# Patient Record
Sex: FEMALE | Race: WHITE | Employment: FULL TIME | URBAN - METROPOLITAN AREA
[De-identification: names, ages, dates, MRNs, and addresses within clinical notes are randomized per-mention and may not be internally consistent; named-entity substitution may affect disease eponyms.]

---

## 2020-01-20 ENCOUNTER — HOSPITAL ENCOUNTER (EMERGENCY)
Age: 40
Discharge: HOME | End: 2020-01-20
Attending: EMERGENCY MEDICINE
Payer: COMMERCIAL

## 2020-01-20 VITALS
WEIGHT: 110 LBS | BODY MASS INDEX: 21.6 KG/M2 | OXYGEN SATURATION: 98 % | HEIGHT: 60 IN | DIASTOLIC BLOOD PRESSURE: 81 MMHG | TEMPERATURE: 97.9 F | HEART RATE: 74 BPM | RESPIRATION RATE: 16 BRPM | SYSTOLIC BLOOD PRESSURE: 129 MMHG

## 2020-01-20 LAB
ALBUMIN SERPL-MCNC: 2.8 GM/DL (ref 3.4–5)
ALP BLD-CCNC: 115 IU/L (ref 40–129)
ALT SERPL-CCNC: 16 U/L (ref 10–40)
ANION GAP SERPL CALCULATED.3IONS-SCNC: 11 MMOL/L (ref 4–16)
AST SERPL-CCNC: 33 IU/L (ref 15–37)
BACTERIA: NEGATIVE /HPF
BASE EXCESS MIXED: ABNORMAL (ref 0–2.3)
BASOPHILS ABSOLUTE: 0.1 K/CU MM
BASOPHILS RELATIVE PERCENT: 1 % (ref 0–1)
BETA-HYDROXYBUTYRATE: 0.5 MG/DL (ref 0–3)
BILIRUB SERPL-MCNC: 0.1 MG/DL (ref 0–1)
BILIRUBIN URINE: NEGATIVE MG/DL
BLOOD, URINE: NEGATIVE
BUN BLDV-MCNC: 10 MG/DL (ref 6–23)
CALCIUM SERPL-MCNC: 8.5 MG/DL (ref 8.3–10.6)
CHLORIDE BLD-SCNC: 87 MMOL/L (ref 99–110)
CHP ED QC CHECK: NORMAL
CLARITY: CLEAR
CO2: 26 MMOL/L (ref 21–32)
COLOR: ABNORMAL
COMMENT: ABNORMAL
CREAT SERPL-MCNC: 1.1 MG/DL (ref 0.6–1.1)
DIFFERENTIAL TYPE: ABNORMAL
EOSINOPHILS ABSOLUTE: 0.3 K/CU MM
EOSINOPHILS RELATIVE PERCENT: 3.4 % (ref 0–3)
GFR AFRICAN AMERICAN: >60 ML/MIN/1.73M2
GFR NON-AFRICAN AMERICAN: 55 ML/MIN/1.73M2
GLUCOSE BLD-MCNC: 319 MG/DL
GLUCOSE BLD-MCNC: 319 MG/DL (ref 70–99)
GLUCOSE BLD-MCNC: 636 MG/DL (ref 70–99)
GLUCOSE, URINE: >500 MG/DL
HCO3 VENOUS: 29.7 MMOL/L (ref 19–25)
HCT VFR BLD CALC: 41.8 % (ref 37–47)
HEMOGLOBIN: 13.6 GM/DL (ref 12.5–16)
IMMATURE NEUTROPHIL %: 0.3 % (ref 0–0.43)
KETONES, URINE: NEGATIVE MG/DL
LEUKOCYTE ESTERASE, URINE: NEGATIVE
LYMPHOCYTES ABSOLUTE: 3 K/CU MM
LYMPHOCYTES RELATIVE PERCENT: 37.1 % (ref 24–44)
MCH RBC QN AUTO: 32.4 PG (ref 27–31)
MCHC RBC AUTO-ENTMCNC: 32.5 % (ref 32–36)
MCV RBC AUTO: 99.5 FL (ref 78–100)
MONOCYTES ABSOLUTE: 0.4 K/CU MM
MONOCYTES RELATIVE PERCENT: 5.3 % (ref 0–4)
MUCUS: ABNORMAL HPF
NITRITE URINE, QUANTITATIVE: NEGATIVE
NUCLEATED RBC %: 0 %
O2 SAT, VEN: 85.4 % (ref 50–70)
PCO2, VEN: 49 MMHG (ref 38–52)
PDW BLD-RTO: 11.8 % (ref 11.7–14.9)
PH VENOUS: 7.39 (ref 7.32–7.42)
PH, URINE: 6 (ref 5–8)
PLATELET # BLD: 370 K/CU MM (ref 140–440)
PMV BLD AUTO: 10.2 FL (ref 7.5–11.1)
PO2, VEN: 53 MMHG (ref 28–48)
POTASSIUM SERPL-SCNC: 4.2 MMOL/L (ref 3.5–5.1)
PROTEIN UA: 100 MG/DL
RBC # BLD: 4.2 M/CU MM (ref 4.2–5.4)
RBC URINE: <1 /HPF (ref 0–6)
SEGMENTED NEUTROPHILS ABSOLUTE COUNT: 4.2 K/CU MM
SEGMENTED NEUTROPHILS RELATIVE PERCENT: 52.9 % (ref 36–66)
SODIUM BLD-SCNC: 124 MMOL/L (ref 135–145)
SPECIFIC GRAVITY UA: 1.03 (ref 1–1.03)
SQUAMOUS EPITHELIAL: 2 /HPF
TOTAL IMMATURE NEUTOROPHIL: 0.02 K/CU MM
TOTAL NUCLEATED RBC: 0 K/CU MM
TOTAL PROTEIN: 6 GM/DL (ref 6.4–8.2)
TRICHOMONAS: ABNORMAL /HPF
TSH HIGH SENSITIVITY: 2.09 UIU/ML (ref 0.27–4.2)
UROBILINOGEN, URINE: NORMAL MG/DL (ref 0.2–1)
WBC # BLD: 8 K/CU MM (ref 4–10.5)
WBC UA: 1 /HPF (ref 0–5)

## 2020-01-20 PROCEDURE — 84443 ASSAY THYROID STIM HORMONE: CPT

## 2020-01-20 PROCEDURE — 82962 GLUCOSE BLOOD TEST: CPT

## 2020-01-20 PROCEDURE — 82010 KETONE BODYS QUAN: CPT

## 2020-01-20 PROCEDURE — 2580000003 HC RX 258: Performed by: PHYSICIAN ASSISTANT

## 2020-01-20 PROCEDURE — 96360 HYDRATION IV INFUSION INIT: CPT

## 2020-01-20 PROCEDURE — 82805 BLOOD GASES W/O2 SATURATION: CPT

## 2020-01-20 PROCEDURE — 2580000003 HC RX 258: Performed by: EMERGENCY MEDICINE

## 2020-01-20 PROCEDURE — 85025 COMPLETE CBC W/AUTO DIFF WBC: CPT

## 2020-01-20 PROCEDURE — 81001 URINALYSIS AUTO W/SCOPE: CPT

## 2020-01-20 PROCEDURE — 80053 COMPREHEN METABOLIC PANEL: CPT

## 2020-01-20 PROCEDURE — 36415 COLL VENOUS BLD VENIPUNCTURE: CPT

## 2020-01-20 PROCEDURE — 99284 EMERGENCY DEPT VISIT MOD MDM: CPT

## 2020-01-20 PROCEDURE — 96361 HYDRATE IV INFUSION ADD-ON: CPT

## 2020-01-20 RX ORDER — 0.9 % SODIUM CHLORIDE 0.9 %
1000 INTRAVENOUS SOLUTION INTRAVENOUS ONCE
Status: COMPLETED | OUTPATIENT
Start: 2020-01-20 | End: 2020-01-20

## 2020-01-20 RX ADMIN — SODIUM CHLORIDE 1000 ML: 9 INJECTION, SOLUTION INTRAVENOUS at 18:56

## 2020-01-20 RX ADMIN — SODIUM CHLORIDE 1000 ML: 9 INJECTION, SOLUTION INTRAVENOUS at 17:40

## 2020-01-20 ASSESSMENT — PAIN SCALES - GENERAL: PAINLEVEL_OUTOF10: 5

## 2020-01-20 ASSESSMENT — PAIN DESCRIPTION - PAIN TYPE: TYPE: CHRONIC PAIN

## 2020-01-20 NOTE — ED PROVIDER NOTES
As provider-in-triage, I performed a medical screening history and physical exam on this patient. HISTORY OF PRESENT ILLNESS  Valeriano Ordoñez is a 35 y.o. female who presents under direction of the lab after finding her blood sugar to be over 700 on lab draw this morning. Patient was obtaining lab work per primary care provider's order for further evaluation of gradual weight loss. Patient denies any associated symptoms. She is currently on Tresiba and has been compliant with this medication with no recent change in doses. She does not check BS at home. PHYSICAL EXAM  BP (!) 143/87   Pulse 83   Temp 97.9 °F (36.6 °C) (Oral)   Resp 17   Ht 5' (1.524 m)   Wt 110 lb (49.9 kg)   SpO2 98%   BMI 21.48 kg/m²     On exam, the patient appears well-hydrated, well-nourished, and in no acute distress. Mucous membranes are moist. Speech is clear. Breathing is unlabored. Skin is dry. Mental status is normal. Moves all extremities, and is without facial droop. Diagnostics and treatment initiated. Patient moved to main ED. Comment: Please note this report has been produced using speech recognition software and may contain errors related to that system including errors in grammar, punctuation, and spelling, as well as words and phrases that may be inappropriate. If there are any questions or concerns please feel free to contact the dictating provider for clarification.         YANNI Reddy  01/20/20 8938

## 2020-01-20 NOTE — ED PROVIDER NOTES
eMERGENCY dEPARTMENT eNCOUnter      PCP: Lakeshia Bernal MD    CHIEF COMPLAINT    Chief Complaint   Patient presents with    Hyperglycemia     >700 out pt blood work. HPI    Alex Stevenson is a 35 y.o. female who presents with hyperglycemia. She states that she had been noncompliant with her Robert Asters for the last few months. She states that she was having issues with the medication, having highs and lows, thus did stop taking it consistently. She reports that she finally followed up with her family doctor today and had blood work done. She states she was called this afternoon and told that her blood glucose was running in the 700s and she needed to go to the emergency department for evaluation. She states she has had intermittent polyuria and polydipsia. She reports lack of appetite and weight loss. She states she had been depressed and started taking antidepressants today which were started by her primary care physician. REVIEW OF SYSTEMS    Constitutional:  Denies fever, chills. HENT:  Denies sore throat or ear pain   Cardiovascular:  Denies chest pain, palpitations   Respiratory:  Denies cough or shortness of breath    GI:  Denies abdominal pain, nausea, vomiting, or diarrhea  :  + urinary symptoms, denies flank pain  Musculoskeletal:  Denies back pain, extremity pain  Skin:  Denies rash, color change  Neurologic:  Denies headache, focal weakness or sensory changes   Lymphatic:  Denies swollen glands, edema    All other review of systems are negative  See HPI and nursing notes for additional information     PAST MEDICAL AND SURGICAL HISTORY    Past Medical History:   Diagnosis Date    Anxiety 11/12    Has d/t stress & crowds per pt.     CAD in native artery 11/20/2012    NSTEMI    CAD S/P percutaneous coronary angioplasty 11/20/2012    PTCA with 2.75 x 22mm Resolute Integrity ILSA in mid RCA- Dr Interiano Friends tunnel syndrome, bilateral     Mild to moderate on right and mild on left.    Chronic low back pain     Cocaine abuse ([facility] [address]     per ERLIDNA Fernando Chart    Depression     DM type 1 (diabetes mellitus, type 1) ([facility] [address] [facility]     H/O cardiac catheterization     H/O echocardiogram 2012-Normal chamber size. Mild regional wall motion abnormalities with normal LV systolic, but abnormal diastolic function. LVEF 50-55%    H/O tubal ligation     History of cholecystectomy     History of complete ECG 2012    Marijuana use     Per ERLINDA Lopez chart    Neuropathy     Panic attacks     H/O after she had MI .  Retinopathy     Seasonal allergies      Past Surgical History:   Procedure Laterality Date     SECTION      CHOLECYSTECTOMY      CORONARY ANGIOPLASTY WITH STENT PLACEMENT  2012    PTCA with 2.75 x 22mm Resollute Integrity ILSA to mid RCA    DIAGNOSTIC CARDIAC CATH LAB PROCEDURE  2012    TUBAL LIGATION         CURRENT MEDICATIONS    Current Outpatient Rx   Medication Sig Dispense Refill    famotidine (PEPCID) 20 MG tablet Take 1 tablet by mouth 2 times daily. 60 tablet 0    sertraline (ZOLOFT) 50 MG tablet Take 50 mg by mouth daily.  hydrOXYzine (VISTARIL) 50 MG capsule Take 50 mg by mouth 3 times daily as needed for Itching.  carvedilol (COREG) 3.125 MG tablet TAKE 1 TABLET TWICE A DAY WITH MEALS 60 tablet 6    aspirin 81 MG tablet Take 81 mg by mouth daily.  insulin glargine (LANTUS) 100 UNIT/ML injection Inject 16 Units into the skin nightly.  insulin aspart (NOVOLOG) 100 UNIT/ML injection Inject  into the skin 3 times daily (before meals). **As per SS.**      nitroGLYCERIN (NITROSTAT) 0.4 MG SL tablet Place 1 tablet under the tongue every 5 minutes as needed for Chest pain. 25 tablet 3       ALLERGIES    Allergies   Allergen Reactions    Tramadol Hives    Vicodin [Hydrocodone-Acetaminophen] Nausea And Vomiting     Pt reports tolerates now.        SOCIAL AND FAMILY HISTORY    Social History Socioeconomic History    Marital status:      Spouse name: None    Number of children: None    Years of education: None    Highest education level: None   Occupational History    None   Social Needs    Financial resource strain: None    Food insecurity:     Worry: None     Inability: None    Transportation needs:     Medical: None     Non-medical: None   Tobacco Use    Smoking status: Current Some Day Smoker     Packs/day: 0.50     Years: 16.00     Pack years: 8.00     Types: Cigarettes     Start date: 12/1/2013    Smokeless tobacco: Never Used    Tobacco comment: . Substance and Sexual Activity    Alcohol use: No     Comment: 2/month    Drug use: Yes     Types: Cocaine, Marijuana     Comment[facility]    Sexual activity: Not Currently     Partners: Male     Comment:    Lifestyle    Physical activity:     Days per week: None     Minutes per session: None    Stress: None   Relationships    Social connections:     Talks on phone: None     Gets together: None     Attends Episcopal service: None     Active member of club or organization: None     Attends meetings of clubs or organizations: None     Relationship status: None    Intimate partner violence:     Fear of current or ex partner: None     Emotionally abused: None     Physically abused: None     Forced sexual activity: None   Other Topics Concern    None   Social History Narrative    None     Family History   Problem Relation Age of Onset    Heart Disease Mother     Diabetes Father          PHYSICAL EXAM    VITAL SIGNS: BP (!) 143/111   Pulse 73   Temp 97.9 °F (36.6 °C) (Oral)   Resp 18   Ht 5' (1.524 m)   Wt 110 lb (49.9 kg)   LMP 04/18/2015   SpO2 99%   BMI 21.48 kg/m²    Constitutional:  Well developed, No acute distress. HENT:  Normocephalic, Atraumatic, PERRL. EOMI. Sclera clear. Conjunctiva normal.  Neck: supple without ridigity  Cardiovascular:  Regular rate and rhythm, No normal.  She was given 2 L of IV fluids, glucose is responding. She is well-appearing, nontoxic. Plan for discharge home with outpatient follow-up with her primary care physician for discussion of her diabetic medications. She is instructed return with new or worsening signs or symptoms. She voiced understanding of the discharge directions and the return precautions. Due to the immediate potential for life-threatening deterioration due to hyperglycemia, I spent 40 minutes providing critical care. This time is excluding time spent performing procedures.     Clinical  IMPRESSION    Hyperglycemia           (Please note the MDM and HPI sections of this note were completed with a voice recognition program.  Efforts were made to edit the dictations but occasionally words are mis-transcribed.)      Kimmy Dillard, DO  01/20/20 2044

## 2021-05-14 PROBLEM — R80.9 MICROALBUMINURIA DUE TO TYPE 2 DIABETES MELLITUS (HCC): Status: ACTIVE | Noted: 2021-05-14

## 2021-05-14 PROBLEM — R80.9 MICROALBUMINURIA: Status: ACTIVE | Noted: 2021-05-14

## 2021-05-14 PROBLEM — E11.29 MICROALBUMINURIA DUE TO TYPE 2 DIABETES MELLITUS (HCC): Status: ACTIVE | Noted: 2021-05-14

## 2021-05-14 PROBLEM — I63.81 LACUNAR CEREBROVASCULAR ACCIDENT (CVA) OF SUBTHALAMIC REGION (HCC): Status: ACTIVE | Noted: 2021-05-14

## 2021-05-14 PROBLEM — N18.30 CHRONIC KIDNEY DISEASE, STAGE III (MODERATE) (HCC): Status: ACTIVE | Noted: 2021-05-14

## 2021-07-12 ENCOUNTER — HOSPITAL ENCOUNTER (OUTPATIENT)
Dept: ULTRASOUND IMAGING | Age: 41
Discharge: HOME | End: 2021-07-12
Payer: COMMERCIAL

## 2021-07-12 DIAGNOSIS — N18.31 STAGE 3A CHRONIC KIDNEY DISEASE (HCC): ICD-10-CM

## 2021-07-12 DIAGNOSIS — E11.29 MICROALBUMINURIA DUE TO TYPE 2 DIABETES MELLITUS (HCC): ICD-10-CM

## 2021-07-12 DIAGNOSIS — R80.9 MICROALBUMINURIA DUE TO TYPE 2 DIABETES MELLITUS (HCC): ICD-10-CM

## 2021-07-12 PROCEDURE — 76775 US EXAM ABDO BACK WALL LIM: CPT

## 2021-08-16 ENCOUNTER — APPOINTMENT (OUTPATIENT)
Dept: GENERAL RADIOLOGY | Age: 41
End: 2021-08-16
Payer: COMMERCIAL

## 2021-08-16 ENCOUNTER — HOSPITAL ENCOUNTER (EMERGENCY)
Age: 41
Discharge: HOME | End: 2021-08-16
Attending: EMERGENCY MEDICINE
Payer: COMMERCIAL

## 2021-08-16 VITALS
RESPIRATION RATE: 19 BRPM | TEMPERATURE: 98.2 F | HEART RATE: 62 BPM | WEIGHT: 130 LBS | DIASTOLIC BLOOD PRESSURE: 100 MMHG | BODY MASS INDEX: 25.52 KG/M2 | SYSTOLIC BLOOD PRESSURE: 124 MMHG | HEIGHT: 60 IN | OXYGEN SATURATION: 98 %

## 2021-08-16 DIAGNOSIS — R53.83 FATIGUE, UNSPECIFIED TYPE: Primary | ICD-10-CM

## 2021-08-16 DIAGNOSIS — E87.5 HYPERKALEMIA: ICD-10-CM

## 2021-08-16 DIAGNOSIS — N17.9 AKI (ACUTE KIDNEY INJURY) (HCC): ICD-10-CM

## 2021-08-16 LAB
ALBUMIN SERPL-MCNC: 3.6 GM/DL (ref 3.4–5)
ALP BLD-CCNC: 97 IU/L (ref 40–129)
ALT SERPL-CCNC: 15 U/L (ref 10–40)
ANION GAP SERPL CALCULATED.3IONS-SCNC: 9 MMOL/L (ref 4–16)
AST SERPL-CCNC: 32 IU/L (ref 15–37)
BACTERIA: ABNORMAL /HPF
BASOPHILS ABSOLUTE: 0.1 K/CU MM
BASOPHILS RELATIVE PERCENT: 1.2 % (ref 0–1)
BILIRUB SERPL-MCNC: 0.3 MG/DL (ref 0–1)
BILIRUBIN URINE: NEGATIVE MG/DL
BLOOD, URINE: NEGATIVE
BUN BLDV-MCNC: 23 MG/DL (ref 6–23)
CALCIUM SERPL-MCNC: 9.1 MG/DL (ref 8.3–10.6)
CHLORIDE BLD-SCNC: 103 MMOL/L (ref 99–110)
CHP ED QC CHECK: YES
CLARITY: ABNORMAL
CO2: 24 MMOL/L (ref 21–32)
COLOR: YELLOW
CREAT SERPL-MCNC: 1.8 MG/DL (ref 0.6–1.1)
DIFFERENTIAL TYPE: ABNORMAL
EKG ATRIAL RATE: 92 BPM
EKG DIAGNOSIS: NORMAL
EKG P AXIS: 75 DEGREES
EKG P-R INTERVAL: 132 MS
EKG Q-T INTERVAL: 354 MS
EKG QRS DURATION: 68 MS
EKG QTC CALCULATION (BAZETT): 437 MS
EKG R AXIS: -16 DEGREES
EKG T AXIS: 97 DEGREES
EKG VENTRICULAR RATE: 92 BPM
EOSINOPHILS ABSOLUTE: 0.5 K/CU MM
EOSINOPHILS RELATIVE PERCENT: 6.8 % (ref 0–3)
GFR AFRICAN AMERICAN: 37 ML/MIN/1.73M2
GFR NON-AFRICAN AMERICAN: 31 ML/MIN/1.73M2
GLUCOSE BLD-MCNC: 163 MG/DL
GLUCOSE BLD-MCNC: 163 MG/DL (ref 70–99)
GLUCOSE, URINE: 50 MG/DL
HCT VFR BLD CALC: 39.6 % (ref 37–47)
HEMOGLOBIN: 12.7 GM/DL (ref 12.5–16)
IMMATURE NEUTROPHIL %: 0.3 % (ref 0–0.43)
KETONES, URINE: NEGATIVE MG/DL
LEUKOCYTE ESTERASE, URINE: NEGATIVE
LYMPHOCYTES ABSOLUTE: 2.1 K/CU MM
LYMPHOCYTES RELATIVE PERCENT: 27.8 % (ref 24–44)
MCH RBC QN AUTO: 31.9 PG (ref 27–31)
MCHC RBC AUTO-ENTMCNC: 32.1 % (ref 32–36)
MCV RBC AUTO: 99.5 FL (ref 78–100)
MONOCYTES ABSOLUTE: 0.5 K/CU MM
MONOCYTES RELATIVE PERCENT: 6 % (ref 0–4)
MUCUS: ABNORMAL HPF
NITRITE URINE, QUANTITATIVE: NEGATIVE
NUCLEATED RBC %: 0 %
PDW BLD-RTO: 12.2 % (ref 11.7–14.9)
PH, URINE: 5 (ref 5–8)
PLATELET # BLD: 413 K/CU MM (ref 140–440)
PMV BLD AUTO: 9.4 FL (ref 7.5–11.1)
POTASSIUM SERPL-SCNC: 5.6 MMOL/L (ref 3.5–5.1)
PROTEIN UA: >500 MG/DL
RBC # BLD: 3.98 M/CU MM (ref 4.2–5.4)
RBC URINE: 4 /HPF (ref 0–6)
SARS-COV-2, NAAT: NOT DETECTED
SEGMENTED NEUTROPHILS ABSOLUTE COUNT: 4.5 K/CU MM
SEGMENTED NEUTROPHILS RELATIVE PERCENT: 57.9 % (ref 36–66)
SODIUM BLD-SCNC: 136 MMOL/L (ref 135–145)
SPECIFIC GRAVITY UA: 1.01 (ref 1–1.03)
SQUAMOUS EPITHELIAL: 31 /HPF
TOTAL IMMATURE NEUTOROPHIL: 0.02 K/CU MM
TOTAL NUCLEATED RBC: 0 K/CU MM
TOTAL PROTEIN: 7.1 GM/DL (ref 6.4–8.2)
TRICHOMONAS: ABNORMAL /HPF
TROPONIN T: <0.01 NG/ML
UROBILINOGEN, URINE: NEGATIVE MG/DL (ref 0.2–1)
WBC # BLD: 7.7 K/CU MM (ref 4–10.5)
WBC UA: 4 /HPF (ref 0–5)

## 2021-08-16 PROCEDURE — 85025 COMPLETE CBC W/AUTO DIFF WBC: CPT

## 2021-08-16 PROCEDURE — 96360 HYDRATION IV INFUSION INIT: CPT

## 2021-08-16 PROCEDURE — 87635 SARS-COV-2 COVID-19 AMP PRB: CPT

## 2021-08-16 PROCEDURE — 99284 EMERGENCY DEPT VISIT MOD MDM: CPT

## 2021-08-16 PROCEDURE — 81001 URINALYSIS AUTO W/SCOPE: CPT

## 2021-08-16 PROCEDURE — 80053 COMPREHEN METABOLIC PANEL: CPT

## 2021-08-16 PROCEDURE — 93010 ELECTROCARDIOGRAM REPORT: CPT | Performed by: INTERNAL MEDICINE

## 2021-08-16 PROCEDURE — 6370000000 HC RX 637 (ALT 250 FOR IP): Performed by: EMERGENCY MEDICINE

## 2021-08-16 PROCEDURE — 84484 ASSAY OF TROPONIN QUANT: CPT

## 2021-08-16 PROCEDURE — 71045 X-RAY EXAM CHEST 1 VIEW: CPT

## 2021-08-16 PROCEDURE — 93005 ELECTROCARDIOGRAM TRACING: CPT | Performed by: PHYSICIAN ASSISTANT

## 2021-08-16 PROCEDURE — 2580000003 HC RX 258: Performed by: EMERGENCY MEDICINE

## 2021-08-16 RX ORDER — 0.9 % SODIUM CHLORIDE 0.9 %
1000 INTRAVENOUS SOLUTION INTRAVENOUS ONCE
Status: COMPLETED | OUTPATIENT
Start: 2021-08-16 | End: 2021-08-16

## 2021-08-16 RX ORDER — ONDANSETRON 2 MG/ML
4 INJECTION INTRAMUSCULAR; INTRAVENOUS EVERY 30 MIN PRN
Status: DISCONTINUED | OUTPATIENT
Start: 2021-08-16 | End: 2021-08-16 | Stop reason: HOSPADM

## 2021-08-16 RX ORDER — HYDROCODONE BITARTRATE AND ACETAMINOPHEN 5; 325 MG/1; MG/1
1 TABLET ORAL ONCE
Status: COMPLETED | OUTPATIENT
Start: 2021-08-16 | End: 2021-08-16

## 2021-08-16 RX ORDER — ASPIRIN 81 MG/1
324 TABLET, CHEWABLE ORAL ONCE
Status: COMPLETED | OUTPATIENT
Start: 2021-08-16 | End: 2021-08-16

## 2021-08-16 RX ORDER — SODIUM POLYSTYRENE SULFONATE 15 G/60ML
15 SUSPENSION ORAL; RECTAL ONCE
Status: COMPLETED | OUTPATIENT
Start: 2021-08-16 | End: 2021-08-16

## 2021-08-16 RX ADMIN — HYDROCODONE BITARTRATE AND ACETAMINOPHEN 1 TABLET: 5; 325 TABLET ORAL at 07:40

## 2021-08-16 RX ADMIN — ASPIRIN 324 MG: 81 TABLET, CHEWABLE ORAL at 07:40

## 2021-08-16 RX ADMIN — SODIUM CHLORIDE 1000 ML: 9 INJECTION, SOLUTION INTRAVENOUS at 08:14

## 2021-08-16 RX ADMIN — SODIUM POLYSTYRENE SULFONATE 15 G: 15 SUSPENSION ORAL; RECTAL at 08:14

## 2021-08-16 ASSESSMENT — PAIN SCALES - GENERAL
PAINLEVEL_OUTOF10: 8
PAINLEVEL_OUTOF10: 8

## 2021-08-16 NOTE — ED PROVIDER NOTES
Emergency Department Encounter    Patient: Blanca Del Angel  MRN: [id number]  : 1980  Date of Evaluation: 2021  ED Provider:  Uday Terrazas MD    Triage Chief Complaint:   Chest Pain and Back Pain    [place]:  Blanca Del Angel is a 42 y.o. female that presents with concern for a week of fatigue, nausea, generally feeling unwell, not wanting to eat. Has some chest tightness, cough. No shortness of breath. She has some lower back pain, bilateral, not midline. It is 8 out of 10. No weakness or numbness in her extremities. No incontinence. She has had no trauma or injury. No known fevers. No diarrhea. She does not know if she could've been exposed to Covid. She is not vaccinated. She has a history of type 1 diabetes. She also has a history of CAD status post stent in . She denies previous back problems, though chronic low back pain is documented in her chart. Reports a stroke in November when she was in [place], affected her left side, will still occasionally have some tingling in the left leg but otherwise completely back to normal.  She is not have any radiation of the pain from her back into her legs. Denies rashes and joint swelling. She is a type I diabetic, blood sugars have been running into the 80s and 90s, been normal for her. She is a diabetic. ROS - see HPI, below listed is current ROS at time of my eval:  10 systems reviewed and negative except as above    Past Medical History:   Diagnosis Date    Anxiety     Has d/t stress & crowds per pt.  CAD in native artery 2012    NSTEMI    CAD S/P percutaneous coronary angioplasty 2012    PTCA with 2.75 x 22mm Resolute Integrity ILSA in mid RCA- Dr Harrell Mood tunnel syndrome, bilateral     Mild to moderate on right and mild on left.  Chronic low back pain     Cocaine abuse ([facility] [address]     per T.  Manuel Parsons Chart    Depression     DM type 1 (diabetes mellitus, type 1) ([facility] [address]     H/O cardiac catheterization     H/O echocardiogram 2012-Normal chamber size. Mild regional wall motion abnormalities with normal LV systolic, but abnormal diastolic function. LVEF 50-55%    H/O tubal ligation     History of cholecystectomy     History of complete ECG 2012    Marijuana use     Per ERLINDA harris    Neuropathy     Panic attacks     H/O after she had MI .  Retinopathy     Seasonal allergies      Past Surgical History:   Procedure Laterality Date     SECTION      CHOLECYSTECTOMY      CORONARY ANGIOPLASTY WITH STENT PLACEMENT  2012    PTCA with 2.75 x 22mm Resollute Integrity ILSA to mid RCA    DIAGNOSTIC CARDIAC CATH LAB PROCEDURE  2012    TUBAL LIGATION       Family History   Problem Relation Age of Onset    Heart Disease Mother     Diabetes Father      Social History     Socioeconomic History    Marital status:      Spouse name: Not on file    Number of children: Not on file    Years of education: Not on file    Highest education level: Not on file   Occupational History    Not on file   Tobacco Use    Smoking status: Current Some Day Smoker     Packs/day: 0.50     Years: 16.00     Pack years: 8.00     Types: Cigarettes     Start date: 2013    Smokeless tobacco: Never Used    Tobacco comment: . Substance and Sexual Activity    Alcohol use: No     Comment: 2/month    Drug use: Yes     Types: Cocaine, Marijuana     Comment: per [facility] chart    Sexual activity: Not Currently     Partners: Male     Comment:    Other Topics Concern    Not on file   Social History Narrative    Not on file     Social Determinants of Health     Financial Resource Strain:     Difficulty of Paying Living Expenses:    Food Insecurity:     Worried About Running Out of Food in the Last Year:     [address] in the Last Year:    Transportation Needs:     Lack of Transportation (Medical):      Lack of Transportation (Non-Medical):    Physical Activity:     Days of Exercise per Week:     Minutes of Exercise per Session:    Stress:     Feeling of Stress :    Social Connections:     Frequency of Communication with Friends and Family:     Frequency of Social Gatherings with Friends and Family:     Attends Judaism Services:     Active Member of Clubs or Organizations:     Attends Club or Organization Meetings:     Marital Status:    Intimate Partner Violence:     Fear of Current or Ex-Partner:     Emotionally Abused:     Physically Abused:     Sexually Abused:      Current Facility-Administered Medications   Medication Dose Route Frequency Provider Last Rate Last Admin    ondansetron (ZOFRAN) injection 4 mg  4 mg Intravenous Q30 Min PRN De Aquino MD         Current Outpatient Medications   Medication Sig Dispense Refill    VITAMIN D PO Take 1 capsule by mouth daily      citalopram (CELEXA) 40 MG tablet Take 40 mg by mouth daily      Insulin Degludec (TRESIBA SC) Inject 15 Units into the skin      rosuvastatin (CRESTOR) 10 MG tablet Take 10 mg by mouth daily      aspirin 81 MG tablet Take 81 mg by mouth daily. Allergies   Allergen Reactions    Tramadol Hives       Nursing Notes Reviewed    Physical Exam:  Triage VS:    ED Triage Vitals [08/16/21 0700]   Enc Vitals Group      BP (!) 142/87      Pulse 93      Resp 18      Temp 98.2 °F (36.8 °C)      Temp Source Oral      SpO2 98 %      Weight 130 lb (59 kg)      Height 5' (1.524 m)      Head Circumference       Peak Flow       Pain Score       Pain Loc       Pain Edu? Excl. in [address]? My pulse ox interpretation is - normal    General appearance:  No acute distress. Skin:  Warm. Dry. Eye:  Extraocular movements intact. Ears, nose, mouth and throat:  Oral mucosa moist   Neck:  Trachea midline. Extremity:  No swelling. Normal ROM     Heart:  Regular rate and rhythm, normal S1 & S2, no extra heart sounds.     Perfusion: intact  Respiratory:  Lungs clear to auscultation bilaterally. Respirations nonlabored. Abdominal:   Soft. Nontender. Non distended. Back: No midline T or L-spine tenderness, no step-offs or deformities. Tender to palpation over bilateral lumbar paraspinal muscles, with reproduction of her pain. Neurological:  Alert and oriented, normal gait, strength 5 and 5 in bilateral lower extremities, sensation intact light touch in bilateral lower extremities, no saddle anesthesia. Deep tendon reflexes intact at bilateral patella. Negative straight leg raise.           Psychiatric:  Appropriate    I have reviewed and interpreted all of the currently available lab results from this visit (if applicable):  Results for orders placed or performed during the hospital encounter of 08/16/21   COVID-19, Rapid    Specimen: Nasopharyngeal   Result Value Ref Range    SARS-CoV-2, NAAT NOT DETECTED NOT DETECTED   CBC Auto Differential   Result Value Ref Range    WBC 7.7 4.0 - 10.5 K/CU MM    RBC 3.98 (L) 4.2 - 5.4 M/CU MM    Hemoglobin 12.7 12.5 - 16.0 GM/DL    Hematocrit 39.6 37 - 47 %    MCV 99.5 78 - 100 FL    MCH 31.9 (H) 27 - 31 PG    MCHC 32.1 32.0 - 36.0 %    RDW 12.2 11.7 - 14.9 %    Platelets 689 181 - 513 K/CU MM    MPV 9.4 7.5 - 11.1 FL    Differential Type AUTOMATED DIFFERENTIAL     Segs Relative 57.9 36 - 66 %    Lymphocytes % 27.8 24 - 44 %    Monocytes % 6.0 (H) 0 - 4 %    Eosinophils % 6.8 (H) 0 - 3 %    Basophils % 1.2 (H) 0 - 1 %    Segs Absolute 4.5 K/CU MM    Lymphocytes Absolute 2.1 K/CU MM    Monocytes Absolute 0.5 K/CU MM    Eosinophils Absolute 0.5 K/CU MM    Basophils Absolute 0.1 K/CU MM    Nucleated RBC % 0.0 %    Total Nucleated RBC 0.0 K/CU MM    Total Immature Neutrophil 0.02 K/CU MM    Immature Neutrophil % 0.3 0 - 0.43 %   Comprehensive Metabolic Panel   Result Value Ref Range    Sodium 136 135 - 145 MMOL/L    Potassium 5.6 (HH) 3.5 - 5.1 MMOL/L    Chloride 103 99 - 110 mMol/L    CO2 24 21 - 32 No ST elevation. No previous compare. MDM:  59-year-old female with history as above presents with multiple complaints, has been feeling ill for a week, fatigue, cough, nausea, no appetite, chest tightness, low back pain. Neurologic exam is unremarkable, no trauma, no fevers. Her symptoms are suggestive of likely some sort of viral illness, she does have cardiac history as above, EKG obtained as above. Troponin added to her labs. I did discuss with her plan to test for COVID-19 and she is agreeable to this. We will also do labs, chest x-ray and reevaluate. I have ordered a dose of pain and nausea medication in addition to a full dose aspirin. Her back pain I do not believe warrants emergent imaging, she has no red flags on history or exam to suggest cauda equina, or traumatic injury, or other emergent cause of her symptoms, appears to be more musculoskeletal.    Found to have Cr slightly elevated with K 5.6, given fluids and kayexalate, other labs reassuring- no acidosis, covid negative. Urinalysis with >500 protein. Discussed with her nephrologist- Dr. Rick Ivey. Plan to hold her lisinopril and f/ui with him next week for BMP, f/u with her PCP as well. Patient agreeable to plan, given strict return precautions, discharged in stable condition. Clinical Impression:  1. Fatigue, unspecified type    2. CRISTIANA (acute kidney injury) ([facility] [address]    3.  Hyperkalemia      Disposition referral (if applicable):  Dangelo Cardenas MD  [address] [facility] [id number]  Adult Hypertension and  [address] [place] [address]  [phone]      for BMP/labs next week    Saurav Savage MD  [address]  [phone]    Schedule an appointment as soon as possible for a visit       [facility] Emergency Department  [address] [facility] [phone]    If symptoms worsen    Disposition medications (if applicable):  Discharge Medication List as of 8/16/2021 10:33 AM        ED Provider Disposition Time  DISPOSITION Decision To Discharge 08/16/2021 10:31:31 AM      Comment: Please note this report has been produced using speech recognition software and may contain errors related to that system including errors in grammar, punctuation, and spelling, as well as words and phrases that may be inappropriate. Efforts were made to edit the dictations.         Alicia Bush MD  08/16/21 2342

## 2021-08-16 NOTE — ED NOTES
Discharge instructions reviewed with patient. follow up was discussed.  Patient denies further questions and verbalizes understanding     Varsha RN  08/16/21 5472

## 2021-08-16 NOTE — PROGRESS NOTES
Medication History  [facility]    Patient Name: Alfredo Nugent 1980     Medication history has been completed by: Lizandro Marquez CPhT    Source(s) of information: patient and insurance claims     Primary Care Physician: Lonnie Soto MD     Pharmacy: [facility]    Allergies as of 08/16/2021 - Fully Reviewed 08/16/2021   Allergen Reaction Noted    Tramadol Hives 07/22/2014        Prior to Admission medications    Medication Sig Start Date End Date Taking? Authorizing Provider   VITAMIN D PO Take 1 capsule by mouth daily   Yes Historical Provider, MD   citalopram (CELEXA) 40 MG tablet Take 40 mg by mouth daily   Yes Historical Provider, MD   Insulin Degludec (TRESIBA SC) Inject 15 Units into the skin   Yes Historical Provider, MD   lisinopril (PRINIVIL;ZESTRIL) 2.5 MG tablet Take 2.5 mg by mouth daily   Yes Historical Provider, MD   rosuvastatin (CRESTOR) 10 MG tablet Take 10 mg by mouth daily   Yes Historical Provider, MD   aspirin 81 MG tablet Take 81 mg by mouth daily. Yes Historical Provider, MD     Medications added or changed (ex. new medication, dosage change, interval change, formulation change):  Vitamin d (added)    Comments:  Medication list reviewed with patient and insurance claims verified. Insulin dosing updated as patient uses [place] 15 units q am.  Patient reports she has taken medications for today.     To my knowledge the above medication history is accurate as of 8/16/2021 9:25 AM.   Lizandro Marquez CPhT   8/16/2021 9:25 AM

## 2021-08-18 PROBLEM — N17.9 AKI (ACUTE KIDNEY INJURY) (HCC): Status: ACTIVE | Noted: 2021-08-18

## 2021-08-18 PROBLEM — E87.5 HYPERPOTASSEMIA: Status: ACTIVE | Noted: 2021-08-18

## 2021-10-25 ENCOUNTER — INITIAL CONSULT (OUTPATIENT)
Dept: CARDIOLOGY CLINIC | Age: 41
End: 2021-10-25
Payer: COMMERCIAL

## 2021-10-25 VITALS
SYSTOLIC BLOOD PRESSURE: 150 MMHG | DIASTOLIC BLOOD PRESSURE: 86 MMHG | HEART RATE: 80 BPM | HEIGHT: 60 IN | WEIGHT: 135.6 LBS | BODY MASS INDEX: 26.62 KG/M2

## 2021-10-25 DIAGNOSIS — I25.10 CAD S/P PERCUTANEOUS CORONARY ANGIOPLASTY: Primary | ICD-10-CM

## 2021-10-25 DIAGNOSIS — I21.4 NSTEMI (NON-ST ELEVATED MYOCARDIAL INFARCTION) (HCC): ICD-10-CM

## 2021-10-25 DIAGNOSIS — Z98.61 CAD S/P PERCUTANEOUS CORONARY ANGIOPLASTY: Primary | ICD-10-CM

## 2021-10-25 DIAGNOSIS — R07.9 CHEST PAIN, UNSPECIFIED TYPE: ICD-10-CM

## 2021-10-25 DIAGNOSIS — E10.59 TYPE 1 DIABETES MELLITUS WITH OTHER CIRCULATORY COMPLICATION (HCC): ICD-10-CM

## 2021-10-25 DIAGNOSIS — I25.10 CAD IN NATIVE ARTERY: ICD-10-CM

## 2021-10-25 PROCEDURE — 93000 ELECTROCARDIOGRAM COMPLETE: CPT | Performed by: INTERNAL MEDICINE

## 2021-10-25 PROCEDURE — 99204 OFFICE O/P NEW MOD 45 MIN: CPT | Performed by: INTERNAL MEDICINE

## 2021-10-25 RX ORDER — METOPROLOL TARTRATE 50 MG/1
50 TABLET, FILM COATED ORAL 2 TIMES DAILY
Qty: 60 TABLET | Refills: 5 | Status: SHIPPED | OUTPATIENT
Start: 2021-10-25 | End: 2022-05-26

## 2021-10-25 NOTE — LETTER
10/25/2021 @[facility]  Patient Name: Tyron Hidalgo  : 1980  MRN# [id number]    REASON FOR VISIT:consult/chest pain  Cardiology Problems  NSTEMI (non-ST elevated myocardial infarction) ([facility] [address]  CAD in native artery  CAD S/P percutaneous coronary angioplasty  Chest pain     Other  Nausea & vomiting  DM type 1 (diabetes mellitus, type 1) ([facility])  Chronic kidney disease, stage III (moderate) ([facility])  Microalbuminuria due to type 2 diabetes mellitus ([facility])  Microalbuminuria  Lacunar cerebrovascular accident (CVA) of subthalamic region ([facility])  Hyperpotassemia    Current Outpatient Medications   Medication Sig Dispense Refill    chlorthalidone (HYGROTON) 25 MG tablet Take 1 tablet by mouth daily 30 tablet 3    amLODIPine (NORVASC) 5 MG tablet Take 1 tablet by mouth daily 30 tablet 2    VITAMIN D PO Take 1 capsule by mouth daily      citalopram (CELEXA) 40 MG tablet Take 40 mg by mouth daily      Insulin Degludec (TRESIBA SC) Inject 15 Units into the skin      rosuvastatin (CRESTOR) 10 MG tablet Take 10 mg by mouth daily      aspirin 81 MG tablet Take 81 mg by mouth daily. No current facility-administered medications for this visit. Smoke: What:                           How much:    Alcohol: How Much:     Caffeine: Pop:         Tea:            Coffee:                Chocolate:    Exercise:    Labs: Lipids:             CBC:       BMP/CMP:          TSH:              A1C:    Last Visit:  Complaints:  Changes:    Last EKG:      STRESS TEST:    This is a Lexiscan nuclear stress test using technetium-based sestamibi. The  patient wa s injected with 10.1 mCi of sestamibi for resting imaging and  myocardial perfusion SPECT was obtained. Subsequently patient was brought to  stress lab and injected with 0.4 mg Lexiscan bolus over 30 seconds, and then  32 mCi of sestamibi for stress imaging.   Baseline EKG showed sinus rhythm  without any EKG changes of ischemia, and during peak exercise there is no  evidence of ischemia. Five-minutes post-Lexiscan blood pressure was 131/77,  pulse 93. Patient complained of chest pain during the study and was given  aminophylline. Myocardial perfusion SPECT is normal in all distributions. No ischemia noted, and EF of 69%. ECHO: 2012  1. Normal chamber sizes. 2.  Mild regional wall motion abnormalities with normal LV systolic, but  abnormal diastolic function    CAROTID: NONE    MUGA: NONE    LAST PACER CHECK: NONE    CARDIAC CATH: 2012    Amio Protocol:    CHADS: [id number] Score for Atrial Fibrillation Stroke Risk   Risk   Factors  Component Value   C CHF No 0   H HTN No 0   A2 Age >= 76 No,  (42 y.o.) 0   D DM Yes 1   S2 Prior Stroke/TIA No 0   V Vascular Disease Yes 1   A Age 74-69 No,  (42 y.o.) 0   Sc Sex female 1    [id number]  Score  3   Score last updated 10/25/21 4:27 AM EDT    Click here for a link to the UpToDate guideline \"Atrial Fibrillation: Anticoagulation therapy to prevent embolization    Disclaimer: Risk Score calculation is dependent on accuracy of patient problem list and past encounter diagnosis.

## 2021-10-25 NOTE — PATIENT INSTRUCTIONS
1. CAD: Known CAD WITH MULTIPLE RISK FACTORS. Had  PTCA with 2.75 x 22mm Resolute Integrity ILSA in mid RCA- now with recurrence of symptoms & abnormal EKG. Will add Metoprolol to the regimen, check Cardiolite perfusion imaging due to baseline abnormal EKG & recommend Cath if significant ischemic burden. 2. Dyslipidemia: No recent Lipid profile, will check  3. Hypertension: Not well controlled. Adjust meds as above  4. DM: Says it is well controlled, last A1c being 6.9  5. CRF: Sees     Office Visit for test results. Please hold on to these instructions the  will call you within 1-9 business days when we receive authorization from your insurance. Echocardiogram    WHAT TO EXPECT:   ? This test will take approximately 45 minutes. ? It is an ultrasound of the heart. ? It can look at the valves and chambers inside the heart   ? There is no special instructions for this test.     If you are unable to keep this appointment, please notify us 24 hours prior to test at [phone]. Please hold on to these instructions the  will call you within 1-9 business days when we receive authorization from your insurance. Nuclear Stress Test    WHAT TO EXPECT:   ? You will need to confirm the test or it could be cancelled. ? This test will take approximately 2 hours: 1 hour in the AM &    1 hour in the PM. You will be given a time by the Technologist after the first part is completed to come back. ? You will be given a medication, through an IV in the hand, this will safely simulate exercise. This IV is also needed to inject the radioactive isotope unless you are able toe walk the treadmill. ? You will receive an injection in the AM & PM before the pictures. ?  Using a special camera, you will have one set of pictures of your heart taken in the AM and a set of pictures in the PM.     PREPARATION FOR TEST:  ? Eat a light breakfast such as water or juice and toast.  ? If you are DIABETIC: Eat a normal breakfast with NO CAFFEINE and take your insulin as normal.   ? AVOID ALL FOODS & DRINKS containing CAFFEINE 12 HOURS PRIOR TO THE TEST: Including coffee, Tea, Cathi and other soft drinks even those labeled  caffeine free or decaffeinated.  ? HOLD THESE MEDICATIONS Persantine & Theophylline (Theodur)  24 hours prior & bring your inhaler with you.

## 2021-10-25 NOTE — LETTER
[address]  [address] [id number]  Phone: [phone]  Fax: [phone]    Ze Malagon MD    October 25, 2021     Myles Clayton MD  [facility] [address]    Patient: Anna Grossman   MR Number: [id number]   YOB: 1980   Date of Visit: 10/25/2021       Dear Myles Clayton: Thank you for referring Rosy Tan to me for evaluation/treatment. Below are the relevant portions of my assessment and plan of care. If you have questions, please do not hesitate to call me. I look forward to following Dayana Caruso along with you.     Sincerely,      Ze Malagon MD

## 2021-10-25 NOTE — PROGRESS NOTES
CARDIAC CONSULT NOTE       Matthias Jara  42 y.o.  female    Chief Complaint   Patient presents with    Coronary Artery Disease    Shortness of Breath       Referring physician:  Peyton Flower MD     Primary care physician:  Peyton Flower MD    History of Present Illness:     Matthias Jara is a 42 y.o. female referred for evaluation and management of CAD. Patient has known H/O CAD, had Stent placed in RCA in . She was lost to F/U now returns with C/O fatigue, weakness & tired. Says these were the symptoms she had before her Heart attack. In addition she C/O Low back pain. Prior to her PCI she had presented with back pain and vomitings X 3 days & had NSTEMI. Her symptoms have been going on for couple of weeks. Her BP has been running high for couple of months. has a past medical history of Anxiety, CAD in native artery, CAD S/P percutaneous coronary angioplasty, Carpal tunnel syndrome, bilateral, Chronic low back pain, Cocaine abuse ([facility] [address] Depression, DM type 1 (diabetes mellitus, type 1) ([facility] [address] H/O cardiac catheterization, H/O echocardiogram, H/O tubal ligation, History of cholecystectomy, History of complete ECG, Marijuana use, Neuropathy, Panic attacks, Retinopathy, and Seasonal allergies. has a past surgical history that includes  section; Tubal ligation; Cholecystectomy; Diagnostic Cardiac Cath Lab Procedure (2012); and Coronary angioplasty with stent (2012). reports that she has quit smoking. Her smoking use included cigarettes. She started smoking about 7 years ago. She has a 8.00 pack-year smoking history. She has never used smokeless tobacco. She reports current drug use. Drugs: Cocaine and Marijuana. She reports that she does not drink alcohol.    family history includes Diabetes in her father; Heart Disease in her mother. Review of Systems:   1.  Cardiovascular: No chest pain, dyspnea on exertion, palpitations or loss of consciousness  2. Respiratory: No cough or wheezing    3. Musculoskeletal:  No gait disturbance, weakness, muscle cramps, aches & pains or joint complaints  4. Neurological: No TIA or stroke symptoms  5. Psychiatric: No anxiety or depression  6. Hematologic/Lymphatic: No bleeding problems, blood clots or swollen lymph nodes    Physical Examination:    BP (!) 150/86 (Site: Left Upper Arm, Position: Sitting, Cuff Size: Medium Adult)   Pulse 80   Ht 5' (1.524 m)   Wt 135 lb 9.6 oz (61.5 kg)   LMP 04/18/2015   BMI 26.48 kg/m²    Wt Readings from Last 3 Encounters:   10/25/21 135 lb 9.6 oz (61.5 kg)   08/18/21 134 lb (60.8 kg)   08/16/21 130 lb (59 kg)     Body mass index is 26.48 kg/m². General Appearance:  Non-obese/Well Nourished  1. Skin: It is warm & dry. No rashes noted. 2. Eyes: No conjunctival Pallor seen. No jaundice noted. 3. Neck: is supple there is no elevation of JVD. No thyromegaly  4. Respiratory:  · Resp Assessment: No abnormal findings. · Resp Auscultation: Vesicular breath sounds without rales or wheezing. 5. Cardiovascular:  · Auscultation: Normal S1 & S2, No prominent murmurs  · Carotid Arteries: No bruits present  · Abdominal Aorta: Non-palpable  · Pedal Pulses: 2+ and equal   6. Abdomen:  · No masses or tenderness  · Liver/Spleen: No Abnormalities Noted, no organomegaly. 7. Musculoskeletal: No joint deformities. No muscle wasting  8. Extremities:  ·  No Cyanosis or Clubbing. No significant edema   9. Rectal / genital:  ·  Deferred  10.  Neurological/Psychiatric:  · Oriented to time, place, and person  · Non-anxious    Lab Results   Component Value Date    TROPONINT <0.010 08/16/2021    TROPONINT <0.010 02/27/2015     BNP:  No results found for: BNP, PROBNP  PT/INR:  No results found for: [employer]  Lab Results   Component Value Date    LABA1C 8.5 (H) 11/20/2012     Lab Results   Component Value Date    CHOL 114 11/20/2012    TRIG 74 11/20/2012    HDL 44 (L) 11/20/2012    LDLDIRECT 58 11/20/2012     Lab Results   Component Value Date    ALT 13 09/17/2021    ALT 15 08/16/2021    AST 29 09/17/2021    AST 32 08/16/2021     BMP:    Lab Results   Component Value Date     09/17/2021     09/17/2021    K 4.9 09/17/2021    K 5.0 09/17/2021     09/17/2021     09/17/2021    CO2 26 09/17/2021    CO2 25 09/17/2021    BUN 17 09/17/2021    BUN 17 09/17/2021    CREATININE 1.6 09/17/2021    CREATININE 1.5 09/17/2021     CMP:    Lab Results   Component Value Date     09/17/2021     09/17/2021    K 4.9 09/17/2021    K 5.0 09/17/2021     09/17/2021     09/17/2021    CO2 26 09/17/2021    CO2 25 09/17/2021    BUN 17 09/17/2021    BUN 17 09/17/2021    CREATININE 1.6 09/17/2021    CREATININE 1.5 09/17/2021    PROT 5.8 09/17/2021    PROT 7.1 08/16/2021    PROT 6.0 11/19/2012    PROT 6.6 08/14/2011     TSH:    Lab Results   Component Value Date    TSHHS 2.090 01/20/2020    TSHHS 0.861 11/20/2012       Echo: none for > 5 years  Stress Cardiolyte: none for > 5 years  EKG: normal sinus rhythm, Lateral ST-T changes C/W ischemia. QUALITY MEASURES REVIEWED:  1.CAD:Patient is taking anti platelet agent:Yes  Patient does not have Hx of documented CAD  2. DYSLIPIDEMIA: Patient is on cholesterol lowering medication:Yes   Patient does not tolerate, secondary to side-effects. 3.Beta-Blocker therapy for CAD, if prior Myocardial Infarction:No   Due to side-effect(s) / Bradycardia  4. Counselled regarding smoking cessation. Yes   Recently quit. 5.Anticoagulation therapy (for A.Fib) No   Does Not have A.Fib.  6.Discussed weight management strategies. Assessment, Recommendations & Tests:     1. CAD: Known CAD WITH MULTIPLE RISK FACTORS. Had  PTCA with 2.75 x 22mm Resolute Integrity ILSA in mid RCA- now with recurrence of symptoms & abnormal EKG.   Will add Metoprolol to the regimen, check Cardiolite perfusion imaging due to baseline abnormal EKG & recommend Cath if significant ischemic burden. 2. Dyslipidemia: No recent Lipid profile, will check  3. Hypertension: Not well controlled. Adjust meds as above  4. DM: Says it is well controlled, last A1c being 6.9  5. CRF: Sees     Office Visit for test results.      Khadra Phillip MD, 10/25/2021 3:46 PM

## 2021-11-04 ENCOUNTER — PROCEDURE VISIT (OUTPATIENT)
Dept: CARDIOLOGY CLINIC | Age: 41
End: 2021-11-04
Payer: COMMERCIAL

## 2021-11-04 DIAGNOSIS — I21.4 NSTEMI (NON-ST ELEVATED MYOCARDIAL INFARCTION) (HCC): ICD-10-CM

## 2021-11-04 DIAGNOSIS — Z98.61 CAD S/P PERCUTANEOUS CORONARY ANGIOPLASTY: ICD-10-CM

## 2021-11-04 DIAGNOSIS — I25.10 CAD S/P PERCUTANEOUS CORONARY ANGIOPLASTY: ICD-10-CM

## 2021-11-04 DIAGNOSIS — I25.10 CAD IN NATIVE ARTERY: ICD-10-CM

## 2021-11-04 DIAGNOSIS — E10.59 TYPE 1 DIABETES MELLITUS WITH OTHER CIRCULATORY COMPLICATION (HCC): ICD-10-CM

## 2021-11-04 DIAGNOSIS — R07.9 CHEST PAIN, UNSPECIFIED TYPE: ICD-10-CM

## 2021-11-04 LAB
LV EF: 50 %
LVEF MODALITY: NORMAL

## 2021-11-04 PROCEDURE — 78452 HT MUSCLE IMAGE SPECT MULT: CPT | Performed by: INTERNAL MEDICINE

## 2021-11-04 PROCEDURE — 93017 CV STRESS TEST TRACING ONLY: CPT | Performed by: INTERNAL MEDICINE

## 2021-11-04 PROCEDURE — 93018 CV STRESS TEST I&R ONLY: CPT | Performed by: INTERNAL MEDICINE

## 2021-11-04 PROCEDURE — A9500 TC99M SESTAMIBI: HCPCS | Performed by: INTERNAL MEDICINE

## 2021-11-04 PROCEDURE — 93016 CV STRESS TEST SUPVJ ONLY: CPT | Performed by: INTERNAL MEDICINE

## 2021-11-05 ENCOUNTER — TELEPHONE (OUTPATIENT)
Dept: CARDIOLOGY CLINIC | Age: 41
End: 2021-11-05

## 2021-11-05 NOTE — TELEPHONE ENCOUNTER
nm:    Abnormal Study.    Medium sized area of mild anterior wall Ischemia.    Normal LV function. LVEF is 50 %     Patient understood.

## 2021-11-08 ENCOUNTER — OFFICE VISIT (OUTPATIENT)
Dept: CARDIOLOGY CLINIC | Age: 41
End: 2021-11-08
Payer: COMMERCIAL

## 2021-11-08 VITALS
HEART RATE: 60 BPM | HEIGHT: 60 IN | DIASTOLIC BLOOD PRESSURE: 82 MMHG | WEIGHT: 136.6 LBS | SYSTOLIC BLOOD PRESSURE: 128 MMHG | BODY MASS INDEX: 26.82 KG/M2

## 2021-11-08 DIAGNOSIS — E10.59 TYPE 1 DIABETES MELLITUS WITH OTHER CIRCULATORY COMPLICATION (HCC): ICD-10-CM

## 2021-11-08 DIAGNOSIS — I21.4 NSTEMI (NON-ST ELEVATED MYOCARDIAL INFARCTION) (HCC): ICD-10-CM

## 2021-11-08 DIAGNOSIS — I25.10 CAD S/P PERCUTANEOUS CORONARY ANGIOPLASTY: Primary | ICD-10-CM

## 2021-11-08 DIAGNOSIS — I25.10 CAD IN NATIVE ARTERY: ICD-10-CM

## 2021-11-08 DIAGNOSIS — Z98.61 CAD S/P PERCUTANEOUS CORONARY ANGIOPLASTY: Primary | ICD-10-CM

## 2021-11-08 LAB
CHOLESTEROL, TOTAL: 145 MG/DL
CHOLESTEROL/HDL RATIO: ABNORMAL
HDLC SERPL-MCNC: 79 MG/DL (ref 35–70)
LDL CHOLESTEROL CALCULATED: 48 MG/DL (ref 0–160)
NONHDLC SERPL-MCNC: ABNORMAL MG/DL
TRIGL SERPL-MCNC: 90 MG/DL
VLDLC SERPL CALC-MCNC: 18 MG/DL

## 2021-11-08 PROCEDURE — 99213 OFFICE O/P EST LOW 20 MIN: CPT | Performed by: INTERNAL MEDICINE

## 2021-11-08 RX ORDER — NITROGLYCERIN 0.4 MG/1
0.4 TABLET SUBLINGUAL EVERY 5 MIN PRN
Qty: 25 TABLET | Refills: 3 | Status: SHIPPED | OUTPATIENT
Start: 2021-11-08

## 2021-11-08 NOTE — PROGRESS NOTES
Sweta Shoemaker is a 42 y.o. female who has    CHIEF COMPLAINT AS FOLLOWS:  CHEST PAIN: Patient denies any C/O chest pains at this time. Still has the back pain, her angina equivalent   SOB:Has SOB with exertion but no change over previous noted. Class 2. LEG EDEMA: No leg edema   PALPITATIONS: Denies any C/O Palpitations   DIZZINESS:  C/O Dizziness once in awhile SYNCOPE: None   OTHER:                                     HPI: Patient is here for F/U on her CAD problems. CAD: Patient has known CAD. Had angioplasty in the past.                Current Outpatient Medications   Medication Sig Dispense Refill    metoprolol tartrate (LOPRESSOR) 50 MG tablet Take 1 tablet by mouth 2 times daily 60 tablet 5    chlorthalidone (HYGROTON) 25 MG tablet Take 1 tablet by mouth daily 30 tablet 3    amLODIPine (NORVASC) 5 MG tablet Take 1 tablet by mouth daily 30 tablet 2    VITAMIN D PO Take 1 capsule by mouth daily      citalopram (CELEXA) 40 MG tablet Take 40 mg by mouth daily      Insulin Degludec (TRESIBA SC) Inject 15 Units into the skin      rosuvastatin (CRESTOR) 10 MG tablet Take 10 mg by mouth daily      aspirin 81 MG tablet Take 81 mg by mouth daily. No current facility-administered medications for this visit.      Allergies: Tramadol  Review of Systems:    Constitutional: Negative for diaphoresis and fatigue  Respiratory: Negative for shortness of breath  Cardiovascular: Negative for chest pain, dyspnea on exertion, claudication, edema, irregular heartbeat, murmur, palpitations or shortness of breath  Musculoskeletal: Negative for muscle pain, muscular weakness, negative for pain in arm and leg or swelling in foot and leg    Objective:  /82   Pulse 60   Ht 5' (1.524 m)   Wt 136 lb 9.6 oz (62 kg)   LMP 04/18/2015   BMI 26.68 kg/m²   Wt Readings from Last 3 Encounters:   11/08/21 136 lb 9.6 oz (62 kg)   10/25/21 135 lb 9.6 oz (61.5 kg)   08/18/21 134 lb (60.8 kg)     Body mass index is 26.68 kg/m². GENERAL - Alert, oriented, pleasant, in no apparent distress. EYES: No jaundice, no conjunctival pallor. Neck - Supple. No jugular venous distention noted. No carotid bruits. Cardiovascular  Normal S1 and S2 without obvious murmur or gallop. Extremities - No cyanosis, clubbing, or significant edema. Pulmonary  No respiratory distress. No wheezes or rales.       Lab Review   Lab Results   Component Value Date    TROPONINT <0.010 08/16/2021    TROPONINT <0.010 02/27/2015     No results found for: BNP, PROBNP  No results found for: INR  Lab Results   Component Value Date    LABA1C 8.5 (H) 11/20/2012     Lab Results   Component Value Date    WBC 7.7 08/16/2021    WBC 8.0 01/20/2020    HCT 39.6 08/16/2021    HCT 41.8 01/20/2020    MCV 99.5 08/16/2021    MCV 99.5 01/20/2020     08/16/2021     01/20/2020     Lab Results   Component Value Date    CHOL 114 11/20/2012    TRIG 74 11/20/2012    HDL 44 (L) 11/20/2012    LDLDIRECT 58 11/20/2012     Lab Results   Component Value Date    ALT 13 09/17/2021    ALT 15 08/16/2021    AST 29 09/17/2021    AST 32 08/16/2021     BMP:    Lab Results   Component Value Date     09/17/2021     09/17/2021    K 4.9 09/17/2021    K 5.0 09/17/2021     09/17/2021     09/17/2021    CO2 26 09/17/2021    CO2 25 09/17/2021    BUN 17 09/17/2021    BUN 17 09/17/2021    CREATININE 1.6 09/17/2021    CREATININE 1.5 09/17/2021     CMP:   Lab Results   Component Value Date     09/17/2021     09/17/2021    K 4.9 09/17/2021    K 5.0 09/17/2021     09/17/2021     09/17/2021    CO2 26 09/17/2021    CO2 25 09/17/2021    BUN 17 09/17/2021    BUN 17 09/17/2021    CREATININE 1.6 09/17/2021    CREATININE 1.5 09/17/2021    PROT 5.8 09/17/2021    PROT 7.1 08/16/2021    PROT 6.0 11/19/2012    PROT 6.6 08/14/2011     Lab Results   Component Value Date    [facility] 2.090 01/20/2020 [facility] 0.861 11/20/2012     CARDIOLITE 11/2021    Abnormal Study.    Medium sized area of mild anterior wall Ischemia.    Normal LV function. LVEF is 50 %     QUALITY MEASURES REVIEWED:  1.CAD:Patient is taking anti platelet agent:Yes  2. DYSLIPIDEMIA: Patient is on cholesterol lowering medication:Yes   3. Beta-Blocker therapy for CAD, if prior Myocardial Infarction:Yes   4. Counselled regarding smoking cessation. No   Patient does not Smoke. 5.Anticoagulation therapy (for A.Fib) No   Does Not have A.Fib.  6.Discussed weight management strategies. Assessment & Plan:  Primary / Secondary prevention is the goal by aggressive risk modification, healthy and therapeutic life style changes for cardiovascular risk reduction. CORONARY ARTERY DISEASE:Yes  clinically stable. Patient is on optimal medical regimen ( see medication list above )  Patient is currently  symptomatic from CAD. -changes in  treatment:   no  -Testing ordered:  yes, if Nephrology agrees Cath  [facility] classification: 3  [place] RISK SCORE:  [place] RISK SCORE:  HYPERTENSION:Yes  well controlled on current medical regimen, see list above. - changes in  treatment:   no   DYSLIPIDEMIA: yes,   Patient's profile is at / near Goal.yes,   HDL is low   Tolerating current medical regimen wellyes,   See most recent Lab values in Labs section above. OTHER RELEVANT DIAGNOSIS:as noted in patient's active problem list:    TESTS ORDERED: Left heart Cath after Nephrology input. PREVIOUSLY ORDERED TESTS REVIEWED & DISCUSSED WITH THE PATIENT:     I personally reviewed & interpreted, all previously ordered tests as copied above. Latest Labs are pulled in to the note with dates. Labs, specially in Reference to Lipid profile, Cardiac testing in the form of Echo, stress tests & other relevant cardiac testing reviewed with patient & recommendations made based on assessment of the results.      MEDICATIONS: List of medications patient is currently taking is reviewed in detail with the patient & family member present. Discussed any side effects or problems taking the medication. Recommend Continue present management & medications as listed. + add SL NTG for PRN use. AFFIRMATION: I spent at least 30 minutes of time reviewing patient's history, previous & current medical problems & all Labs + testing. This includes chart prep even prior to the vosit. Various goals are discussed and multiple questions answered. Relevant concelling performed. Office follow up after Cath.

## 2021-11-08 NOTE — LETTER
2021 @10:30  Patient Name: Mukund Biswas  : 1980  MRN# [id number]    REASON FOR VISIT:   Cardiology Problems  NSTEMI (non-ST elevated myocardial infarction) ([facility] [address]    CAD in native artery    CAD S/P percutaneous coronary angioplasty    Chest pain       Other  Nausea & vomiting    DM type 1 (diabetes mellitus, type 1) ([facility])      Current Outpatient Medications   Medication Sig Dispense Refill    metoprolol tartrate (LOPRESSOR) 50 MG tablet Take 1 tablet by mouth 2 times daily 60 tablet 5    chlorthalidone (HYGROTON) 25 MG tablet Take 1 tablet by mouth daily 30 tablet 3    amLODIPine (NORVASC) 5 MG tablet Take 1 tablet by mouth daily 30 tablet 2    VITAMIN D PO Take 1 capsule by mouth daily      citalopram (CELEXA) 40 MG tablet Take 40 mg by mouth daily      Insulin Degludec (TRESIBA SC) Inject 15 Units into the skin      rosuvastatin (CRESTOR) 10 MG tablet Take 10 mg by mouth daily      aspirin 81 MG tablet Take 81 mg by mouth daily. No current facility-administered medications for this visit. Smoke: What:                           How much:    Alcohol: How Much:     Caffeine: Pop:         Tea:            Coffee:                Chocolate:    Exercise:    Labs: Lipids:             CBC:       BMP/CMP:          TSH:              A1C:    Last Visit:  Complaints:  Changes:    Last EKG:      STRESS TEST:  2021  Abnormal Study.    Medium sized area of mild anterior wall Ischemia.    Normal LV function.  LVEF is 50 %     Patient wanted to come in before echo to talk about this abnormal     ECHO: having done on 2021    CAROTID: NONE    MUGA: NONE    LAST PACER CHECK: NONE    CARDIAC CATH: 2012    Amio Protocol:    CHADS: [id number] Score for Atrial Fibrillation Stroke Risk   Risk   Factors  Component Value   C CHF No 0   H HTN No 0   A2 Age >= 75 No,  (42 y.o.) 0   D DM Yes 1   S2 Prior Stroke/TIA No 0   V Vascular Disease Yes 1   A Age 74-69 No,  (42 y.o.) 0   Sc Sex female 1    [id number]  Score  3   Score last updated 11/8/21 7:38 AM EST    Click here for a link to the UpToDate guideline \"Atrial Fibrillation: Anticoagulation therapy to prevent embolization    Disclaimer: Risk Score calculation is dependent on accuracy of patient problem list and past encounter diagnosis.

## 2021-11-09 ENCOUNTER — TELEPHONE (OUTPATIENT)
Dept: CARDIOLOGY CLINIC | Age: 41
End: 2021-11-09

## 2021-11-09 DIAGNOSIS — Z01.810 PRE-OPERATIVE CARDIOVASCULAR EXAMINATION: Primary | ICD-10-CM

## 2021-11-09 NOTE — TELEPHONE ENCOUNTER
I talk to [facility] and he show me the test between him and Dr. Atul Sigala. I called the patient to make sure everything is correct. The patient said yes and that once we have a date and time she will call Dr. Atul Sigala office to let them know. I told her one of the coworker will be working on the paper work.

## 2021-11-09 NOTE — TELEPHONE ENCOUNTER
Patient called stating that she just heard from Dr. Jackelin Bullard and she is ready to schedule her cath, please call her back at ph# [phone].

## 2021-11-15 ENCOUNTER — NURSE ONLY (OUTPATIENT)
Dept: CARDIOLOGY CLINIC | Age: 41
End: 2021-11-15
Payer: COMMERCIAL

## 2021-11-15 ENCOUNTER — HOSPITAL ENCOUNTER (OUTPATIENT)
Age: 41
Discharge: HOME | End: 2021-11-15
Payer: COMMERCIAL

## 2021-11-15 ENCOUNTER — HOSPITAL ENCOUNTER (OUTPATIENT)
Age: 41
Setting detail: SPECIMEN
Discharge: HOME | End: 2021-11-15
Payer: COMMERCIAL

## 2021-11-15 VITALS — DIASTOLIC BLOOD PRESSURE: 80 MMHG | TEMPERATURE: 97.2 F | SYSTOLIC BLOOD PRESSURE: 116 MMHG

## 2021-11-15 DIAGNOSIS — Z01.810 PRE-OPERATIVE CARDIOVASCULAR EXAMINATION: ICD-10-CM

## 2021-11-15 LAB
ABO/RH: NORMAL
ANTIBODY SCREEN: NEGATIVE
COMMENT: NORMAL

## 2021-11-15 PROCEDURE — 86850 RBC ANTIBODY SCREEN: CPT

## 2021-11-15 PROCEDURE — 99211 OFF/OP EST MAY X REQ PHY/QHP: CPT | Performed by: INTERNAL MEDICINE

## 2021-11-15 PROCEDURE — 86901 BLOOD TYPING SEROLOGIC RH(D): CPT

## 2021-11-15 PROCEDURE — U0005 INFEC AGEN DETEC AMPLI PROBE: HCPCS

## 2021-11-15 PROCEDURE — U0003 INFECTIOUS AGENT DETECTION BY NUCLEIC ACID (DNA OR RNA); SEVERE ACUTE RESPIRATORY SYNDROME CORONAVIRUS 2 (SARS-COV-2) (CORONAVIRUS DISEASE [COVID-19]), AMPLIFIED PROBE TECHNIQUE, MAKING USE OF HIGH THROUGHPUT TECHNOLOGIES AS DESCRIBED BY CMS-2020-01-R: HCPCS

## 2021-11-15 PROCEDURE — 36415 COLL VENOUS BLD VENIPUNCTURE: CPT

## 2021-11-15 PROCEDURE — 86900 BLOOD TYPING SEROLOGIC ABO: CPT

## 2021-11-15 NOTE — PROGRESS NOTES
Patient informed of instructions and guidance for performing test.  Throat swab performed. Swab placed into labeled collection tube. Collection tube and lab order placed in plastic bag. Bag placed in biohazard bag and placed in fridge.  called for . Patient here in office & educated on [facility] for Dx: ABN NM, scheduled for 11/18/21 @ 8, w/arrival @ 6 AM, @ [address] [facility]. Risks explained; & consents signed. Pre-admission orders given to pt for labs & CXR, which are due TODAY @ [facility]. Instructions given to pt to: aby [place] after midnight the night before procedure. Patient to call hospital @ [phone] to pre-register. May take morning meds the morning of procedure. Patient was notified that procedure could be delayed due to an emergency. Patient voiced understanding. Copies of consent, pre-testing orders, & info. sheet scanned into media.

## 2021-11-17 ENCOUNTER — TELEPHONE (OUTPATIENT)
Dept: CARDIOLOGY CLINIC | Age: 41
End: 2021-11-17

## 2021-11-17 ENCOUNTER — PROCEDURE VISIT (OUTPATIENT)
Dept: CARDIOLOGY CLINIC | Age: 41
End: 2021-11-17
Payer: COMMERCIAL

## 2021-11-17 DIAGNOSIS — E10.59 TYPE 1 DIABETES MELLITUS WITH OTHER CIRCULATORY COMPLICATION (HCC): ICD-10-CM

## 2021-11-17 DIAGNOSIS — Z01.818 PRE-OP EXAMINATION: Primary | ICD-10-CM

## 2021-11-17 DIAGNOSIS — I25.10 CAD S/P PERCUTANEOUS CORONARY ANGIOPLASTY: ICD-10-CM

## 2021-11-17 DIAGNOSIS — Z98.61 CAD S/P PERCUTANEOUS CORONARY ANGIOPLASTY: ICD-10-CM

## 2021-11-17 DIAGNOSIS — R07.9 CHEST PAIN, UNSPECIFIED TYPE: Primary | ICD-10-CM

## 2021-11-17 DIAGNOSIS — I21.4 NSTEMI (NON-ST ELEVATED MYOCARDIAL INFARCTION) (HCC): ICD-10-CM

## 2021-11-17 DIAGNOSIS — I25.10 CAD IN NATIVE ARTERY: ICD-10-CM

## 2021-11-17 LAB
LV EF: 58 %
LVEF MODALITY: NORMAL
SARS-COV-2: NOT DETECTED
SOURCE: NORMAL

## 2021-11-17 PROCEDURE — 93306 TTE W/DOPPLER COMPLETE: CPT | Performed by: INTERNAL MEDICINE

## 2021-11-17 NOTE — TELEPHONE ENCOUNTER
Patient was to follow up with Dr. Hira Webb about labs prior to procedure. Called and left her a message to give the office back to discuss before 5 pm if she gets the message.

## 2021-11-17 NOTE — TELEPHONE ENCOUNTER
Called cath lab to inform them that Dr. Malou Harris had cleared patient for heart cath and he was going to check her labs prior to the cath tomorrow. If this is done we do not have the results. Spoke to Ryan Kim and informed her that patient may need labs in the am. She verbalized understanding of all information given.

## 2021-11-17 NOTE — TELEPHONE ENCOUNTER
Shantell Tim stated the girl toshia in Dr. [facility] office did not call with order for labs. Called cath lab spoke to Ryan Kim they are going to be drawn stat on arrival to the cath lab.

## 2021-11-17 NOTE — H&P
HISTORY & PHYSICAL        CHIEF COMPLAINT: Angina equivalent symptoms. HISTORY OF PRESENT ILLNESS:  Demetra Ortega is a 42 y.o. female with known H/O CAD with PCI in the fast.  Her Cardiolite perfusion imaging is also abnormal.  A Nephrology opinion was obtained before arranging a Cath    Brian Jenkins has the following history recorded in [facility]:  Patient Active Problem List    Diagnosis Date Noted    Hyperpotassemia 08/18/2021    CRISTIANA (acute kidney injury) ([facility] 08/18/2021    Chronic kidney disease, stage III (moderate) ([facility] 05/14/2021    Microalbuminuria due to type 2 diabetes mellitus ([facility] 05/14/2021    Microalbuminuria 05/14/2021    Lacunar cerebrovascular accident (CVA) of subthalamic region ([facility] 05/14/2021    Chest pain     CAD in native artery 11/20/2012    CAD S/P percutaneous coronary angioplasty 11/20/2012    [facility], [facility]     NSTEMI (non-ST elevated myocardial infarction) ([facility] 11/19/2012    Nausea & vomiting 11/19/2012     Current Outpatient Medications   Medication Sig Dispense Refill    nitroGLYCERIN (NITROSTAT) 0.4 MG SL tablet Place 1 tablet under the tongue every 5 minutes as needed for Chest pain up to max of 3 total doses. If no relief after 1 dose, call 911. 25 tablet 3    metoprolol tartrate (LOPRESSOR) 50 MG tablet Take 1 tablet by mouth 2 times daily 60 tablet 5    chlorthalidone (HYGROTON) 25 MG tablet Take 1 tablet by mouth daily 30 tablet 3    amLODIPine (NORVASC) 5 MG tablet Take 1 tablet by mouth daily 30 tablet 2    VITAMIN D PO Take 1 capsule by mouth daily      citalopram (CELEXA) 40 MG tablet Take 40 mg by mouth daily      Insulin Degludec (TRESIBA SC) Inject 15 Units into the skin      rosuvastatin (CRESTOR) 10 MG tablet Take 10 mg by mouth daily      aspirin 81 MG tablet Take 81 mg by mouth daily.        No current facility-administered Documented  Constitutional: Neg    Objective:    /82   Pulse 60   Ht 5' (1.524 m)   Wt 136 lb 9.6 oz (62 kg)   LMP 04/18/2015   BMI 26.68 kg/m²    LMP 04/18/2015   Wt Readings from Last 3 Encounters:   11/08/21 136 lb 9.6 oz (62 kg)   10/25/21 135 lb 9.6 oz (61.5 kg)   08/18/21 134 lb (60.8 kg)     GENERAL - Alert, oriented, pleasant, in no apparent distress. HEENT - Unremarkable. Neck - Supple. No jugular venous distention noted. No carotid bruits. Cardiovascular - Normal S1 and S2 without obvious murmur or gallop. Extremities - No cyanosis, clubbing, or significant edema. Pulmonary - No respiratory distress. No wheezes or rales. Abdomen - No masses, tenderness, or organomegaly. Musculoskeletal - No significant edema. Neurologic - Cranial nerves II through XII are grossly intact. There were no gross focal neurologic abnormalities.     Lab Review   Lab Results   Component Value Date    TROPONINI 8.269 11/20/2012     BNP:  No results found for: BNP  PT/INR:  No results found for: PTINR  Lab Results   Component Value Date    LABA1C 8.5 (H) 11/20/2012     Lab Results   Component Value Date    CHOL 145 11/04/2021    TRIG 90 11/04/2021    HDL 79 (A) 11/04/2021    LDLCALC 48 11/04/2021    LDLDIRECT 58 11/20/2012     Lab Results   Component Value Date    ALT 13 09/17/2021    AST 29 09/17/2021     BMP:    Lab Results   Component Value Date     09/17/2021     09/17/2021    K 4.9 09/17/2021    K 5.0 09/17/2021     09/17/2021     09/17/2021    CO2 26 09/17/2021    CO2 25 09/17/2021    BUN 17 09/17/2021    BUN 17 09/17/2021     CMP:   Lab Results   Component Value Date     09/17/2021     09/17/2021    K 4.9 09/17/2021    K 5.0 09/17/2021     09/17/2021     09/17/2021    CO2 26 09/17/2021    CO2 25 09/17/2021    BUN 17 09/17/2021    BUN 17 09/17/2021    PROT 5.8 09/17/2021    PROT 6.0 11/19/2012     TSH:  No results found for: TSH      Impression: Patient Active Problem List   Diagnosis    NSTEMI (non-ST elevated myocardial infarction) [facility]    Nausea & vomiting    DM type 1 (diabetes mellitus, type 1) ([facility]    CAD in native artery    CAD S/P percutaneous coronary angioplasty    Chest pain    [facility], [facility]    Microalbuminuria due to type 2 diabetes mellitus ([facility]    Microalbuminuria    Lacunar cerebrovascular accident (CVA) of subthalamic region ([facility]    Hyperpotassemia    [facility]       Plan:  Left Heart Cath possible PCI. Informed consent obtained. ASA & Mallampati 2:2  Further recommendations to be based on Cath findings.

## 2021-11-18 ENCOUNTER — HOSPITAL ENCOUNTER (OUTPATIENT)
Dept: CARDIAC CATH/INVASIVE PROCEDURES | Age: 41
Discharge: HOME | End: 2021-11-18
Attending: INTERNAL MEDICINE | Admitting: INTERNAL MEDICINE
Payer: COMMERCIAL

## 2021-11-18 VITALS
DIASTOLIC BLOOD PRESSURE: 93 MMHG | TEMPERATURE: 97 F | SYSTOLIC BLOOD PRESSURE: 133 MMHG | BODY MASS INDEX: 26.5 KG/M2 | WEIGHT: 135 LBS | RESPIRATION RATE: 20 BRPM | OXYGEN SATURATION: 98 % | HEIGHT: 60 IN | HEART RATE: 72 BPM

## 2021-11-18 PROBLEM — I10 PRIMARY HYPERTENSION: Status: ACTIVE | Noted: 2021-11-18

## 2021-11-18 PROBLEM — E78.5 DYSLIPIDEMIA: Status: ACTIVE | Noted: 2021-11-18

## 2021-11-18 LAB
ACTIVATED CLOTTING TIME, LOW RANGE: 157 SEC
ACTIVATED CLOTTING TIME, LOW RANGE: 188 SEC
ANION GAP SERPL CALCULATED.3IONS-SCNC: 8 MMOL/L (ref 4–16)
APTT: 25.4 SECONDS (ref 25.1–37.1)
BUN BLDV-MCNC: 30 MG/DL (ref 6–23)
CALCIUM SERPL-MCNC: 8.7 MG/DL (ref 8.3–10.6)
CHLORIDE BLD-SCNC: 101 MMOL/L (ref 99–110)
CO2: 23 MMOL/L (ref 21–32)
CREAT SERPL-MCNC: 2 MG/DL (ref 0.6–1.1)
GFR AFRICAN AMERICAN: 33 ML/MIN/1.73M2
GFR NON-AFRICAN AMERICAN: 27 ML/MIN/1.73M2
GLUCOSE BLD-MCNC: 145 MG/DL (ref 70–99)
HCT VFR BLD CALC: 33.8 % (ref 37–47)
HEMOGLOBIN: 11.1 GM/DL (ref 12.5–16)
INR BLD: 0.75 INDEX
MCH RBC QN AUTO: 32.3 PG (ref 27–31)
MCHC RBC AUTO-ENTMCNC: 32.8 % (ref 32–36)
MCV RBC AUTO: 98.3 FL (ref 78–100)
PDW BLD-RTO: 11.6 % (ref 11.7–14.9)
PLATELET # BLD: 343 K/CU MM (ref 140–440)
PMV BLD AUTO: 10.1 FL (ref 7.5–11.1)
POTASSIUM SERPL-SCNC: 5.5 MMOL/L (ref 3.5–5.1)
PROTHROMBIN TIME: 9.7 SECONDS (ref 11.7–14.5)
RBC # BLD: 3.44 M/CU MM (ref 4.2–5.4)
SODIUM BLD-SCNC: 132 MMOL/L (ref 135–145)
WBC # BLD: 11.9 K/CU MM (ref 4–10.5)

## 2021-11-18 PROCEDURE — 2709999900 HC NON-CHARGEABLE SUPPLY

## 2021-11-18 PROCEDURE — 85730 THROMBOPLASTIN TIME PARTIAL: CPT

## 2021-11-18 PROCEDURE — 93458 L HRT ARTERY/VENTRICLE ANGIO: CPT

## 2021-11-18 PROCEDURE — C1760 CLOSURE DEV, VASC: HCPCS

## 2021-11-18 PROCEDURE — 2500000003 HC RX 250 WO HCPCS

## 2021-11-18 PROCEDURE — 2580000003 HC RX 258: Performed by: INTERNAL MEDICINE

## 2021-11-18 PROCEDURE — 93572 IV DOP VEL&/PRESS C FLO EA: CPT

## 2021-11-18 PROCEDURE — 85027 COMPLETE CBC AUTOMATED: CPT

## 2021-11-18 PROCEDURE — 93571 IV DOP VEL&/PRESS C FLO 1ST: CPT | Performed by: INTERNAL MEDICINE

## 2021-11-18 PROCEDURE — 85347 COAGULATION TIME ACTIVATED: CPT

## 2021-11-18 PROCEDURE — C1894 INTRO/SHEATH, NON-LASER: HCPCS

## 2021-11-18 PROCEDURE — 80048 BASIC METABOLIC PNL TOTAL CA: CPT

## 2021-11-18 PROCEDURE — 93572 IV DOP VEL&/PRESS C FLO EA: CPT | Performed by: INTERNAL MEDICINE

## 2021-11-18 PROCEDURE — 93571 IV DOP VEL&/PRESS C FLO 1ST: CPT

## 2021-11-18 PROCEDURE — C1887 CATHETER, GUIDING: HCPCS

## 2021-11-18 PROCEDURE — 85610 PROTHROMBIN TIME: CPT

## 2021-11-18 PROCEDURE — 6360000002 HC RX W HCPCS

## 2021-11-18 PROCEDURE — 2720000010 HC SURG SUPPLY STERILE

## 2021-11-18 PROCEDURE — 6360000004 HC RX CONTRAST MEDICATION

## 2021-11-18 PROCEDURE — 6370000000 HC RX 637 (ALT 250 FOR IP): Performed by: INTERNAL MEDICINE

## 2021-11-18 PROCEDURE — 93458 L HRT ARTERY/VENTRICLE ANGIO: CPT | Performed by: INTERNAL MEDICINE

## 2021-11-18 RX ORDER — SODIUM CHLORIDE 0.9 % (FLUSH) 0.9 %
5-40 SYRINGE (ML) INJECTION EVERY 12 HOURS SCHEDULED
Status: DISCONTINUED | OUTPATIENT
Start: 2021-11-18 | End: 2021-11-18 | Stop reason: HOSPADM

## 2021-11-18 RX ORDER — ACETAMINOPHEN 325 MG/1
650 TABLET ORAL EVERY 4 HOURS PRN
Status: DISCONTINUED | OUTPATIENT
Start: 2021-11-18 | End: 2021-11-18 | Stop reason: HOSPADM

## 2021-11-18 RX ORDER — SODIUM CHLORIDE 0.9 % (FLUSH) 0.9 %
5-40 SYRINGE (ML) INJECTION PRN
Status: DISCONTINUED | OUTPATIENT
Start: 2021-11-18 | End: 2021-11-18 | Stop reason: HOSPADM

## 2021-11-18 RX ORDER — DIPHENHYDRAMINE HCL 25 MG
25 TABLET ORAL ONCE
Status: COMPLETED | OUTPATIENT
Start: 2021-11-18 | End: 2021-11-18

## 2021-11-18 RX ORDER — SODIUM CHLORIDE 9 MG/ML
INJECTION, SOLUTION INTRAVENOUS CONTINUOUS
Status: DISCONTINUED | OUTPATIENT
Start: 2021-11-18 | End: 2021-11-18 | Stop reason: HOSPADM

## 2021-11-18 RX ORDER — DIAZEPAM 5 MG/1
5 TABLET ORAL ONCE
Status: COMPLETED | OUTPATIENT
Start: 2021-11-18 | End: 2021-11-18

## 2021-11-18 RX ORDER — SODIUM CHLORIDE 9 MG/ML
25 INJECTION, SOLUTION INTRAVENOUS PRN
Status: DISCONTINUED | OUTPATIENT
Start: 2021-11-18 | End: 2021-11-18 | Stop reason: HOSPADM

## 2021-11-18 RX ADMIN — DIAZEPAM 5 MG: 5 TABLET ORAL at 06:50

## 2021-11-18 RX ADMIN — SODIUM CHLORIDE: 9 INJECTION, SOLUTION INTRAVENOUS at 06:49

## 2021-11-18 RX ADMIN — DIPHENHYDRAMINE HCL 25 MG: 25 TABLET ORAL at 06:50

## 2021-11-18 NOTE — FLOWSHEET NOTE
Pt to pt  in wheelchair per volunteer for d/c home in private vehicle with family.  Right groin free of bleeding/hematoma at time of d/c

## 2021-11-19 RX ORDER — METOPROLOL TARTRATE 50 MG/1
TABLET, FILM COATED ORAL
Qty: 60 TABLET | Refills: 5 | OUTPATIENT
Start: 2021-11-19

## 2021-12-01 ENCOUNTER — OFFICE VISIT (OUTPATIENT)
Dept: CARDIOLOGY CLINIC | Age: 41
End: 2021-12-01
Payer: COMMERCIAL

## 2021-12-01 VITALS
HEART RATE: 64 BPM | HEIGHT: 60 IN | DIASTOLIC BLOOD PRESSURE: 78 MMHG | WEIGHT: 142 LBS | BODY MASS INDEX: 27.88 KG/M2 | SYSTOLIC BLOOD PRESSURE: 136 MMHG

## 2021-12-01 DIAGNOSIS — E10.59 TYPE 1 DIABETES MELLITUS WITH OTHER CIRCULATORY COMPLICATION (HCC): ICD-10-CM

## 2021-12-01 DIAGNOSIS — I10 PRIMARY HYPERTENSION: ICD-10-CM

## 2021-12-01 DIAGNOSIS — I25.10 CAD S/P PERCUTANEOUS CORONARY ANGIOPLASTY: ICD-10-CM

## 2021-12-01 DIAGNOSIS — I21.4 NSTEMI (NON-ST ELEVATED MYOCARDIAL INFARCTION) (HCC): ICD-10-CM

## 2021-12-01 DIAGNOSIS — I25.10 CAD IN NATIVE ARTERY: Primary | ICD-10-CM

## 2021-12-01 DIAGNOSIS — Z98.61 CAD S/P PERCUTANEOUS CORONARY ANGIOPLASTY: ICD-10-CM

## 2021-12-01 DIAGNOSIS — E78.5 DYSLIPIDEMIA: ICD-10-CM

## 2021-12-01 PROCEDURE — 99213 OFFICE O/P EST LOW 20 MIN: CPT | Performed by: INTERNAL MEDICINE

## 2021-12-01 NOTE — LETTER
[address]  [address] [id number]  Phone: [phone]  Fax: [phone]    Maryann Ramirez MD    December 1, 2021     Rene Lomeli MD  [facility] [id number]    Patient: Tessie Barillas   MR Number: [id number]   YOB: 1980   Date of Visit: 12/1/2021       Dear Rene Lomeli: Thank you for referring Satish Carvalho to me for evaluation/treatment. Below are the relevant portions of my assessment and plan of care. If you have questions, please do not hesitate to call me. I look forward to following Nyla Harrington along with you.     Sincerely,      Maryann Ramirez MD

## 2021-12-01 NOTE — PATIENT INSTRUCTIONS
CORONARY ARTERY DISEASE:Yes, mild to moderate. clinically stable. Patient is on optimal medical regimen ( see medication list above )  Patient is currently  not significantly symptomatic from CAD. -changes in  treatment:   no, on  ASA & Metoprolol  -Testing ordered:  yes, if Nephrology agrees Cath  [facility] classification: 3  [place] RISK SCORE:  PILLO RISK SCORE:  HYPERTENSION:Yes  well controlled on current medical regimen, see list above. - changes in  treatment:   no, takes Amlodipine & Hygroton  DYSLIPIDEMIA: yes,   Patient's profile is at / near Goal.yes,   HDL is high  Tolerating current medical regimen wellyes, on Crestor  See most recent Lab values in Labs section above. TESTS ORDERED: None this visit     PREVIOUSLY ORDERED TESTS REVIEWED & DISCUSSED WITH THE PATIENT:     I personally reviewed & interpreted, all previously ordered tests as copied above. Latest Labs are pulled in to the note with dates. Labs, specially in Reference to Lipid profile, Cardiac testing in the form of Echo, stress tests & other relevant cardiac testing reviewed with patient & recommendations made based on assessment of the results. MEDICATIONS: List of medications patient is currently taking is reviewed in detail with the patient . Discussed any side effects or problems taking the medication. Recommend Continue present management & medications as listed. AFFIRMATION: I spent at least 20 minutes of time reviewing patient's history, previous & current medical problems & all Labs + testing. This includes chart prep even prior to the vosit. Various goals are discussed and multiple questions answered. Relevant concelling performed. Office follow up in six months.

## 2021-12-01 NOTE — PROGRESS NOTES
Mehdi Gooden is a 42 y.o. female who has    CHIEF COMPLAINT AS FOLLOWS:  CHEST PAIN: Patient denies any C/O chest pains at this time. SOB: Has SOB with exertion but no change over previous noted. LEG EDEMA: No leg edema   PALPITATIONS: Denies any C/O Palpitations   DIZZINESS: No C/O Dizziness   SYNCOPE: None   OTHER:                                     HPI: Patient is here for F/U on his CAD, HTN & Dyslipidemia problems. CAD: Patient has known CAD. Had angioplasty in the past.                Current Outpatient Medications   Medication Sig Dispense Refill    nitroGLYCERIN (NITROSTAT) 0.4 MG SL tablet Place 1 tablet under the tongue every 5 minutes as needed for Chest pain up to max of 3 total doses. If no relief after 1 dose, call 911. 25 tablet 3    metoprolol tartrate (LOPRESSOR) 50 MG tablet Take 1 tablet by mouth 2 times daily 60 tablet 5    chlorthalidone (HYGROTON) 25 MG tablet Take 1 tablet by mouth daily 30 tablet 3    amLODIPine (NORVASC) 5 MG tablet Take 1 tablet by mouth daily 30 tablet 2    VITAMIN D PO Take 1 capsule by mouth daily      citalopram (CELEXA) 40 MG tablet Take 40 mg by mouth daily      Insulin Degludec (TRESIBA SC) Inject 15 Units into the skin      rosuvastatin (CRESTOR) 10 MG tablet Take 10 mg by mouth daily      aspirin 81 MG tablet Take 81 mg by mouth daily. No current facility-administered medications for this visit.      Allergies: Tramadol  Review of Systems:    Constitutional: Negative for diaphoresis and fatigue  Respiratory: Negative for shortness of breath  Cardiovascular: Negative for chest pain, dyspnea on exertion, claudication, edema, irregular heartbeat, murmur, palpitations or shortness of breath  Musculoskeletal: Negative for muscle pain, muscular weakness, negative for pain in arm and leg or swelling in foot and leg    Objective:  /78   Pulse 64   Ht 5' (1.524 m)   Wt 142 lb (64.4 kg)   LMP 04/18/2015 BMI 27.73 kg/m²   Wt Readings from Last 3 Encounters:   12/01/21 142 lb (64.4 kg)   11/18/21 135 lb (61.2 kg)   11/08/21 136 lb 9.6 oz (62 kg)     Body mass index is 27.73 kg/m². GENERAL - Alert, oriented, pleasant, in no apparent distress. EYES: No jaundice, no conjunctival pallor. Neck - Supple. No jugular venous distention noted. No carotid bruits. Cardiovascular  Normal S1 and S2 without obvious murmur or gallop. Extremities - No cyanosis, clubbing, or significant edema. Pulmonary  No respiratory distress. No wheezes or rales.       MEDICAL DECISION MAKING & DATA REVIEW:    Lab Review   Lab Results   Component Value Date    TROPONINT <0.010 08/16/2021    TROPONINT <0.010 02/27/2015     No results found for: BNP, PROBNP  Lab Results   Component Value Date    INR 0.75 11/18/2021     Lab Results   Component Value Date    LABA1C 8.5 (H) 11/20/2012     Lab Results   Component Value Date    WBC 11.9 (H) 11/18/2021    WBC 7.7 08/16/2021    HCT 33.8 (L) 11/18/2021    HCT 39.6 08/16/2021    MCV 98.3 11/18/2021    MCV 99.5 08/16/2021     11/18/2021     08/16/2021     Lab Results   Component Value Date    CHOL 145 11/04/2021    CHOL 114 11/20/2012    TRIG 90 11/04/2021    TRIG 74 11/20/2012    HDL 79 (A) 11/04/2021    HDL 44 (L) 11/20/2012    LDLCALC 48 11/04/2021    LDLDIRECT 58 11/20/2012     Lab Results   Component Value Date    ALT 13 09/17/2021    ALT 15 08/16/2021    AST 29 09/17/2021    AST 32 08/16/2021     BMP:    Lab Results   Component Value Date     11/18/2021     09/17/2021     09/17/2021    K 5.5 11/18/2021    K 4.9 09/17/2021    K 5.0 09/17/2021     11/18/2021     09/17/2021     09/17/2021    CO2 23 11/18/2021    CO2 26 09/17/2021    CO2 25 09/17/2021    BUN 30 11/18/2021    BUN 17 09/17/2021    BUN 17 09/17/2021    CREATININE 2.0 11/18/2021    CREATININE 1.6 09/17/2021    CREATININE 1.5 09/17/2021     CMP:   Lab Results   Component Value Date  11/18/2021     09/17/2021     09/17/2021    K 5.5 11/18/2021    K 4.9 09/17/2021    K 5.0 09/17/2021     11/18/2021     09/17/2021     09/17/2021    CO2 23 11/18/2021    CO2 26 09/17/2021    CO2 25 09/17/2021    BUN 30 11/18/2021    BUN 17 09/17/2021    BUN 17 09/17/2021    CREATININE 2.0 11/18/2021    CREATININE 1.6 09/17/2021    CREATININE 1.5 09/17/2021    PROT 5.8 09/17/2021    PROT 7.1 08/16/2021    PROT 6.0 11/19/2012    PROT 6.6 08/14/2011     Lab Results   Component Value Date    TSHHS 2.090 01/20/2020    TSHHS 0.861 11/20/2012     CATH 11/2021   Left Main: Mild distal tapering. LAD: Moderate segmental Luminal irregularities + 50 to 60 % very   proximal (hazy) & mid vessel stenosis. LEFT CIRCUMFLEX: Moderate segmental Luminal irregularities + Sub   branch of OM1 which is very small 1.5 mm vessel has ostial &   proximal 95 % stenosis. RCA: Moderate segmental Luminal irregularities + 40 to 50 %   proximal & 50 to 60 & mid vessel in-stent re-stenosis. Normal LVEDP. IFR:   Proximal RCA: 95   Mid RCA: 95   Proximal LAD: 92    QUALITY MEASURES REVIEWED:  1.CAD:Patient is taking anti platelet agent:Yes  2. DYSLIPIDEMIA: Patient is on cholesterol lowering medication:Yes   3. Beta-Blocker therapy for CAD, if prior Myocardial Infarction:Yes   4. Counselled regarding smoking cessation. No   Patient does not Smoke. 5.Anticoagulation therapy (for A.Fib) No   Does Not have A.Fib.  6.Discussed weight management Nostrategies. Assessment & Plan:  Primary / Secondary prevention is the goal by aggressive risk modification, healthy and therapeutic life style changes for cardiovascular risk reduction. CORONARY ARTERY DISEASE:Yes, mild to moderate. clinically stable. Patient is on optimal medical regimen ( see medication list above )  Patient is currently  not significantly symptomatic from CAD.    -changes in  treatment:   no, on  ASA & Metoprolol  -Testing ordered: yes, if Nephrology agrees Cath  [place] classification: 3  [place] RISK SCORE:  PILLO RISK SCORE:  HYPERTENSION:Yes  well controlled on current medical regimen, see list above. - changes in  treatment:   no, takes Amlodipine & Hygroton  DYSLIPIDEMIA: yes,   Patient's profile is at / near Goal.yes,   HDL is high  Tolerating current medical regimen wellyes, on Crestor  See most recent Lab values in Labs section above. TESTS ORDERED: None this visit     PREVIOUSLY ORDERED TESTS REVIEWED & DISCUSSED WITH THE PATIENT:     I personally reviewed & interpreted, all previously ordered tests as copied above. Latest Labs are pulled in to the note with dates. Labs, specially in Reference to Lipid profile, Cardiac testing in the form of Echo, stress tests & other relevant cardiac testing reviewed with patient & recommendations made based on assessment of the results. MEDICATIONS: List of medications patient is currently taking is reviewed in detail with the patient . Discussed any side effects or problems taking the medication. Recommend Continue present management & medications as listed. AFFIRMATION: I spent at least 20 minutes of time reviewing patient's history, previous & current medical problems & all Labs + testing. This includes chart prep even prior to the vosit. Various goals are discussed and multiple questions answered. Relevant concelling performed. Office follow up in six months.

## 2022-01-07 ENCOUNTER — HOSPITAL ENCOUNTER (EMERGENCY)
Age: 42
Discharge: HOME | End: 2022-01-07
Payer: COMMERCIAL

## 2022-01-07 VITALS
TEMPERATURE: 97.9 F | OXYGEN SATURATION: 99 % | RESPIRATION RATE: 17 BRPM | HEART RATE: 98 BPM | DIASTOLIC BLOOD PRESSURE: 89 MMHG | SYSTOLIC BLOOD PRESSURE: 124 MMHG

## 2022-01-07 DIAGNOSIS — Z20.822 SUSPECTED COVID-19 VIRUS INFECTION: Primary | ICD-10-CM

## 2022-01-07 LAB
GLUCOSE BLD-MCNC: 220 MG/DL (ref 70–99)
SARS-COV-2, NAAT: NOT DETECTED
SOURCE: NORMAL

## 2022-01-07 PROCEDURE — 82962 GLUCOSE BLOOD TEST: CPT

## 2022-01-07 PROCEDURE — 99285 EMERGENCY DEPT VISIT HI MDM: CPT

## 2022-01-07 PROCEDURE — 87635 SARS-COV-2 COVID-19 AMP PRB: CPT

## 2022-01-07 PROCEDURE — 6370000000 HC RX 637 (ALT 250 FOR IP): Performed by: PHYSICIAN ASSISTANT

## 2022-01-07 RX ORDER — ONDANSETRON 4 MG/1
4 TABLET, ORALLY DISINTEGRATING ORAL EVERY 8 HOURS PRN
Qty: 15 TABLET | Refills: 0 | Status: SHIPPED | OUTPATIENT
Start: 2022-01-07

## 2022-01-07 RX ORDER — ONDANSETRON 4 MG/1
4 TABLET, ORALLY DISINTEGRATING ORAL ONCE
Status: COMPLETED | OUTPATIENT
Start: 2022-01-07 | End: 2022-01-07

## 2022-01-07 RX ORDER — GUAIFENESIN/DEXTROMETHORPHAN 100-10MG/5
5 SYRUP ORAL ONCE
Status: COMPLETED | OUTPATIENT
Start: 2022-01-07 | End: 2022-01-07

## 2022-01-07 RX ADMIN — GUAIFENESIN AND DEXTROMETHORPHAN 5 ML: 100; 10 SYRUP ORAL at 06:46

## 2022-01-07 RX ADMIN — ONDANSETRON 4 MG: 4 TABLET, ORALLY DISINTEGRATING ORAL at 06:47

## 2022-01-07 ASSESSMENT — PAIN SCALES - GENERAL: PAINLEVEL_OUTOF10: 8

## 2022-01-07 ASSESSMENT — PAIN DESCRIPTION - LOCATION: LOCATION: GENERALIZED

## 2022-01-07 ASSESSMENT — PAIN DESCRIPTION - PAIN TYPE: TYPE: ACUTE PAIN

## 2022-01-07 NOTE — ED TRIAGE NOTES
"ALERT team  note:  60 year old male BIB PD today, legal hold, inability to care for self; pt with noted h/o of dementia; Medicare insurance plan; pt evaluated by [facility] psychiatrist, Dr. Price, \"dx with dementia without behavioral disturbance. Referred to neurology\" and \"does NOT have a psychiatric problem, therefore legal hold is invalid\"; pt with calm, cooperarive behaviors during his all day visit at [facility], with no aggression noted; writer RN spoke with pt's son, Michele Doty, [phone], re: pt's medically stable and requesting family assistance with DC planning; son states family, he, his wife, or sister, who reside in [facility], where pt lives with his wife, are unable to transport pt home; states  Pt can be put in a taxi home; son states family does not have community medical or  support for pt, that they \"tried\" to make phone calls approx 1 year ago to request assistance with mgmt of pt's dementia but did not receive any callbacks (son does not state who family LVM's for); writer RN reviewed [facility] services with pt's son with phone numbers given for case mgmt, extended living; later, writer RN called pt's son's phone and pt's wife, Marina, answered and, again, states family is not available to transport pt home; she does verify pt is living with his wife independently in pt's residence and has daily assistance provided pt's daughter; states pt's daughter is on vacation for a week but has hired a temporary daily caregiver to attend to pt's wife (states pt does not have in home community care or services); states pt with h/o agitation and aggression, noted today towards pt's daugther, who then called PD; writer RN reviewed pt's status and DC plan with Marina; [employer] transportation is not available for pt transport home today; [facility] SW's assisted pt to contact [facility] ambulance for transport home; pt unable to sign ambulance transport payment waiver with cost estimate of $1763.00; " May stop cellcept- labs from today stable; no elevated creatinine- will treat with pg monotherapy Pt presents to ED with c/o body aches, nausea, and loss of appetite x2 days. Pt states her mother just tested positive for covid and she was exposed. Pt states that she is worried that she has been unable to eat because she is diabetic. Last blood sugar was 150 a few days ago per pt. "writer RN reviewed this cost with Marina who states pt will pay \"he has plenty of money\"; writer RN also reviewed community adult and elderly supports for pt and reviewed private or public guardianship with Marina who states family will not apply for guardianship, will not assist pt in referral to a supervised living residence  Pt to DC to his residence tonight with transport by ambulance [facility] arranged by [facility] SW  Writer RN reviewed above with [facility] ERP Dr. Jalloh  "

## 2022-01-07 NOTE — ED PROVIDER NOTES
Juan Antonio      PCP: Capri Sepulveda MD    [address] [place] [address]    Chief Complaint   Patient presents with    Nausea    Generalized Body Aches    Anorexia     pt states she is diabetic so she was worried she isn't able to eat       This patient was not evaluated by the attending physician. I have independently evaluated this patient. HPI    Destin Britton is a 42 y.o. female who presents with cough, body aches, nausea. Onset 2 days ago. Patient states her mother tested positive for COVID. Patient is not vaccinated. Patient has had a sense of hot and cold chills. Patient has decreased appetite. Patient denies abdominal pain. Patient does have a history of diabetes. Patient denies chest pain, shortness of breath. REVIEW OF SYSTEMS    Constitutional: See HPI  HEENT:  See above  Cardiovascular:  Denies chest pain  Respiratory:  + cough Denies shortness of breath   GI:  + nausea Denies abdominal pain, vomiting, or diarrhea   :  Denies any urinary symptoms   Neurologic:  Denies syncope       All other review of systems are negative  See HPI and nursing notes for additional information       PAST MEDICAL AND SURGICAL HISTORY    Past Medical History:   Diagnosis Date    Anxiety 11/12    Has d/t stress & crowds per pt.  CAD in native artery 11/20/2012    NSTEMI    CAD S/P percutaneous coronary angioplasty 11/20/2012    PTCA with 2.75 x 22mm Resolute Integrity ILSA in mid RCA- Dr Hayes Netters tunnel syndrome, bilateral     Mild to moderate on right and mild on left.  Chronic low back pain     Cocaine abuse ([facility]     per ERLINDA Siddiqui Chart    Depression     DM type 1 (diabetes mellitus, type 1) ([facility]     H/O cardiac catheterization 11/20/20012    H/O echocardiogram 11/20/2012 11/20/2012-Normal chamber size. Mild regional wall motion abnormalities with normal LV systolic, but abnormal diastolic function.  LVEF 50-55%    H/O tubal ligation     History of cholecystectomy  History of complete ECG 2012    Marijuana use     Per ERLINDA Lopez chart    Neuropathy     Panic attacks     H/O after she had MI .  Retinopathy     Seasonal allergies      Past Surgical History:   Procedure Laterality Date     SECTION      CHOLECYSTECTOMY      CORONARY ANGIOPLASTY WITH STENT PLACEMENT  2012    PTCA with 2.75 x 22mm Resollute Integrity ILSA to mid RCA    DIAGNOSTIC CARDIAC CATH LAB PROCEDURE  2012    TUBAL LIGATION         CURRENT MEDICATIONS    Current Outpatient Rx   Medication Sig Dispense Refill    nitroGLYCERIN (NITROSTAT) 0.4 MG SL tablet Place 1 tablet under the tongue every 5 minutes as needed for Chest pain up to max of 3 total doses. If no relief after 1 dose, call 911. 25 tablet 3    metoprolol tartrate (LOPRESSOR) 50 MG tablet Take 1 tablet by mouth 2 times daily 60 tablet 5    chlorthalidone (HYGROTON) 25 MG tablet Take 1 tablet by mouth daily 30 tablet 3    amLODIPine (NORVASC) 5 MG tablet Take 1 tablet by mouth daily 30 tablet 2    VITAMIN D PO Take 1 capsule by mouth daily      citalopram (CELEXA) 40 MG tablet Take 40 mg by mouth daily      Insulin Degludec (TRESIBA SC) Inject 15 Units into the skin      rosuvastatin (CRESTOR) 10 MG tablet Take 10 mg by mouth daily      aspirin 81 MG tablet Take 81 mg by mouth daily.          ALLERGIES    Allergies   Allergen Reactions    Tramadol Hives       FAMILY AND SOCIAL HISTORY    Family History   Problem Relation Age of Onset    Heart Disease Mother     Diabetes Father      Social History     Socioeconomic History    Marital status:      Spouse name: None    Number of children: None    Years of education: None    Highest education level: None   Occupational History    None   Tobacco Use    Smoking status: Former Smoker     Packs/day: 0.50     Years: 16.00     Pack years: 8.00     Types: Cigarettes     Start date: 2013     Quit date: 10/8/2021     Years since quittin.2    Smokeless tobacco: Never Used    Tobacco comment: quit in past month   Vaping Use    Vaping Use: Never used   Substance and Sexual Activity    Alcohol use: No     Comment: 2/month/caffeine 1-2 cups of coffee and a1 pop a day    Drug use: Yes     Types: Cocaine, Marijuana (Weed)     Comment: per [facility] chart    Sexual activity: Not Currently     Partners: Male     Comment:    Other Topics Concern    None   Social History Narrative    None     Social Determinants of Health     Financial Resource Strain:     Difficulty of Paying Living Expenses: Not on file   Food Insecurity:     Worried About Running Out of Food in the Last Year: Not on file    Melvin of Food in the Last Year: Not on file   Transportation Needs:     Lack of Transportation (Medical): Not on file    Lack of Transportation (Non-Medical):  Not on file   Physical Activity:     Days of Exercise per Week: Not on file    Minutes of Exercise per Session: Not on file   Stress:     Feeling of Stress : Not on file   Social Connections:     Frequency of Communication with Friends and Family: Not on file    Frequency of Social Gatherings with Friends and Family: Not on file    Attends Orthodoxy Services: Not on file    Active Member of [address] [place] [address] [employer] or Organizations: Not on file    Attends Club or Organization Meetings: Not on file    Marital Status: Not on file   Intimate Partner Violence:     Fear of Current or Ex-Partner: Not on file    Emotionally Abused: Not on file    Physically Abused: Not on file    Sexually Abused: Not on file   Housing Stability:     Unable to Pay for Housing in the Last Year: Not on file    Number of [place] in the Last Year: Not on file    Unstable Housing in the Last Year: Not on file       PHYSICAL EXAM    VITAL SIGNS: /89   Pulse 98   Temp 97.9 °F (36.6 °C) (Oral)   Resp 17   LMP 2015   SpO2 99%   Constitutional:  Well developed, well nourished, no acute

## 2022-03-01 LAB
ALBUMIN/GLOBULIN RATIO: 1.4 RATIO (ref 0.8–2.6)
ALBUMIN: 3.4 G/DL (ref 3.5–5.2)
ALP BLD-CCNC: 109 U/L (ref 23–144)
ALT SERPL-CCNC: 10 U/L (ref 0–60)
AST SERPL-CCNC: 22 U/L (ref 0–55)
BILIRUB SERPL-MCNC: 0.1 MG/DL (ref 0–1.2)
BUN BLDV-MCNC: 44 MG/DL (ref 3–29)
BUN/CREAT BLD: 16 (ref 7–25)
CALCIUM SERPL-MCNC: 8.5 MG/DL (ref 8.5–10.5)
CHLORIDE BLD-SCNC: 98 MEQ/L (ref 96–110)
CO2: 22 MEQ/L (ref 19–32)
CREAT SERPL-MCNC: 2.8 MG/DL (ref 0.5–1.2)
GLOBULIN: 2.5 G/DL (ref 1.9–3.6)
GLOMERULAR FILTRATION RATE: 21 MLS/MIN/1.73M2
GLUCOSE BLD-MCNC: 224 MG/DL (ref 70–99)
POTASSIUM SERPL-SCNC: 4.4 MEQ/L (ref 3.4–5.3)
SODIUM BLD-SCNC: 133 MEQ/L (ref 135–148)
STATUS: ABNORMAL
TOTAL PROTEIN: 5.9 G/DL (ref 6–8.3)

## 2022-03-03 PROBLEM — R30.0 DYSURIA: Status: ACTIVE | Noted: 2022-03-03

## 2022-03-03 PROBLEM — N18.30 CHRONIC KIDNEY DISEASE, STAGE III (MODERATE) (HCC): Status: RESOLVED | Noted: 2021-05-14 | Resolved: 2022-03-03

## 2022-03-03 PROBLEM — N18.4 CHRONIC KIDNEY DISEASE, STAGE IV (SEVERE) (HCC): Status: ACTIVE | Noted: 2022-03-03

## 2022-05-26 RX ORDER — METOPROLOL TARTRATE 50 MG/1
50 TABLET, FILM COATED ORAL 2 TIMES DAILY
Qty: 180 TABLET | Refills: 3 | Status: SHIPPED | OUTPATIENT
Start: 2022-05-26

## 2023-02-21 NOTE — PROGRESS NOTES
IR Procedure at [facility]:  Spoke with patient and she will arrive at 0900 at [facility] on 2/24/2023 for her procedure at 1030. Also went over below instructions. NPO at [address]     2. Follow your directions as prescribed by the doctor for your procedure and medications. 3.   Consult your provider as to when to stop blood thinner  4. Do not take any pain medication within 6 hours of your procedure  5. Do not drink any alcoholic beverages or use any street drugs 24 hours before procedure. 6.   Please wear simple, loose fitting clothing to the hospital.  Do not bring valuables (money,             credit cards, checkbooks, etc.)     7. If you  have a Living Will and Durable Power of  for Healthcare, please bring in a copy. 8.   Please bring picture ID,  insurance card, paperwork from the doctors office            (H & P, Consent,  & card for implantable devices). 9.   Report to the information desk on the ground floor. 10. Take a shower the night before or morning of your procedure, do not apply any lotion, oil or powder. 11. If you are going to be sedated for the procedure, you will need a responsible adult to drive you home.

## 2023-02-24 ENCOUNTER — HOSPITAL ENCOUNTER (OUTPATIENT)
Dept: INTERVENTIONAL RADIOLOGY/VASCULAR | Age: 43
Discharge: HOME | End: 2023-02-24
Payer: COMMERCIAL

## 2023-02-24 VITALS
OXYGEN SATURATION: 98 % | SYSTOLIC BLOOD PRESSURE: 135 MMHG | HEART RATE: 65 BPM | DIASTOLIC BLOOD PRESSURE: 58 MMHG | RESPIRATION RATE: 20 BRPM

## 2023-02-24 DIAGNOSIS — R33.9 ACUTE ON CHRONIC RETENTION OF URINE: ICD-10-CM

## 2023-02-24 LAB
APTT: 23.1 SECONDS (ref 25.1–37.1)
GLUCOSE BLD-MCNC: 104 MG/DL (ref 70–99)
GLUCOSE BLD-MCNC: 55 MG/DL (ref 70–99)
HCT VFR BLD CALC: 31.6 % (ref 37–47)
HEMOGLOBIN: 10.1 GM/DL (ref 12.5–16)
INR BLD: 0.87 INDEX
MCH RBC QN AUTO: 31.1 PG (ref 27–31)
MCHC RBC AUTO-ENTMCNC: 32 % (ref 32–36)
MCV RBC AUTO: 97.2 FL (ref 78–100)
PDW BLD-RTO: 12.1 % (ref 11.7–14.9)
PLATELET # BLD: 311 K/CU MM (ref 140–440)
PMV BLD AUTO: 10.2 FL (ref 7.5–11.1)
PROTHROMBIN TIME: 11.2 SECONDS (ref 11.7–14.5)
RBC # BLD: 3.25 M/CU MM (ref 4.2–5.4)
WBC # BLD: 9.9 K/CU MM (ref 4–10.5)

## 2023-02-24 PROCEDURE — 77002 NEEDLE LOCALIZATION BY XRAY: CPT

## 2023-02-24 PROCEDURE — 6360000004 HC RX CONTRAST MEDICATION

## 2023-02-24 PROCEDURE — 85730 THROMBOPLASTIN TIME PARTIAL: CPT

## 2023-02-24 PROCEDURE — 7100000011 HC PHASE II RECOVERY - ADDTL 15 MIN

## 2023-02-24 PROCEDURE — 51102 DRAIN BL W/CATH INSERTION: CPT

## 2023-02-24 PROCEDURE — 6360000004 HC RX CONTRAST MEDICATION: Performed by: SURGERY

## 2023-02-24 PROCEDURE — 85027 COMPLETE CBC AUTOMATED: CPT

## 2023-02-24 PROCEDURE — 2500000003 HC RX 250 WO HCPCS

## 2023-02-24 PROCEDURE — 6360000002 HC RX W HCPCS

## 2023-02-24 PROCEDURE — 2580000003 HC RX 258: Performed by: RADIOLOGY

## 2023-02-24 PROCEDURE — 85610 PROTHROMBIN TIME: CPT

## 2023-02-24 PROCEDURE — 6360000002 HC RX W HCPCS: Performed by: RADIOLOGY

## 2023-02-24 PROCEDURE — 82962 GLUCOSE BLOOD TEST: CPT

## 2023-02-24 PROCEDURE — 2709999900 IR DRAINAGE HEMATOMA/SEROMA/FLUID COLLECTION

## 2023-02-24 PROCEDURE — 7100000010 HC PHASE II RECOVERY - FIRST 15 MIN

## 2023-02-24 RX ORDER — FENTANYL CITRATE 50 UG/ML
INJECTION, SOLUTION INTRAMUSCULAR; INTRAVENOUS
Status: COMPLETED | OUTPATIENT
Start: 2023-02-24 | End: 2023-02-24

## 2023-02-24 RX ORDER — SODIUM CHLORIDE 0.9 % (FLUSH) 0.9 %
10 SYRINGE (ML) INJECTION PRN
Status: DISCONTINUED | OUTPATIENT
Start: 2023-02-24 | End: 2023-02-25 | Stop reason: HOSPADM

## 2023-02-24 RX ADMIN — FENTANYL CITRATE 50 MCG: 50 INJECTION, SOLUTION INTRAMUSCULAR; INTRAVENOUS at 10:09

## 2023-02-24 RX ADMIN — DEXTROSE MONOHYDRATE 250 ML: 50 INJECTION, SOLUTION INTRAVENOUS at 08:42

## 2023-02-24 RX ADMIN — IOPAMIDOL 5 ML: 755 INJECTION, SOLUTION INTRAVENOUS at 11:49

## 2023-02-24 ASSESSMENT — PAIN SCALES - GENERAL
PAINLEVEL_OUTOF10: 0
PAINLEVEL_OUTOF10: 0

## 2023-02-24 NOTE — PROGRESS NOTES
0484 31 29 02:  Pt discharged to home via wheelchair alert and oriented. SP puncture site covered with gauze/opsite, no bleeding noted. SP cath draining clear yellow urine. Pt tolerating PO, VSS.

## 2023-02-24 NOTE — PROGRESS NOTES
0800 Pt. Originally came up from ED to [facility] from scheduled outpatient. Pt. States I went to ED first because I felt like my BS was low. Pt. States they gave me a life saver and I feel a little better but still shaky. Education provided on use of call light, call light in reach. 0806 Pt. BS checked, result of 55. This nurse notified IR, new orders given. Pt. Provided with apple juice. 7773 Dextrose 5% started and BS rechecked at 0915 and BS came up to 104. IR notified. Pt. Family at bedside and call light in reach.

## 2023-02-24 NOTE — OR NURSING
PROCEDURE PERFORMED: Suprapubic tube placement     PRIMARY INDICATION FOR PROCEDURE: urinary retention post stroke     INFORMED CONSENT:  Obtained prior to procedure. Consent placed in chart. RICCO SCORE PRE PROCEDURE:    10    PT TRANSPORTED FROM:      [facility]                              TO THE IR ROOM:    Large                     ARRIVED TO ROOM: 0100    ASSESSMENT:A&Ox 4 verbalized undrerstanding of procedure. VSS on R/A. Applied 2L Nc pre sedation . Right side weakness post stroke 12/18/22. Candelario in place ,patent and flowing to gravity. Candelario clamped and bladder inflated with 450cc of sterile water per [address]:               Pt transferred to the IR table and positioned supine for comfort. Warm blankets given. Pt placed on Cardiac Monitor. Pt prepped and draped in a sterile fashion with chlorhexadine.     TIME OUT:  1008        PAIN/LOCAL ANESTHESIA/SEDATION MANAGEMENT:           Local: Lidocaine 1% given by Dr. Selma Smith @ 1013          Sedation:              Fentanyl:50mcg IV @ 1009             Versed:     INTRAOPERATIVE:           ACCESS TIME: one step @1014          US/FLUORO: x2 US images  FT 0.1  K 4          WIRE USED: Amplatz super stiff 0.035 x75 cm          SHEATH USED:           CATHETER USED: M drain single step 16 fr x25 cm locking pigtail connected to Uresil drainage bag           FINAL IMAGE TAKEN TO CONFIRM PLACEMENT OF: SPT           CONTRAST/CC: ISOVUE 370 x5 cc    STERILE DRESSINGS:sutured, bio patch ,tegaderm     SPECIMENS: 450 cc of clear /pink tinged urine/ sterile water from uresil bag disposed of properly     EBL:      >3cc    FOLLOW- UP X-RAY: done in IR         COMPLICATIONS/ OUTCOME:pt tolerated procedure without issue     RCICO SCORE POST PROCEDURE:   10       LEFT THE ROOM: 1027    REPORT CALLED TO: Jade at bedside

## 2023-02-24 NOTE — PRE SEDATION
Sedation Pre-Procedure Note    Patient Name: Gus Garza   YOB: 1980  Room/Bed: Room/bed info not found  Medical Record Number: [id number]  Date: 2023   Time: 9:51 AM       Indication:  suprapubic catheter placement - recent stroke    Consent: I have discussed with the patient and/or the patient representative the indication, alternatives, and the possible risks and/or complications of the planned procedure and the anesthesia methods. The patient and/or patient representative appear to understand and agree to proceed.    Vital Signs:   Vitals:    23 0812   BP: (!) 144/63   Pulse: 66   Resp: 16   SpO2: 99%       Past Medical History:   has a past medical history of Anxiety, CAD in native artery, CAD S/P percutaneous coronary angioplasty, Carpal tunnel syndrome, bilateral, Chronic low back pain, Cocaine abuse ([facility]), Depression, DM type 1 (diabetes mellitus, type 1) ([facility]), H/O cardiac catheterization, H/O echocardiogram, H/O tubal ligation, History of cholecystectomy, History of complete ECG, Marijuana use, Neuropathy, Panic attacks, Retinopathy, and Seasonal allergies.    Past Surgical History:   has a past surgical history that includes  section; Tubal ligation; Cholecystectomy; Diagnostic Cardiac Cath Lab Procedure (2012); and Coronary angioplasty with stent (2012).    Medications:   Scheduled Meds:    dextrose 5%  250 mL IntraVENous Once     Continuous Infusions:   PRN Meds: sodium chloride flush  Home Meds:   Prior to Admission medications    Medication Sig Start Date End Date Taking? Authorizing Provider   metoprolol tartrate (LOPRESSOR) 50 MG tablet Take 1 tablet by mouth 2 times daily 22   John Lieberman MD   ondansetron (ZOFRAN ODT) 4 MG disintegrating tablet Take 1 tablet by mouth every 8 hours as needed for Nausea or Vomiting 22   Evens Rojo PA-C   guaiFENesin (ROBITUSSIN) 100 MG/5ML liquid 1 teaspoon by mouth every 4-6 hours when  necessary cough 1/7/22   Temitope Rojo PA-C   nitroGLYCERIN (NITROSTAT) 0.4 MG SL tablet Place 1 tablet under the tongue every 5 minutes as needed for Chest pain up to max of 3 total doses. If no relief after 1 dose, call 911. 11/8/21   Bianca Quintanilla MD   chlorthalidone (HYGROTON) 25 MG tablet Take 1 tablet by mouth daily  Patient not taking: Reported on 2/24/2023 10/11/21   Pedro Bansal MD   VITAMIN D PO Take 1 capsule by mouth daily    Historical Provider, MD   citalopram (CELEXA) 40 MG tablet Take 40 mg by mouth daily    Historical Provider, MD   Insulin Degludec (TRESIBA SC) Inject 15 Units into the skin    Historical Provider, MD   rosuvastatin (CRESTOR) 10 MG tablet Take 10 mg by mouth daily    Historical Provider, MD   aspirin 81 MG tablet Take 81 mg by mouth daily.     Historical Provider, MD         Pre-Sedation Documentation and Exam:   Middle age female in nad resting on bed  AAOx3 Intact grossly    Mallampati Airway Assessment:  normal    Prior History of Anesthesia Complications:   none    ASA Classification:  Class 3 - A patient with severe systemic disease that limits activity but is not incapacitating    Sedation/ Anesthesia Plan:   intravenous sedation    Medications Planned:   midazolam (Versed) intravenously and fentanyl intravenously    Patient is an appropriate candidate for plan of sedation: yes    Electronically signed by Leticia Covarrubias MD on 2/24/2023 at 9:51 AM

## 2023-03-13 NOTE — PROGRESS NOTES
IR Procedure at [facility]:  Left message for patient to arrive at 0800 at [facility] on 3/20/2023 for his procedure at 0930. Also went over below instructions. NPO at [address] [place] [address] Avenue     2. Follow your directions as prescribed by the doctor for your procedure and medications. 3.   Consult your provider as to when to stop blood thinner  4. Do not take any pain medication within 6 hours of your procedure  5. Do not drink any alcoholic beverages or use any street drugs 24 hours before procedure. 6.   Please wear simple, loose fitting clothing to the hospital.  Do not bring valuables (money,             credit cards, checkbooks, etc.)     7. If you  have a Living Will and Durable Power of  for Healthcare, please bring in a copy. 8.   Please bring picture ID,  insurance card, paperwork from the doctors office            (H & P, Consent,  & card for implantable devices). 9.   Report to the information desk on the ground floor. 10. Take a shower the night before or morning of your procedure, do not apply any lotion, oil or powder. 11. If you are going to be sedated for the procedure, you will need a responsible adult to drive you home.

## 2023-03-20 ENCOUNTER — HOSPITAL ENCOUNTER (OUTPATIENT)
Dept: INTERVENTIONAL RADIOLOGY/VASCULAR | Age: 43
Discharge: HOME | End: 2023-03-20
Payer: COMMERCIAL

## 2023-03-20 VITALS
DIASTOLIC BLOOD PRESSURE: 74 MMHG | BODY MASS INDEX: 31.41 KG/M2 | TEMPERATURE: 97.4 F | OXYGEN SATURATION: 95 % | HEIGHT: 60 IN | SYSTOLIC BLOOD PRESSURE: 165 MMHG | RESPIRATION RATE: 18 BRPM | WEIGHT: 160 LBS | HEART RATE: 62 BPM

## 2023-03-20 DIAGNOSIS — R33.9 BLADDER RETENTION OF URINE: ICD-10-CM

## 2023-03-20 PROCEDURE — 75984 XRAY CONTROL CATHETER CHANGE: CPT

## 2023-03-20 PROCEDURE — 6360000004 HC RX CONTRAST MEDICATION

## 2023-03-20 PROCEDURE — 2500000003 HC RX 250 WO HCPCS

## 2023-03-20 PROCEDURE — 51600 INJECTION FOR BLADDER X-RAY: CPT

## 2023-03-20 PROCEDURE — 49423 EXCHANGE DRAINAGE CATHETER: CPT

## 2023-03-20 PROCEDURE — 7100000010 HC PHASE II RECOVERY - FIRST 15 MIN

## 2023-03-20 PROCEDURE — 2709999900 IR CHANGE DRAINAGE CATH

## 2023-03-20 ASSESSMENT — PAIN - FUNCTIONAL ASSESSMENT: PAIN_FUNCTIONAL_ASSESSMENT: 0-10

## 2023-03-20 ASSESSMENT — PAIN SCALES - GENERAL: PAINLEVEL_OUTOF10: 0

## 2023-03-20 NOTE — PROCEDURES
Called  to clarify order for upsizing suprapubic tube. Dr. Joy Akins stated that patient needs SPT exchanged d/t difficulty exchanging the m-drain (J-loop) tube in office. Pt denied leaking around current SPT site and endorses good urine output without any concerns. The plan is to exchange the m-drain with a otto catheter.

## 2023-03-20 NOTE — PROGRESS NOTES
0910- Pt returned to SDS rm 136, respirations even and unlabored, VSS, report at bedside w/ Mindy Amin.  Family at [facility]   0912- Pt dressing w/ assistance from pt mother   5- Discharge instructions reviewed w/ pt and pt mother at bedside, verbalized understanding, no questions at this time   26 [id number]- Pt discharged via car w/ pt mother driving

## 2023-03-20 NOTE — OR NURSING
PROCEDURE PERFORMED: suprapubic tube exchange     PRIMARY INDICATION FOR PROCEDURE: Denise Day stated that he has difficulty exchanging the m-drain for a otto catheter in the office and asked that we please place a otto instead of m-drain     INFORMED CONSENT:  Obtained prior to procedure. Consent placed in chart. PT TRANSPORTED FROM:    [facility]                                TO THE IR ROOM:    Large room                     ARRIVED TO ROOM: 0820    ASSESSMENT:pt is alert and oriented x4 with VSS on room air. Pt has right sided weakness d/t previous stroke. Verbal consent obtained per pt request. Pt verbalized understanding of risks and benefits of procedure. BARRIER PRECAUTIONS & STERILE TECHNIQUE:               Pt transferred to the IR table and positioned supine for comfort. Warm blankets given. Pt placed on vital sign Monitor. Pt prepped and draped in a sterile fashion with chlorhexadine.       PAIN/LOCAL ANESTHESIA/SEDATION MANAGEMENT:           Local: Lidocaine 1% given by  @9342             INTRAOPERATIVE:           ACCESS TIME: @5306 with current access           US/FLUORO:   FT 1.1  K 50          WIRE USED: stiff glide 180                 CATHETER USED: 16 F silicone otto catheter connected to fluid management ,stop cock, and otto drainage bag           FINAL IMAGE TAKEN TO CONFIRM PLACEMENT OF: otto catheter           CONTRAST/CC: 10 cc of Iso Figueroa 370     STERILE DRESSINGS: bio patch , tegaderm   SPECIMENS: none     EBL:      >1cc    FOLLOW- UP X-RAY: IR guided         COMPLICATIONS/ OUTCOME:pt tolerated without issue            LEFT THE ROOM: 0906    REPORT CALLED TO: bedside report given to [facility] RN SDS

## 2023-03-20 NOTE — DISCHARGE INSTRUCTIONS
appropriate. When working with your local health department check their available hours. If you have a medical emergency and need to call 911, notify the dispatch personnel that you have, or are being evaluated for COVID-19. If possible, put on a facemask before emergency medical services arrive. Discontinuing home isolation  Patients with confirmed COVID-19 should remain under home isolation precautions until the risk of secondary transmission to others is thought to be low. The decision to discontinue home isolation precautions should be made on a case-by-case basis, in consultation with healthcare providers and state and local health departments.

## 2023-03-23 ENCOUNTER — HOSPITAL ENCOUNTER (OUTPATIENT)
Age: 43
Discharge: HOME | End: 2023-03-23
Payer: COMMERCIAL

## 2023-03-23 DIAGNOSIS — N18.4 CHRONIC KIDNEY DISEASE, STAGE IV (SEVERE) (HCC): ICD-10-CM

## 2023-03-23 DIAGNOSIS — R80.9 MICROALBUMINURIA DUE TO TYPE 2 DIABETES MELLITUS (HCC): ICD-10-CM

## 2023-03-23 DIAGNOSIS — N17.9 AKI (ACUTE KIDNEY INJURY) (HCC): ICD-10-CM

## 2023-03-23 DIAGNOSIS — E87.5 HYPERPOTASSEMIA: ICD-10-CM

## 2023-03-23 DIAGNOSIS — I10 PRIMARY HYPERTENSION: ICD-10-CM

## 2023-03-23 DIAGNOSIS — E11.29 MICROALBUMINURIA DUE TO TYPE 2 DIABETES MELLITUS (HCC): ICD-10-CM

## 2023-03-23 LAB
ANION GAP SERPL CALCULATED.3IONS-SCNC: 9 MMOL/L (ref 4–16)
BUN SERPL-MCNC: 58 MG/DL (ref 6–23)
CALCIUM SERPL-MCNC: 8.2 MG/DL (ref 8.3–10.6)
CHLORIDE BLD-SCNC: 103 MMOL/L (ref 99–110)
CO2: 23 MMOL/L (ref 21–32)
CREAT SERPL-MCNC: 3 MG/DL (ref 0.6–1.1)
GFR SERPL CREATININE-BSD FRML MDRD: 19 ML/MIN/1.73M2
GLUCOSE SERPL-MCNC: 161 MG/DL (ref 70–99)
POTASSIUM SERPL-SCNC: 5.6 MMOL/L (ref 3.5–5.1)
SODIUM BLD-SCNC: 135 MMOL/L (ref 135–145)

## 2023-03-23 PROCEDURE — 36415 COLL VENOUS BLD VENIPUNCTURE: CPT

## 2023-03-23 PROCEDURE — 80048 BASIC METABOLIC PNL TOTAL CA: CPT

## 2023-04-06 ENCOUNTER — HOSPITAL ENCOUNTER (OUTPATIENT)
Age: 43
Discharge: HOME | End: 2023-04-06
Payer: COMMERCIAL

## 2023-04-06 DIAGNOSIS — N18.4 CHRONIC KIDNEY DISEASE, STAGE IV (SEVERE) (HCC): ICD-10-CM

## 2023-04-06 DIAGNOSIS — N17.9 AKI (ACUTE KIDNEY INJURY) (HCC): ICD-10-CM

## 2023-04-06 LAB
ALBUMIN SERPL-MCNC: 3.6 GM/DL (ref 3.4–5)
ALBUMIN SERPL-MCNC: 3.6 GM/DL (ref 3.4–5)
ALP BLD-CCNC: 97 IU/L (ref 40–128)
ALT SERPL-CCNC: 13 U/L (ref 10–40)
ANION GAP SERPL CALCULATED.3IONS-SCNC: 8 MMOL/L (ref 4–16)
ANION GAP SERPL CALCULATED.3IONS-SCNC: 9 MMOL/L (ref 4–16)
AST SERPL-CCNC: 25 IU/L (ref 15–37)
BASOPHILS ABSOLUTE: 0.1 K/CU MM
BASOPHILS RELATIVE PERCENT: 0.7 % (ref 0–1)
BILIRUB SERPL-MCNC: 0.2 MG/DL (ref 0–1)
BUN SERPL-MCNC: 43 MG/DL (ref 6–23)
BUN SERPL-MCNC: 45 MG/DL (ref 6–23)
CALCIUM SERPL-MCNC: 8.7 MG/DL (ref 8.3–10.6)
CALCIUM SERPL-MCNC: 8.7 MG/DL (ref 8.3–10.6)
CHLORIDE BLD-SCNC: 106 MMOL/L (ref 99–110)
CHLORIDE BLD-SCNC: 107 MMOL/L (ref 99–110)
CHOLEST SERPL-MCNC: 99 MG/DL
CO2: 23 MMOL/L (ref 21–32)
CO2: 23 MMOL/L (ref 21–32)
CREAT SERPL-MCNC: 2.8 MG/DL (ref 0.6–1.1)
CREAT SERPL-MCNC: 2.9 MG/DL (ref 0.6–1.1)
DIFFERENTIAL TYPE: ABNORMAL
EOSINOPHILS ABSOLUTE: 0.3 K/CU MM
EOSINOPHILS RELATIVE PERCENT: 2.7 % (ref 0–3)
ESTIMATED AVERAGE GLUCOSE: 123 MG/DL
FERRITIN: 84 NG/ML (ref 15–150)
GFR SERPL CREATININE-BSD FRML MDRD: 20 ML/MIN/1.73M2
GFR SERPL CREATININE-BSD FRML MDRD: 21 ML/MIN/1.73M2
GLUCOSE SERPL-MCNC: 81 MG/DL (ref 70–99)
GLUCOSE SERPL-MCNC: 82 MG/DL (ref 70–99)
HBA1C MFR BLD: 5.9 % (ref 4.2–6.3)
HCT VFR BLD CALC: 26.7 % (ref 37–47)
HDLC SERPL-MCNC: 59 MG/DL
HEMOGLOBIN: 8.3 GM/DL (ref 12.5–16)
IMMATURE NEUTROPHIL %: 0.1 % (ref 0–0.43)
IRON: 38 UG/DL (ref 37–145)
LDLC SERPL CALC-MCNC: 28 MG/DL
LYMPHOCYTES ABSOLUTE: 1.8 K/CU MM
LYMPHOCYTES RELATIVE PERCENT: 19.6 % (ref 24–44)
MCH RBC QN AUTO: 30.2 PG (ref 27–31)
MCHC RBC AUTO-ENTMCNC: 31.1 % (ref 32–36)
MCV RBC AUTO: 97.1 FL (ref 78–100)
MONOCYTES ABSOLUTE: 0.6 K/CU MM
MONOCYTES RELATIVE PERCENT: 6.8 % (ref 0–4)
NUCLEATED RBC %: 0 %
PCT TRANSFERRIN: 15 % (ref 10–44)
PDW BLD-RTO: 12.6 % (ref 11.7–14.9)
PHOSPHORUS: 4.2 MG/DL (ref 2.5–4.9)
PLATELET # BLD: 330 K/CU MM (ref 140–440)
PMV BLD AUTO: 10.5 FL (ref 7.5–11.1)
POTASSIUM SERPL-SCNC: 4.9 MMOL/L (ref 3.5–5.1)
POTASSIUM SERPL-SCNC: 5 MMOL/L (ref 3.5–5.1)
RBC # BLD: 2.75 M/CU MM (ref 4.2–5.4)
SEGMENTED NEUTROPHILS ABSOLUTE COUNT: 6.4 K/CU MM
SEGMENTED NEUTROPHILS RELATIVE PERCENT: 70.1 % (ref 36–66)
SODIUM BLD-SCNC: 138 MMOL/L (ref 135–145)
SODIUM BLD-SCNC: 138 MMOL/L (ref 135–145)
TOTAL IMMATURE NEUTOROPHIL: 0.01 K/CU MM
TOTAL IRON BINDING CAPACITY: 253 UG/DL (ref 250–450)
TOTAL NUCLEATED RBC: 0 K/CU MM
TOTAL PROTEIN: 6.1 GM/DL (ref 6.4–8.2)
TRIGL SERPL-MCNC: 61 MG/DL
UNSATURATED IRON BINDING CAPACITY: 215 UG/DL (ref 110–370)
VITAMIN B-12: 491 PG/ML (ref 211–911)
WBC # BLD: 9.1 K/CU MM (ref 4–10.5)

## 2023-04-06 PROCEDURE — 83540 ASSAY OF IRON: CPT

## 2023-04-06 PROCEDURE — 83550 IRON BINDING TEST: CPT

## 2023-04-06 PROCEDURE — 80061 LIPID PANEL: CPT

## 2023-04-06 PROCEDURE — 82607 VITAMIN B-12: CPT

## 2023-04-06 PROCEDURE — 85025 COMPLETE CBC W/AUTO DIFF WBC: CPT

## 2023-04-06 PROCEDURE — 80069 RENAL FUNCTION PANEL: CPT

## 2023-04-06 PROCEDURE — 36415 COLL VENOUS BLD VENIPUNCTURE: CPT

## 2023-04-06 PROCEDURE — 82728 ASSAY OF FERRITIN: CPT

## 2023-04-06 PROCEDURE — 80053 COMPREHEN METABOLIC PANEL: CPT

## 2023-04-06 PROCEDURE — 83036 HEMOGLOBIN GLYCOSYLATED A1C: CPT

## 2023-04-21 ENCOUNTER — HOSPITAL ENCOUNTER (OUTPATIENT)
Age: 43
Discharge: HOME | End: 2023-04-21
Payer: COMMERCIAL

## 2023-04-21 LAB
ALBUMIN SERPL-MCNC: 3.8 GM/DL (ref 3.4–5)
ALP BLD-CCNC: 95 IU/L (ref 40–129)
ALT SERPL-CCNC: 16 U/L (ref 10–40)
ANION GAP SERPL CALCULATED.3IONS-SCNC: 10 MMOL/L (ref 4–16)
AST SERPL-CCNC: 30 IU/L (ref 15–37)
BASOPHILS ABSOLUTE: 0.1 K/CU MM
BASOPHILS RELATIVE PERCENT: 0.7 % (ref 0–1)
BILIRUB SERPL-MCNC: 0.2 MG/DL (ref 0–1)
BUN SERPL-MCNC: 51 MG/DL (ref 6–23)
CALCIUM SERPL-MCNC: 8.7 MG/DL (ref 8.3–10.6)
CHLORIDE BLD-SCNC: 102 MMOL/L (ref 99–110)
CHOLEST SERPL-MCNC: 102 MG/DL
CO2: 22 MMOL/L (ref 21–32)
CREAT SERPL-MCNC: 2.9 MG/DL (ref 0.6–1.1)
DIFFERENTIAL TYPE: ABNORMAL
EOSINOPHILS ABSOLUTE: 0.3 K/CU MM
EOSINOPHILS RELATIVE PERCENT: 3 % (ref 0–3)
ESTIMATED AVERAGE GLUCOSE: 123 MG/DL
GFR SERPL CREATININE-BSD FRML MDRD: 20 ML/MIN/1.73M2
GLUCOSE SERPL-MCNC: 124 MG/DL (ref 70–99)
HBA1C MFR BLD: 5.9 % (ref 4.2–6.3)
HCT VFR BLD CALC: 26 % (ref 37–47)
HDLC SERPL-MCNC: 61 MG/DL
HEMOGLOBIN: 8.1 GM/DL (ref 12.5–16)
IMMATURE NEUTROPHIL %: 0.2 % (ref 0–0.43)
IRON: 55 UG/DL (ref 37–145)
LDLC SERPL CALC-MCNC: 27 MG/DL
LYMPHOCYTES ABSOLUTE: 1.6 K/CU MM
LYMPHOCYTES RELATIVE PERCENT: 17.3 % (ref 24–44)
MCH RBC QN AUTO: 30.5 PG (ref 27–31)
MCHC RBC AUTO-ENTMCNC: 31.2 % (ref 32–36)
MCV RBC AUTO: 97.7 FL (ref 78–100)
MONOCYTES ABSOLUTE: 0.6 K/CU MM
MONOCYTES RELATIVE PERCENT: 6.2 % (ref 0–4)
NUCLEATED RBC %: 0 %
PCT TRANSFERRIN: 21 % (ref 10–44)
PDW BLD-RTO: 12.5 % (ref 11.7–14.9)
PLATELET # BLD: 310 K/CU MM (ref 140–440)
PMV BLD AUTO: 10.5 FL (ref 7.5–11.1)
POTASSIUM SERPL-SCNC: 5.2 MMOL/L (ref 3.5–5.1)
RBC # BLD: 2.66 M/CU MM (ref 4.2–5.4)
SEGMENTED NEUTROPHILS ABSOLUTE COUNT: 6.6 K/CU MM
SEGMENTED NEUTROPHILS RELATIVE PERCENT: 72.6 % (ref 36–66)
SODIUM BLD-SCNC: 134 MMOL/L (ref 135–145)
TOTAL IMMATURE NEUTOROPHIL: 0.02 K/CU MM
TOTAL IRON BINDING CAPACITY: 256 UG/DL (ref 250–450)
TOTAL NUCLEATED RBC: 0 K/CU MM
TOTAL PROTEIN: 6.4 GM/DL (ref 6.4–8.2)
TRIGL SERPL-MCNC: 71 MG/DL
UNSATURATED IRON BINDING CAPACITY: 201 UG/DL (ref 110–370)
WBC # BLD: 9.1 K/CU MM (ref 4–10.5)

## 2023-04-21 PROCEDURE — 82728 ASSAY OF FERRITIN: CPT

## 2023-04-21 PROCEDURE — 85025 COMPLETE CBC W/AUTO DIFF WBC: CPT

## 2023-04-21 PROCEDURE — 83540 ASSAY OF IRON: CPT

## 2023-04-21 PROCEDURE — 82607 VITAMIN B-12: CPT

## 2023-04-21 PROCEDURE — 80053 COMPREHEN METABOLIC PANEL: CPT

## 2023-04-21 PROCEDURE — 36415 COLL VENOUS BLD VENIPUNCTURE: CPT

## 2023-04-21 PROCEDURE — 83550 IRON BINDING TEST: CPT

## 2023-04-21 PROCEDURE — 80061 LIPID PANEL: CPT

## 2023-04-21 PROCEDURE — 83036 HEMOGLOBIN GLYCOSYLATED A1C: CPT

## 2023-04-22 LAB
FERRITIN: 71 NG/ML (ref 15–150)
VITAMIN B-12: 540.1 PG/ML (ref 211–911)

## 2023-05-04 ENCOUNTER — TELEPHONE (OUTPATIENT)
Dept: NEUROLOGY | Age: 43
End: 2023-05-04

## 2023-05-04 ENCOUNTER — HOSPITAL ENCOUNTER (OUTPATIENT)
Age: 43
Discharge: HOME | End: 2023-05-04
Payer: COMMERCIAL

## 2023-05-04 DIAGNOSIS — N17.9 AKI (ACUTE KIDNEY INJURY) (HCC): ICD-10-CM

## 2023-05-04 DIAGNOSIS — N18.4 CHRONIC KIDNEY DISEASE, STAGE IV (SEVERE) (HCC): ICD-10-CM

## 2023-05-04 LAB
ALBUMIN SERPL-MCNC: 3.9 GM/DL (ref 3.4–5)
ANION GAP SERPL CALCULATED.3IONS-SCNC: 12 MMOL/L (ref 4–16)
BUN SERPL-MCNC: 60 MG/DL (ref 6–23)
CALCIUM SERPL-MCNC: 8.7 MG/DL (ref 8.3–10.6)
CHLORIDE BLD-SCNC: 103 MMOL/L (ref 99–110)
CO2: 22 MMOL/L (ref 21–32)
CREAT SERPL-MCNC: 3.7 MG/DL (ref 0.6–1.1)
GFR SERPL CREATININE-BSD FRML MDRD: 15 ML/MIN/1.73M2
GLUCOSE SERPL-MCNC: 79 MG/DL (ref 70–99)
PHOSPHORUS: 5.4 MG/DL (ref 2.5–4.9)
POTASSIUM SERPL-SCNC: 4.8 MMOL/L (ref 3.5–5.1)
SODIUM BLD-SCNC: 137 MMOL/L (ref 135–145)

## 2023-05-04 PROCEDURE — 80069 RENAL FUNCTION PANEL: CPT

## 2023-05-04 PROCEDURE — 36415 COLL VENOUS BLD VENIPUNCTURE: CPT

## 2023-05-04 NOTE — TELEPHONE ENCOUNTER
LM for pt to call back to be schedule with Asaf Matthew. Pt was seen at [facility] previously for a Lacunar Infarction.

## 2023-05-09 PROBLEM — I15.1 SECONDARY HYPERTENSION DUE TO RENAL DISEASE: Status: ACTIVE | Noted: 2023-05-09

## 2023-05-09 PROBLEM — N18.4 ANEMIA IN STAGE 4 CHRONIC KIDNEY DISEASE (HCC): Status: ACTIVE | Noted: 2023-05-09

## 2023-05-09 PROBLEM — D63.1 ANEMIA IN STAGE 4 CHRONIC KIDNEY DISEASE (HCC): Status: ACTIVE | Noted: 2023-05-09

## 2023-05-10 ENCOUNTER — APPOINTMENT (OUTPATIENT)
Dept: GENERAL RADIOLOGY | Age: 43
End: 2023-05-10
Payer: COMMERCIAL

## 2023-05-10 ENCOUNTER — HOSPITAL ENCOUNTER (EMERGENCY)
Age: 43
Discharge: HOME | End: 2023-05-10
Attending: EMERGENCY MEDICINE
Payer: COMMERCIAL

## 2023-05-10 VITALS
TEMPERATURE: 97.7 F | RESPIRATION RATE: 15 BRPM | HEART RATE: 71 BPM | BODY MASS INDEX: 30.04 KG/M2 | WEIGHT: 153 LBS | SYSTOLIC BLOOD PRESSURE: 169 MMHG | HEIGHT: 60 IN | OXYGEN SATURATION: 98 % | DIASTOLIC BLOOD PRESSURE: 69 MMHG

## 2023-05-10 DIAGNOSIS — D64.9 ACUTE ON CHRONIC ANEMIA: ICD-10-CM

## 2023-05-10 DIAGNOSIS — I10 ESSENTIAL HYPERTENSION: ICD-10-CM

## 2023-05-10 DIAGNOSIS — N39.0 ACUTE LOWER URINARY TRACT INFECTION: Primary | ICD-10-CM

## 2023-05-10 LAB
ALBUMIN SERPL-MCNC: 3.9 GM/DL (ref 3.4–5)
ALP BLD-CCNC: 64 IU/L (ref 40–129)
ALT SERPL-CCNC: 24 U/L (ref 10–40)
ANION GAP SERPL CALCULATED.3IONS-SCNC: 8 MMOL/L (ref 4–16)
AST SERPL-CCNC: 16 IU/L (ref 15–37)
BASOPHILS ABSOLUTE: 0.1 K/CU MM
BASOPHILS RELATIVE PERCENT: 1 % (ref 0–1)
BILIRUB SERPL-MCNC: 0.4 MG/DL (ref 0–1)
BUN SERPL-MCNC: 9 MG/DL (ref 6–23)
CALCIUM SERPL-MCNC: 7.8 MG/DL (ref 8.3–10.6)
CHLORIDE BLD-SCNC: 106 MMOL/L (ref 99–110)
CO2: 27 MMOL/L (ref 21–32)
CREAT SERPL-MCNC: 0.8 MG/DL (ref 0.6–1.1)
DIFFERENTIAL TYPE: ABNORMAL
EKG ATRIAL RATE: 68 BPM
EKG DIAGNOSIS: NORMAL
EKG P AXIS: 48 DEGREES
EKG P-R INTERVAL: 154 MS
EKG Q-T INTERVAL: 460 MS
EKG QRS DURATION: 84 MS
EKG QTC CALCULATION (BAZETT): 489 MS
EKG R AXIS: -41 DEGREES
EKG T AXIS: 82 DEGREES
EKG VENTRICULAR RATE: 68 BPM
EOSINOPHILS ABSOLUTE: 0.4 K/CU MM
EOSINOPHILS RELATIVE PERCENT: 4 % (ref 0–3)
GFR SERPL CREATININE-BSD FRML MDRD: >60 ML/MIN/1.73M2
GLUCOSE SERPL-MCNC: 97 MG/DL (ref 70–99)
HCT VFR BLD CALC: 27.2 % (ref 37–47)
HEMOGLOBIN: 8.6 GM/DL (ref 12.5–16)
IMMATURE NEUTROPHIL %: 0.2 % (ref 0–0.43)
LYMPHOCYTES ABSOLUTE: 2.2 K/CU MM
LYMPHOCYTES RELATIVE PERCENT: 23 % (ref 24–44)
MAGNESIUM: 1.7 MG/DL (ref 1.8–2.4)
MCH RBC QN AUTO: 30.2 PG (ref 27–31)
MCHC RBC AUTO-ENTMCNC: 31.6 % (ref 32–36)
MCV RBC AUTO: 95.4 FL (ref 78–100)
MONOCYTES ABSOLUTE: 0.5 K/CU MM
MONOCYTES RELATIVE PERCENT: 5.5 % (ref 0–4)
NUCLEATED RBC %: 0 %
PDW BLD-RTO: 12.2 % (ref 11.7–14.9)
PLATELET # BLD: 340 K/CU MM (ref 140–440)
PMV BLD AUTO: 10.5 FL (ref 7.5–11.1)
POTASSIUM SERPL-SCNC: 3.8 MMOL/L (ref 3.5–5.1)
PRO-BNP: 61.99 PG/ML
RBC # BLD: 2.85 M/CU MM (ref 4.2–5.4)
SEGMENTED NEUTROPHILS ABSOLUTE COUNT: 6.3 K/CU MM
SEGMENTED NEUTROPHILS RELATIVE PERCENT: 66.3 % (ref 36–66)
SODIUM BLD-SCNC: 141 MMOL/L (ref 135–145)
TOTAL IMMATURE NEUTOROPHIL: 0.02 K/CU MM
TOTAL NUCLEATED RBC: 0 K/CU MM
TOTAL PROTEIN: 6.2 GM/DL (ref 6.4–8.2)
TROPONIN T: <0.01 NG/ML
WBC # BLD: 9.6 K/CU MM (ref 4–10.5)

## 2023-05-10 PROCEDURE — 85025 COMPLETE CBC W/AUTO DIFF WBC: CPT

## 2023-05-10 PROCEDURE — 96376 TX/PRO/DX INJ SAME DRUG ADON: CPT

## 2023-05-10 PROCEDURE — 84484 ASSAY OF TROPONIN QUANT: CPT

## 2023-05-10 PROCEDURE — 71045 X-RAY EXAM CHEST 1 VIEW: CPT

## 2023-05-10 PROCEDURE — 6370000000 HC RX 637 (ALT 250 FOR IP): Performed by: EMERGENCY MEDICINE

## 2023-05-10 PROCEDURE — 80053 COMPREHEN METABOLIC PANEL: CPT

## 2023-05-10 PROCEDURE — 93005 ELECTROCARDIOGRAM TRACING: CPT | Performed by: EMERGENCY MEDICINE

## 2023-05-10 PROCEDURE — 99285 EMERGENCY DEPT VISIT HI MDM: CPT

## 2023-05-10 PROCEDURE — 93010 ELECTROCARDIOGRAM REPORT: CPT | Performed by: INTERNAL MEDICINE

## 2023-05-10 PROCEDURE — 6360000002 HC RX W HCPCS: Performed by: EMERGENCY MEDICINE

## 2023-05-10 PROCEDURE — 83735 ASSAY OF MAGNESIUM: CPT

## 2023-05-10 PROCEDURE — 83880 ASSAY OF NATRIURETIC PEPTIDE: CPT

## 2023-05-10 PROCEDURE — 96375 TX/PRO/DX INJ NEW DRUG ADDON: CPT

## 2023-05-10 PROCEDURE — 96365 THER/PROPH/DIAG IV INF INIT: CPT

## 2023-05-10 PROCEDURE — 2580000003 HC RX 258: Performed by: EMERGENCY MEDICINE

## 2023-05-10 RX ORDER — HYDRALAZINE HYDROCHLORIDE 20 MG/ML
10 INJECTION INTRAMUSCULAR; INTRAVENOUS ONCE
Status: COMPLETED | OUTPATIENT
Start: 2023-05-10 | End: 2023-05-10

## 2023-05-10 RX ORDER — CEPHALEXIN 500 MG/1
500 CAPSULE ORAL 4 TIMES DAILY
Qty: 40 CAPSULE | Refills: 0 | Status: SHIPPED | OUTPATIENT
Start: 2023-05-10 | End: 2023-05-20

## 2023-05-10 RX ORDER — AMLODIPINE BESYLATE 5 MG/1
5 TABLET ORAL ONCE
Status: COMPLETED | OUTPATIENT
Start: 2023-05-10 | End: 2023-05-10

## 2023-05-10 RX ADMIN — HYDRALAZINE HYDROCHLORIDE 10 MG: 20 INJECTION INTRAMUSCULAR; INTRAVENOUS at 18:02

## 2023-05-10 RX ADMIN — HYDRALAZINE HYDROCHLORIDE 10 MG: 20 INJECTION INTRAMUSCULAR; INTRAVENOUS at 16:13

## 2023-05-10 RX ADMIN — AMLODIPINE BESYLATE 5 MG: 5 TABLET ORAL at 16:33

## 2023-05-10 RX ADMIN — CEFTRIAXONE SODIUM 1000 MG: 1 INJECTION, POWDER, FOR SOLUTION INTRAMUSCULAR; INTRAVENOUS at 16:34

## 2023-05-10 ASSESSMENT — LIFESTYLE VARIABLES
HOW MANY STANDARD DRINKS CONTAINING ALCOHOL DO YOU HAVE ON A TYPICAL DAY: PATIENT DOES NOT DRINK
HOW OFTEN DO YOU HAVE A DRINK CONTAINING ALCOHOL: NEVER

## 2023-05-10 NOTE — DISCHARGE INSTRUCTIONS
Follow up with PCP for evaluation of hypertension and anemia. Call for an appointment. Follow up with your nephrologist Dr. Steve Brito in 1-2 days for re-evaluation. Call for an appointment  Take blood pressure medications as prescribed and directed.   Return to ED immediately if any shortness of breath, chest pain, nausea, vomiting or any worsening symptoms

## 2023-05-10 NOTE — ED PROVIDER NOTES
answered. Clinical Impression:  1. Acute lower urinary tract infection    2. Essential hypertension    3. Acute on chronic anemia      Disposition referral (if applicable):  Kevin Vargas MD  [address]  [address]  [phone]    Schedule an appointment as soon as possible for a visit in 1 day  If symptoms worsen    Jaylene Richards MD  [address]  Adult Hypertension and  [address] [address]  [phone]    Schedule an appointment as soon as possible for a visit in 1 day  If symptoms worsen    [facility] Emergency Department  [address] [facility] [phone]  [phone]  Go in 1 day  If symptoms worsen    Disposition medications (if applicable):  New Prescriptions    CEPHALEXIN (KEFLEX) 500 MG CAPSULE    Take 1 capsule by mouth 4 times daily for 10 days     ED Provider Disposition Time  DISPOSITION  6:09 PM          Comment: Please note this report has been produced using speech recognition software and may contain errors related to that system including errors in grammar, punctuation, and spelling, as well as words and phrases that may be inappropriate. Efforts were made to edit the dictations. Bhavik Mcpherson DO  05/10/23 5760

## 2023-05-15 ENCOUNTER — OFFICE VISIT (OUTPATIENT)
Dept: NEUROLOGY | Age: 43
End: 2023-05-15
Payer: COMMERCIAL

## 2023-05-15 VITALS
DIASTOLIC BLOOD PRESSURE: 72 MMHG | HEIGHT: 60 IN | WEIGHT: 153 LBS | OXYGEN SATURATION: 98 % | BODY MASS INDEX: 30.04 KG/M2 | HEART RATE: 61 BPM | SYSTOLIC BLOOD PRESSURE: 142 MMHG

## 2023-05-15 DIAGNOSIS — Z86.73 HISTORY OF LACUNAR CEREBROVASCULAR ACCIDENT: ICD-10-CM

## 2023-05-15 DIAGNOSIS — I61.3 PONTINE HEMORRHAGE (HCC): Primary | ICD-10-CM

## 2023-05-15 DIAGNOSIS — F14.10 COCAINE ABUSE (HCC): ICD-10-CM

## 2023-05-15 PROCEDURE — 3078F DIAST BP <80 MM HG: CPT | Performed by: PSYCHIATRY & NEUROLOGY

## 2023-05-15 PROCEDURE — 99204 OFFICE O/P NEW MOD 45 MIN: CPT | Performed by: PSYCHIATRY & NEUROLOGY

## 2023-05-15 PROCEDURE — 3077F SYST BP >= 140 MM HG: CPT | Performed by: PSYCHIATRY & NEUROLOGY

## 2023-05-15 NOTE — PROGRESS NOTES
5/15/23    Alfred Hunter Kayla  1980    Chief Complaint   Patient presents with    New Patient     Intercranial hemorrhage         History of Present Illness  Susanne Reyna is a 39 y.o. female presenting today for evaluation of:  Pontine hemorrhage    She presented to [facility] Emergency room for possible overdose. She was found to have an acute hemorrhagic infarct in the sathish with extension to the left cerebral peduncle. She had pinpoint pupils and was given intranasal Narcan. Cocaine was positive on presentation. She has had a left facial droop and right sided weakness. She was transferred to [facility] for further treatment and she Beegan today for extraction. She states that she was hospitalized for about a month. She has severe dysphagia and had a feeding tube and provide 6 weeks. She still currently has a suprapubic catheter in due to urinary retention. She had blood pressure adjustments due to significant hypertension presentation. She states she was in inpatient rehab for about 3 more weeks after getting the hospital.  She did have home health physical therapy and is currently in outpatient rehab in hospital.  She states she still has some pins-and-needles on the right side. She is now walking with the assistance of a walker. She states her right forehead \"feels like there is tape on it\". Her speech was originally affected by the stroke but she states that her speech is actually pretty good and so is her cognition. She states her swallowing is back to normal.  She had a feeding tube for about 6 weeks. She is on a normal diet. She states at the time of the stroke she had deviation of the right eye outwards. This has improved. She denies any issues with her vision currently. She states in 2021 she had a possible stroke at that time. This affected her right side last about 24 hours. Documentation states that this was to be \"splenic region\".   She was on aspirin 81 mg and

## 2023-05-22 ENCOUNTER — HOSPITAL ENCOUNTER (OUTPATIENT)
Age: 43
Discharge: HOME | End: 2023-05-22
Payer: COMMERCIAL

## 2023-05-22 LAB
ALBUMIN SERPL-MCNC: 4 GM/DL (ref 3.4–5)
ANION GAP SERPL CALCULATED.3IONS-SCNC: 10 MMOL/L (ref 4–16)
BUN SERPL-MCNC: 52 MG/DL (ref 6–23)
CALCIUM SERPL-MCNC: 8.5 MG/DL (ref 8.3–10.6)
CHLORIDE BLD-SCNC: 104 MMOL/L (ref 99–110)
CO2: 22 MMOL/L (ref 21–32)
CREAT SERPL-MCNC: 3.4 MG/DL (ref 0.6–1.1)
FERRITIN: 86 NG/ML (ref 15–150)
FOLATE SERPL-MCNC: 10.2 NG/ML (ref 3.1–17.5)
GFR SERPL CREATININE-BSD FRML MDRD: 16 ML/MIN/1.73M2
GLUCOSE SERPL-MCNC: 100 MG/DL (ref 70–99)
HCT VFR BLD CALC: 25.2 % (ref 37–47)
HEMOGLOBIN: 7.7 GM/DL (ref 12.5–16)
IRON: 44 UG/DL (ref 37–145)
MCH RBC QN AUTO: 29.7 PG (ref 27–31)
MCHC RBC AUTO-ENTMCNC: 30.6 % (ref 32–36)
MCV RBC AUTO: 97.3 FL (ref 78–100)
PCT TRANSFERRIN: 17 % (ref 10–44)
PDW BLD-RTO: 12.8 % (ref 11.7–14.9)
PHOSPHORUS: 5.2 MG/DL (ref 2.5–4.9)
PLATELET # BLD: 325 K/CU MM (ref 140–440)
PMV BLD AUTO: 10.8 FL (ref 7.5–11.1)
POTASSIUM SERPL-SCNC: 5.2 MMOL/L (ref 3.5–5.1)
RBC # BLD: 2.59 M/CU MM (ref 4.2–5.4)
RETICULOCYTE COUNT PCT: 1.5 % (ref 0.2–2.2)
SODIUM BLD-SCNC: 136 MMOL/L (ref 135–145)
TOTAL IRON BINDING CAPACITY: 266 UG/DL (ref 250–450)
UNSATURATED IRON BINDING CAPACITY: 222 UG/DL (ref 110–370)
VITAMIN B-12: 502.6 PG/ML (ref 211–911)
WBC # BLD: 7.5 K/CU MM (ref 4–10.5)

## 2023-05-22 PROCEDURE — 36415 COLL VENOUS BLD VENIPUNCTURE: CPT

## 2023-05-22 PROCEDURE — 83540 ASSAY OF IRON: CPT

## 2023-05-22 PROCEDURE — 83550 IRON BINDING TEST: CPT

## 2023-05-22 PROCEDURE — 85027 COMPLETE CBC AUTOMATED: CPT

## 2023-05-22 PROCEDURE — 82607 VITAMIN B-12: CPT

## 2023-05-22 PROCEDURE — 82746 ASSAY OF FOLIC ACID SERUM: CPT

## 2023-05-22 PROCEDURE — 83970 ASSAY OF PARATHORMONE: CPT

## 2023-05-22 PROCEDURE — 85045 AUTOMATED RETICULOCYTE COUNT: CPT

## 2023-05-22 PROCEDURE — 82728 ASSAY OF FERRITIN: CPT

## 2023-05-22 PROCEDURE — 80069 RENAL FUNCTION PANEL: CPT

## 2023-05-24 LAB — PTH-INTACT SERPL-MCNC: 104 PG/ML (ref 15–65)

## 2023-06-22 ENCOUNTER — INITIAL CONSULT (OUTPATIENT)
Dept: ONCOLOGY | Age: 43
End: 2023-06-22
Payer: COMMERCIAL

## 2023-06-22 ENCOUNTER — HOSPITAL ENCOUNTER (OUTPATIENT)
Dept: INFUSION THERAPY | Age: 43
Discharge: HOME | End: 2023-06-22
Payer: COMMERCIAL

## 2023-06-22 VITALS
HEIGHT: 60 IN | RESPIRATION RATE: 18 BRPM | WEIGHT: 157.4 LBS | OXYGEN SATURATION: 95 % | DIASTOLIC BLOOD PRESSURE: 73 MMHG | HEART RATE: 67 BPM | BODY MASS INDEX: 30.9 KG/M2 | SYSTOLIC BLOOD PRESSURE: 169 MMHG | TEMPERATURE: 98 F

## 2023-06-22 DIAGNOSIS — D63.1 ANEMIA IN STAGE 4 CHRONIC KIDNEY DISEASE (HCC): Primary | ICD-10-CM

## 2023-06-22 DIAGNOSIS — E10.59 TYPE 1 DIABETES MELLITUS WITH OTHER CIRCULATORY COMPLICATION (HCC): ICD-10-CM

## 2023-06-22 DIAGNOSIS — N18.4 ANEMIA IN STAGE 4 CHRONIC KIDNEY DISEASE (HCC): ICD-10-CM

## 2023-06-22 DIAGNOSIS — D63.1 ANEMIA IN STAGE 4 CHRONIC KIDNEY DISEASE (HCC): ICD-10-CM

## 2023-06-22 DIAGNOSIS — N18.4 ANEMIA IN STAGE 4 CHRONIC KIDNEY DISEASE (HCC): Primary | ICD-10-CM

## 2023-06-22 LAB
ALBUMIN SERPL-MCNC: 3.9 GM/DL (ref 3.4–5)
ALP BLD-CCNC: 184 IU/L (ref 40–129)
ALT SERPL-CCNC: 56 U/L (ref 10–40)
ANION GAP SERPL CALCULATED.3IONS-SCNC: 12 MMOL/L (ref 4–16)
AST SERPL-CCNC: 77 IU/L (ref 15–37)
BASOPHILS ABSOLUTE: 0.1 K/CU MM
BASOPHILS RELATIVE PERCENT: 1 % (ref 0–1)
BILIRUB SERPL-MCNC: 0.2 MG/DL (ref 0–1)
BUN SERPL-MCNC: 46 MG/DL (ref 6–23)
CALCIUM SERPL-MCNC: 8.3 MG/DL (ref 8.3–10.6)
CHLORIDE BLD-SCNC: 102 MMOL/L (ref 99–110)
CO2: 21 MMOL/L (ref 21–32)
CREAT SERPL-MCNC: 3 MG/DL (ref 0.6–1.1)
DIFFERENTIAL TYPE: ABNORMAL
EOSINOPHILS ABSOLUTE: 0.3 K/CU MM
EOSINOPHILS RELATIVE PERCENT: 3.4 % (ref 0–3)
ERYTHROCYTE SEDIMENTATION RATE: 41 MM/HR (ref 0–20)
FERRITIN: 93 NG/ML (ref 15–150)
FOLATE SERPL-MCNC: 13.6 NG/ML (ref 3.1–17.5)
GFR SERPL CREATININE-BSD FRML MDRD: 19 ML/MIN/1.73M2
GLUCOSE SERPL-MCNC: 131 MG/DL (ref 70–99)
HCT VFR BLD CALC: 22.3 % (ref 37–47)
HEMOGLOBIN: 7.1 GM/DL (ref 12.5–16)
IRON: 21 UG/DL (ref 37–145)
LACTATE DEHYDROGENASE: 193 IU/L (ref 120–246)
LYMPHOCYTES ABSOLUTE: 2 K/CU MM
LYMPHOCYTES RELATIVE PERCENT: 25 % (ref 24–44)
MCH RBC QN AUTO: 30.5 PG (ref 27–31)
MCHC RBC AUTO-ENTMCNC: 31.8 % (ref 32–36)
MCV RBC AUTO: 95.7 FL (ref 78–100)
MONOCYTES ABSOLUTE: 0.6 K/CU MM
MONOCYTES RELATIVE PERCENT: 7.1 % (ref 0–4)
PCT TRANSFERRIN: 9 % (ref 10–44)
PDW BLD-RTO: 12.2 % (ref 11.7–14.9)
PLATELET # BLD: 289 K/CU MM (ref 140–440)
PMV BLD AUTO: 9.7 FL (ref 7.5–11.1)
POTASSIUM SERPL-SCNC: 3.6 MMOL/L (ref 3.5–5.1)
RBC # BLD: 2.33 M/CU MM (ref 4.2–5.4)
RETICULOCYTE COUNT PCT: 1.6 % (ref 0.2–2.2)
SEGMENTED NEUTROPHILS ABSOLUTE COUNT: 5 K/CU MM
SEGMENTED NEUTROPHILS RELATIVE PERCENT: 63.5 % (ref 36–66)
SODIUM BLD-SCNC: 135 MMOL/L (ref 135–145)
TOTAL IRON BINDING CAPACITY: 247 UG/DL (ref 250–450)
TOTAL PROTEIN: 6.6 GM/DL (ref 6.4–8.2)
TSH SERPL DL<=0.005 MIU/L-ACNC: 1.5 UIU/ML (ref 0.27–4.2)
UNSATURATED IRON BINDING CAPACITY: 226 UG/DL (ref 110–370)
VITAMIN B-12: 709.2 PG/ML (ref 211–911)
WBC # BLD: 7.9 K/CU MM (ref 4–10.5)

## 2023-06-22 PROCEDURE — 3078F DIAST BP <80 MM HG: CPT | Performed by: INTERNAL MEDICINE

## 2023-06-22 PROCEDURE — 3077F SYST BP >= 140 MM HG: CPT | Performed by: INTERNAL MEDICINE

## 2023-06-22 PROCEDURE — 84165 PROTEIN E-PHORESIS SERUM: CPT

## 2023-06-22 PROCEDURE — 82607 VITAMIN B-12: CPT

## 2023-06-22 PROCEDURE — 85045 AUTOMATED RETICULOCYTE COUNT: CPT

## 2023-06-22 PROCEDURE — 86038 ANTINUCLEAR ANTIBODIES: CPT

## 2023-06-22 PROCEDURE — 82746 ASSAY OF FOLIC ACID SERUM: CPT

## 2023-06-22 PROCEDURE — 85652 RBC SED RATE AUTOMATED: CPT

## 2023-06-22 PROCEDURE — 86430 RHEUMATOID FACTOR TEST QUAL: CPT

## 2023-06-22 PROCEDURE — 82668 ASSAY OF ERYTHROPOIETIN: CPT

## 2023-06-22 PROCEDURE — 3044F HG A1C LEVEL LT 7.0%: CPT | Performed by: INTERNAL MEDICINE

## 2023-06-22 PROCEDURE — 83550 IRON BINDING TEST: CPT

## 2023-06-22 PROCEDURE — 99211 OFF/OP EST MAY X REQ PHY/QHP: CPT

## 2023-06-22 PROCEDURE — 86039 ANTINUCLEAR ANTIBODIES (ANA): CPT

## 2023-06-22 PROCEDURE — 85025 COMPLETE CBC W/AUTO DIFF WBC: CPT

## 2023-06-22 PROCEDURE — 36415 COLL VENOUS BLD VENIPUNCTURE: CPT

## 2023-06-22 PROCEDURE — 82728 ASSAY OF FERRITIN: CPT

## 2023-06-22 PROCEDURE — 84155 ASSAY OF PROTEIN SERUM: CPT

## 2023-06-22 PROCEDURE — 84443 ASSAY THYROID STIM HORMONE: CPT

## 2023-06-22 PROCEDURE — 83615 LACTATE (LD) (LDH) ENZYME: CPT

## 2023-06-22 PROCEDURE — 80053 COMPREHEN METABOLIC PANEL: CPT

## 2023-06-22 PROCEDURE — 99204 OFFICE O/P NEW MOD 45 MIN: CPT | Performed by: INTERNAL MEDICINE

## 2023-06-22 PROCEDURE — 84436 ASSAY OF TOTAL THYROXINE: CPT

## 2023-06-22 PROCEDURE — 83540 ASSAY OF IRON: CPT

## 2023-06-22 PROCEDURE — 83010 ASSAY OF HAPTOGLOBIN QUANT: CPT

## 2023-06-22 ASSESSMENT — PATIENT HEALTH QUESTIONNAIRE - PHQ9
2. FEELING DOWN, DEPRESSED OR HOPELESS: 1
SUM OF ALL RESPONSES TO PHQ9 QUESTIONS 1 & 2: 3
SUM OF ALL RESPONSES TO PHQ QUESTIONS 1-9: 3
1. LITTLE INTEREST OR PLEASURE IN DOING THINGS: 2
SUM OF ALL RESPONSES TO PHQ QUESTIONS 1-9: 3

## 2023-06-22 NOTE — PROGRESS NOTES
MA Rooming Questions  Patient: Savi Lyman  MRN: [id number]    Date: 6/22/2023        New patient    5. Did the patient have a depression screening completed today?  Yes    No data recorded     PHQ-9 Given to (if applicable):               PHQ-9 Score (if applicable):                     [] Positive     []  Negative              Does question #9 need addressed (if applicable)                     [] Yes    []  No               Malissa Chandra MA
corrected by editing.     [address]

## 2023-06-23 ENCOUNTER — HOSPITAL ENCOUNTER (OUTPATIENT)
Age: 43
Setting detail: SPECIMEN
Discharge: HOME | End: 2023-06-23
Payer: COMMERCIAL

## 2023-06-23 LAB
AMORPHOUS: ABNORMAL /HPF
BACTERIA: ABNORMAL /HPF
BILIRUBIN URINE: NEGATIVE MG/DL
BLOOD, URINE: NEGATIVE
CLARITY: ABNORMAL
COLOR: YELLOW
GLUCOSE, URINE: NEGATIVE MG/DL
HEMOCCULT SP1 STL QL: NEGATIVE
KETONES, URINE: NEGATIVE MG/DL
LEUKOCYTE ESTERASE, URINE: ABNORMAL
NITRITE URINE, QUANTITATIVE: NEGATIVE
PH, URINE: 7.5 (ref 5–8)
PROTEIN UA: 100 MG/DL
RBC URINE: 1 /HPF (ref 0–6)
RENAL EPITHELIAL, UA: 1 /HPF
SPECIFIC GRAVITY UA: 1.01 (ref 1–1.03)
TRANSITIONAL EPITHELIAL: <1 /HPF
TRICHOMONAS: ABNORMAL /HPF
UROBILINOGEN, URINE: 0.2 MG/DL (ref 0.2–1)
WBC CLUMP: ABNORMAL /HPF
WBC UA: 20 /HPF (ref 0–5)

## 2023-06-23 PROCEDURE — 82270 OCCULT BLOOD FECES: CPT

## 2023-06-23 PROCEDURE — 81001 URINALYSIS AUTO W/SCOPE: CPT

## 2023-06-25 LAB
ALBUMIN SERPL ELPH-MCNC: 3.4 GM/DL (ref 3.2–5.6)
ALPHA-1-GLOBULIN: 0.4 GM/DL (ref 0.1–0.4)
ALPHA-2-GLOBULIN: 0.8 GM/DL (ref 0.4–1.2)
BETA GLOBULIN: 0.8 GM/DL (ref 0.5–1.3)
EPO SERPL-ACNC: 15 MU/ML (ref 4–27)
GAMMA GLOBULIN: 1.2 GM/DL (ref 0.5–1.6)
HAPTOGLOB SERPL-MCNC: 193 MG/DL (ref 30–200)
NUCLEAR IGG SER QL IA: DETECTED
RHEUMATOID FACTOR: <10 IU/ML (ref 0–14)
T4 SERPL-MCNC: 7.66 UG/DL (ref 4.5–11.7)
TOTAL PROTEIN: 6.6 GM/DL (ref 6.4–8.2)

## 2023-06-26 LAB
ALBUMIN SERPL ELPH-MCNC: 3.4 GM/DL (ref 3.2–5.6)
ALPHA-1-GLOBULIN: 0.4 GM/DL (ref 0.1–0.4)
ALPHA-2-GLOBULIN: 0.8 GM/DL (ref 0.4–1.2)
ANA PATTERN: ABNORMAL
ANA TITER: ABNORMAL
ANTINUCLEAR ANTIBODY, HEP-2, IGG: DETECTED
BETA GLOBULIN: 0.8 GM/DL (ref 0.5–1.3)
GAMMA GLOBULIN: 1.2 GM/DL (ref 0.5–1.6)
INTERPRETATION: ABNORMAL
SPEP INTERPRETATION: NORMAL
TOTAL PROTEIN: 6.6 GM/DL (ref 6.4–8.2)

## 2023-06-27 ENCOUNTER — CLINICAL DOCUMENTATION (OUTPATIENT)
Dept: ONCOLOGY | Age: 43
End: 2023-06-27

## 2023-06-27 DIAGNOSIS — N18.4 ANEMIA IN STAGE 4 CHRONIC KIDNEY DISEASE (HCC): ICD-10-CM

## 2023-06-27 DIAGNOSIS — D63.1 ANEMIA IN STAGE 4 CHRONIC KIDNEY DISEASE (HCC): ICD-10-CM

## 2023-06-27 DIAGNOSIS — R76.8 ANA POSITIVE: Primary | ICD-10-CM

## 2023-07-05 ENCOUNTER — CLINICAL DOCUMENTATION (OUTPATIENT)
Dept: INFUSION THERAPY | Age: 43
End: 2023-07-05

## 2023-07-13 ENCOUNTER — OFFICE VISIT (OUTPATIENT)
Dept: ONCOLOGY | Age: 43
End: 2023-07-13
Payer: COMMERCIAL

## 2023-07-13 ENCOUNTER — HOSPITAL ENCOUNTER (OUTPATIENT)
Dept: INFUSION THERAPY | Age: 43
Discharge: HOME | End: 2023-07-13
Payer: COMMERCIAL

## 2023-07-13 VITALS
BODY MASS INDEX: 29.29 KG/M2 | DIASTOLIC BLOOD PRESSURE: 67 MMHG | TEMPERATURE: 97.7 F | HEIGHT: 60 IN | SYSTOLIC BLOOD PRESSURE: 150 MMHG | OXYGEN SATURATION: 94 % | RESPIRATION RATE: 14 BRPM | HEART RATE: 65 BPM | WEIGHT: 149.2 LBS

## 2023-07-13 DIAGNOSIS — D63.1 ANEMIA IN STAGE 4 CHRONIC KIDNEY DISEASE (HCC): ICD-10-CM

## 2023-07-13 DIAGNOSIS — N18.4 ANEMIA IN STAGE 4 CHRONIC KIDNEY DISEASE (HCC): ICD-10-CM

## 2023-07-13 DIAGNOSIS — D63.1 ANEMIA DUE TO CHRONIC KIDNEY DISEASE, UNSPECIFIED CKD STAGE: ICD-10-CM

## 2023-07-13 DIAGNOSIS — R76.8 ANA POSITIVE: Primary | ICD-10-CM

## 2023-07-13 DIAGNOSIS — E10.59 TYPE 1 DIABETES MELLITUS WITH OTHER CIRCULATORY COMPLICATION (HCC): ICD-10-CM

## 2023-07-13 DIAGNOSIS — R76.8 ANA POSITIVE: ICD-10-CM

## 2023-07-13 DIAGNOSIS — N18.9 ANEMIA DUE TO CHRONIC KIDNEY DISEASE, UNSPECIFIED CKD STAGE: ICD-10-CM

## 2023-07-13 LAB
ALBUMIN SERPL-MCNC: 3.9 GM/DL (ref 3.4–5)
ALP BLD-CCNC: 182 IU/L (ref 40–128)
ALT SERPL-CCNC: 26 U/L (ref 10–40)
ANION GAP SERPL CALCULATED.3IONS-SCNC: 12 MMOL/L (ref 4–16)
AST SERPL-CCNC: 42 IU/L (ref 15–37)
BASOPHILS ABSOLUTE: 0.1 K/CU MM
BASOPHILS RELATIVE PERCENT: 1 % (ref 0–1)
BILIRUB SERPL-MCNC: 0.2 MG/DL (ref 0–1)
BUN SERPL-MCNC: 52 MG/DL (ref 6–23)
CALCIUM SERPL-MCNC: 9 MG/DL (ref 8.3–10.6)
CHLORIDE BLD-SCNC: 102 MMOL/L (ref 99–110)
CO2: 20 MMOL/L (ref 21–32)
CREAT SERPL-MCNC: 3.4 MG/DL (ref 0.6–1.1)
DIFFERENTIAL TYPE: ABNORMAL
EOSINOPHILS ABSOLUTE: 0.4 K/CU MM
EOSINOPHILS RELATIVE PERCENT: 6.8 % (ref 0–3)
GFR SERPL CREATININE-BSD FRML MDRD: 16 ML/MIN/1.73M2
GLUCOSE SERPL-MCNC: 87 MG/DL (ref 70–99)
HCT VFR BLD CALC: 25 % (ref 37–47)
HEMOGLOBIN: 7.7 GM/DL (ref 12.5–16)
LYMPHOCYTES ABSOLUTE: 1.3 K/CU MM
LYMPHOCYTES RELATIVE PERCENT: 21.7 % (ref 24–44)
MCH RBC QN AUTO: 29.4 PG (ref 27–31)
MCHC RBC AUTO-ENTMCNC: 30.8 % (ref 32–36)
MCV RBC AUTO: 95.4 FL (ref 78–100)
MONOCYTES ABSOLUTE: 0.5 K/CU MM
MONOCYTES RELATIVE PERCENT: 8.4 % (ref 0–4)
PDW BLD-RTO: 12.4 % (ref 11.7–14.9)
PLATELET # BLD: 288 K/CU MM (ref 140–440)
PMV BLD AUTO: 9.3 FL (ref 7.5–11.1)
POTASSIUM SERPL-SCNC: 5.3 MMOL/L (ref 3.5–5.1)
RBC # BLD: 2.62 M/CU MM (ref 4.2–5.4)
SEGMENTED NEUTROPHILS ABSOLUTE COUNT: 3.8 K/CU MM
SEGMENTED NEUTROPHILS RELATIVE PERCENT: 62.1 % (ref 36–66)
SODIUM BLD-SCNC: 134 MMOL/L (ref 135–145)
TOTAL PROTEIN: 6.8 GM/DL (ref 6.4–8.2)
WBC # BLD: 6.1 K/CU MM (ref 4–10.5)

## 2023-07-13 PROCEDURE — 99211 OFF/OP EST MAY X REQ PHY/QHP: CPT

## 2023-07-13 PROCEDURE — 3044F HG A1C LEVEL LT 7.0%: CPT | Performed by: INTERNAL MEDICINE

## 2023-07-13 PROCEDURE — 85025 COMPLETE CBC W/AUTO DIFF WBC: CPT

## 2023-07-13 PROCEDURE — 99214 OFFICE O/P EST MOD 30 MIN: CPT | Performed by: INTERNAL MEDICINE

## 2023-07-13 PROCEDURE — 36415 COLL VENOUS BLD VENIPUNCTURE: CPT

## 2023-07-13 PROCEDURE — 3077F SYST BP >= 140 MM HG: CPT | Performed by: INTERNAL MEDICINE

## 2023-07-13 PROCEDURE — 3078F DIAST BP <80 MM HG: CPT | Performed by: INTERNAL MEDICINE

## 2023-07-13 PROCEDURE — 80053 COMPREHEN METABOLIC PANEL: CPT

## 2023-07-13 RX ORDER — ACETAMINOPHEN 325 MG/1
650 TABLET ORAL
OUTPATIENT
Start: 2023-07-20

## 2023-07-13 RX ORDER — ALBUTEROL SULFATE 90 UG/1
4 AEROSOL, METERED RESPIRATORY (INHALATION) PRN
OUTPATIENT
Start: 2023-07-20

## 2023-07-13 RX ORDER — EPINEPHRINE 1 MG/ML
0.3 INJECTION, SOLUTION, CONCENTRATE INTRAVENOUS PRN
OUTPATIENT
Start: 2023-07-20

## 2023-07-13 RX ORDER — FAMOTIDINE 10 MG/ML
20 INJECTION, SOLUTION INTRAVENOUS
OUTPATIENT
Start: 2023-07-20

## 2023-07-13 RX ORDER — LANOLIN ALCOHOL/MO/W.PET/CERES
1000 CREAM (GRAM) TOPICAL DAILY
Qty: 30 TABLET | Refills: 3 | Status: SHIPPED | OUTPATIENT
Start: 2023-07-13 | End: 2023-11-10

## 2023-07-13 RX ORDER — SODIUM CHLORIDE 9 MG/ML
INJECTION, SOLUTION INTRAVENOUS CONTINUOUS
OUTPATIENT
Start: 2023-07-20

## 2023-07-13 RX ORDER — ONDANSETRON 2 MG/ML
8 INJECTION INTRAMUSCULAR; INTRAVENOUS
OUTPATIENT
Start: 2023-07-20

## 2023-07-13 RX ORDER — BUSPIRONE HYDROCHLORIDE 5 MG/1
5 TABLET ORAL 2 TIMES DAILY
COMMUNITY

## 2023-07-13 RX ORDER — DIPHENHYDRAMINE HYDROCHLORIDE 50 MG/ML
50 INJECTION INTRAMUSCULAR; INTRAVENOUS
OUTPATIENT
Start: 2023-07-20

## 2023-07-13 RX ORDER — FERROUS GLUCONATE 324(37.5)
324 TABLET ORAL EVERY OTHER DAY
Qty: 15 TABLET | Refills: 5 | Status: SHIPPED | OUTPATIENT
Start: 2023-07-13 | End: 2024-01-09

## 2023-07-13 NOTE — PROGRESS NOTES
Patient Name:  Kenji Garibay  Patient :  1980  Patient MRN:  [id number]     Primary Oncologist: Aida Moses MD  Referring Provider: Mariaelena Day MD     Date of Service: 2023      Reason for Consult:  Anemia in stage 4 CKD      Chief Complaint:    Chief Complaint   Patient presents with    Follow-up       Encounter Diagnoses   Name Primary? SHELLY positive Yes    Anemia in stage 4 chronic kidney disease (HCC)     Type 1 diabetes mellitus with other circulatory complication (HCC)         HPI: 23: She arrived with her mother to the clinic today. She reported that she was diagnosed with type 1 diabetes at the age of 15 and has not been taking care of herself controlling blood glucose levels  Reported that she had hemorrhagic CVA in 2022 at which time the CT scan of the head was done with contrast and hence worsen the renal function. She reported a right-sided upper and lower extremity weakness. Is undergoing physical therapy. Denied any bleeding. Denied any weight loss. Denied chest pain. Reported increased shortness of breath on exertion. Denied any abdominal pain, nausea, vomiting, diarrhea, constipation. 23 CT Head  IMPRESSION:     1. Resolution of the pontine and intraventricular hemorrhage. No new hemorrhage or developing hydrocephalus. 2.  Stable small area of hypoattenuation in the lateral left cerebellum that may reflect sequelae of a remote infarct.      2023 vitamin B12 540.1  CBC WBC 9.1 hemoglobin 8.1 hematocrit 26.0 MCV 97.7 platelets 618 ANC 5647  CMP BUN 51 creatinine 2.9 GFR 20  Ferritin 71  Iron 55 TIBC 256 sat 21    5/10/2023 CMP WNL  Magnesium 1.7  CBC WBC 9.6 hemoglobin 8.6 hematocrit 27.2 MCV 95.4 platelets 130 ANC 0306  BNP 61.99    2023 vitamin B12 502.6, folate 10.2  CBC WBC 7.5 hemoglobin 7.7 hematocrit 25.2 MCV 97.3 platelets 116  Ferritin 86    Renal function BUN 52 creatinine 3.4 GFR 16 phosphorus 5.2  Reticulocytes

## 2023-07-13 NOTE — PROGRESS NOTES
MA Rooming Questions  Patient: Sushila Kelly  MRN: [id number]    Date: 7/13/2023        1. Do you have any new issues?   no         2. Do you need any refills on medications?    no    3. Have you had any imaging done since your last visit?   no    4. Have you been hospitalized or seen in the emergency room since your last visit here?   no    5. Did the patient have a depression screening completed today?  No    No data recorded     PHQ-9 Given to (if applicable):               PHQ-9 Score (if applicable):                     [] Positive     []  Negative              Does question #9 need addressed (if applicable)                     [] Yes    []  No               Nano Heaton MA

## 2023-08-08 RX ORDER — LANOLIN ALCOHOL/MO/W.PET/CERES
CREAM (GRAM) TOPICAL
Qty: 30 TABLET | Refills: 3 | OUTPATIENT
Start: 2023-08-08

## 2023-08-08 RX ORDER — FERROUS GLUCONATE 324(37.5)
324 TABLET ORAL EVERY OTHER DAY
Qty: 15 TABLET | Refills: 5 | OUTPATIENT
Start: 2023-08-08 | End: 2024-02-04

## 2023-08-16 ENCOUNTER — HOSPITAL ENCOUNTER (OUTPATIENT)
Dept: INFUSION THERAPY | Age: 43
Discharge: HOME | End: 2023-08-16
Payer: COMMERCIAL

## 2023-08-16 VITALS — DIASTOLIC BLOOD PRESSURE: 73 MMHG | SYSTOLIC BLOOD PRESSURE: 165 MMHG

## 2023-08-16 DIAGNOSIS — N18.4 ANEMIA DUE TO STAGE 4 CHRONIC KIDNEY DISEASE (HCC): ICD-10-CM

## 2023-08-16 DIAGNOSIS — R76.8 ANA POSITIVE: Primary | ICD-10-CM

## 2023-08-16 DIAGNOSIS — D63.1 ANEMIA DUE TO STAGE 4 CHRONIC KIDNEY DISEASE (HCC): ICD-10-CM

## 2023-08-16 LAB
BASOPHILS ABSOLUTE: 0.1 K/CU MM
BASOPHILS RELATIVE PERCENT: 0.8 % (ref 0–1)
DIFFERENTIAL TYPE: ABNORMAL
EOSINOPHILS ABSOLUTE: 0.7 K/CU MM
EOSINOPHILS RELATIVE PERCENT: 7 % (ref 0–3)
HCT VFR BLD CALC: 23.9 % (ref 37–47)
HEMOGLOBIN: 7.6 GM/DL (ref 12.5–16)
LYMPHOCYTES ABSOLUTE: 2 K/CU MM
LYMPHOCYTES RELATIVE PERCENT: 21.5 % (ref 24–44)
MCH RBC QN AUTO: 29.7 PG (ref 27–31)
MCHC RBC AUTO-ENTMCNC: 31.8 % (ref 32–36)
MCV RBC AUTO: 93.4 FL (ref 78–100)
MONOCYTES ABSOLUTE: 0.6 K/CU MM
MONOCYTES RELATIVE PERCENT: 6.8 % (ref 0–4)
PDW BLD-RTO: 12.7 % (ref 11.7–14.9)
PLATELET # BLD: 294 K/CU MM (ref 140–440)
PMV BLD AUTO: 9.8 FL (ref 7.5–11.1)
RBC # BLD: 2.56 M/CU MM (ref 4.2–5.4)
SEGMENTED NEUTROPHILS ABSOLUTE COUNT: 5.9 K/CU MM
SEGMENTED NEUTROPHILS RELATIVE PERCENT: 63.9 % (ref 36–66)
WBC # BLD: 9.3 K/CU MM (ref 4–10.5)

## 2023-08-16 PROCEDURE — 85025 COMPLETE CBC W/AUTO DIFF WBC: CPT

## 2023-08-16 PROCEDURE — 96372 THER/PROPH/DIAG INJ SC/IM: CPT

## 2023-08-16 PROCEDURE — 36415 COLL VENOUS BLD VENIPUNCTURE: CPT

## 2023-08-16 PROCEDURE — 6360000002 HC RX W HCPCS: Performed by: INTERNAL MEDICINE

## 2023-08-16 RX ORDER — ALBUTEROL SULFATE 90 UG/1
4 AEROSOL, METERED RESPIRATORY (INHALATION) PRN
OUTPATIENT
Start: 2023-08-30

## 2023-08-16 RX ORDER — ACETAMINOPHEN 325 MG/1
650 TABLET ORAL
OUTPATIENT
Start: 2023-08-30

## 2023-08-16 RX ORDER — FAMOTIDINE 10 MG/ML
20 INJECTION, SOLUTION INTRAVENOUS
OUTPATIENT
Start: 2023-08-30

## 2023-08-16 RX ORDER — ONDANSETRON 2 MG/ML
8 INJECTION INTRAMUSCULAR; INTRAVENOUS
OUTPATIENT
Start: 2023-08-30

## 2023-08-16 RX ORDER — DIPHENHYDRAMINE HYDROCHLORIDE 50 MG/ML
50 INJECTION INTRAMUSCULAR; INTRAVENOUS
OUTPATIENT
Start: 2023-08-30

## 2023-08-16 RX ORDER — EPINEPHRINE 1 MG/ML
0.3 INJECTION, SOLUTION, CONCENTRATE INTRAVENOUS PRN
OUTPATIENT
Start: 2023-08-30

## 2023-08-16 RX ORDER — SODIUM CHLORIDE 9 MG/ML
INJECTION, SOLUTION INTRAVENOUS CONTINUOUS
OUTPATIENT
Start: 2023-08-30

## 2023-08-16 RX ADMIN — DARBEPOETIN ALFA 40 MCG: 40 INJECTION, SOLUTION INTRAVENOUS; SUBCUTANEOUS at 15:37

## 2023-08-16 NOTE — PROGRESS NOTES
Pt here for new Aransep injection. CBC reviewed from 8/16 and Hgb of 7.6. pt has no concerns at this time for physician. Pt educated on Aranesp injections. All questions answered. Injection given SQ to left upper arm.  Pt tolerated without incident left ambulatory discharge instructions given

## 2023-08-30 ENCOUNTER — APPOINTMENT (OUTPATIENT)
Dept: INFUSION THERAPY | Age: 43
End: 2023-08-30
Payer: COMMERCIAL

## 2023-08-30 ENCOUNTER — HOSPITAL ENCOUNTER (OUTPATIENT)
Dept: INFUSION THERAPY | Age: 43
Discharge: HOME | End: 2023-08-30
Payer: COMMERCIAL

## 2023-08-30 VITALS — SYSTOLIC BLOOD PRESSURE: 176 MMHG | DIASTOLIC BLOOD PRESSURE: 77 MMHG

## 2023-08-30 DIAGNOSIS — D63.1 ANEMIA DUE TO STAGE 4 CHRONIC KIDNEY DISEASE (HCC): Primary | ICD-10-CM

## 2023-08-30 DIAGNOSIS — N18.4 ANEMIA DUE TO STAGE 4 CHRONIC KIDNEY DISEASE (HCC): Primary | ICD-10-CM

## 2023-08-30 LAB
BASOPHILS ABSOLUTE: 0.1 K/CU MM
BASOPHILS RELATIVE PERCENT: 0.8 % (ref 0–1)
DIFFERENTIAL TYPE: ABNORMAL
EOSINOPHILS ABSOLUTE: 0.5 K/CU MM
EOSINOPHILS RELATIVE PERCENT: 6.5 % (ref 0–3)
HCT VFR BLD CALC: 26.2 % (ref 37–47)
HEMOGLOBIN: 8.2 GM/DL (ref 12.5–16)
LYMPHOCYTES ABSOLUTE: 1.8 K/CU MM
LYMPHOCYTES RELATIVE PERCENT: 24.1 % (ref 24–44)
MCH RBC QN AUTO: 29.4 PG (ref 27–31)
MCHC RBC AUTO-ENTMCNC: 31.3 % (ref 32–36)
MCV RBC AUTO: 93.9 FL (ref 78–100)
MONOCYTES ABSOLUTE: 0.7 K/CU MM
MONOCYTES RELATIVE PERCENT: 9.6 % (ref 0–4)
PDW BLD-RTO: 12.8 % (ref 11.7–14.9)
PLATELET # BLD: 307 K/CU MM (ref 140–440)
PMV BLD AUTO: 8.7 FL (ref 7.5–11.1)
RBC # BLD: 2.79 M/CU MM (ref 4.2–5.4)
SEGMENTED NEUTROPHILS ABSOLUTE COUNT: 4.5 K/CU MM
SEGMENTED NEUTROPHILS RELATIVE PERCENT: 59 % (ref 36–66)
WBC # BLD: 7.6 K/CU MM (ref 4–10.5)

## 2023-08-30 PROCEDURE — 85025 COMPLETE CBC W/AUTO DIFF WBC: CPT

## 2023-08-30 PROCEDURE — 96372 THER/PROPH/DIAG INJ SC/IM: CPT

## 2023-08-30 PROCEDURE — 36415 COLL VENOUS BLD VENIPUNCTURE: CPT

## 2023-08-30 PROCEDURE — 6360000002 HC RX W HCPCS: Performed by: INTERNAL MEDICINE

## 2023-08-30 RX ORDER — ALBUTEROL SULFATE 90 UG/1
4 AEROSOL, METERED RESPIRATORY (INHALATION) PRN
OUTPATIENT
Start: 2023-09-13

## 2023-08-30 RX ORDER — SODIUM CHLORIDE 9 MG/ML
INJECTION, SOLUTION INTRAVENOUS CONTINUOUS
OUTPATIENT
Start: 2023-09-13

## 2023-08-30 RX ORDER — DIPHENHYDRAMINE HYDROCHLORIDE 50 MG/ML
50 INJECTION INTRAMUSCULAR; INTRAVENOUS
OUTPATIENT
Start: 2023-09-13

## 2023-08-30 RX ORDER — FAMOTIDINE 10 MG/ML
20 INJECTION, SOLUTION INTRAVENOUS
OUTPATIENT
Start: 2023-09-13

## 2023-08-30 RX ORDER — ONDANSETRON 2 MG/ML
8 INJECTION INTRAMUSCULAR; INTRAVENOUS
OUTPATIENT
Start: 2023-09-13

## 2023-08-30 RX ORDER — ACETAMINOPHEN 325 MG/1
650 TABLET ORAL
OUTPATIENT
Start: 2023-09-13

## 2023-08-30 RX ORDER — EPINEPHRINE 1 MG/ML
0.3 INJECTION, SOLUTION, CONCENTRATE INTRAVENOUS PRN
OUTPATIENT
Start: 2023-09-13

## 2023-08-30 RX ADMIN — DARBEPOETIN ALFA 40 MCG: 40 INJECTION, SOLUTION INTRAVENOUS; SUBCUTANEOUS at 14:58

## 2023-08-30 NOTE — PROGRESS NOTES
Patient arrived to treatment [facility] lab for Aranesp injection. No questions or concerns for the doctor at this time. /77, but patient states feeling like she has a UTI and that her BP was elevated the last time she felt that way. Hgb 8.2, injection indicated. Treatment approved and given subcutaneously in right arm, band-aid applied. Patient tolerated well. Left treatment suite ambulatory. Discharge instructions provided.

## 2023-08-31 ENCOUNTER — HOSPITAL ENCOUNTER (OUTPATIENT)
Age: 43
Discharge: HOME | End: 2023-08-31
Payer: COMMERCIAL

## 2023-08-31 DIAGNOSIS — N18.4 CHRONIC KIDNEY DISEASE, STAGE IV (SEVERE) (HCC): ICD-10-CM

## 2023-08-31 DIAGNOSIS — R80.9 MICROALBUMINURIA DUE TO TYPE 2 DIABETES MELLITUS (HCC): ICD-10-CM

## 2023-08-31 DIAGNOSIS — N18.4 ANEMIA IN STAGE 4 CHRONIC KIDNEY DISEASE (HCC): ICD-10-CM

## 2023-08-31 DIAGNOSIS — E11.29 MICROALBUMINURIA DUE TO TYPE 2 DIABETES MELLITUS (HCC): ICD-10-CM

## 2023-08-31 DIAGNOSIS — D63.1 ANEMIA IN STAGE 4 CHRONIC KIDNEY DISEASE (HCC): ICD-10-CM

## 2023-08-31 DIAGNOSIS — N17.9 AKI (ACUTE KIDNEY INJURY) (HCC): ICD-10-CM

## 2023-08-31 DIAGNOSIS — I15.1 SECONDARY HYPERTENSION DUE TO RENAL DISEASE: ICD-10-CM

## 2023-08-31 LAB
ANION GAP SERPL CALCULATED.3IONS-SCNC: 13 MMOL/L (ref 4–16)
BUN SERPL-MCNC: 45 MG/DL (ref 6–23)
CALCIUM SERPL-MCNC: 8.6 MG/DL (ref 8.3–10.6)
CHLORIDE BLD-SCNC: 105 MMOL/L (ref 99–110)
CO2: 20 MMOL/L (ref 21–32)
CREAT SERPL-MCNC: 3.3 MG/DL (ref 0.6–1.1)
GFR SERPL CREATININE-BSD FRML MDRD: 17 ML/MIN/1.73M2
GLUCOSE SERPL-MCNC: 72 MG/DL (ref 70–99)
POTASSIUM SERPL-SCNC: 5.1 MMOL/L (ref 3.5–5.1)
SODIUM BLD-SCNC: 138 MMOL/L (ref 135–145)

## 2023-08-31 PROCEDURE — 80048 BASIC METABOLIC PNL TOTAL CA: CPT

## 2023-08-31 PROCEDURE — 36415 COLL VENOUS BLD VENIPUNCTURE: CPT

## 2023-09-13 ENCOUNTER — HOSPITAL ENCOUNTER (OUTPATIENT)
Dept: INFUSION THERAPY | Age: 43
Discharge: HOME | End: 2023-09-13
Payer: COMMERCIAL

## 2023-09-13 VITALS — DIASTOLIC BLOOD PRESSURE: 70 MMHG | SYSTOLIC BLOOD PRESSURE: 156 MMHG

## 2023-09-13 DIAGNOSIS — N18.4 ANEMIA IN STAGE 4 CHRONIC KIDNEY DISEASE (HCC): Primary | ICD-10-CM

## 2023-09-13 DIAGNOSIS — D63.1 ANEMIA IN STAGE 4 CHRONIC KIDNEY DISEASE (HCC): Primary | ICD-10-CM

## 2023-09-13 DIAGNOSIS — N18.4 ANEMIA DUE TO STAGE 4 CHRONIC KIDNEY DISEASE (HCC): ICD-10-CM

## 2023-09-13 DIAGNOSIS — D63.1 ANEMIA DUE TO STAGE 4 CHRONIC KIDNEY DISEASE (HCC): ICD-10-CM

## 2023-09-13 LAB
BASOPHILS ABSOLUTE: 0 K/CU MM
BASOPHILS RELATIVE PERCENT: 0.4 % (ref 0–1)
DIFFERENTIAL TYPE: ABNORMAL
EOSINOPHILS ABSOLUTE: 0.4 K/CU MM
EOSINOPHILS RELATIVE PERCENT: 4.4 % (ref 0–3)
HCT VFR BLD CALC: 27.9 % (ref 37–47)
HEMOGLOBIN: 8.6 GM/DL (ref 12.5–16)
LYMPHOCYTES ABSOLUTE: 1.8 K/CU MM
LYMPHOCYTES RELATIVE PERCENT: 19.3 % (ref 24–44)
MCH RBC QN AUTO: 29 PG (ref 27–31)
MCHC RBC AUTO-ENTMCNC: 30.8 % (ref 32–36)
MCV RBC AUTO: 93.9 FL (ref 78–100)
MONOCYTES ABSOLUTE: 0.8 K/CU MM
MONOCYTES RELATIVE PERCENT: 8.9 % (ref 0–4)
PDW BLD-RTO: 12.9 % (ref 11.7–14.9)
PLATELET # BLD: 338 K/CU MM (ref 140–440)
PMV BLD AUTO: 8.8 FL (ref 7.5–11.1)
RBC # BLD: 2.97 M/CU MM (ref 4.2–5.4)
SEGMENTED NEUTROPHILS ABSOLUTE COUNT: 6.3 K/CU MM
SEGMENTED NEUTROPHILS RELATIVE PERCENT: 67 % (ref 36–66)
WBC # BLD: 9.4 K/CU MM (ref 4–10.5)

## 2023-09-13 PROCEDURE — 96372 THER/PROPH/DIAG INJ SC/IM: CPT

## 2023-09-13 PROCEDURE — 85025 COMPLETE CBC W/AUTO DIFF WBC: CPT

## 2023-09-13 PROCEDURE — 6360000002 HC RX W HCPCS: Performed by: INTERNAL MEDICINE

## 2023-09-13 RX ORDER — EPINEPHRINE 1 MG/ML
0.3 INJECTION, SOLUTION, CONCENTRATE INTRAVENOUS PRN
OUTPATIENT
Start: 2023-09-27

## 2023-09-13 RX ORDER — DIPHENHYDRAMINE HYDROCHLORIDE 50 MG/ML
50 INJECTION INTRAMUSCULAR; INTRAVENOUS
OUTPATIENT
Start: 2023-09-27

## 2023-09-13 RX ORDER — ACETAMINOPHEN 325 MG/1
650 TABLET ORAL
OUTPATIENT
Start: 2023-09-27

## 2023-09-13 RX ORDER — ONDANSETRON 2 MG/ML
8 INJECTION INTRAMUSCULAR; INTRAVENOUS
OUTPATIENT
Start: 2023-09-27

## 2023-09-13 RX ORDER — SODIUM CHLORIDE 9 MG/ML
INJECTION, SOLUTION INTRAVENOUS CONTINUOUS
OUTPATIENT
Start: 2023-09-27

## 2023-09-13 RX ORDER — ALBUTEROL SULFATE 90 UG/1
4 AEROSOL, METERED RESPIRATORY (INHALATION) PRN
OUTPATIENT
Start: 2023-09-27

## 2023-09-13 RX ORDER — FAMOTIDINE 10 MG/ML
20 INJECTION, SOLUTION INTRAVENOUS
OUTPATIENT
Start: 2023-09-27

## 2023-09-13 RX ADMIN — DARBEPOETIN ALFA 40 MCG: 40 INJECTION, SOLUTION INTRAVENOUS; SUBCUTANEOUS at 15:21

## 2023-09-27 ENCOUNTER — HOSPITAL ENCOUNTER (OUTPATIENT)
Dept: INFUSION THERAPY | Age: 43
Discharge: HOME | End: 2023-09-27
Payer: COMMERCIAL

## 2023-09-27 VITALS — SYSTOLIC BLOOD PRESSURE: 142 MMHG | HEART RATE: 58 BPM | DIASTOLIC BLOOD PRESSURE: 68 MMHG

## 2023-09-27 DIAGNOSIS — D63.1 ANEMIA DUE TO STAGE 4 CHRONIC KIDNEY DISEASE (HCC): Primary | ICD-10-CM

## 2023-09-27 DIAGNOSIS — N18.4 ANEMIA DUE TO STAGE 4 CHRONIC KIDNEY DISEASE (HCC): Primary | ICD-10-CM

## 2023-09-27 LAB
BASOPHILS ABSOLUTE: 0.1 K/CU MM
BASOPHILS RELATIVE PERCENT: 1 % (ref 0–1)
DIFFERENTIAL TYPE: ABNORMAL
EOSINOPHILS ABSOLUTE: 0.4 K/CU MM
EOSINOPHILS RELATIVE PERCENT: 5.2 % (ref 0–3)
HCT VFR BLD CALC: 29.6 % (ref 37–47)
HEMOGLOBIN: 9.3 GM/DL (ref 12.5–16)
LYMPHOCYTES ABSOLUTE: 1.8 K/CU MM
LYMPHOCYTES RELATIVE PERCENT: 22.4 % (ref 24–44)
MCH RBC QN AUTO: 29.3 PG (ref 27–31)
MCHC RBC AUTO-ENTMCNC: 31.4 % (ref 32–36)
MCV RBC AUTO: 93.4 FL (ref 78–100)
MONOCYTES ABSOLUTE: 0.7 K/CU MM
MONOCYTES RELATIVE PERCENT: 8.3 % (ref 0–4)
PDW BLD-RTO: 12.9 % (ref 11.7–14.9)
PLATELET # BLD: 324 K/CU MM (ref 140–440)
PMV BLD AUTO: 8.9 FL (ref 7.5–11.1)
RBC # BLD: 3.17 M/CU MM (ref 4.2–5.4)
SEGMENTED NEUTROPHILS ABSOLUTE COUNT: 5.1 K/CU MM
SEGMENTED NEUTROPHILS RELATIVE PERCENT: 63.1 % (ref 36–66)
WBC # BLD: 8.1 K/CU MM (ref 4–10.5)

## 2023-09-27 PROCEDURE — 36415 COLL VENOUS BLD VENIPUNCTURE: CPT

## 2023-09-27 PROCEDURE — 96372 THER/PROPH/DIAG INJ SC/IM: CPT

## 2023-09-27 PROCEDURE — 6360000002 HC RX W HCPCS: Performed by: INTERNAL MEDICINE

## 2023-09-27 PROCEDURE — 85025 COMPLETE CBC W/AUTO DIFF WBC: CPT

## 2023-09-27 RX ORDER — ONDANSETRON 2 MG/ML
8 INJECTION INTRAMUSCULAR; INTRAVENOUS
OUTPATIENT
Start: 2023-10-11

## 2023-09-27 RX ORDER — DIPHENHYDRAMINE HYDROCHLORIDE 50 MG/ML
50 INJECTION INTRAMUSCULAR; INTRAVENOUS
OUTPATIENT
Start: 2023-10-11

## 2023-09-27 RX ORDER — SODIUM CHLORIDE 9 MG/ML
INJECTION, SOLUTION INTRAVENOUS CONTINUOUS
OUTPATIENT
Start: 2023-10-11

## 2023-09-27 RX ORDER — EPINEPHRINE 1 MG/ML
0.3 INJECTION, SOLUTION, CONCENTRATE INTRAVENOUS PRN
OUTPATIENT
Start: 2023-10-11

## 2023-09-27 RX ORDER — ACETAMINOPHEN 325 MG/1
650 TABLET ORAL
OUTPATIENT
Start: 2023-10-11

## 2023-09-27 RX ORDER — ALBUTEROL SULFATE 90 UG/1
4 AEROSOL, METERED RESPIRATORY (INHALATION) PRN
OUTPATIENT
Start: 2023-10-11

## 2023-09-27 RX ORDER — FAMOTIDINE 10 MG/ML
20 INJECTION, SOLUTION INTRAVENOUS
OUTPATIENT
Start: 2023-10-11

## 2023-09-27 RX ADMIN — DARBEPOETIN ALFA 40 MCG: 40 INJECTION, SOLUTION INTRAVENOUS; SUBCUTANEOUS at 14:52

## 2023-09-27 NOTE — PROGRESS NOTES
Patient ambulated to infusion area, here today for an Aranesp injection. No concerns at this time. Treatment approved and given in right arm SQ. Patient tolerated well. Patient provided discharge instructions. RTC 10/11 for next injection.

## 2023-10-03 ENCOUNTER — HOSPITAL ENCOUNTER (OUTPATIENT)
Age: 43
Discharge: HOME | End: 2023-10-03
Payer: COMMERCIAL

## 2023-10-03 LAB
ALBUMIN SERPL-MCNC: 4 GM/DL (ref 3.4–5)
ANION GAP SERPL CALCULATED.3IONS-SCNC: 13 MMOL/L (ref 4–16)
BUN SERPL-MCNC: 52 MG/DL (ref 6–23)
CALCIUM SERPL-MCNC: 8.5 MG/DL (ref 8.3–10.6)
CHLORIDE BLD-SCNC: 102 MMOL/L (ref 99–110)
CO2: 19 MMOL/L (ref 21–32)
CREAT SERPL-MCNC: 4 MG/DL (ref 0.6–1.1)
GFR SERPL CREATININE-BSD FRML MDRD: 13 ML/MIN/1.73M2
GLUCOSE SERPL-MCNC: 153 MG/DL (ref 70–99)
PHOSPHORUS: 5.6 MG/DL (ref 2.5–4.9)
POTASSIUM SERPL-SCNC: 5.7 MMOL/L (ref 3.5–5.1)
SODIUM BLD-SCNC: 134 MMOL/L (ref 135–145)

## 2023-10-03 PROCEDURE — 36415 COLL VENOUS BLD VENIPUNCTURE: CPT

## 2023-10-03 PROCEDURE — 80069 RENAL FUNCTION PANEL: CPT

## 2023-10-04 DIAGNOSIS — N18.4 ANEMIA IN STAGE 4 CHRONIC KIDNEY DISEASE (HCC): Primary | ICD-10-CM

## 2023-10-04 DIAGNOSIS — D63.1 ANEMIA IN STAGE 4 CHRONIC KIDNEY DISEASE (HCC): Primary | ICD-10-CM

## 2023-10-09 ENCOUNTER — HOSPITAL ENCOUNTER (EMERGENCY)
Age: 43
Discharge: LEFT BEFORE DISCHARGE | End: 2023-10-09

## 2023-10-09 VITALS
TEMPERATURE: 98.5 F | OXYGEN SATURATION: 95 % | SYSTOLIC BLOOD PRESSURE: 177 MMHG | DIASTOLIC BLOOD PRESSURE: 82 MMHG | RESPIRATION RATE: 18 BRPM | HEART RATE: 70 BPM

## 2023-10-09 NOTE — ACP (ADVANCE CARE PLANNING)
Patient does not have any ACP documents/Medical Power of . LSW notes hospital will follow [place] Next of Kin hierarchy in the following descending order for priority:    Guardian  Spouse  Majority of adult Children  Parents  Majority of adult Siblings  Nearest Relative not described above    Per [place] Next of Kin hierarchy: Patients' adult child will be [address]

## 2023-10-10 ENCOUNTER — APPOINTMENT (OUTPATIENT)
Dept: CT IMAGING | Age: 43
DRG: 698 | End: 2023-10-10
Payer: COMMERCIAL

## 2023-10-10 ENCOUNTER — HOSPITAL ENCOUNTER (INPATIENT)
Age: 43
LOS: 2 days | Discharge: HOME | DRG: 698 | End: 2023-10-12
Attending: EMERGENCY MEDICINE | Admitting: INTERNAL MEDICINE
Payer: COMMERCIAL

## 2023-10-10 DIAGNOSIS — N18.4 STAGE 4 CHRONIC KIDNEY DISEASE (HCC): ICD-10-CM

## 2023-10-10 DIAGNOSIS — N39.0 URINARY TRACT INFECTION ASSOCIATED WITH INDWELLING URETHRAL CATHETER, INITIAL ENCOUNTER (HCC): Primary | ICD-10-CM

## 2023-10-10 DIAGNOSIS — T83.511A URINARY TRACT INFECTION ASSOCIATED WITH INDWELLING URETHRAL CATHETER, INITIAL ENCOUNTER (HCC): Primary | ICD-10-CM

## 2023-10-10 DIAGNOSIS — R91.1 RIGHT UPPER LOBE PULMONARY NODULE: ICD-10-CM

## 2023-10-10 LAB
ALBUMIN SERPL-MCNC: 3.9 GM/DL (ref 3.4–5)
ALP BLD-CCNC: 118 IU/L (ref 40–128)
ALT SERPL-CCNC: 12 U/L (ref 10–40)
ANION GAP SERPL CALCULATED.3IONS-SCNC: 15 MMOL/L (ref 4–16)
AST SERPL-CCNC: 25 IU/L (ref 15–37)
BACTERIA: ABNORMAL /HPF
BASOPHILS ABSOLUTE: 0.1 K/CU MM
BASOPHILS RELATIVE PERCENT: 1 % (ref 0–1)
BILIRUB SERPL-MCNC: 0.3 MG/DL (ref 0–1)
BILIRUBIN URINE: NEGATIVE MG/DL
BLOOD, URINE: ABNORMAL
BUN SERPL-MCNC: 50 MG/DL (ref 6–23)
CALCIUM SERPL-MCNC: 9.5 MG/DL (ref 8.3–10.6)
CHLORIDE BLD-SCNC: 101 MMOL/L (ref 99–110)
CLARITY: ABNORMAL
CO2: 20 MMOL/L (ref 21–32)
COLOR: YELLOW
CREAT SERPL-MCNC: 3.2 MG/DL (ref 0.6–1.1)
DIFFERENTIAL TYPE: ABNORMAL
EKG ATRIAL RATE: 63 BPM
EKG DIAGNOSIS: NORMAL
EKG P AXIS: 43 DEGREES
EKG P-R INTERVAL: 146 MS
EKG Q-T INTERVAL: 524 MS
EKG QRS DURATION: 86 MS
EKG QTC CALCULATION (BAZETT): 536 MS
EKG R AXIS: -46 DEGREES
EKG T AXIS: 64 DEGREES
EKG VENTRICULAR RATE: 63 BPM
EOSINOPHILS ABSOLUTE: 0.4 K/CU MM
EOSINOPHILS RELATIVE PERCENT: 4.5 % (ref 0–3)
ESTIMATED AVERAGE GLUCOSE: 114 MG/DL
GFR SERPL CREATININE-BSD FRML MDRD: 18 ML/MIN/1.73M2
GLUCOSE BLD-MCNC: 293 MG/DL (ref 70–99)
GLUCOSE BLD-MCNC: 444 MG/DL (ref 70–99)
GLUCOSE SERPL-MCNC: 90 MG/DL (ref 70–99)
GLUCOSE, URINE: NEGATIVE MG/DL
HBA1C MFR BLD: 5.6 % (ref 4.2–6.3)
HCT VFR BLD CALC: 32.5 % (ref 37–47)
HEMOGLOBIN: 10.3 GM/DL (ref 12.5–16)
IMMATURE NEUTROPHIL %: 0.2 % (ref 0–0.43)
KETONES, URINE: NEGATIVE MG/DL
LACTIC ACID, SEPSIS: 0.6 MMOL/L (ref 0.4–2)
LACTIC ACID, SEPSIS: 1.8 MMOL/L (ref 0.4–2)
LEUKOCYTE ESTERASE, URINE: ABNORMAL
LYMPHOCYTES ABSOLUTE: 1.2 K/CU MM
LYMPHOCYTES RELATIVE PERCENT: 14.6 % (ref 24–44)
MAGNESIUM: 2.5 MG/DL (ref 1.8–2.4)
MCH RBC QN AUTO: 28.9 PG (ref 27–31)
MCHC RBC AUTO-ENTMCNC: 31.7 % (ref 32–36)
MCV RBC AUTO: 91 FL (ref 78–100)
MONOCYTES ABSOLUTE: 0.6 K/CU MM
MONOCYTES RELATIVE PERCENT: 6.7 % (ref 0–4)
MUCUS: ABNORMAL HPF
NITRITE URINE, QUANTITATIVE: POSITIVE
NUCLEATED RBC %: 0 %
PDW BLD-RTO: 12.7 % (ref 11.7–14.9)
PH, URINE: 8.5 (ref 5–8)
PLATELET # BLD: 300 K/CU MM (ref 140–440)
PMV BLD AUTO: 9.2 FL (ref 7.5–11.1)
POTASSIUM SERPL-SCNC: 3.9 MMOL/L (ref 3.5–5.1)
PRO-BNP: 8128 PG/ML
PROCALCITONIN SERPL-MCNC: 0.11 NG/ML
PROTEIN UA: >300 MG/DL
RBC # BLD: 3.57 M/CU MM (ref 4.2–5.4)
RBC URINE: 30 /HPF (ref 0–6)
SARS-COV-2 RDRP RESP QL NAA+PROBE: NOT DETECTED
SEGMENTED NEUTROPHILS ABSOLUTE COUNT: 6.1 K/CU MM
SEGMENTED NEUTROPHILS RELATIVE PERCENT: 73 % (ref 36–66)
SODIUM BLD-SCNC: 136 MMOL/L (ref 135–145)
SOURCE: NORMAL
SPECIFIC GRAVITY UA: 1.01 (ref 1–1.03)
SQUAMOUS EPITHELIAL: <1 /HPF
TOTAL IMMATURE NEUTOROPHIL: 0.02 K/CU MM
TOTAL NUCLEATED RBC: 0 K/CU MM
TOTAL PROTEIN: 7.5 GM/DL (ref 6.4–8.2)
TRICHOMONAS: ABNORMAL /HPF
TRIPLE PHOSPHATE CRYSTALS: ABNORMAL /HPF
TROPONIN, HIGH SENSITIVITY: 12 NG/L (ref 0–14)
TROPONIN, HIGH SENSITIVITY: 14 NG/L (ref 0–14)
UROBILINOGEN, URINE: 0.2 MG/DL (ref 0.2–1)
WBC # BLD: 8.3 K/CU MM (ref 4–10.5)
WBC UA: 107 /HPF (ref 0–5)

## 2023-10-10 PROCEDURE — 1200000000 HC SEMI PRIVATE

## 2023-10-10 PROCEDURE — 87086 URINE CULTURE/COLONY COUNT: CPT

## 2023-10-10 PROCEDURE — 80053 COMPREHEN METABOLIC PANEL: CPT

## 2023-10-10 PROCEDURE — 87635 SARS-COV-2 COVID-19 AMP PRB: CPT

## 2023-10-10 PROCEDURE — 6370000000 HC RX 637 (ALT 250 FOR IP): Performed by: NURSE PRACTITIONER

## 2023-10-10 PROCEDURE — 85025 COMPLETE CBC W/AUTO DIFF WBC: CPT

## 2023-10-10 PROCEDURE — 2580000003 HC RX 258: Performed by: NURSE PRACTITIONER

## 2023-10-10 PROCEDURE — 83880 ASSAY OF NATRIURETIC PEPTIDE: CPT

## 2023-10-10 PROCEDURE — 87040 BLOOD CULTURE FOR BACTERIA: CPT

## 2023-10-10 PROCEDURE — 83036 HEMOGLOBIN GLYCOSYLATED A1C: CPT

## 2023-10-10 PROCEDURE — 81001 URINALYSIS AUTO W/SCOPE: CPT

## 2023-10-10 PROCEDURE — 94640 AIRWAY INHALATION TREATMENT: CPT

## 2023-10-10 PROCEDURE — 96375 TX/PRO/DX INJ NEW DRUG ADDON: CPT

## 2023-10-10 PROCEDURE — 6370000000 HC RX 637 (ALT 250 FOR IP)

## 2023-10-10 PROCEDURE — 6360000002 HC RX W HCPCS: Performed by: EMERGENCY MEDICINE

## 2023-10-10 PROCEDURE — 2580000003 HC RX 258

## 2023-10-10 PROCEDURE — 84484 ASSAY OF TROPONIN QUANT: CPT

## 2023-10-10 PROCEDURE — 84145 PROCALCITONIN (PCT): CPT

## 2023-10-10 PROCEDURE — 83605 ASSAY OF LACTIC ACID: CPT

## 2023-10-10 PROCEDURE — 93005 ELECTROCARDIOGRAM TRACING: CPT

## 2023-10-10 PROCEDURE — 93010 ELECTROCARDIOGRAM REPORT: CPT | Performed by: INTERNAL MEDICINE

## 2023-10-10 PROCEDURE — 71250 CT THORAX DX C-: CPT

## 2023-10-10 PROCEDURE — 83735 ASSAY OF MAGNESIUM: CPT

## 2023-10-10 PROCEDURE — 96374 THER/PROPH/DIAG INJ IV PUSH: CPT

## 2023-10-10 PROCEDURE — 82962 GLUCOSE BLOOD TEST: CPT

## 2023-10-10 PROCEDURE — 36415 COLL VENOUS BLD VENIPUNCTURE: CPT

## 2023-10-10 PROCEDURE — 94761 N-INVAS EAR/PLS OXIMETRY MLT: CPT

## 2023-10-10 PROCEDURE — 6360000002 HC RX W HCPCS

## 2023-10-10 PROCEDURE — 2580000003 HC RX 258: Performed by: EMERGENCY MEDICINE

## 2023-10-10 PROCEDURE — 99285 EMERGENCY DEPT VISIT HI MDM: CPT

## 2023-10-10 RX ORDER — ACETAMINOPHEN 650 MG/1
650 SUPPOSITORY RECTAL EVERY 6 HOURS PRN
Status: DISCONTINUED | OUTPATIENT
Start: 2023-10-10 | End: 2023-10-12 | Stop reason: HOSPADM

## 2023-10-10 RX ORDER — ONDANSETRON 2 MG/ML
4 INJECTION INTRAMUSCULAR; INTRAVENOUS EVERY 6 HOURS PRN
Status: DISCONTINUED | OUTPATIENT
Start: 2023-10-10 | End: 2023-10-10

## 2023-10-10 RX ORDER — INSULIN LISPRO 100 [IU]/ML
0-8 INJECTION, SOLUTION INTRAVENOUS; SUBCUTANEOUS
Status: DISCONTINUED | OUTPATIENT
Start: 2023-10-10 | End: 2023-10-12 | Stop reason: HOSPADM

## 2023-10-10 RX ORDER — ROSUVASTATIN CALCIUM 5 MG/1
10 TABLET, COATED ORAL NIGHTLY
Status: DISCONTINUED | OUTPATIENT
Start: 2023-10-10 | End: 2023-10-12 | Stop reason: HOSPADM

## 2023-10-10 RX ORDER — OXYCODONE HYDROCHLORIDE AND ACETAMINOPHEN 5; 325 MG/1; MG/1
1 TABLET ORAL EVERY 4 HOURS PRN
Status: DISCONTINUED | OUTPATIENT
Start: 2023-10-10 | End: 2023-10-12 | Stop reason: HOSPADM

## 2023-10-10 RX ORDER — POLYETHYLENE GLYCOL 3350 17 G/17G
17 POWDER, FOR SOLUTION ORAL DAILY PRN
Status: DISCONTINUED | OUTPATIENT
Start: 2023-10-10 | End: 2023-10-12 | Stop reason: HOSPADM

## 2023-10-10 RX ORDER — FENTANYL CITRATE 50 UG/ML
25 INJECTION, SOLUTION INTRAMUSCULAR; INTRAVENOUS ONCE
Status: COMPLETED | OUTPATIENT
Start: 2023-10-10 | End: 2023-10-10

## 2023-10-10 RX ORDER — PROCHLORPERAZINE EDISYLATE 5 MG/ML
10 INJECTION INTRAMUSCULAR; INTRAVENOUS EVERY 6 HOURS PRN
Status: DISCONTINUED | OUTPATIENT
Start: 2023-10-10 | End: 2023-10-12 | Stop reason: HOSPADM

## 2023-10-10 RX ORDER — ONDANSETRON 4 MG/1
4 TABLET, ORALLY DISINTEGRATING ORAL EVERY 8 HOURS PRN
Status: DISCONTINUED | OUTPATIENT
Start: 2023-10-10 | End: 2023-10-10

## 2023-10-10 RX ORDER — SODIUM CHLORIDE 0.9 % (FLUSH) 0.9 %
10 SYRINGE (ML) INJECTION PRN
Status: DISCONTINUED | OUTPATIENT
Start: 2023-10-10 | End: 2023-10-12 | Stop reason: HOSPADM

## 2023-10-10 RX ORDER — PREDNISONE 20 MG/1
60 TABLET ORAL ONCE
Status: COMPLETED | OUTPATIENT
Start: 2023-10-10 | End: 2023-10-10

## 2023-10-10 RX ORDER — ACETAMINOPHEN 325 MG/1
650 TABLET ORAL EVERY 6 HOURS PRN
Status: DISCONTINUED | OUTPATIENT
Start: 2023-10-10 | End: 2023-10-12 | Stop reason: HOSPADM

## 2023-10-10 RX ORDER — SEVELAMER CARBONATE 800 MG/1
800 TABLET, FILM COATED ORAL
Status: DISCONTINUED | OUTPATIENT
Start: 2023-10-10 | End: 2023-10-12 | Stop reason: HOSPADM

## 2023-10-10 RX ORDER — ASPIRIN 81 MG/1
81 TABLET, CHEWABLE ORAL DAILY
Status: DISCONTINUED | OUTPATIENT
Start: 2023-10-11 | End: 2023-10-12 | Stop reason: HOSPADM

## 2023-10-10 RX ORDER — GLUCAGON 1 MG/ML
1 KIT INJECTION PRN
Status: DISCONTINUED | OUTPATIENT
Start: 2023-10-10 | End: 2023-10-12 | Stop reason: HOSPADM

## 2023-10-10 RX ORDER — FERROUS GLUCONATE 324(37.5)
324 TABLET ORAL
Status: DISCONTINUED | OUTPATIENT
Start: 2023-10-13 | End: 2023-10-12 | Stop reason: HOSPADM

## 2023-10-10 RX ORDER — METOPROLOL TARTRATE 50 MG/1
25 TABLET, FILM COATED ORAL 2 TIMES DAILY
Status: DISCONTINUED | OUTPATIENT
Start: 2023-10-10 | End: 2023-10-12 | Stop reason: HOSPADM

## 2023-10-10 RX ORDER — IPRATROPIUM BROMIDE AND ALBUTEROL SULFATE 2.5; .5 MG/3ML; MG/3ML
1 SOLUTION RESPIRATORY (INHALATION) ONCE
Status: COMPLETED | OUTPATIENT
Start: 2023-10-10 | End: 2023-10-10

## 2023-10-10 RX ORDER — AMLODIPINE BESYLATE 5 MG/1
5 TABLET ORAL DAILY
Status: DISCONTINUED | OUTPATIENT
Start: 2023-10-10 | End: 2023-10-12 | Stop reason: HOSPADM

## 2023-10-10 RX ORDER — 0.9 % SODIUM CHLORIDE 0.9 %
1000 INTRAVENOUS SOLUTION INTRAVENOUS ONCE
Status: COMPLETED | OUTPATIENT
Start: 2023-10-10 | End: 2023-10-10

## 2023-10-10 RX ORDER — POLYETHYLENE GLYCOL 3350 17 G
2 POWDER IN PACKET (EA) ORAL
Status: DISCONTINUED | OUTPATIENT
Start: 2023-10-10 | End: 2023-10-12 | Stop reason: HOSPADM

## 2023-10-10 RX ORDER — ONDANSETRON 2 MG/ML
4 INJECTION INTRAMUSCULAR; INTRAVENOUS EVERY 30 MIN PRN
Status: DISCONTINUED | OUTPATIENT
Start: 2023-10-10 | End: 2023-10-10 | Stop reason: SDUPTHER

## 2023-10-10 RX ORDER — DEXTROSE MONOHYDRATE 100 MG/ML
INJECTION, SOLUTION INTRAVENOUS CONTINUOUS PRN
Status: DISCONTINUED | OUTPATIENT
Start: 2023-10-10 | End: 2023-10-12 | Stop reason: HOSPADM

## 2023-10-10 RX ORDER — CITALOPRAM 40 MG/1
40 TABLET ORAL DAILY
Status: DISCONTINUED | OUTPATIENT
Start: 2023-10-11 | End: 2023-10-12 | Stop reason: HOSPADM

## 2023-10-10 RX ORDER — SODIUM CHLORIDE 0.9 % (FLUSH) 0.9 %
5-40 SYRINGE (ML) INJECTION EVERY 12 HOURS SCHEDULED
Status: DISCONTINUED | OUTPATIENT
Start: 2023-10-10 | End: 2023-10-12 | Stop reason: HOSPADM

## 2023-10-10 RX ORDER — BUSPIRONE HYDROCHLORIDE 5 MG/1
5 TABLET ORAL 2 TIMES DAILY
Status: DISCONTINUED | OUTPATIENT
Start: 2023-10-10 | End: 2023-10-12 | Stop reason: HOSPADM

## 2023-10-10 RX ORDER — INSULIN LISPRO 100 [IU]/ML
0-4 INJECTION, SOLUTION INTRAVENOUS; SUBCUTANEOUS NIGHTLY
Status: DISCONTINUED | OUTPATIENT
Start: 2023-10-10 | End: 2023-10-12 | Stop reason: HOSPADM

## 2023-10-10 RX ORDER — FERROUS GLUCONATE 324(37.5)
324 TABLET ORAL EVERY OTHER DAY
Status: DISCONTINUED | OUTPATIENT
Start: 2023-10-10 | End: 2023-10-10

## 2023-10-10 RX ORDER — SODIUM CHLORIDE 9 MG/ML
INJECTION, SOLUTION INTRAVENOUS PRN
Status: DISCONTINUED | OUTPATIENT
Start: 2023-10-10 | End: 2023-10-12 | Stop reason: HOSPADM

## 2023-10-10 RX ORDER — PROCHLORPERAZINE MALEATE 5 MG/1
5 TABLET ORAL EVERY 6 HOURS PRN
Status: DISCONTINUED | OUTPATIENT
Start: 2023-10-10 | End: 2023-10-12 | Stop reason: HOSPADM

## 2023-10-10 RX ORDER — SODIUM CHLORIDE 9 MG/ML
INJECTION, SOLUTION INTRAVENOUS CONTINUOUS
Status: DISCONTINUED | OUTPATIENT
Start: 2023-10-10 | End: 2023-10-11

## 2023-10-10 RX ORDER — HYDRALAZINE HYDROCHLORIDE 25 MG/1
100 TABLET, FILM COATED ORAL 3 TIMES DAILY
Status: DISCONTINUED | OUTPATIENT
Start: 2023-10-10 | End: 2023-10-12 | Stop reason: HOSPADM

## 2023-10-10 RX ORDER — CITALOPRAM 20 MG/1
20 TABLET ORAL DAILY
Status: DISCONTINUED | OUTPATIENT
Start: 2023-10-10 | End: 2023-10-10

## 2023-10-10 RX ORDER — ENOXAPARIN SODIUM 100 MG/ML
30 INJECTION SUBCUTANEOUS DAILY
Status: DISCONTINUED | OUTPATIENT
Start: 2023-10-10 | End: 2023-10-12 | Stop reason: HOSPADM

## 2023-10-10 RX ORDER — INSULIN LISPRO 100 [IU]/ML
4 INJECTION, SOLUTION INTRAVENOUS; SUBCUTANEOUS ONCE
Status: COMPLETED | OUTPATIENT
Start: 2023-10-10 | End: 2023-10-10

## 2023-10-10 RX ADMIN — IPRATROPIUM BROMIDE AND ALBUTEROL SULFATE 1 DOSE: 2.5; .5 SOLUTION RESPIRATORY (INHALATION) at 07:44

## 2023-10-10 RX ADMIN — PREDNISONE 60 MG: 20 TABLET ORAL at 08:16

## 2023-10-10 RX ADMIN — OXYCODONE AND ACETAMINOPHEN 1 TABLET: 5; 325 TABLET ORAL at 18:49

## 2023-10-10 RX ADMIN — ONDANSETRON 4 MG: 2 INJECTION INTRAMUSCULAR; INTRAVENOUS at 08:24

## 2023-10-10 RX ADMIN — AMLODIPINE BESYLATE 5 MG: 5 TABLET ORAL at 17:45

## 2023-10-10 RX ADMIN — ROSUVASTATIN CALCIUM 10 MG: 5 TABLET, FILM COATED ORAL at 20:41

## 2023-10-10 RX ADMIN — BUSPIRONE HYDROCHLORIDE 5 MG: 5 TABLET ORAL at 20:40

## 2023-10-10 RX ADMIN — INSULIN LISPRO 4 UNITS: 100 INJECTION, SOLUTION INTRAVENOUS; SUBCUTANEOUS at 17:45

## 2023-10-10 RX ADMIN — HYDRALAZINE HYDROCHLORIDE 100 MG: 25 TABLET, FILM COATED ORAL at 17:45

## 2023-10-10 RX ADMIN — CEFTRIAXONE SODIUM 1000 MG: 1 INJECTION, POWDER, FOR SOLUTION INTRAMUSCULAR; INTRAVENOUS at 09:49

## 2023-10-10 RX ADMIN — SODIUM CHLORIDE 1000 ML: 9 INJECTION, SOLUTION INTRAVENOUS at 08:13

## 2023-10-10 RX ADMIN — INSULIN LISPRO 4 UNITS: 100 INJECTION, SOLUTION INTRAVENOUS; SUBCUTANEOUS at 22:50

## 2023-10-10 RX ADMIN — METOPROLOL TARTRATE 25 MG: 50 TABLET, FILM COATED ORAL at 20:40

## 2023-10-10 RX ADMIN — SODIUM CHLORIDE, PRESERVATIVE FREE 10 ML: 5 INJECTION INTRAVENOUS at 22:47

## 2023-10-10 RX ADMIN — INSULIN LISPRO 4 UNITS: 100 INJECTION, SOLUTION INTRAVENOUS; SUBCUTANEOUS at 22:51

## 2023-10-10 RX ADMIN — FENTANYL CITRATE 25 MCG: 50 INJECTION INTRAMUSCULAR; INTRAVENOUS at 08:24

## 2023-10-10 RX ADMIN — SEVELAMER CARBONATE 800 MG: 800 TABLET, FILM COATED ORAL at 17:45

## 2023-10-10 RX ADMIN — HYDRALAZINE HYDROCHLORIDE 100 MG: 25 TABLET, FILM COATED ORAL at 20:40

## 2023-10-10 RX ADMIN — SODIUM CHLORIDE: 9 INJECTION, SOLUTION INTRAVENOUS at 17:00

## 2023-10-10 ASSESSMENT — PAIN DESCRIPTION - ORIENTATION
ORIENTATION: LEFT;RIGHT
ORIENTATION: RIGHT;LEFT

## 2023-10-10 ASSESSMENT — PAIN DESCRIPTION - PAIN TYPE: TYPE: ACUTE PAIN

## 2023-10-10 ASSESSMENT — PAIN SCALES - GENERAL
PAINLEVEL_OUTOF10: 7
PAINLEVEL_OUTOF10: 5
PAINLEVEL_OUTOF10: 0
PAINLEVEL_OUTOF10: 8
PAINLEVEL_OUTOF10: 7

## 2023-10-10 ASSESSMENT — PAIN DESCRIPTION - FREQUENCY: FREQUENCY: CONTINUOUS

## 2023-10-10 ASSESSMENT — PAIN - FUNCTIONAL ASSESSMENT: PAIN_FUNCTIONAL_ASSESSMENT: PREVENTS OR INTERFERES SOME ACTIVE ACTIVITIES AND ADLS

## 2023-10-10 ASSESSMENT — PAIN DESCRIPTION - LOCATION
LOCATION: FLANK
LOCATION: FLANK

## 2023-10-10 ASSESSMENT — PAIN DESCRIPTION - DESCRIPTORS
DESCRIPTORS: SHOOTING
DESCRIPTORS: DISCOMFORT;SHARP;SHOOTING

## 2023-10-10 ASSESSMENT — PAIN DESCRIPTION - ONSET: ONSET: ON-GOING

## 2023-10-10 NOTE — CONSULTS
non-tender; bowel sounds normal; no masses,   Extremities: extremities normal, atraumatic, no cyanosis or edema  Neurologic: Mental status: alert, oriented, interactive, following commands  Psychiatric: mood and affect appropriate     CBC:   Recent Labs     10/10/23  0746   WBC 8.3   HGB 10.3*                 Electronically signed by Kerry Dubin, DO on 10/10/2023 at 115 MD mAy Ramos DO  [address]  PHONE: [phone]  FAX: [phone]

## 2023-10-10 NOTE — ED PROVIDER NOTES
I independently examined and evaluated Ketan. In brief, 43yof with complaint of cough and congestion, rhinorrhea. Generally not feeling well for several days. Had had some cough and nausea as well. Was having back pain/flank pain. No vomiting. No diarrhea. Has indwelling otto. Sees nephrology and urology. No headaches. No fevers. Saw PCP yesterday and had abnormal CXR and CT a/p without contrast.  She does complain of some back pain, and nausea currently, not single-sided, generally just not feeling well and having some body aches. Focused exam revealed patient in no acute distress, regular rate and rhythm, nonlabored respirations, abdomen soft and nondistended. No peripheral edema. She is alert and oriented, Otto catheter with sediment in it. No blood noted. Leatha Montes CC/HPI Summary, DDx, ED Course, and Reassessment: Reviewed patient's chart as below, appears that she had been seen by nephrology with worsening CRISTIANA on CKD on 5 October, had some medication adjustments and was given Lokelma samples. I am going to give her small amount of fluids now as appears she had had that CRISTIANA and is now in the setting of questionable infection versus viral disease versus worsening kidney disease versus electrolyte abnormality. Plan for labs, and reassess. ED Course as of 10/10/23 0913   Tue Oct 10, 2023   0739 No tachycardia, no fevers. Has CKD stage 4, will start with 1L bolus as had had recent CRISTIANA, but no definitive bacterial infection at this time, symptoms suggestive of viral illness. Holding on antibiotics until labs/imaging result as vitals only show HTN [KA]   9644 Spoke with Dr. Nestor Liao, she will evaluate patient, symptoms sound more viral, may be retaining some fluid, so far WBC and lactic are normal.  [KA]   0840 Urinalysis + nitrites, does have indwelling otto, will give dose of ceftriaxone. WBC normal. Lactic acid normal. I am not concerned for sepsis at this time.  BNP elevated, in setting of CKD we will see how she does with initial fluids and reassess. [KA]   0840 UA results moderate blood positive nitrates large number leukocytes. Ceftriaxone IV ordered. [NS]      ED Course User Index  [KA] Ирина Dunaway MD  [NS] MAMIE Ibarra - CNP       History from : Patient    Limitations to history : None    Patient was given the following medications:  Medications - No data to display    EKG (if obtained): (All EKG's are interpreted by myself in the absence of a cardiologist) normal sinus rhythm with rate of 63 bpm, there is some baseline artifact, no ST elevation. Normal intervals. 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 Rosalind Stack as above    Social Determinants : insurance will not pay for Carlos Goltz, nephrology working to get her pt assistance. Records Reviewed : Outpatient Notes reviewed nephrology note from 10/5/2023. She follows with Dr. Lauren Guy has a history of chronic kidney disease stage IV severe and secondary hypertension due to renal disease as well as anemia secondary to chronic kidney disease, and hyperpotassemia. They also noted that she had had CRISTIANA recently. Appears that her blood pressures been running in the 130s in the morning, 1 40-1 50 at night, she has an indwelling catheter in place. She is on Lopressor 25 mg twice daily and hydralazine 100 mg 3 times daily. Her insurance will not cover Carlos Goltz and so had been on Veltassa because of that but her potassium was going up and they provided her with samples of Carlos Goltz. Reviewed care everywhere office note from Dr. Lord Leahy from yesterday. Patient's blood tests are yesterday was 160/70. They were concerned that she was having cough and shortness of breath, had wheezing and crackles and they did a stat chest x-ray.   She had apparently also complained of flank pain, and

## 2023-10-10 NOTE — H&P
V2.0  History and Physical      Name:  Nia Spears /Age/Sex: 1980  (39 y.o. female)   MRN & CSN:  [id number] & [id number] Encounter Date/Time: 10/10/2023 10:45 AM EDT   Location:  ED21/ED-21 PCP: Stephen Ramos MD       Hospital Day: 1    Assessment and Plan:   Nia Spears is a 39 y.o. female Nia Spears is a 39 y.o. female with a pmh of hypertension, type 2 diabetes, stroke, MI, urine retention with suprapubic catheter who presents with Complicated UTI (urinary tract infection)    Hospital Problems             Last Modified POA    * (Principal) Complicated UTI (urinary tract infection)  Yes     Complicated UTI  Chronic urinary retention  -Suprapubic catheter is onsite, it was changed 3 weeks ago  -Cloudy urine in bag  -UA showed positive nitrite with large amount of leukocyte and blood  -Urine culture, blood culture pending  -Admit patient in medical surgical floor  -IV antibiotics with Rocephin  -Urology consult to change suprapubic catheter    CRISTIANA on CKD stage IV  -Creatinine level 3.2  -Baseline with from 2.5-3.0  -Received 1 bag of fluid normal saline in the ED  -Gentle hydration with normal saline at 100 mL/h for 10 hours  -Nephrology consult    Lung nodule  -Incidental finding in right upper lung with 10 mm nodule.  -No cough, no shortness of breath  -Recommended follow-up chest CT in 3 months, PET CT or tissue sampling can be considered    Hypertension  -Continue amlodipine, metoprolol, hydralazine    Type 2 diabetes  -Update hemoglobin A1c  -Insulin sliding scale    History of stroke  -Continue statin, aspirin    Anxiety and depression  -Continue BuSpar and citalopram        Disposition:   Current Living situation: Home  Expected Disposition: Home  Estimated D/C: 2 to 3 days    Diet No diet orders on file   DVT Prophylaxis [x] Lovenox, []  Heparin, [] SCDs, [] Ambulation,  [] Eliquis, [] Xarelto   Code Status Prior   Surrogate Decision Maker/ POA      History from:

## 2023-10-10 NOTE — ED PROVIDER NOTES
[address] [facility] ENCOUNTER        Pt Name: Angle Montejo  MRN: [id number]  [address] [facility] 1980  Date of evaluation: 10/10/2023  Provider: MAMIE Jackson - CNP  PCP: Lucina Garrison MD  Note Started: 7:22 AM EDT 10/10/23       I have seen and evaluated this patient with my supervising physician Sana Toledo MD.      [address]       Chief Complaint   Patient presents with    Abnormal Lab     Pt told to come by her doctor for abnormal chest xray and CT       HISTORY OF PRESENT ILLNESS: 1 or more Elements     History From: Patient and mother at bedside    Limitations to history : None    Social Determinants Significantly Affecting Health : None    Chief Complaint: Flank pain  congestion    Angle Montejo is a 39 y.o. female who presents via private vehicle was reported to come in by her primary care provider for a \"abnormal labs abnormal CT of kidney and abnormal chest x-ray\". Patient stated for 2 weeks she i stated her urine becomes darker in color with flank pain bilaterally. She also states she has been, feeling more fatigued and has been started to have nasal congestion as well as a nonproductive cough. Stated she feels slightly short of breath with center sternal chest pain when she takes a deep breath. Reports she is in stage IV kidney disease and currently has a suprapubic catheter in place from history of retention due to a stroke. Her last catheter care was done in September. Patient denies fever chills diarrhea constipation. Reports she has had some nausea without vomiting. States that she always has suprapubic pressure due to her catheter but the flank pain on both sides is new. Patient denies unilateral leg swelling denies pain in her lower extremities. Denies abdominal pain. History of stage IV kidney disease diabetes, CVA, chronic low back pain.      Nursing Notes were all reviewed and agreed hospitalist for admission for continuation of lab trends as well as treatment of UTI. Disposition Considerations (tests considered but not done, Admit vs D/C, Shared Decision Making, Pt Expectation of Test or Tx.): Shared decision making with patient for inpatient stay to continue to trend labs and treatment of UTI due to chronic kidney disease history as well as indwelling catheter. Patient will be admitted to inpatient hospitalization. I am the Primary Clinician of Record. FINAL IMPRESSION      1. Urinary tract infection associated with indwelling urethral catheter, initial encounter ([address])    2. Stage 4 chronic kidney disease ([address])    3. Right upper lobe pulmonary nodule          DISPOSITION/PLAN     DISPOSITION Admitted 10/10/2023 10:47:37 AM      PATIENT REFERRED TO:  No follow-up provider specified.     DISCHARGE MEDICATIONS:  New Prescriptions    No medications on file       DISCONTINUED MEDICATIONS:  Discontinued Medications    No medications on file              (Please note that portions of this note were completed with a voice recognition program.  Efforts were made to edit the dictations but occasionally words are mis-transcribed.)    MAMIE Johnston CNP (electronically signed)      MAMIE Johnston CNP  10/10/23 1049

## 2023-10-10 NOTE — PROGRESS NOTES
4 Eyes Skin Assessment     NAME:  Harika Gramajo  YOB: 1980  MEDICAL RECORD NUMBER:  [id number]    The patient is being assessed for  Admission    I agree that at least one RN has performed a thorough Head to Toe Skin Assessment on the patient. ALL assessment sites listed below have been assessed. Areas assessed by both nurses:    Head, Face, Ears, Shoulders, Back, Chest, Arms, Elbows, Hands, Sacrum. Buttock, Coccyx, Ischium, and Legs. Feet and Heels        Does the Patient have a Wound?  No noted wound(s)       Saleem Prevention initiated by RN: No  Wound Care Orders initiated by RN: No    Pressure Injury (Stage 3,4, Unstageable, DTI, NWPT, and Complex wounds) if present, place Wound referral order by RN under : No    New Ostomies, if present place, Ostomy referral order under : No     Nurse 1 eSignature: Electronically signed by Dianne Nelson LPN on 79/65/89 at 5:41 PM EDT    **SHARE this note so that the co-signing nurse can place an eSignature**    Nurse 2 eSignature: Electronically signed by Carissa Gagnon RN on 10/10/23 at 6:04 PM EDT

## 2023-10-10 NOTE — ED TRIAGE NOTES
Pt to ED after she was told by her doctor to come in for having abnormal results on her chest xray and CT. Pt states she has stage 4 kidney disease, and has just not been feeling well so she went to her primary care doctor yesterday. Pt states she was sent here for further testing.

## 2023-10-10 NOTE — ED NOTES
Medication History  [facility]    Patient Name: Kenji Garibay 1980     Medication history has been completed by: Kitty Flowers CPhT    Source(s) of information: patient and insurance  claims     Primary Care Physician: Mariaelena Day MD     Pharmacy: [facility]    Allergies as of 10/10/2023 - Fully Reviewed 10/10/2023   Allergen Reaction Noted    Tramadol Hives 07/22/2014    Hydrocodone-acetaminophen Anxiety 02/24/2023        Prior to Admission medications    Medication Sig Start Date End Date Taking?  Authorizing Provider   sodium zirconium cyclosilicate (LOKELMA) 10 g PACK oral suspension Take 1 packet by mouth daily 10/5/23 2/2/24  Josiah Lester MD   sevelamer (RENVELA) 800 MG tablet Take 1 tablet by mouth 3 times daily (with meals) 10/5/23   Josiah Lester MD   busPIRone (BUSPAR) 5 MG tablet Take 1 tablet by mouth 2 times daily    Corie Erwin MD   ferrous gluconate 324 (37.5 Fe) MG TABS Take 1 tablet by mouth Twice a Week   10/10/23 Patient states she takes this on Mon/Fri 7/13/23 1/9/24  Aida Moses MD   vitamin B-12 (CYANOCOBALAMIN) 1000 MCG tablet  Take 1 tablet by mouth three times a week 10/10/23 Patient reports takes this 3 times weekly Mon/Wed/Fri 7/13/23 11/10/23  Aida Moses MD   Insulin Degludec 100 UNIT/ML SOLN Inject 6 Units into the skin nightly    ProviderCorie MD   Melatonin 1 MG CAPS Take by mouth    Corie Erwin MD   amLODIPine (NORVASC) 5 MG tablet  Take 1 tablet by mouth nightly 5/9/23   Josiah Lester MD   hydrALAZINE (APRESOLINE) 100 MG tablet Take 1 tablet by mouth 3 times daily    Corie Erwin MD   metoprolol tartrate (LOPRESSOR) 50 MG tablet Take 0.5 tablets by mouth 2 times daily 5/26/22   Monse Ravi MD   VITAMIN D PO Take 1 capsule by mouth daily    Corie Erwin MD   citalopram (CELEXA) 40 MG tablet Take 1 tablet by mouth daily    Corie Erwin MD   rosuvastatin (CRESTOR) 10 MG tablet Take 1

## 2023-10-11 LAB
ANION GAP SERPL CALCULATED.3IONS-SCNC: 11 MMOL/L (ref 4–16)
BUN SERPL-MCNC: 56 MG/DL (ref 6–23)
CALCIUM SERPL-MCNC: 8.6 MG/DL (ref 8.3–10.6)
CHLORIDE BLD-SCNC: 101 MMOL/L (ref 99–110)
CO2: 22 MMOL/L (ref 21–32)
CREAT SERPL-MCNC: 3.6 MG/DL (ref 0.6–1.1)
CULTURE: NORMAL
GFR SERPL CREATININE-BSD FRML MDRD: 15 ML/MIN/1.73M2
GLUCOSE BLD-MCNC: 161 MG/DL (ref 70–99)
GLUCOSE BLD-MCNC: 194 MG/DL (ref 70–99)
GLUCOSE BLD-MCNC: 225 MG/DL (ref 70–99)
GLUCOSE BLD-MCNC: 277 MG/DL (ref 70–99)
GLUCOSE BLD-MCNC: 288 MG/DL (ref 70–99)
GLUCOSE SERPL-MCNC: 192 MG/DL (ref 70–99)
Lab: NORMAL
POTASSIUM SERPL-SCNC: 4.1 MMOL/L (ref 3.5–5.1)
SODIUM BLD-SCNC: 134 MMOL/L (ref 135–145)
SPECIMEN: NORMAL

## 2023-10-11 PROCEDURE — 6370000000 HC RX 637 (ALT 250 FOR IP): Performed by: NURSE PRACTITIONER

## 2023-10-11 PROCEDURE — 6360000002 HC RX W HCPCS: Performed by: NURSE PRACTITIONER

## 2023-10-11 PROCEDURE — 80048 BASIC METABOLIC PNL TOTAL CA: CPT

## 2023-10-11 PROCEDURE — 94761 N-INVAS EAR/PLS OXIMETRY MLT: CPT

## 2023-10-11 PROCEDURE — 1200000000 HC SEMI PRIVATE

## 2023-10-11 PROCEDURE — 2580000003 HC RX 258: Performed by: INTERNAL MEDICINE

## 2023-10-11 PROCEDURE — 36415 COLL VENOUS BLD VENIPUNCTURE: CPT

## 2023-10-11 PROCEDURE — 82962 GLUCOSE BLOOD TEST: CPT

## 2023-10-11 PROCEDURE — 6370000000 HC RX 637 (ALT 250 FOR IP)

## 2023-10-11 PROCEDURE — 2580000003 HC RX 258: Performed by: NURSE PRACTITIONER

## 2023-10-11 RX ORDER — SODIUM CHLORIDE 9 MG/ML
INJECTION, SOLUTION INTRAVENOUS CONTINUOUS
Status: DISCONTINUED | OUTPATIENT
Start: 2023-10-11 | End: 2023-10-12

## 2023-10-11 RX ADMIN — METOPROLOL TARTRATE 25 MG: 50 TABLET, FILM COATED ORAL at 20:44

## 2023-10-11 RX ADMIN — CITALOPRAM 40 MG: 40 TABLET, FILM COATED ORAL at 08:22

## 2023-10-11 RX ADMIN — SODIUM CHLORIDE: 9 INJECTION, SOLUTION INTRAVENOUS at 11:50

## 2023-10-11 RX ADMIN — OXYCODONE AND ACETAMINOPHEN 1 TABLET: 5; 325 TABLET ORAL at 08:27

## 2023-10-11 RX ADMIN — ASPIRIN 81 MG CHEWABLE TABLET 81 MG: 81 TABLET CHEWABLE at 08:22

## 2023-10-11 RX ADMIN — ROSUVASTATIN CALCIUM 10 MG: 5 TABLET, FILM COATED ORAL at 20:51

## 2023-10-11 RX ADMIN — SEVELAMER CARBONATE 800 MG: 800 TABLET, FILM COATED ORAL at 16:33

## 2023-10-11 RX ADMIN — AMLODIPINE BESYLATE 5 MG: 5 TABLET ORAL at 08:22

## 2023-10-11 RX ADMIN — SEVELAMER CARBONATE 800 MG: 800 TABLET, FILM COATED ORAL at 08:23

## 2023-10-11 RX ADMIN — INSULIN LISPRO 2 UNITS: 100 INJECTION, SOLUTION INTRAVENOUS; SUBCUTANEOUS at 11:51

## 2023-10-11 RX ADMIN — METOPROLOL TARTRATE 25 MG: 50 TABLET, FILM COATED ORAL at 08:22

## 2023-10-11 RX ADMIN — SODIUM CHLORIDE, PRESERVATIVE FREE 10 ML: 5 INJECTION INTRAVENOUS at 08:24

## 2023-10-11 RX ADMIN — HYDRALAZINE HYDROCHLORIDE 100 MG: 25 TABLET, FILM COATED ORAL at 13:54

## 2023-10-11 RX ADMIN — HYDRALAZINE HYDROCHLORIDE 100 MG: 25 TABLET, FILM COATED ORAL at 20:51

## 2023-10-11 RX ADMIN — BUSPIRONE HYDROCHLORIDE 5 MG: 5 TABLET ORAL at 08:23

## 2023-10-11 RX ADMIN — HYDRALAZINE HYDROCHLORIDE 100 MG: 25 TABLET, FILM COATED ORAL at 08:22

## 2023-10-11 RX ADMIN — SODIUM CHLORIDE, PRESERVATIVE FREE 10 ML: 5 INJECTION INTRAVENOUS at 20:44

## 2023-10-11 RX ADMIN — SEVELAMER CARBONATE 800 MG: 800 TABLET, FILM COATED ORAL at 11:51

## 2023-10-11 RX ADMIN — BUSPIRONE HYDROCHLORIDE 5 MG: 5 TABLET ORAL at 20:51

## 2023-10-11 RX ADMIN — CEFTRIAXONE SODIUM 1000 MG: 1 INJECTION, POWDER, FOR SOLUTION INTRAMUSCULAR; INTRAVENOUS at 10:26

## 2023-10-11 ASSESSMENT — PAIN SCALES - GENERAL: PAINLEVEL_OUTOF10: 0

## 2023-10-11 NOTE — CARE COORDINATION
Pt is from home with elderly mother, 70. She had a stroke in December of last year. Mother cares for her. Pt is ind, with walker at home. On disabilty, can live on mail level of two story home. Denies needs/        10/11/23 1335   Service Assessment   Patient Orientation Alert and Oriented   Cognition Alert   History Provided By Patient   Primary Caregiver Self   Support Systems Parent  (mother)   [address] is: Legal Next of Kin   Prior Functional Level Independent in ADLs/IADLs   Current Functional Level Independent in ADLs/IADLs   Can patient return to prior living arrangement Yes   Ability to make needs known: Fair   Family able to assist with home care needs: Yes   Would you like for me to discuss the discharge plan with any other family members/significant others, and if so, who? No   Social/Functional History   Lives With Parent   Type of [address] [place] [address] Road to Live on Main level with bedroom/bathroom   Home Access Stairs to enter with rails   [address] chair   Home Equipment Walker, [address] Responsibilities Yes   Ambulation Assistance Independent   Transfer Assistance Independent   Active  No   Patient's  Info mother drive her   Mode of Transportation Other   Occupation On disability   Discharge Planning   Living Arrangements Parent   Current Services Prior To Admission None   Potential Assistance Needed N/A   DME Ordered? No   Potential Assistance Purchasing Medications No   Type of Home Care Services None   Patient expects to be discharged to: House   Condition of Participation: Discharge Planning   The Patient and/or Patient Representative was provided with a Choice of Provider? Patient   The Patient and/Or Patient Representative agree with the Discharge Plan?  Yes   Freedom of Choice list was provided with basic dialogue that supports the

## 2023-10-11 NOTE — PROGRESS NOTES
V2.0  [facility] Hospitalist Progress Note      Name:  Judie Brunner /Age/Sex: 1980  (39 y.o. female)   MRN & CSN:  [id number] & [id number] Encounter Date/Time: 10/11/2023 10:39 AM EDT    Location:  Critical access hospital[facility] hospital[facility] PCP: Luisito Allan MD       Hospital Day: 2    Assessment and Plan:   Judie Brunner is a 39 y.o. female with pmh of hypertension, type 2 diabetes, stroke, MI, urinary retention with a suprapubic catheter who presents with Complicated UTI (urinary tract infection). Continue IV antibiotics: Pending urine culture result. Possible discharge in 1 to 2 days. Plan:  Complicated UTI  Chronic urinary retention  -Suprapubic catheter is onsite, it was changed 3 weeks ago  -Cloudy urine in bag  -UA showed positive nitrite with large amount of leukocyte and blood  -Urine culture, blood culture pending  -IV antibiotics with Rocephin  -Appreciate urology input: Changed suprapubic catheter today, follow-up outpatient later this year for possible voiding trial     CRISTIANA on CKD stage IV  -Creatinine level 3.2, increased to 3.6  -Baseline with from 2.5-3.0  -Received 1 bag of fluid normal saline in the ED  -Gentle hydration with normal saline at 100 mL/h for 10 hours  -Appreciate nephrology input, avoid nephrotoxin, keep monitor     Lung nodule  -Incidental finding in right upper lung with 10 mm nodule.  -No cough, no shortness of breath  -Recommended follow-up chest CT in 3 months, PET CT or tissue sampling can be considered     Hypertension  -Continue amlodipine, metoprolol, hydralazine     Type 2 diabetes  -Update hemoglobin A1c  -Insulin sliding scale     History of stroke  -Continue statin, aspirin     Anxiety and depression  -Continue BuSpar and citalopram    Diet ADULT DIET;  Regular   DVT Prophylaxis [] Lovenox, []  Heparin, [] SCDs, [] Ambulation,  [] Eliquis, [] Xarelto  [] Coumadin   Code Status Full Code   Disposition From: Home  Expected Disposition: Home  Estimated Date of Discharge: 1 to 2 contrast is seen in the colon and the rectum. No evidence of bowel obstruction is seen. The appendix is nondilated. No evidence of bowel obstruction seen. Pelvis: The urinary bladder is collapsed due to suprapubic catheter. There is apparent urinary bladder wall thickening, which may be due to under distension. Correlate clinically with signs of cystitis. The uterus appears absent or atrophic. Peritoneum/Retroperitoneum: No evidence of intra-abdominal free air is seen. No evidence of ascites is seen. Heavy vascular calcification changes of the abdominal aorta and its branches seen. Bones/Soft Tissues: No evidence of acute osseous abnormality seen. Limited noncontrast examination. The urinary bladder is collapsed due to Candelario catheter. Apparent urinary bladder wall thickening may be due to under distension; however, correlate clinically with signs of cystitis. Nonspecific moderate bilateral perinephric stranding. Punctate foci of mineralization about the kidneys may be vascular in origin. No evidence of obstructive uropathy seen. 10 mm nodule in the right upper lobe. Follow-up chest CT in 3 months, PET-CT or tissue sampling can be considered for further evaluation. Mild scarring/atelectatic changes seen at the left lung base.        Electronically signed by MAMIE Harvey CNP on 10/11/2023 at 10:39 AM

## 2023-10-12 ENCOUNTER — APPOINTMENT (OUTPATIENT)
Dept: GENERAL RADIOLOGY | Age: 43
DRG: 698 | End: 2023-10-12
Payer: COMMERCIAL

## 2023-10-12 VITALS
HEART RATE: 61 BPM | HEIGHT: 60 IN | BODY MASS INDEX: 30.3 KG/M2 | WEIGHT: 154.32 LBS | OXYGEN SATURATION: 96 % | SYSTOLIC BLOOD PRESSURE: 153 MMHG | DIASTOLIC BLOOD PRESSURE: 78 MMHG | TEMPERATURE: 98.1 F | RESPIRATION RATE: 18 BRPM

## 2023-10-12 LAB
ANION GAP SERPL CALCULATED.3IONS-SCNC: 15 MMOL/L (ref 4–16)
BUN SERPL-MCNC: 63 MG/DL (ref 6–23)
CALCIUM SERPL-MCNC: 8.2 MG/DL (ref 8.3–10.6)
CHLORIDE BLD-SCNC: 103 MMOL/L (ref 99–110)
CO2: 16 MMOL/L (ref 21–32)
CREAT SERPL-MCNC: 3.6 MG/DL (ref 0.6–1.1)
GFR SERPL CREATININE-BSD FRML MDRD: 15 ML/MIN/1.73M2
GLUCOSE BLD-MCNC: 130 MG/DL (ref 70–99)
GLUCOSE BLD-MCNC: 239 MG/DL (ref 70–99)
GLUCOSE SERPL-MCNC: 238 MG/DL (ref 70–99)
POTASSIUM SERPL-SCNC: 4.6 MMOL/L (ref 3.5–5.1)
SODIUM BLD-SCNC: 134 MMOL/L (ref 135–145)

## 2023-10-12 PROCEDURE — 2580000003 HC RX 258: Performed by: NURSE PRACTITIONER

## 2023-10-12 PROCEDURE — 80048 BASIC METABOLIC PNL TOTAL CA: CPT

## 2023-10-12 PROCEDURE — 82962 GLUCOSE BLOOD TEST: CPT

## 2023-10-12 PROCEDURE — 6370000000 HC RX 637 (ALT 250 FOR IP): Performed by: INTERNAL MEDICINE

## 2023-10-12 PROCEDURE — 2700000000 HC OXYGEN THERAPY PER DAY

## 2023-10-12 PROCEDURE — 94664 DEMO&/EVAL PT USE INHALER: CPT

## 2023-10-12 PROCEDURE — 6370000000 HC RX 637 (ALT 250 FOR IP): Performed by: NURSE PRACTITIONER

## 2023-10-12 PROCEDURE — 71045 X-RAY EXAM CHEST 1 VIEW: CPT

## 2023-10-12 PROCEDURE — 6360000002 HC RX W HCPCS

## 2023-10-12 PROCEDURE — 36415 COLL VENOUS BLD VENIPUNCTURE: CPT

## 2023-10-12 PROCEDURE — 94150 VITAL CAPACITY TEST: CPT

## 2023-10-12 PROCEDURE — 94761 N-INVAS EAR/PLS OXIMETRY MLT: CPT

## 2023-10-12 PROCEDURE — 6370000000 HC RX 637 (ALT 250 FOR IP)

## 2023-10-12 PROCEDURE — 6360000002 HC RX W HCPCS: Performed by: NURSE PRACTITIONER

## 2023-10-12 PROCEDURE — 2580000003 HC RX 258: Performed by: INTERNAL MEDICINE

## 2023-10-12 RX ORDER — FUROSEMIDE 10 MG/ML
40 INJECTION INTRAMUSCULAR; INTRAVENOUS ONCE
Status: COMPLETED | OUTPATIENT
Start: 2023-10-12 | End: 2023-10-12

## 2023-10-12 RX ORDER — CEPHALEXIN 500 MG/1
500 CAPSULE ORAL 3 TIMES DAILY
Qty: 15 CAPSULE | Refills: 0 | Status: SHIPPED | OUTPATIENT
Start: 2023-10-12 | End: 2023-10-17

## 2023-10-12 RX ORDER — SODIUM BICARBONATE 650 MG/1
1300 TABLET ORAL 3 TIMES DAILY
Qty: 8 TABLET | Refills: 0 | Status: SHIPPED | OUTPATIENT
Start: 2023-10-12 | End: 2023-10-14

## 2023-10-12 RX ORDER — SODIUM BICARBONATE 650 MG/1
1300 TABLET ORAL 3 TIMES DAILY
Status: DISCONTINUED | OUTPATIENT
Start: 2023-10-12 | End: 2023-10-12 | Stop reason: HOSPADM

## 2023-10-12 RX ADMIN — SODIUM CHLORIDE: 9 INJECTION, SOLUTION INTRAVENOUS at 00:13

## 2023-10-12 RX ADMIN — METOPROLOL TARTRATE 25 MG: 50 TABLET, FILM COATED ORAL at 09:07

## 2023-10-12 RX ADMIN — FUROSEMIDE 40 MG: 10 INJECTION, SOLUTION INTRAMUSCULAR; INTRAVENOUS at 10:28

## 2023-10-12 RX ADMIN — ASPIRIN 81 MG CHEWABLE TABLET 81 MG: 81 TABLET CHEWABLE at 09:06

## 2023-10-12 RX ADMIN — SEVELAMER CARBONATE 800 MG: 800 TABLET, FILM COATED ORAL at 09:14

## 2023-10-12 RX ADMIN — SODIUM CHLORIDE, PRESERVATIVE FREE 10 ML: 5 INJECTION INTRAVENOUS at 10:25

## 2023-10-12 RX ADMIN — CEFTRIAXONE SODIUM 1000 MG: 1 INJECTION, POWDER, FOR SOLUTION INTRAMUSCULAR; INTRAVENOUS at 10:25

## 2023-10-12 RX ADMIN — HYDRALAZINE HYDROCHLORIDE 100 MG: 25 TABLET, FILM COATED ORAL at 09:06

## 2023-10-12 RX ADMIN — PROCHLORPERAZINE EDISYLATE 10 MG: 5 INJECTION INTRAMUSCULAR; INTRAVENOUS at 08:37

## 2023-10-12 RX ADMIN — CITALOPRAM 40 MG: 40 TABLET, FILM COATED ORAL at 09:05

## 2023-10-12 RX ADMIN — SODIUM BICARBONATE 1300 MG: 650 TABLET ORAL at 14:49

## 2023-10-12 RX ADMIN — AMLODIPINE BESYLATE 5 MG: 5 TABLET ORAL at 09:14

## 2023-10-12 RX ADMIN — HYDRALAZINE HYDROCHLORIDE 100 MG: 25 TABLET, FILM COATED ORAL at 14:49

## 2023-10-12 RX ADMIN — INSULIN LISPRO 2 UNITS: 100 INJECTION, SOLUTION INTRAVENOUS; SUBCUTANEOUS at 11:59

## 2023-10-12 RX ADMIN — SODIUM BICARBONATE 1300 MG: 650 TABLET ORAL at 09:05

## 2023-10-12 RX ADMIN — SEVELAMER CARBONATE 800 MG: 800 TABLET, FILM COATED ORAL at 11:59

## 2023-10-12 RX ADMIN — OXYCODONE AND ACETAMINOPHEN 1 TABLET: 5; 325 TABLET ORAL at 08:35

## 2023-10-12 RX ADMIN — BUSPIRONE HYDROCHLORIDE 5 MG: 5 TABLET ORAL at 09:07

## 2023-10-12 ASSESSMENT — PAIN SCALES - GENERAL
PAINLEVEL_OUTOF10: 4
PAINLEVEL_OUTOF10: 5
PAINLEVEL_OUTOF10: 2
PAINLEVEL_OUTOF10: 2

## 2023-10-12 ASSESSMENT — PAIN DESCRIPTION - DESCRIPTORS: DESCRIPTORS: ACHING

## 2023-10-12 ASSESSMENT — PAIN DESCRIPTION - ORIENTATION: ORIENTATION: RIGHT

## 2023-10-12 ASSESSMENT — PAIN DESCRIPTION - LOCATION: LOCATION: ABDOMEN

## 2023-10-12 NOTE — PROGRESS NOTES
Patient instructed and educated on "[employer]. Patient able to do 500 ml. Vital capacity. Patient's goal is 1750ml.  Electronically signed by Shameka Marinelli RCP on 10/12/2023 at 1:00 PM

## 2023-10-12 NOTE — DISCHARGE INSTR - COC
Continuity of Care Form    Patient Name: Gordon Huang   :  1980  MRN:  [id number]    Admit date:  10/10/2023  Discharge date:  ***    Code Status Order: Full Code   Advance Directives:     Admitting Physician:  Manuel Reyna MD  PCP: Chelsea Phillip MD    Discharging Nurse: [facility] Care Unit/Room#: 0320/8264-U  Discharging Unit Phone Number: ***    Emergency Contact:   Extended Emergency Contact Information  Primary Emergency Contact: [facility] ([facility]) Phone: [phone]  Mobile Phone: [phone]  Relation: Child  Secondary Emergency Contact: Charlene Marks  Address: [facility], [address] [place] [address] of [address] Phone: [phone]  Relation: Parent    Past Surgical History:  Past Surgical History:   Procedure Laterality Date     SECTION      CHOLECYSTECTOMY      CORONARY ANGIOPLASTY WITH STENT PLACEMENT  2012    PTCA with 2.75 x 22mm Resollute Integrity ILSA to mid RCA    DIAGNOSTIC CARDIAC CATH LAB PROCEDURE  2012    DILATION AND CURETTAGE OF UTERUS      TUBAL LIGATION         Immunization History:   Immunization History   Administered Date(s) Administered    Influenza Virus Vaccine 2012, 2016, 2020    Influenza, AFLURIA (age 1 yrs+), FLUZONE, (age 10 mo+), MDV, 0.5mL 2016    Pneumococcal, PPSV23, PNEUMOVAX 21, (age 2y+), SC/IM, 0.5mL 2012    TDaP, ADACEL (age 6y-58y), BOOSTRIX (age 10y+), IM, 0.5mL 2021       Active Problems:  Patient Active Problem List   Diagnosis Code    NSTEMI (non-ST elevated myocardial infarction) ([address]) I21.4    Nausea & vomiting R11.2    DM type 1 (diabetes mellitus, type 1) ([address]) E10.9    CAD in native artery I25.10    CAD S/P percutaneous coronary angioplasty I25.10, Z98.61    Chest pain R07.9    Microalbuminuria due to type 2 diabetes mellitus (HCC) E11.29, R80.9    Microalbuminuria R80.9    Lacunar cerebrovascular accident (CVA) of subthalamic region [facility]) Concerns:032659582}    Impairments/Disabilities:      [address] [place] [address] Impairments/Disabilities:863236354}    Nutrition Therapy:  Current Nutrition Therapy:   [address] [place] [address] Diet List:996494277}    Routes of Feeding: {CHP DME Other Feedings:798892939}  Liquids: {Slp liquid thickness:26944}  Daily Fluid Restriction: {CHP DME Yes amt example:471989990}  Last Modified Barium Swallow with Video (Video Swallowing Test): {Done Not Done WSNW:160615753}    Treatments at the Time of Hospital Discharge:   Respiratory Treatments: ***  Oxygen Therapy:  {Therapy; copd oxygen:59854}  Ventilator:    { CC Vent KONO:194887168}    Rehab Therapies: {THERAPEUTIC INTERVENTION:1981594295}  Weight Bearing Status/Restrictions: [address] [place] [address] Weight Bearin}  Other Medical Equipment (for information only, NOT a DME order):  {EQUIPMENT:195926704}  Other Treatments: ***    Patient's personal belongings (please select all that are sent with patient):  {CHP DME Belongings:754064484}    RN SIGNATURE:  {Esignature:819867263}    CASE MANAGEMENT/SOCIAL WORK SECTION    Inpatient Status Date: ***    Readmission Risk Assessment Score:  Readmission Risk              Risk of Unplanned Readmission:  26           Discharging to Facility/ Agency   Name:   Address:  Phone:  Fax:    Dialysis Facility (if applicable)   Name:  Address:  Dialysis Schedule:  Phone:  Fax:    / signature: {Esignature:277137422}    PHYSICIAN SECTION    Prognosis: {Prognosis:3528048526}    Condition at Discharge: [address] [place] [address] Patient Condition:246646218}    Rehab Potential (if transferring to Rehab): {Prognosis:2409299311}    Recommended Labs or Other Treatments After Discharge: ***    Physician Certification: I certify the above information and transfer of Judie Brunner  is necessary for the continuing treatment of the diagnosis listed and that she requires {Admit to Appropriate Level of Care:70256} for {GREATER/LESS:108599183} 30 days.      Update Admission H&P: {CHP DME

## 2023-10-12 NOTE — DISCHARGE SUMMARY
40 mg IV  -resolved shortness of breath, stable on room air with O2 sat 93%     Lung nodule  -Incidental finding in right upper lung with 10 mm nodule.  -No cough, no shortness of breath  -Recommended follow-up chest CT in 3 months, PET CT or tissue sampling can be considered     Hypertension  -Continue amlodipine, metoprolol, hydralazine     Type 2 diabetes  -Update hemoglobin A1c  -Insulin sliding scale     History of stroke  -Continue statin, aspirin     Anxiety and depression  -Continue BuSpar and citalopram      The patient expressed appropriate understanding of, and agreement with the discharge recommendations, medications, and plan.      Consults this admission:  IP CONSULT TO NEPHROLOGY  IP CONSULT TO UROLOGY    Discharge Diagnosis:   Complicated UTI (urinary tract infection)  Chronic urinary retention  CRISTIANA on CKD stage IV  Acute respiratory failure  Lung nodule  Hypertension  Type 2 diabetes  History of stroke  Anxiety and depression    Discharge Instruction:   Follow up appointments: nephology  Primary care physician: Dayami Busch MD within 2 weeks  Diet: regular diet   Activity: activity as tolerated  Disposition: Discharged to:   [x]Home, []C, []SNF, []Acute Rehab, []Other [address] [place] [address], []Hospice   Condition on discharge: Stable  Labs and Tests to be Followed up as an outpatient by PCP or Specialist:     Discharge Medications:        Medication List        START taking these medications      cephALEXin 500 MG capsule  Commonly known as: KEFLEX  Take 1 capsule by mouth 3 times daily for 5 days     sodium bicarbonate 650 MG tablet  Take 2 tablets by mouth 3 times daily for 4 doses            CHANGE how you take these medications      amLODIPine 5 MG tablet  Commonly known as: NORVASC  Take 1 tablet by mouth daily  What changed: when to take this            CONTINUE taking these medications      aspirin 81 MG tablet     busPIRone 5 MG tablet  Commonly known as: BUSPAR     citalopram 40 MG BLOODU MODERATE NUMBER OR AMOUNT OBSERVED 10/10/2023 08:12 AM    GLUCOSEU 500 03/24/2022 07:09 AM    KETUA NEGATIVE 10/10/2023 08:12 AM    AMORPHOUS OCCASIONAL 06/23/2023 01:00 PM     Urine Cultures:   Lab Results   Component Value Date/Time    LABURIN SEE RESULTS BELOW 03/03/2022 12:12 PM     Blood Cultures: No results found for: \"BC\"  No results found for: \"BLOODCULT2\"  Organism: No results found for: \"ORG\"    Time Spent Discharging patient 35 minutes    Electronically signed by MAMIE Valentin CNP on 10/12/2023 at 4:00 PM

## 2023-10-12 NOTE — DISCHARGE INSTRUCTIONS
Please follow up with nephology in 2-3 days to repeat kidney function, follow up with urology for catheter replacement every month, and have void trial at the end of this year, complete antibiotics as instruction, return to ER for any other concerns.

## 2023-10-15 LAB
CULTURE: NORMAL
CULTURE: NORMAL
Lab: NORMAL
Lab: NORMAL
SPECIMEN: NORMAL
SPECIMEN: NORMAL

## 2023-10-18 ENCOUNTER — HOSPITAL ENCOUNTER (OUTPATIENT)
Dept: INFUSION THERAPY | Age: 43
Discharge: HOME | End: 2023-10-18
Payer: COMMERCIAL

## 2023-10-18 ENCOUNTER — HOSPITAL ENCOUNTER (OUTPATIENT)
Age: 43
Discharge: HOME | End: 2023-10-18
Payer: COMMERCIAL

## 2023-10-18 DIAGNOSIS — D63.1 ANEMIA IN STAGE 4 CHRONIC KIDNEY DISEASE (HCC): Primary | ICD-10-CM

## 2023-10-18 DIAGNOSIS — N17.9 AKI (ACUTE KIDNEY INJURY) (HCC): ICD-10-CM

## 2023-10-18 DIAGNOSIS — N18.4 ANEMIA IN STAGE 4 CHRONIC KIDNEY DISEASE (HCC): Primary | ICD-10-CM

## 2023-10-18 DIAGNOSIS — N18.4 CHRONIC KIDNEY DISEASE, STAGE IV (SEVERE) (HCC): ICD-10-CM

## 2023-10-18 DIAGNOSIS — D63.1 ANEMIA DUE TO STAGE 4 CHRONIC KIDNEY DISEASE (HCC): ICD-10-CM

## 2023-10-18 DIAGNOSIS — N18.4 ANEMIA DUE TO STAGE 4 CHRONIC KIDNEY DISEASE (HCC): ICD-10-CM

## 2023-10-18 LAB
ALBUMIN SERPL-MCNC: 3.7 GM/DL (ref 3.4–5)
ALP BLD-CCNC: 103 IU/L (ref 40–129)
ALT SERPL-CCNC: 10 U/L (ref 10–40)
ANION GAP SERPL CALCULATED.3IONS-SCNC: 10 MMOL/L (ref 4–16)
ANION GAP SERPL CALCULATED.3IONS-SCNC: 12 MMOL/L (ref 4–16)
AST SERPL-CCNC: 26 IU/L (ref 15–37)
BASOPHILS ABSOLUTE: 0.1 K/CU MM
BASOPHILS RELATIVE PERCENT: 1 % (ref 0–1)
BILIRUB SERPL-MCNC: 0.2 MG/DL (ref 0–1)
BUN SERPL-MCNC: 54 MG/DL (ref 6–23)
BUN SERPL-MCNC: 54 MG/DL (ref 6–23)
CALCIUM SERPL-MCNC: 9.1 MG/DL (ref 8.3–10.6)
CALCIUM SERPL-MCNC: 9.2 MG/DL (ref 8.3–10.6)
CHLORIDE BLD-SCNC: 97 MMOL/L (ref 99–110)
CHLORIDE BLD-SCNC: 97 MMOL/L (ref 99–110)
CO2: 21 MMOL/L (ref 21–32)
CO2: 23 MMOL/L (ref 21–32)
CREAT SERPL-MCNC: 3.7 MG/DL (ref 0.6–1.1)
CREAT SERPL-MCNC: 3.8 MG/DL (ref 0.6–1.1)
DIFFERENTIAL TYPE: ABNORMAL
EOSINOPHILS ABSOLUTE: 0.4 K/CU MM
EOSINOPHILS RELATIVE PERCENT: 4.5 % (ref 0–3)
FOLATE SERPL-MCNC: 10.2 NG/ML (ref 3.1–17.5)
GFR SERPL CREATININE-BSD FRML MDRD: 14 ML/MIN/1.73M2
GFR SERPL CREATININE-BSD FRML MDRD: 15 ML/MIN/1.73M2
GLUCOSE SERPL-MCNC: 123 MG/DL (ref 70–99)
GLUCOSE SERPL-MCNC: 153 MG/DL (ref 70–99)
HCT VFR BLD CALC: 29.8 % (ref 37–47)
HEMOGLOBIN: 9.6 GM/DL (ref 12.5–16)
IRON: 32 UG/DL (ref 37–145)
LYMPHOCYTES ABSOLUTE: 1.7 K/CU MM
LYMPHOCYTES RELATIVE PERCENT: 20.9 % (ref 24–44)
MCH RBC QN AUTO: 29 PG (ref 27–31)
MCHC RBC AUTO-ENTMCNC: 32.2 % (ref 32–36)
MCV RBC AUTO: 90 FL (ref 78–100)
MONOCYTES ABSOLUTE: 0.7 K/CU MM
MONOCYTES RELATIVE PERCENT: 9.3 % (ref 0–4)
PCT TRANSFERRIN: 12 % (ref 10–44)
PDW BLD-RTO: 12.2 % (ref 11.7–14.9)
PLATELET # BLD: 377 K/CU MM (ref 140–440)
PMV BLD AUTO: 8.9 FL (ref 7.5–11.1)
POTASSIUM SERPL-SCNC: 3.9 MMOL/L (ref 3.5–5.1)
POTASSIUM SERPL-SCNC: 3.9 MMOL/L (ref 3.5–5.1)
RBC # BLD: 3.31 M/CU MM (ref 4.2–5.4)
SEGMENTED NEUTROPHILS ABSOLUTE COUNT: 5.1 K/CU MM
SEGMENTED NEUTROPHILS RELATIVE PERCENT: 64.3 % (ref 36–66)
SODIUM BLD-SCNC: 130 MMOL/L (ref 135–145)
SODIUM BLD-SCNC: 130 MMOL/L (ref 135–145)
TOTAL IRON BINDING CAPACITY: 266 UG/DL (ref 250–450)
TOTAL PROTEIN: 6.9 GM/DL (ref 6.4–8.2)
UNSATURATED IRON BINDING CAPACITY: 234 UG/DL (ref 110–370)
VITAMIN B-12: 1082 PG/ML (ref 211–911)
WBC # BLD: 8 K/CU MM (ref 4–10.5)

## 2023-10-18 PROCEDURE — 83540 ASSAY OF IRON: CPT

## 2023-10-18 PROCEDURE — 36415 COLL VENOUS BLD VENIPUNCTURE: CPT

## 2023-10-18 PROCEDURE — 80048 BASIC METABOLIC PNL TOTAL CA: CPT

## 2023-10-18 PROCEDURE — 96372 THER/PROPH/DIAG INJ SC/IM: CPT

## 2023-10-18 PROCEDURE — 80053 COMPREHEN METABOLIC PANEL: CPT

## 2023-10-18 PROCEDURE — 85025 COMPLETE CBC W/AUTO DIFF WBC: CPT

## 2023-10-18 PROCEDURE — 82746 ASSAY OF FOLIC ACID SERUM: CPT

## 2023-10-18 PROCEDURE — 83550 IRON BINDING TEST: CPT

## 2023-10-18 PROCEDURE — 6360000002 HC RX W HCPCS: Performed by: INTERNAL MEDICINE

## 2023-10-18 PROCEDURE — 82607 VITAMIN B-12: CPT

## 2023-10-18 RX ORDER — ACETAMINOPHEN 325 MG/1
650 TABLET ORAL
OUTPATIENT
Start: 2023-10-25

## 2023-10-18 RX ORDER — EPINEPHRINE 1 MG/ML
0.3 INJECTION, SOLUTION, CONCENTRATE INTRAVENOUS PRN
OUTPATIENT
Start: 2023-10-25

## 2023-10-18 RX ORDER — FAMOTIDINE 10 MG/ML
20 INJECTION, SOLUTION INTRAVENOUS
OUTPATIENT
Start: 2023-10-25

## 2023-10-18 RX ORDER — DIPHENHYDRAMINE HYDROCHLORIDE 50 MG/ML
50 INJECTION INTRAMUSCULAR; INTRAVENOUS
OUTPATIENT
Start: 2023-10-25

## 2023-10-18 RX ORDER — SODIUM CHLORIDE 9 MG/ML
INJECTION, SOLUTION INTRAVENOUS CONTINUOUS
OUTPATIENT
Start: 2023-10-25

## 2023-10-18 RX ORDER — ONDANSETRON 2 MG/ML
8 INJECTION INTRAMUSCULAR; INTRAVENOUS
OUTPATIENT
Start: 2023-10-25

## 2023-10-18 RX ORDER — ALBUTEROL SULFATE 90 UG/1
4 AEROSOL, METERED RESPIRATORY (INHALATION) PRN
OUTPATIENT
Start: 2023-10-25

## 2023-10-18 RX ADMIN — DARBEPOETIN ALFA 40 MCG: 40 INJECTION, SOLUTION INTRAVENOUS; SUBCUTANEOUS at 15:33

## 2023-10-18 NOTE — PROGRESS NOTES
Patient ambulated to infusion area, here today for an Aransep injection. No concerns at this time. Treatment approved and given in right arm. Patient tolerated well. Patient provided discharge instructions.

## 2023-10-19 NOTE — PROGRESS NOTES
Physician Progress Note      Netta Vincent  [facility] #:                  [id number]  :                       1980  ADMIT DATE:       10/10/2023 6:43 AM  DISCH DATE:        10/12/2023 4:30 PM  RESPONDING  PROVIDER #:        Vicenta Barriga          QUERY TEXT:    Pt admitted with UTI. Pt noted to have suprapubic catheter. If possible,   please document in the progress notes and discharge summary if you are   evaluating and/or treating any of the following: The medical record reflects the following:  Risk Factors: Suprapubic catheter, urinary retention  Clinical Indicators: Discharge summary \"Complicated UTI Chronic urinary   retention -Suprapubic catheter is onsite, it was changed 3 weeks ago -Cloudy   urine in bag -UA showed positive nitrite with large amount of leukocyte and   blood. \",  Treatment: IV antibiotic, urology consult, UA. Thank you,  Donald THURSTONN, RN, [place] [phone]  Options provided:  -- UTI due to suprapubic catheter  -- UTI not due to suprapubic catheter  -- Other - I will add my own diagnosis  -- Disagree - Not applicable / Not valid  -- Disagree - Clinically unable to determine / Unknown  -- Refer to Clinical Documentation Reviewer    PROVIDER RESPONSE TEXT:    UTI is due to suprapubic catheter.     Query created by: Donald Martinez on 10/19/2023 1:15 PM      Electronically signed by:  Vicenta Barriga 10/19/2023 7:41 PM

## 2023-10-20 ENCOUNTER — OFFICE VISIT (OUTPATIENT)
Dept: ONCOLOGY | Age: 43
End: 2023-10-20
Payer: COMMERCIAL

## 2023-10-20 ENCOUNTER — HOSPITAL ENCOUNTER (OUTPATIENT)
Dept: INFUSION THERAPY | Age: 43
Discharge: HOME | End: 2023-10-20
Payer: COMMERCIAL

## 2023-10-20 VITALS
HEIGHT: 60 IN | OXYGEN SATURATION: 94 % | BODY MASS INDEX: 29.64 KG/M2 | DIASTOLIC BLOOD PRESSURE: 71 MMHG | TEMPERATURE: 97.8 F | HEART RATE: 66 BPM | SYSTOLIC BLOOD PRESSURE: 158 MMHG | WEIGHT: 151 LBS

## 2023-10-20 DIAGNOSIS — I15.1 SECONDARY HYPERTENSION DUE TO RENAL DISEASE: ICD-10-CM

## 2023-10-20 DIAGNOSIS — R76.8 ANA POSITIVE: Primary | ICD-10-CM

## 2023-10-20 DIAGNOSIS — E10.59 TYPE 1 DIABETES MELLITUS WITH OTHER CIRCULATORY COMPLICATION (HCC): ICD-10-CM

## 2023-10-20 DIAGNOSIS — R91.1 NODULE OF RIGHT LUNG: ICD-10-CM

## 2023-10-20 DIAGNOSIS — N18.4 ANEMIA IN STAGE 4 CHRONIC KIDNEY DISEASE (HCC): ICD-10-CM

## 2023-10-20 DIAGNOSIS — D63.1 ANEMIA IN STAGE 4 CHRONIC KIDNEY DISEASE (HCC): ICD-10-CM

## 2023-10-20 PROCEDURE — 99211 OFF/OP EST MAY X REQ PHY/QHP: CPT

## 2023-10-20 PROCEDURE — 3044F HG A1C LEVEL LT 7.0%: CPT | Performed by: INTERNAL MEDICINE

## 2023-10-20 PROCEDURE — 99214 OFFICE O/P EST MOD 30 MIN: CPT | Performed by: INTERNAL MEDICINE

## 2023-10-20 PROCEDURE — 3078F DIAST BP <80 MM HG: CPT | Performed by: INTERNAL MEDICINE

## 2023-10-20 PROCEDURE — 3077F SYST BP >= 140 MM HG: CPT | Performed by: INTERNAL MEDICINE

## 2023-10-20 NOTE — PROGRESS NOTES
MA Rooming Questions  Patient: Harika Gramajo  MRN: [id number]    Date: 10/20/2023        1. Do you have any new issues?   no         2. Do you need any refills on medications?    no    3. Have you had any imaging done since your last visit? yes - [facility]    4. Have you been hospitalized or seen in the emergency room since your last visit here?   yes - [facility]    5. Did the patient have a depression screening completed today?  No    No data recorded     PHQ-9 Given to (if applicable):               PHQ-9 Score (if applicable):                     [] Positive     []  Negative              Does question #9 need addressed (if applicable)                     [] Yes    []  No               Suni Dick CMA
CALCIUM 9.1 10/18/2023    PROT 6.9 10/18/2023    LABALBU 3.7 10/18/2023    BILITOT 0.2 10/18/2023    ALKPHOS 103 10/18/2023    AST 26 10/18/2023    ALT 10 10/18/2023    LABGLOM 14 (L) 10/18/2023    GFRAA 33 (L) 11/18/2021    GLOB 2.5 03/24/2022    PHOS 5.6 (H) 10/03/2023    MG 2.5 (H) 10/10/2023    POCGLU 239 (H) 10/12/2023     Lab Results   Component Value Date     06/22/2023     No results found for: \"LD\"  Lab Results   Component Value Date    TSHHS 1.500 06/22/2023    T4FREE 0.79 (L) 11/20/2012     Immunology:  Lab Results   Component Value Date    PROT 6.9 10/18/2023    SPEP INTERPRETATION - Within normal limits. LP. 06/22/2023    ALBUMINELP 3.4 06/22/2023    LABALPH 0.4 06/22/2023    LABALPH 0.8 06/22/2023    GAMGLOB 1.2 06/22/2023     No results found for: \"KAPPAUVOL\", \"LAMBDAUVOL\", \"KLFLCR\"  No results found for: \"B2M\"  Coagulation Panel:  Lab Results   Component Value Date    PROTIME 11.2 (L) 02/24/2023    INR 0.87 02/24/2023    APTT 23.1 (L) 02/24/2023     Anemia Panel:  Lab Results   Component Value Date    KIOBYCZE90 1082 (H) 10/18/2023    FOLATE 10.2 10/18/2023     Tumor Markers:  No results found for: \"\", \"CEA\", \"\", \"LABCA2\", \"PSA\"     Observations:  ECOG:  No data recorded       Assessment & Plan:                                                          Normocytic anemia most probably multifactorial secondary to diabetes/anemia of chronic disease, anemia of kidney disease. No evidence of nutritional deficiency, hemolysis, monoclonal gammopathy, thyroid dysfunction, rheumatoid arthritis, connective tissue disorder. Discussed that I would recommend iron eod and also B12 supplementation. Discussed the role of MICHELA and has been started in sep 2023. Discussed adverse effects. Also discussed further evaluation including bone marrow biopsy, aspiration but she defers at  this point and wishes to be monitored. .  Transfusion support if hemoglobin less than 7.    Oct 2023 Hb 9.6

## 2023-11-01 ENCOUNTER — HOSPITAL ENCOUNTER (OUTPATIENT)
Dept: INFUSION THERAPY | Age: 43
Discharge: HOME | End: 2023-11-01
Payer: COMMERCIAL

## 2023-11-01 DIAGNOSIS — D63.1 ANEMIA DUE TO STAGE 4 CHRONIC KIDNEY DISEASE (HCC): Primary | ICD-10-CM

## 2023-11-01 DIAGNOSIS — N18.4 ANEMIA DUE TO STAGE 4 CHRONIC KIDNEY DISEASE (HCC): Primary | ICD-10-CM

## 2023-11-01 LAB
BASOPHILS ABSOLUTE: 0.1 K/CU MM
BASOPHILS RELATIVE PERCENT: 0.9 % (ref 0–1)
DIFFERENTIAL TYPE: ABNORMAL
EOSINOPHILS ABSOLUTE: 0.4 K/CU MM
EOSINOPHILS RELATIVE PERCENT: 4.5 % (ref 0–3)
HCT VFR BLD CALC: 29.8 % (ref 37–47)
HEMOGLOBIN: 9.4 GM/DL (ref 12.5–16)
LYMPHOCYTES ABSOLUTE: 1.9 K/CU MM
LYMPHOCYTES RELATIVE PERCENT: 20.3 % (ref 24–44)
MCH RBC QN AUTO: 28.3 PG (ref 27–31)
MCHC RBC AUTO-ENTMCNC: 31.5 % (ref 32–36)
MCV RBC AUTO: 89.8 FL (ref 78–100)
MONOCYTES ABSOLUTE: 0.7 K/CU MM
MONOCYTES RELATIVE PERCENT: 7.6 % (ref 0–4)
PDW BLD-RTO: 12.6 % (ref 11.7–14.9)
PLATELET # BLD: 361 K/CU MM (ref 140–440)
PMV BLD AUTO: 8.8 FL (ref 7.5–11.1)
RBC # BLD: 3.32 M/CU MM (ref 4.2–5.4)
SEGMENTED NEUTROPHILS ABSOLUTE COUNT: 6.2 K/CU MM
SEGMENTED NEUTROPHILS RELATIVE PERCENT: 66.7 % (ref 36–66)
WBC # BLD: 9.4 K/CU MM (ref 4–10.5)

## 2023-11-01 PROCEDURE — 85025 COMPLETE CBC W/AUTO DIFF WBC: CPT

## 2023-11-01 PROCEDURE — 36415 COLL VENOUS BLD VENIPUNCTURE: CPT

## 2023-11-01 PROCEDURE — 6360000002 HC RX W HCPCS: Performed by: INTERNAL MEDICINE

## 2023-11-01 PROCEDURE — 96372 THER/PROPH/DIAG INJ SC/IM: CPT

## 2023-11-01 RX ORDER — FAMOTIDINE 10 MG/ML
20 INJECTION, SOLUTION INTRAVENOUS
OUTPATIENT
Start: 2023-11-15

## 2023-11-01 RX ORDER — ALBUTEROL SULFATE 90 UG/1
4 AEROSOL, METERED RESPIRATORY (INHALATION) PRN
OUTPATIENT
Start: 2023-11-15

## 2023-11-01 RX ORDER — SODIUM CHLORIDE 9 MG/ML
INJECTION, SOLUTION INTRAVENOUS CONTINUOUS
OUTPATIENT
Start: 2023-11-15

## 2023-11-01 RX ORDER — EPINEPHRINE 1 MG/ML
0.3 INJECTION, SOLUTION, CONCENTRATE INTRAVENOUS PRN
OUTPATIENT
Start: 2023-11-15

## 2023-11-01 RX ORDER — DIPHENHYDRAMINE HYDROCHLORIDE 50 MG/ML
50 INJECTION INTRAMUSCULAR; INTRAVENOUS
OUTPATIENT
Start: 2023-11-15

## 2023-11-01 RX ORDER — ONDANSETRON 2 MG/ML
8 INJECTION INTRAMUSCULAR; INTRAVENOUS
OUTPATIENT
Start: 2023-11-15

## 2023-11-01 RX ORDER — ACETAMINOPHEN 325 MG/1
650 TABLET ORAL
OUTPATIENT
Start: 2023-11-15

## 2023-11-01 RX ADMIN — DARBEPOETIN ALFA 40 MCG: 40 INJECTION, SOLUTION INTRAVENOUS; SUBCUTANEOUS at 15:16

## 2023-11-01 NOTE — PROGRESS NOTES
Arrived to treatment suite from lab for Aranesp injection. Hgb 9.4. Treatment plan approved, released and completed. Pt tolerated well. AVS provided. Discharge ambulatory in stable condition.   Rosalba Delvalle RN

## 2023-11-15 ENCOUNTER — HOSPITAL ENCOUNTER (OUTPATIENT)
Dept: INFUSION THERAPY | Age: 43
Discharge: HOME | End: 2023-11-15
Payer: COMMERCIAL

## 2023-11-15 VITALS — DIASTOLIC BLOOD PRESSURE: 74 MMHG | SYSTOLIC BLOOD PRESSURE: 157 MMHG

## 2023-11-15 DIAGNOSIS — N18.4 ANEMIA DUE TO STAGE 4 CHRONIC KIDNEY DISEASE (HCC): Primary | ICD-10-CM

## 2023-11-15 DIAGNOSIS — D63.1 ANEMIA DUE TO STAGE 4 CHRONIC KIDNEY DISEASE (HCC): Primary | ICD-10-CM

## 2023-11-15 LAB
BASOPHILS ABSOLUTE: 0.1 K/CU MM
BASOPHILS RELATIVE PERCENT: 0.7 % (ref 0–1)
DIFFERENTIAL TYPE: ABNORMAL
EOSINOPHILS ABSOLUTE: 0.3 K/CU MM
EOSINOPHILS RELATIVE PERCENT: 3.4 % (ref 0–3)
HCT VFR BLD CALC: 27 % (ref 37–47)
HEMOGLOBIN: 8.7 GM/DL (ref 12.5–16)
LYMPHOCYTES ABSOLUTE: 1.6 K/CU MM
LYMPHOCYTES RELATIVE PERCENT: 18.1 % (ref 24–44)
MCH RBC QN AUTO: 29.1 PG (ref 27–31)
MCHC RBC AUTO-ENTMCNC: 32.2 % (ref 32–36)
MCV RBC AUTO: 90.3 FL (ref 78–100)
MONOCYTES ABSOLUTE: 0.8 K/CU MM
MONOCYTES RELATIVE PERCENT: 9 % (ref 0–4)
PDW BLD-RTO: 13.7 % (ref 11.7–14.9)
PLATELET # BLD: 290 K/CU MM (ref 140–440)
PMV BLD AUTO: 9.1 FL (ref 7.5–11.1)
RBC # BLD: 2.99 M/CU MM (ref 4.2–5.4)
SEGMENTED NEUTROPHILS ABSOLUTE COUNT: 6.1 K/CU MM
SEGMENTED NEUTROPHILS RELATIVE PERCENT: 68.8 % (ref 36–66)
WBC # BLD: 8.8 K/CU MM (ref 4–10.5)

## 2023-11-15 PROCEDURE — 85025 COMPLETE CBC W/AUTO DIFF WBC: CPT

## 2023-11-15 PROCEDURE — 96372 THER/PROPH/DIAG INJ SC/IM: CPT

## 2023-11-15 PROCEDURE — 6360000002 HC RX W HCPCS: Performed by: INTERNAL MEDICINE

## 2023-11-15 PROCEDURE — 36415 COLL VENOUS BLD VENIPUNCTURE: CPT

## 2023-11-15 RX ORDER — EPINEPHRINE 1 MG/ML
0.3 INJECTION, SOLUTION, CONCENTRATE INTRAVENOUS PRN
OUTPATIENT
Start: 2023-11-29

## 2023-11-15 RX ORDER — ALBUTEROL SULFATE 90 UG/1
4 AEROSOL, METERED RESPIRATORY (INHALATION) PRN
OUTPATIENT
Start: 2023-11-29

## 2023-11-15 RX ORDER — FAMOTIDINE 10 MG/ML
20 INJECTION, SOLUTION INTRAVENOUS
OUTPATIENT
Start: 2023-11-29

## 2023-11-15 RX ORDER — ONDANSETRON 2 MG/ML
8 INJECTION INTRAMUSCULAR; INTRAVENOUS
OUTPATIENT
Start: 2023-11-29

## 2023-11-15 RX ORDER — SODIUM CHLORIDE 9 MG/ML
INJECTION, SOLUTION INTRAVENOUS CONTINUOUS
OUTPATIENT
Start: 2023-11-29

## 2023-11-15 RX ORDER — ACETAMINOPHEN 325 MG/1
650 TABLET ORAL
OUTPATIENT
Start: 2023-11-29

## 2023-11-15 RX ORDER — DIPHENHYDRAMINE HYDROCHLORIDE 50 MG/ML
50 INJECTION INTRAMUSCULAR; INTRAVENOUS
OUTPATIENT
Start: 2023-11-29

## 2023-11-15 RX ADMIN — DARBEPOETIN ALFA 40 MCG: 40 INJECTION, SOLUTION INTRAVENOUS; SUBCUTANEOUS at 15:07

## 2023-11-15 NOTE — PROGRESS NOTES
Arrived to treatment suite from lab for Aranesp injection. Hgb 8.7. Treatment plan approved, released and completed as ordered. Pt tolerated well. AVS provided. Discharge in stable condition.  Bren Larkin RN

## 2023-11-20 ENCOUNTER — HOSPITAL ENCOUNTER (OUTPATIENT)
Age: 43
Discharge: HOME | End: 2023-11-20
Payer: COMMERCIAL

## 2023-11-20 DIAGNOSIS — N18.4 CHRONIC KIDNEY DISEASE, STAGE IV (SEVERE) (HCC): ICD-10-CM

## 2023-11-20 DIAGNOSIS — N17.9 AKI (ACUTE KIDNEY INJURY) (HCC): ICD-10-CM

## 2023-11-20 LAB
ANION GAP SERPL CALCULATED.3IONS-SCNC: 12 MMOL/L (ref 4–16)
BUN SERPL-MCNC: 59 MG/DL (ref 6–23)
CALCIUM SERPL-MCNC: 8.9 MG/DL (ref 8.3–10.6)
CHLORIDE BLD-SCNC: 106 MMOL/L (ref 99–110)
CO2: 17 MMOL/L (ref 21–32)
CREAT SERPL-MCNC: 3.9 MG/DL (ref 0.6–1.1)
GFR SERPL CREATININE-BSD FRML MDRD: 14 ML/MIN/1.73M2
GLUCOSE SERPL-MCNC: 89 MG/DL (ref 70–99)
POTASSIUM SERPL-SCNC: 4.3 MMOL/L (ref 3.5–5.1)
SODIUM BLD-SCNC: 135 MMOL/L (ref 135–145)

## 2023-11-20 PROCEDURE — 36415 COLL VENOUS BLD VENIPUNCTURE: CPT

## 2023-11-20 PROCEDURE — 80048 BASIC METABOLIC PNL TOTAL CA: CPT

## 2023-11-20 RX ORDER — LANOLIN ALCOHOL/MO/W.PET/CERES
1000 CREAM (GRAM) TOPICAL DAILY
Qty: 90 TABLET | Refills: 1 | Status: SHIPPED | OUTPATIENT
Start: 2023-11-20

## 2023-11-29 ENCOUNTER — OFFICE VISIT (OUTPATIENT)
Dept: CARDIOLOGY CLINIC | Age: 43
End: 2023-11-29
Payer: COMMERCIAL

## 2023-11-29 ENCOUNTER — HOSPITAL ENCOUNTER (OUTPATIENT)
Dept: INFUSION THERAPY | Age: 43
Discharge: HOME | End: 2023-11-29
Payer: COMMERCIAL

## 2023-11-29 VITALS
SYSTOLIC BLOOD PRESSURE: 118 MMHG | DIASTOLIC BLOOD PRESSURE: 62 MMHG | WEIGHT: 150.4 LBS | HEIGHT: 60 IN | HEART RATE: 58 BPM | BODY MASS INDEX: 29.53 KG/M2

## 2023-11-29 VITALS — DIASTOLIC BLOOD PRESSURE: 67 MMHG | SYSTOLIC BLOOD PRESSURE: 144 MMHG

## 2023-11-29 DIAGNOSIS — E78.5 DYSLIPIDEMIA: ICD-10-CM

## 2023-11-29 DIAGNOSIS — I10 PRIMARY HYPERTENSION: ICD-10-CM

## 2023-11-29 DIAGNOSIS — Z98.61 CAD S/P PERCUTANEOUS CORONARY ANGIOPLASTY: ICD-10-CM

## 2023-11-29 DIAGNOSIS — D63.1 ANEMIA DUE TO STAGE 4 CHRONIC KIDNEY DISEASE (HCC): Primary | ICD-10-CM

## 2023-11-29 DIAGNOSIS — I21.4 NSTEMI (NON-ST ELEVATED MYOCARDIAL INFARCTION) (HCC): ICD-10-CM

## 2023-11-29 DIAGNOSIS — N18.4 ANEMIA DUE TO STAGE 4 CHRONIC KIDNEY DISEASE (HCC): Primary | ICD-10-CM

## 2023-11-29 DIAGNOSIS — R07.2 PRECORDIAL PAIN: ICD-10-CM

## 2023-11-29 DIAGNOSIS — E10.59 TYPE 1 DIABETES MELLITUS WITH OTHER CIRCULATORY COMPLICATION (HCC): ICD-10-CM

## 2023-11-29 DIAGNOSIS — I25.10 CAD S/P PERCUTANEOUS CORONARY ANGIOPLASTY: ICD-10-CM

## 2023-11-29 DIAGNOSIS — I25.10 CAD IN NATIVE ARTERY: Primary | ICD-10-CM

## 2023-11-29 PROBLEM — I15.1 SECONDARY HYPERTENSION DUE TO RENAL DISEASE: Status: RESOLVED | Noted: 2023-05-09 | Resolved: 2023-11-29

## 2023-11-29 LAB
BASOPHILS ABSOLUTE: 0.1 K/CU MM
BASOPHILS RELATIVE PERCENT: 0.6 % (ref 0–1)
DIFFERENTIAL TYPE: ABNORMAL
EOSINOPHILS ABSOLUTE: 0.3 K/CU MM
EOSINOPHILS RELATIVE PERCENT: 3.5 % (ref 0–3)
HCT VFR BLD CALC: 28.6 % (ref 37–47)
HEMOGLOBIN: 9 GM/DL (ref 12.5–16)
LYMPHOCYTES ABSOLUTE: 1.6 K/CU MM
LYMPHOCYTES RELATIVE PERCENT: 19.2 % (ref 24–44)
MCH RBC QN AUTO: 28.8 PG (ref 27–31)
MCHC RBC AUTO-ENTMCNC: 31.5 % (ref 32–36)
MCV RBC AUTO: 91.7 FL (ref 78–100)
MONOCYTES ABSOLUTE: 0.6 K/CU MM
MONOCYTES RELATIVE PERCENT: 7.9 % (ref 0–4)
PDW BLD-RTO: 13.4 % (ref 11.7–14.9)
PLATELET # BLD: 326 K/CU MM (ref 140–440)
PMV BLD AUTO: 9.2 FL (ref 7.5–11.1)
RBC # BLD: 3.12 M/CU MM (ref 4.2–5.4)
SEGMENTED NEUTROPHILS ABSOLUTE COUNT: 5.6 K/CU MM
SEGMENTED NEUTROPHILS RELATIVE PERCENT: 68.8 % (ref 36–66)
WBC # BLD: 8.1 K/CU MM (ref 4–10.5)

## 2023-11-29 PROCEDURE — 93000 ELECTROCARDIOGRAM COMPLETE: CPT | Performed by: INTERNAL MEDICINE

## 2023-11-29 PROCEDURE — 85025 COMPLETE CBC W/AUTO DIFF WBC: CPT

## 2023-11-29 PROCEDURE — 3074F SYST BP LT 130 MM HG: CPT | Performed by: INTERNAL MEDICINE

## 2023-11-29 PROCEDURE — 6360000002 HC RX W HCPCS: Performed by: INTERNAL MEDICINE

## 2023-11-29 PROCEDURE — 99214 OFFICE O/P EST MOD 30 MIN: CPT | Performed by: INTERNAL MEDICINE

## 2023-11-29 PROCEDURE — 3078F DIAST BP <80 MM HG: CPT | Performed by: INTERNAL MEDICINE

## 2023-11-29 PROCEDURE — 96372 THER/PROPH/DIAG INJ SC/IM: CPT

## 2023-11-29 PROCEDURE — 3044F HG A1C LEVEL LT 7.0%: CPT | Performed by: INTERNAL MEDICINE

## 2023-11-29 RX ORDER — EPINEPHRINE 1 MG/ML
0.3 INJECTION, SOLUTION, CONCENTRATE INTRAVENOUS PRN
OUTPATIENT
Start: 2023-12-13

## 2023-11-29 RX ORDER — SODIUM CHLORIDE 9 MG/ML
INJECTION, SOLUTION INTRAVENOUS CONTINUOUS
OUTPATIENT
Start: 2023-12-13

## 2023-11-29 RX ORDER — DIPHENHYDRAMINE HYDROCHLORIDE 50 MG/ML
50 INJECTION INTRAMUSCULAR; INTRAVENOUS
OUTPATIENT
Start: 2023-12-13

## 2023-11-29 RX ORDER — FAMOTIDINE 10 MG/ML
20 INJECTION, SOLUTION INTRAVENOUS
OUTPATIENT
Start: 2023-12-13

## 2023-11-29 RX ORDER — ONDANSETRON 2 MG/ML
8 INJECTION INTRAMUSCULAR; INTRAVENOUS
OUTPATIENT
Start: 2023-12-13

## 2023-11-29 RX ORDER — ALBUTEROL SULFATE 90 UG/1
4 AEROSOL, METERED RESPIRATORY (INHALATION) PRN
OUTPATIENT
Start: 2023-12-13

## 2023-11-29 RX ORDER — ACETAMINOPHEN 325 MG/1
650 TABLET ORAL
OUTPATIENT
Start: 2023-12-13

## 2023-11-29 RX ADMIN — DARBEPOETIN ALFA 40 MCG: 40 INJECTION, SOLUTION INTRAVENOUS; SUBCUTANEOUS at 15:36

## 2023-11-29 NOTE — PROGRESS NOTES
When did it begin? September 2023  How long does it last? Minute, once every couple weeks  How severe 1-10? 7  Radiation? No (middle of chest)  Aggravating Factors? nothing  Relieving Factors? Nothing, sits and lets it pass  Associated Features? Sharp, no other sx  Tenderness to palp?  A little bit

## 2023-11-29 NOTE — PROGRESS NOTES
OFFICE PROGRESS NOTES      Candice Lowe is a 39 y.o. female who has    CHIEF COMPLAINT AS FOLLOWS:  CHEST PAIN:Patient C/O chest pain but symptoms appear to be atypical. No relation with Physical activity, some times 7/10, sharp lasting for a minute. SOB: Has SOB with exertion but no change over previous noted. LEG EDEMA: No leg edema   PALPITATIONS: Denies any C/O Palpitations   DIZZINESS: No C/O Dizziness   SYNCOPE: None   OTHER/ ADDITIONAL COMPLAINTS: Referred for re-evaluation because of a Heart murmur. Reportedly had a stroke last year December. Patient has not seen me for couple of years. HPI: Patient is here for F/U on her CAD, HTN & Dyslipidemia problems. CAD: Patient has known CAD. Had angioplasty in the past.  HTN: Patient has known essential HTN. Has been treated with guideline recommended medical / physical/ diet therapy as stated below. Dyslipidemia: Patient has known mixed dyslipidemia. Has been treated with guideline recommended medical / physical/ diet therapy as stated below.                 Current Outpatient Medications   Medication Sig Dispense Refill    metoprolol tartrate (LOPRESSOR) 25 MG tablet Take 1 tablet by mouth 2 times daily      vitamin B-12 (CYANOCOBALAMIN) 1000 MCG tablet TAKE 1 TABLET BY MOUTH EVERY DAY 90 tablet 1    amLODIPine (NORVASC) 5 MG tablet Take 1 tablet by mouth in the morning and at bedtime 60 tablet 5    sevelamer (RENVELA) 800 MG tablet Take 1 tablet by mouth 3 times daily (with meals) 90 tablet 3    patiromer sorbitex calcium (VELTASSA) 8.4 g PACK packet Take 1 packet by mouth daily 30 packet 5    busPIRone (BUSPAR) 5 MG tablet Take 1 tablet by mouth 2 times daily      ferrous gluconate 324 (37.5 Fe) MG TABS Take 1 tablet by mouth every other day (Patient taking differently: Take 1 tablet by mouth three times a week 10/10/23 Patient states she takes this on T/Th/S) 15 tablet 5    Insulin Degludec 100 UNIT/ML SOLN

## 2023-11-29 NOTE — PROGRESS NOTES
Arrived to treatment suite from lab for Aranesp injection. Hgb 9.0. Treatment plan approved, released and completed as ordered. Pt tolerated well. AVS provided. Discharge in stable condition. Nano Zuniga, RN     Pt would like to speak to Dr. Soco Roche regarding scheduling a bone marrow biopsy as well as PET scan without contrast. Pt states this has been discussed in prior appointments, but wasn't ready at that time. Message left for Dr. Soco Roche who is currently on vacation.

## 2023-11-29 NOTE — PATIENT INSTRUCTIONS
CORONARY ARTERY DISEASE:Yes, mild to moderate. clinically stable. Patient is on optimal medical regimen ( see medication list above )  Patient is currently  not significantly symptomatic from CAD. -changes in  treatment:   no, on  ASA & Metoprolol    11/18/2023   Left Main: Mild distal tapering. LAD: Moderate segmental Luminal irregularities + 50 to 60 % very   proximal (hazy) & mid vessel stenosis. LEFT CIRCUMFLEX: Moderate segmental Luminal irregularities + Sub   branch of OM1 which is very small 1.5 mm vessel has ostial &   proximal 95 % stenosis. RCA: Moderate segmental Luminal irregularities + 40 to 50 %   proximal & 50 to 60 & mid vessel in-stent re-stenosis. IFR:   Proximal RCA: 95   Mid RCA: 95   Proximal LAD: 92    EKG today: NSR 58/min, Old IMI. HYPERTENSION:Yes  well controlled on current medical regimen, see list above. - changes in  treatment:   no, takes Amlodipine & Hygroton  DYSLIPIDEMIA: yes,   Patient's profile is at / near Goal.yes,   HDL is high  Tolerating current medical regimen evangelista, on Crestor  LIPID PROFILE  [facility]/08/[facility]  Component 11/08/2023 08/11/2023 04/21/2023 10/11/2022 09/20/2022 04/05/2021 12/30/2020               Cholesterol, Total 104 101 -- 106 125 159 153 Load older lab results   Triglycerides 101 66 -- 130 88 90 88 Load older lab results   HDL-C 56 Low      54 -- 55 69 -- 77 Load older lab results   LDL Cholesterol 28 34 27 25 38 -- 58 Load older lab results   VLDL 20 13 -- 26 18 -- 18 Load older lab results   HDL Cholesterol -- -- 61 -- -- 73 --    VLDL Cholesterol Turner -- -- -- -- -- 17 --    LDL CHOL CALC (NIH) -- -- -- -- -- 69 --    CHOLESTEROL -- -- 102 -- -- -- --    TRIGLYCERIDES -- -- 71          VALVULAR HEART DISEASE:yes, mild  11/17/2021   Left ventricular function is normal, EF 55-60%. Grade I diastolic dysfunction. Mild eccentric left ventricular hypertrophy. Mitral annular calcification is present.    Mild mitral and tricuspid

## 2023-12-05 ENCOUNTER — CLINICAL DOCUMENTATION (OUTPATIENT)
Dept: ONCOLOGY | Age: 43
End: 2023-12-05

## 2023-12-05 DIAGNOSIS — R91.1 NODULE OF RIGHT LUNG: Primary | ICD-10-CM

## 2023-12-05 NOTE — PROGRESS NOTES
Patient would like to proceed with PET scan and BMB as previously discussed with physician. Orders placed for PET scan and Referral to IR for BMB per physician instruction.

## 2023-12-07 ENCOUNTER — TELEPHONE (OUTPATIENT)
Dept: ONCOLOGY | Age: 43
End: 2023-12-07

## 2023-12-07 NOTE — TELEPHONE ENCOUNTER
Patient called given time and prep for BMB to be done at [facility] on 12/18/23 arrival at 9 AM.

## 2023-12-12 RX ORDER — FERROUS GLUCONATE 324(37.5)
324 TABLET ORAL EVERY OTHER DAY
Qty: 45 TABLET | Refills: 1 | Status: SHIPPED | OUTPATIENT
Start: 2023-12-12 | End: 2024-06-09

## 2023-12-13 ENCOUNTER — HOSPITAL ENCOUNTER (OUTPATIENT)
Dept: INFUSION THERAPY | Age: 43
Discharge: HOME | End: 2023-12-13
Payer: COMMERCIAL

## 2023-12-13 VITALS — DIASTOLIC BLOOD PRESSURE: 66 MMHG | SYSTOLIC BLOOD PRESSURE: 139 MMHG

## 2023-12-13 DIAGNOSIS — D63.1 ANEMIA DUE TO STAGE 4 CHRONIC KIDNEY DISEASE (HCC): Primary | ICD-10-CM

## 2023-12-13 DIAGNOSIS — N18.4 ANEMIA DUE TO STAGE 4 CHRONIC KIDNEY DISEASE (HCC): Primary | ICD-10-CM

## 2023-12-13 LAB
BASOPHILS ABSOLUTE: 0.1 K/CU MM
BASOPHILS RELATIVE PERCENT: 0.8 % (ref 0–1)
DIFFERENTIAL TYPE: ABNORMAL
EOSINOPHILS ABSOLUTE: 0.3 K/CU MM
EOSINOPHILS RELATIVE PERCENT: 3.2 % (ref 0–3)
HCT VFR BLD CALC: 26.2 % (ref 37–47)
HEMOGLOBIN: 8.2 GM/DL (ref 12.5–16)
LYMPHOCYTES ABSOLUTE: 1.4 K/CU MM
LYMPHOCYTES RELATIVE PERCENT: 15.1 % (ref 24–44)
MCH RBC QN AUTO: 28.7 PG (ref 27–31)
MCHC RBC AUTO-ENTMCNC: 31.3 % (ref 32–36)
MCV RBC AUTO: 91.6 FL (ref 78–100)
MONOCYTES ABSOLUTE: 0.8 K/CU MM
MONOCYTES RELATIVE PERCENT: 8.7 % (ref 0–4)
PDW BLD-RTO: 13.4 % (ref 11.7–14.9)
PLATELET # BLD: 292 K/CU MM (ref 140–440)
PMV BLD AUTO: 8.9 FL (ref 7.5–11.1)
RBC # BLD: 2.86 M/CU MM (ref 4.2–5.4)
SEGMENTED NEUTROPHILS ABSOLUTE COUNT: 6.6 K/CU MM
SEGMENTED NEUTROPHILS RELATIVE PERCENT: 72.2 % (ref 36–66)
WBC # BLD: 9.1 K/CU MM (ref 4–10.5)

## 2023-12-13 PROCEDURE — 85025 COMPLETE CBC W/AUTO DIFF WBC: CPT

## 2023-12-13 PROCEDURE — 96372 THER/PROPH/DIAG INJ SC/IM: CPT

## 2023-12-13 PROCEDURE — 6360000002 HC RX W HCPCS: Performed by: INTERNAL MEDICINE

## 2023-12-13 PROCEDURE — 36415 COLL VENOUS BLD VENIPUNCTURE: CPT

## 2023-12-13 RX ORDER — ONDANSETRON 2 MG/ML
8 INJECTION INTRAMUSCULAR; INTRAVENOUS
OUTPATIENT
Start: 2023-12-27

## 2023-12-13 RX ORDER — FAMOTIDINE 10 MG/ML
20 INJECTION, SOLUTION INTRAVENOUS
OUTPATIENT
Start: 2023-12-27

## 2023-12-13 RX ORDER — ACETAMINOPHEN 325 MG/1
650 TABLET ORAL
OUTPATIENT
Start: 2023-12-27

## 2023-12-13 RX ORDER — SODIUM CHLORIDE 9 MG/ML
INJECTION, SOLUTION INTRAVENOUS CONTINUOUS
OUTPATIENT
Start: 2023-12-27

## 2023-12-13 RX ORDER — EPINEPHRINE 1 MG/ML
0.3 INJECTION, SOLUTION, CONCENTRATE INTRAVENOUS PRN
OUTPATIENT
Start: 2023-12-27

## 2023-12-13 RX ORDER — DIPHENHYDRAMINE HYDROCHLORIDE 50 MG/ML
50 INJECTION INTRAMUSCULAR; INTRAVENOUS
OUTPATIENT
Start: 2023-12-27

## 2023-12-13 RX ORDER — ALBUTEROL SULFATE 90 UG/1
4 AEROSOL, METERED RESPIRATORY (INHALATION) PRN
OUTPATIENT
Start: 2023-12-27

## 2023-12-13 RX ADMIN — DARBEPOETIN ALFA 40 MCG: 40 INJECTION, SOLUTION INTRAVENOUS; SUBCUTANEOUS at 15:37

## 2023-12-13 NOTE — PROGRESS NOTES
Arrived to treatment suite from lab for Aranesp injection. Denies questions, concerns. Pt states is scheduled for bone marrow, PET scan and stress test next week. Labs reviewed, Hgb 8.2. Treatment plan approved, released and completed as ordered. Discharge ambulatory in stable condition. AVS declined.   Maya Hurst RN

## 2023-12-14 ENCOUNTER — TELEPHONE (OUTPATIENT)
Dept: CARDIOLOGY CLINIC | Age: 43
End: 2023-12-14

## 2023-12-14 NOTE — TELEPHONE ENCOUNTER
Called to advise of :     Left Ventricle: Normal left ventricular systolic function. Left ventricle size is normal. Normal wall thickness. Normal wall motion. Normal diastolic function. Left Ventricle: Normal left ventricular systolic function with a visually estimated EF of 50 - 55%. Left ventricle size is normal. Normal wall thickness. Mitral Valve: Mild to moderate regurgitation. Tricuspid Valve: Moderately elevated RVSP. The estimated RVSP is 53 mmHg. Pulmonic Valve: Mild regurgitation with a centrally directed jet. Right Atrium: Right atrium is mildly dilated.

## 2023-12-14 NOTE — TELEPHONE ENCOUNTER
Patient called stating that she is going to  have a bone marrow biopsy done on Monday and then has a nuclear stress test on Tuesday; she wants to make sure there won't be a problem with that. Please call her back at ph# [phone].

## 2023-12-16 ENCOUNTER — HOSPITAL ENCOUNTER (OUTPATIENT)
Age: 43
Discharge: HOME | End: 2023-12-16
Payer: COMMERCIAL

## 2023-12-16 DIAGNOSIS — N18.4 ANEMIA IN STAGE 4 CHRONIC KIDNEY DISEASE (HCC): ICD-10-CM

## 2023-12-16 DIAGNOSIS — E11.29 MICROALBUMINURIA DUE TO TYPE 2 DIABETES MELLITUS (HCC): ICD-10-CM

## 2023-12-16 DIAGNOSIS — I15.1 SECONDARY HYPERTENSION DUE TO RENAL DISEASE: ICD-10-CM

## 2023-12-16 DIAGNOSIS — N18.4 CHRONIC KIDNEY DISEASE, STAGE IV (SEVERE) (HCC): ICD-10-CM

## 2023-12-16 DIAGNOSIS — D63.1 ANEMIA IN STAGE 4 CHRONIC KIDNEY DISEASE (HCC): ICD-10-CM

## 2023-12-16 DIAGNOSIS — R80.9 MICROALBUMINURIA DUE TO TYPE 2 DIABETES MELLITUS (HCC): ICD-10-CM

## 2023-12-16 DIAGNOSIS — N17.9 AKI (ACUTE KIDNEY INJURY) (HCC): ICD-10-CM

## 2023-12-16 LAB
ANION GAP SERPL CALCULATED.3IONS-SCNC: 16 MMOL/L (ref 4–16)
BUN SERPL-MCNC: 63 MG/DL (ref 6–23)
CALCIUM SERPL-MCNC: 8.4 MG/DL (ref 8.3–10.6)
CHLORIDE BLD-SCNC: 101 MMOL/L (ref 99–110)
CO2: 16 MMOL/L (ref 21–32)
CREAT SERPL-MCNC: 3.9 MG/DL (ref 0.6–1.1)
GFR SERPL CREATININE-BSD FRML MDRD: 14 ML/MIN/1.73M2
GLUCOSE SERPL-MCNC: 93 MG/DL (ref 70–99)
POTASSIUM SERPL-SCNC: 3.9 MMOL/L (ref 3.5–5.1)
SODIUM BLD-SCNC: 133 MMOL/L (ref 135–145)

## 2023-12-16 PROCEDURE — 80048 BASIC METABOLIC PNL TOTAL CA: CPT

## 2023-12-16 PROCEDURE — 36415 COLL VENOUS BLD VENIPUNCTURE: CPT

## 2023-12-20 PROBLEM — N18.5 CHRONIC KIDNEY DISEASE, STAGE V (HCC): Status: ACTIVE | Noted: 2022-03-03

## 2023-12-27 ENCOUNTER — HOSPITAL ENCOUNTER (OUTPATIENT)
Dept: INFUSION THERAPY | Age: 43
Discharge: HOME | End: 2023-12-27
Payer: COMMERCIAL

## 2023-12-27 ENCOUNTER — TELEPHONE (OUTPATIENT)
Dept: ONCOLOGY | Age: 43
End: 2023-12-27

## 2023-12-27 DIAGNOSIS — D63.1 ANEMIA DUE TO STAGE 4 CHRONIC KIDNEY DISEASE (HCC): Primary | ICD-10-CM

## 2023-12-27 DIAGNOSIS — N18.4 ANEMIA DUE TO STAGE 4 CHRONIC KIDNEY DISEASE (HCC): Primary | ICD-10-CM

## 2023-12-27 LAB
BASOPHILS ABSOLUTE: 0.1 K/CU MM
BASOPHILS RELATIVE PERCENT: 0.8 % (ref 0–1)
DIFFERENTIAL TYPE: ABNORMAL
EOSINOPHILS ABSOLUTE: 0.4 K/CU MM
EOSINOPHILS RELATIVE PERCENT: 4.8 % (ref 0–3)
HCT VFR BLD CALC: 25.1 % (ref 37–47)
HEMOGLOBIN: 7.9 GM/DL (ref 12.5–16)
LYMPHOCYTES ABSOLUTE: 1.3 K/CU MM
LYMPHOCYTES RELATIVE PERCENT: 16.2 % (ref 24–44)
MCH RBC QN AUTO: 29 PG (ref 27–31)
MCHC RBC AUTO-ENTMCNC: 31.5 % (ref 32–36)
MCV RBC AUTO: 92.3 FL (ref 78–100)
MONOCYTES ABSOLUTE: 0.6 K/CU MM
MONOCYTES RELATIVE PERCENT: 7 % (ref 0–4)
PDW BLD-RTO: 13.7 % (ref 11.7–14.9)
PLATELET # BLD: 303 K/CU MM (ref 140–440)
PMV BLD AUTO: 9.2 FL (ref 7.5–11.1)
RBC # BLD: 2.72 M/CU MM (ref 4.2–5.4)
SEGMENTED NEUTROPHILS ABSOLUTE COUNT: 5.6 K/CU MM
SEGMENTED NEUTROPHILS RELATIVE PERCENT: 71.2 % (ref 36–66)
WBC # BLD: 7.9 K/CU MM (ref 4–10.5)

## 2023-12-27 PROCEDURE — 6360000002 HC RX W HCPCS: Performed by: INTERNAL MEDICINE

## 2023-12-27 PROCEDURE — 96372 THER/PROPH/DIAG INJ SC/IM: CPT

## 2023-12-27 PROCEDURE — 85025 COMPLETE CBC W/AUTO DIFF WBC: CPT

## 2023-12-27 PROCEDURE — 36415 COLL VENOUS BLD VENIPUNCTURE: CPT

## 2023-12-27 RX ORDER — DIPHENHYDRAMINE HYDROCHLORIDE 50 MG/ML
50 INJECTION INTRAMUSCULAR; INTRAVENOUS
OUTPATIENT
Start: 2024-01-10

## 2023-12-27 RX ORDER — SODIUM CHLORIDE 9 MG/ML
INJECTION, SOLUTION INTRAVENOUS CONTINUOUS
OUTPATIENT
Start: 2024-01-10

## 2023-12-27 RX ORDER — FAMOTIDINE 10 MG/ML
20 INJECTION, SOLUTION INTRAVENOUS
OUTPATIENT
Start: 2024-01-10

## 2023-12-27 RX ORDER — EPINEPHRINE 1 MG/ML
0.3 INJECTION, SOLUTION, CONCENTRATE INTRAVENOUS PRN
OUTPATIENT
Start: 2024-01-10

## 2023-12-27 RX ORDER — ALBUTEROL SULFATE 90 UG/1
4 AEROSOL, METERED RESPIRATORY (INHALATION) PRN
OUTPATIENT
Start: 2024-01-10

## 2023-12-27 RX ORDER — ACETAMINOPHEN 325 MG/1
650 TABLET ORAL
OUTPATIENT
Start: 2024-01-10

## 2023-12-27 RX ORDER — ONDANSETRON 2 MG/ML
8 INJECTION INTRAMUSCULAR; INTRAVENOUS
OUTPATIENT
Start: 2024-01-10

## 2023-12-27 RX ADMIN — DARBEPOETIN ALFA 40 MCG: 40 INJECTION, SOLUTION INTRAVENOUS; SUBCUTANEOUS at 14:08

## 2023-12-27 NOTE — TELEPHONE ENCOUNTER
This nurse called the patient @ [phone] to review BMB results. However there was no answer. This nurse left a VM for the patient requesting a call back, this RN's direct number left.

## 2023-12-27 NOTE — PROGRESS NOTES
Patient arrived to treatment [facility] lab for Aranesp injection. No questions or concerns for the doctor at this time. Treatment approved and given subcutaneously in right arm, band-aid applied. Patient tolerated well. Left treatment suite ambulatory. Discharge instructions provided.

## 2023-12-27 NOTE — TELEPHONE ENCOUNTER
Patient was in the infusion suite for an injection, this nurse reviewed results with her and notified her that her chromosomal and molecular analysis were still pending. The patient verbalized understanding.

## 2024-01-04 ENCOUNTER — OFFICE VISIT (OUTPATIENT)
Dept: CARDIOLOGY CLINIC | Age: 44
End: 2024-01-04
Payer: COMMERCIAL

## 2024-01-04 VITALS
WEIGHT: 152 LBS | DIASTOLIC BLOOD PRESSURE: 62 MMHG | SYSTOLIC BLOOD PRESSURE: 122 MMHG | HEIGHT: 60 IN | BODY MASS INDEX: 29.84 KG/M2 | HEART RATE: 58 BPM

## 2024-01-04 DIAGNOSIS — E78.5 DYSLIPIDEMIA: ICD-10-CM

## 2024-01-04 DIAGNOSIS — I25.10 CAD IN NATIVE ARTERY: Primary | ICD-10-CM

## 2024-01-04 DIAGNOSIS — I21.4 NSTEMI (NON-ST ELEVATED MYOCARDIAL INFARCTION) (HCC): ICD-10-CM

## 2024-01-04 DIAGNOSIS — E10.59 TYPE 1 DIABETES MELLITUS WITH OTHER CIRCULATORY COMPLICATION (HCC): ICD-10-CM

## 2024-01-04 DIAGNOSIS — Z98.61 CAD S/P PERCUTANEOUS CORONARY ANGIOPLASTY: ICD-10-CM

## 2024-01-04 DIAGNOSIS — I25.10 CAD S/P PERCUTANEOUS CORONARY ANGIOPLASTY: ICD-10-CM

## 2024-01-04 DIAGNOSIS — I10 PRIMARY HYPERTENSION: ICD-10-CM

## 2024-01-04 PROCEDURE — 99213 OFFICE O/P EST LOW 20 MIN: CPT | Performed by: INTERNAL MEDICINE

## 2024-01-04 PROCEDURE — 3078F DIAST BP <80 MM HG: CPT | Performed by: INTERNAL MEDICINE

## 2024-01-04 PROCEDURE — 3074F SYST BP LT 130 MM HG: CPT | Performed by: INTERNAL MEDICINE

## 2024-01-04 NOTE — PATIENT INSTRUCTIONS
CORONARY ARTERY DISEASE:Yes, mild to moderate.  clinically stable. Patient is on optimal medical regimen ( see medication list above )  Patient is currently  not significantly symptomatic from CAD.   -changes in  treatment:   no, on  ASA & Metoprolol     11/18/2023   Left Main: Mild distal tapering.   LAD: Moderate segmental Luminal irregularities + 50 to 60 % very   proximal (hazy) & mid vessel stenosis.   LEFT CIRCUMFLEX: Moderate segmental Luminal irregularities + Sub   branch of OM1 which is very small 1.5 mm vessel has ostial &   proximal 95 % stenosis.   RCA: Moderate segmental Luminal irregularities + 40 to 50 %   proximal & 50 to 60 & mid vessel in-stent re-stenosis.      IFR:   Proximal RCA: 95   Mid RCA: 95   Proximal LAD: 92     EKG today: NSR 58/min, Old IMI.    12/19/2023  This is a normal perfusion Cardiolite study.  Patient's baseline EKG reveals sinus bradycardia at 58 bpm otherwise EKG is normal.  No ischemic EKG changes noted with Lexiscan infusion.     Perfusion imaging reveals normal uptake of Cardiolite in all myocardial territories.  No ischemia or myocardial infarction findings noted.  Normal LV function and wall motion.  Left ventricular ejection fraction is 58%.     HYPERTENSION:Yes  well controlled on current medical regimen, see list above.  - changes in  treatment:   no, takes Amlodipine & Hygroton    DYSLIPIDEMIA: yes,   Patient's profile is at / near Goal.yes,   HDL is high  Tolerating current medical regimen wellyes, on Crestor  LIPID PROFILE  [facility]1/08/2023  Component 11/08/2023 08/11/2023 04/21/2023 10/11/2022 09/20/2022 04/05/2021 12/30/2020                         Cholesterol, Total 104 101 -- 106 125 159 153 Load older lab results   Triglycerides 101 66 -- 130 88 90 88 Load older lab results   HDL-C 56 Low      54 -- 55 69 -- 77 Load older lab results   LDL Cholesterol 28 34 27 25 38 -- 58 Load older lab results   VLDL 20 13 -- 26 18 -- 18 Load older lab results   HDL

## 2024-01-04 NOTE — PROGRESS NOTES
OFFICE PROGRESS NOTES      Gus is a 43 y.o. female who has    CHIEF COMPLAINT AS FOLLOWS:  CHEST PAIN: Patient denies any C/O chest pains at this time.      SOB: Has SOB with exertion but no change over previous noted.                 LEG EDEMA: No leg edema   PALPITATIONS: Denies any C/O Palpitations   DIZZINESS:  C/O balance problems   SYNCOPE: None   OTHER/ ADDITIONAL COMPLAINTS:                                     HPI: Patient is here for F/U on his CAD, HTN & Dyslipidemia problems.   CAD: Patient has known CAD. Had angioplasty in the past.  HTN: Patient has known essential HTN. Has been treated with guideline recommended medical / physical/ diet therapy as stated below.  Dyslipidemia: Patient has known mixed dyslipidemia. Has been treated with guideline recommended medical / physical/ diet therapy as stated below.                Current Outpatient Medications   Medication Sig Dispense Refill    busPIRone (BUSPAR) 10 MG tablet TAKE 1 TABLET (10 MG TOTAL) BY MOUTH IN THE MORNING AND BEFORE BEDTIME      ferrous gluconate 324 (37.5 Fe) MG TABS TAKE 1 TABLET BY MOUTH EVERY OTHER DAY 45 tablet 1    metoprolol tartrate (LOPRESSOR) 25 MG tablet Take 1 tablet by mouth 2 times daily      vitamin B-12 (CYANOCOBALAMIN) 1000 MCG tablet TAKE 1 TABLET BY MOUTH EVERY DAY 90 tablet 1    amLODIPine (NORVASC) 5 MG tablet Take 1 tablet by mouth in the morning and at bedtime 60 tablet 5    sevelamer (RENVELA) 800 MG tablet Take 1 tablet by mouth 3 times daily (with meals) 90 tablet 3    patiromer sorbitex calcium (VELTASSA) 8.4 g PACK packet Take 1 packet by mouth daily 30 packet 5    busPIRone (BUSPAR) 5 MG tablet Take 1 tablet by mouth 2 times daily      Insulin Degludec 100 UNIT/ML SOLN Inject 6 Units into the skin nightly      Melatonin 1 MG CAPS Take by mouth      hydrALAZINE (APRESOLINE) 100 MG tablet Take 1 tablet by mouth 3 times daily      metoprolol tartrate (LOPRESSOR) 50 MG tablet Take 1 tablet by mouth 2 times  done

## 2024-01-10 ENCOUNTER — HOSPITAL ENCOUNTER (OUTPATIENT)
Dept: INFUSION THERAPY | Age: 44
Discharge: HOME | End: 2024-01-10
Payer: COMMERCIAL

## 2024-01-10 VITALS — SYSTOLIC BLOOD PRESSURE: 149 MMHG | DIASTOLIC BLOOD PRESSURE: 69 MMHG

## 2024-01-10 DIAGNOSIS — N18.4 ANEMIA DUE TO STAGE 4 CHRONIC KIDNEY DISEASE (HCC): Primary | ICD-10-CM

## 2024-01-10 DIAGNOSIS — D63.1 ANEMIA DUE TO STAGE 4 CHRONIC KIDNEY DISEASE (HCC): Primary | ICD-10-CM

## 2024-01-10 LAB
BASOPHILS ABSOLUTE: 0.1 K/CU MM
BASOPHILS RELATIVE PERCENT: 0.8 % (ref 0–1)
DIFFERENTIAL TYPE: ABNORMAL
EOSINOPHILS ABSOLUTE: 0.4 K/CU MM
EOSINOPHILS RELATIVE PERCENT: 4.5 % (ref 0–3)
HCT VFR BLD CALC: 24.9 % (ref 37–47)
HEMOGLOBIN: 7.5 GM/DL (ref 12.5–16)
LYMPHOCYTES ABSOLUTE: 1.2 K/CU MM
LYMPHOCYTES RELATIVE PERCENT: 15.2 % (ref 24–44)
MCH RBC QN AUTO: 28.7 PG (ref 27–31)
MCHC RBC AUTO-ENTMCNC: 30.1 % (ref 32–36)
MCV RBC AUTO: 95.4 FL (ref 78–100)
MONOCYTES ABSOLUTE: 0.5 K/CU MM
MONOCYTES RELATIVE PERCENT: 6.7 % (ref 0–4)
PDW BLD-RTO: 13.9 % (ref 11.7–14.9)
PLATELET # BLD: 275 K/CU MM (ref 140–440)
PMV BLD AUTO: 9.5 FL (ref 7.5–11.1)
RBC # BLD: 2.61 M/CU MM (ref 4.2–5.4)
SEGMENTED NEUTROPHILS ABSOLUTE COUNT: 5.8 K/CU MM
SEGMENTED NEUTROPHILS RELATIVE PERCENT: 72.8 % (ref 36–66)
WBC # BLD: 8 K/CU MM (ref 4–10.5)

## 2024-01-10 PROCEDURE — 96372 THER/PROPH/DIAG INJ SC/IM: CPT

## 2024-01-10 PROCEDURE — 85025 COMPLETE CBC W/AUTO DIFF WBC: CPT

## 2024-01-10 PROCEDURE — 36415 COLL VENOUS BLD VENIPUNCTURE: CPT

## 2024-01-10 PROCEDURE — 6360000002 HC RX W HCPCS: Performed by: INTERNAL MEDICINE

## 2024-01-10 RX ORDER — FAMOTIDINE 10 MG/ML
20 INJECTION, SOLUTION INTRAVENOUS
OUTPATIENT
Start: 2024-01-24

## 2024-01-10 RX ORDER — ACETAMINOPHEN 325 MG/1
650 TABLET ORAL
OUTPATIENT
Start: 2024-01-24

## 2024-01-10 RX ORDER — SODIUM CHLORIDE 9 MG/ML
INJECTION, SOLUTION INTRAVENOUS CONTINUOUS
OUTPATIENT
Start: 2024-01-24

## 2024-01-10 RX ORDER — ONDANSETRON 2 MG/ML
8 INJECTION INTRAMUSCULAR; INTRAVENOUS
OUTPATIENT
Start: 2024-01-24

## 2024-01-10 RX ORDER — ALBUTEROL SULFATE 90 UG/1
4 AEROSOL, METERED RESPIRATORY (INHALATION) PRN
OUTPATIENT
Start: 2024-01-24

## 2024-01-10 RX ORDER — EPINEPHRINE 1 MG/ML
0.3 INJECTION, SOLUTION, CONCENTRATE INTRAVENOUS PRN
OUTPATIENT
Start: 2024-01-24

## 2024-01-10 RX ORDER — DIPHENHYDRAMINE HYDROCHLORIDE 50 MG/ML
50 INJECTION INTRAMUSCULAR; INTRAVENOUS
OUTPATIENT
Start: 2024-01-24

## 2024-01-10 RX ADMIN — DARBEPOETIN ALFA 40 MCG: 40 INJECTION, SOLUTION INTRAVENOUS; SUBCUTANEOUS at 13:56

## 2024-01-10 NOTE — PROGRESS NOTES
Ambulated to infusion area after labs. CBC results reviewed with patient. Aranesp injection administered as ordered. Tolerated well. Discharged in stable condition.

## 2024-01-11 ENCOUNTER — CASE MANAGEMENT (OUTPATIENT)
Dept: ONCOLOGY | Age: 44
End: 2024-01-11

## 2024-01-11 ENCOUNTER — CLINICAL DOCUMENTATION (OUTPATIENT)
Dept: ONCOLOGY | Age: 44
End: 2024-01-11

## 2024-02-05 ENCOUNTER — HOSPITAL ENCOUNTER (OUTPATIENT)
Age: 44
Discharge: HOME | End: 2024-02-05
Payer: COMMERCIAL

## 2024-02-05 ENCOUNTER — TELEPHONE (OUTPATIENT)
Dept: ONCOLOGY | Age: 44
End: 2024-02-05

## 2024-02-05 DIAGNOSIS — E11.29 MICROALBUMINURIA DUE TO TYPE 2 DIABETES MELLITUS (HCC): ICD-10-CM

## 2024-02-05 DIAGNOSIS — N17.9 AKI (ACUTE KIDNEY INJURY) (HCC): ICD-10-CM

## 2024-02-05 DIAGNOSIS — N18.4 CHRONIC KIDNEY DISEASE, STAGE IV (SEVERE) (HCC): ICD-10-CM

## 2024-02-05 DIAGNOSIS — R80.9 MICROALBUMINURIA DUE TO TYPE 2 DIABETES MELLITUS (HCC): ICD-10-CM

## 2024-02-05 DIAGNOSIS — E87.5 HYPERPOTASSEMIA: ICD-10-CM

## 2024-02-05 DIAGNOSIS — I10 PRIMARY HYPERTENSION: ICD-10-CM

## 2024-02-05 LAB
ANION GAP SERPL CALCULATED.3IONS-SCNC: 12 MMOL/L (ref 7–16)
BUN SERPL-MCNC: 62 MG/DL (ref 6–23)
CALCIUM SERPL-MCNC: 8.3 MG/DL (ref 8.3–10.6)
CHLORIDE BLD-SCNC: 101 MMOL/L (ref 99–110)
CO2: 20 MMOL/L (ref 21–32)
CREAT SERPL-MCNC: 3.7 MG/DL (ref 0.6–1.1)
GFR SERPL CREATININE-BSD FRML MDRD: 15 ML/MIN/1.73M2
GLUCOSE SERPL-MCNC: 137 MG/DL (ref 70–99)
POTASSIUM SERPL-SCNC: 4.8 MMOL/L (ref 3.5–5.1)
SODIUM BLD-SCNC: 133 MMOL/L (ref 135–145)

## 2024-02-05 PROCEDURE — 80048 BASIC METABOLIC PNL TOTAL CA: CPT

## 2024-02-05 PROCEDURE — 36415 COLL VENOUS BLD VENIPUNCTURE: CPT

## 2024-02-05 NOTE — TELEPHONE ENCOUNTER
Called patient to let her know that I spoke with Krystle the pharmacist and we have to figure out what's going on with her insurance and then Cecy will call her to get her on the schedule for her Aranesp .

## 2024-02-07 ENCOUNTER — HOSPITAL ENCOUNTER (OUTPATIENT)
Dept: INFUSION THERAPY | Age: 44
Discharge: HOME | End: 2024-02-07
Payer: COMMERCIAL

## 2024-02-07 ENCOUNTER — CLINICAL DOCUMENTATION (OUTPATIENT)
Dept: ONCOLOGY | Age: 44
End: 2024-02-07

## 2024-02-07 VITALS — DIASTOLIC BLOOD PRESSURE: 67 MMHG | SYSTOLIC BLOOD PRESSURE: 147 MMHG

## 2024-02-07 DIAGNOSIS — D63.1 ANEMIA IN STAGE 4 CHRONIC KIDNEY DISEASE (HCC): Primary | ICD-10-CM

## 2024-02-07 DIAGNOSIS — D63.1 ANEMIA IN STAGE 4 CHRONIC KIDNEY DISEASE (HCC): ICD-10-CM

## 2024-02-07 DIAGNOSIS — D63.1 ANEMIA DUE TO STAGE 4 CHRONIC KIDNEY DISEASE (HCC): Primary | ICD-10-CM

## 2024-02-07 DIAGNOSIS — N18.4 ANEMIA IN STAGE 4 CHRONIC KIDNEY DISEASE (HCC): Primary | ICD-10-CM

## 2024-02-07 DIAGNOSIS — N18.4 ANEMIA DUE TO STAGE 4 CHRONIC KIDNEY DISEASE (HCC): Primary | ICD-10-CM

## 2024-02-07 DIAGNOSIS — N18.4 ANEMIA IN STAGE 4 CHRONIC KIDNEY DISEASE (HCC): ICD-10-CM

## 2024-02-07 PROBLEM — D64.9 ANEMIA: Status: ACTIVE | Noted: 2024-02-07

## 2024-02-07 LAB
BASOPHILS ABSOLUTE: 0.1 K/CU MM
BASOPHILS RELATIVE PERCENT: 0.8 % (ref 0–1)
DIFFERENTIAL TYPE: ABNORMAL
EOSINOPHILS ABSOLUTE: 0.3 K/CU MM
EOSINOPHILS RELATIVE PERCENT: 3.9 % (ref 0–3)
HCT VFR BLD CALC: 22.5 % (ref 37–47)
HEMOGLOBIN: 6.9 GM/DL (ref 12.5–16)
LYMPHOCYTES ABSOLUTE: 1 K/CU MM
LYMPHOCYTES RELATIVE PERCENT: 12.8 % (ref 24–44)
MCH RBC QN AUTO: 28.4 PG (ref 27–31)
MCHC RBC AUTO-ENTMCNC: 30.7 % (ref 32–36)
MCV RBC AUTO: 92.6 FL (ref 78–100)
MONOCYTES ABSOLUTE: 0.5 K/CU MM
MONOCYTES RELATIVE PERCENT: 5.8 % (ref 0–4)
PDW BLD-RTO: 14 % (ref 11.7–14.9)
PLATELET # BLD: 279 K/CU MM (ref 140–440)
PMV BLD AUTO: 9.9 FL (ref 7.5–11.1)
RBC # BLD: 2.43 M/CU MM (ref 4.2–5.4)
REASON FOR REJECTION: NORMAL
REJECTED TEST: NORMAL
RETICULOCYTE COUNT PCT: 2.3 % (ref 0.2–2.2)
SEGMENTED NEUTROPHILS ABSOLUTE COUNT: 6.2 K/CU MM
SEGMENTED NEUTROPHILS RELATIVE PERCENT: 76.7 % (ref 36–66)
WBC # BLD: 8 K/CU MM (ref 4–10.5)

## 2024-02-07 PROCEDURE — 82728 ASSAY OF FERRITIN: CPT

## 2024-02-07 PROCEDURE — 86901 BLOOD TYPING SEROLOGIC RH(D): CPT

## 2024-02-07 PROCEDURE — 36415 COLL VENOUS BLD VENIPUNCTURE: CPT

## 2024-02-07 PROCEDURE — 85025 COMPLETE CBC W/AUTO DIFF WBC: CPT

## 2024-02-07 PROCEDURE — 86900 BLOOD TYPING SEROLOGIC ABO: CPT

## 2024-02-07 PROCEDURE — 85045 AUTOMATED RETICULOCYTE COUNT: CPT

## 2024-02-07 PROCEDURE — 86922 COMPATIBILITY TEST ANTIGLOB: CPT

## 2024-02-07 PROCEDURE — 86850 RBC ANTIBODY SCREEN: CPT

## 2024-02-07 PROCEDURE — 6360000002 HC RX W HCPCS: Performed by: INTERNAL MEDICINE

## 2024-02-07 PROCEDURE — 96372 THER/PROPH/DIAG INJ SC/IM: CPT

## 2024-02-07 RX ORDER — FAMOTIDINE 10 MG/ML
20 INJECTION, SOLUTION INTRAVENOUS
OUTPATIENT
Start: 2024-02-21

## 2024-02-07 RX ORDER — ACETAMINOPHEN 325 MG/1
650 TABLET ORAL
OUTPATIENT
Start: 2024-02-21

## 2024-02-07 RX ORDER — DIPHENHYDRAMINE HCL 25 MG
25 TABLET ORAL ONCE
Status: CANCELLED | OUTPATIENT
Start: 2024-02-07 | End: 2024-02-07

## 2024-02-07 RX ORDER — ONDANSETRON 2 MG/ML
8 INJECTION INTRAMUSCULAR; INTRAVENOUS
OUTPATIENT
Start: 2024-02-21

## 2024-02-07 RX ORDER — SODIUM CHLORIDE 9 MG/ML
INJECTION, SOLUTION INTRAVENOUS CONTINUOUS
OUTPATIENT
Start: 2024-02-21

## 2024-02-07 RX ORDER — DIPHENHYDRAMINE HYDROCHLORIDE 50 MG/ML
50 INJECTION INTRAMUSCULAR; INTRAVENOUS
OUTPATIENT
Start: 2024-02-21

## 2024-02-07 RX ORDER — SODIUM CHLORIDE 9 MG/ML
20 INJECTION, SOLUTION INTRAVENOUS CONTINUOUS
Status: CANCELLED | OUTPATIENT
Start: 2024-02-07

## 2024-02-07 RX ORDER — ACETAMINOPHEN 325 MG/1
650 TABLET ORAL ONCE
Status: CANCELLED | OUTPATIENT
Start: 2024-02-07 | End: 2024-02-07

## 2024-02-07 RX ORDER — ALBUTEROL SULFATE 90 UG/1
4 AEROSOL, METERED RESPIRATORY (INHALATION) PRN
Status: CANCELLED | OUTPATIENT
Start: 2024-02-07

## 2024-02-07 RX ORDER — SODIUM CHLORIDE 0.9 % (FLUSH) 0.9 %
5-40 SYRINGE (ML) INJECTION PRN
Status: CANCELLED | OUTPATIENT
Start: 2024-02-07

## 2024-02-07 RX ORDER — ONDANSETRON 2 MG/ML
8 INJECTION INTRAMUSCULAR; INTRAVENOUS
Status: CANCELLED | OUTPATIENT
Start: 2024-02-07

## 2024-02-07 RX ORDER — FAMOTIDINE 10 MG/ML
20 INJECTION, SOLUTION INTRAVENOUS
Status: CANCELLED | OUTPATIENT
Start: 2024-02-07

## 2024-02-07 RX ORDER — ALBUTEROL SULFATE 90 UG/1
4 AEROSOL, METERED RESPIRATORY (INHALATION) PRN
OUTPATIENT
Start: 2024-02-21

## 2024-02-07 RX ORDER — METHYLPREDNISOLONE SODIUM SUCCINATE 40 MG/ML
20 INJECTION, POWDER, LYOPHILIZED, FOR SOLUTION INTRAMUSCULAR; INTRAVENOUS ONCE
Status: CANCELLED
Start: 2024-02-07 | End: 2024-02-07

## 2024-02-07 RX ORDER — DIPHENHYDRAMINE HYDROCHLORIDE 50 MG/ML
50 INJECTION INTRAMUSCULAR; INTRAVENOUS
Status: CANCELLED | OUTPATIENT
Start: 2024-02-07

## 2024-02-07 RX ORDER — SODIUM CHLORIDE 9 MG/ML
INJECTION, SOLUTION INTRAVENOUS CONTINUOUS
Status: CANCELLED | OUTPATIENT
Start: 2024-02-07

## 2024-02-07 RX ORDER — SODIUM CHLORIDE 9 MG/ML
25 INJECTION, SOLUTION INTRAVENOUS PRN
Status: CANCELLED | OUTPATIENT
Start: 2024-02-07

## 2024-02-07 RX ORDER — EPINEPHRINE 1 MG/ML
0.3 INJECTION, SOLUTION, CONCENTRATE INTRAVENOUS PRN
OUTPATIENT
Start: 2024-02-21

## 2024-02-07 RX ORDER — ACETAMINOPHEN 325 MG/1
650 TABLET ORAL
Status: CANCELLED | OUTPATIENT
Start: 2024-02-07

## 2024-02-07 RX ORDER — EPINEPHRINE 1 MG/ML
0.3 INJECTION, SOLUTION, CONCENTRATE INTRAVENOUS PRN
Status: CANCELLED | OUTPATIENT
Start: 2024-02-07

## 2024-02-07 RX ADMIN — DARBEPOETIN ALFA 40 MCG: 40 INJECTION, SOLUTION INTRAVENOUS; SUBCUTANEOUS at 15:15

## 2024-02-07 NOTE — PROGRESS NOTES
Patient scheduled for blood transfusion at [facility] OP infusion on 02/08/2024 @ 1300. Order placed for 1 unit PRBC per physician order. Blood therapy plan added.

## 2024-02-07 NOTE — PROGRESS NOTES
Arrived to treatment suite from lab for Aranesp injection.  Pt states has been fatigued, with dizziness and poor appetite. Hgb 6.9. Dr. Grady ordered 1u PRBC.  Lab to chairside to draw type and screen.  Band applied. Karina at chairside to schedule  Consent signed for transfusion.   Pt is scheduled tomorrow at infusion unit.   Treatment plan approved, released and completed as ordered. Aranesp injection given SC in right arm. Band aid applied at injection site. Pt tolerated well.  AVS provided. Discharge via wheelchair in stable condition. Lesli Trent RN

## 2024-02-07 NOTE — PROGRESS NOTES
Hgb 6.9; per Dr Grady 1 unit of PRBC, called OP Infusion Dept, spoke to Lynne, patient scheduled for 2/8/24 @ 1:00pm, patient advised of appointment time and signed consent, blood banded and sent for T&S, band on patient (verifed), also advised to keep band on until they receive their infusion and patient states understanding.

## 2024-02-08 ENCOUNTER — HOSPITAL ENCOUNTER (OUTPATIENT)
Dept: INFUSION THERAPY | Age: 44
Setting detail: INFUSION SERIES
Discharge: HOME | End: 2024-02-08
Payer: COMMERCIAL

## 2024-02-08 VITALS
HEART RATE: 54 BPM | SYSTOLIC BLOOD PRESSURE: 133 MMHG | DIASTOLIC BLOOD PRESSURE: 56 MMHG | TEMPERATURE: 98.1 F | OXYGEN SATURATION: 99 % | RESPIRATION RATE: 16 BRPM

## 2024-02-08 DIAGNOSIS — N18.4 ANEMIA IN STAGE 4 CHRONIC KIDNEY DISEASE (HCC): Primary | ICD-10-CM

## 2024-02-08 DIAGNOSIS — D63.1 ANEMIA IN STAGE 4 CHRONIC KIDNEY DISEASE (HCC): Primary | ICD-10-CM

## 2024-02-08 LAB
FERRITIN: 86 NG/ML (ref 15–150)
FOLATE SERPL-MCNC: 7.7 NG/ML (ref 3.1–17.5)
IRON: 46 UG/DL (ref 37–145)
LACTATE DEHYDROGENASE: 177 IU/L (ref 120–246)
PCT TRANSFERRIN: 13 % (ref 10–44)
TOTAL IRON BINDING CAPACITY: 352 UG/DL (ref 250–450)
UNSATURATED IRON BINDING CAPACITY: 306 UG/DL (ref 110–370)
VITAMIN B-12: 1008 PG/ML (ref 211–911)

## 2024-02-08 PROCEDURE — 2580000003 HC RX 258

## 2024-02-08 PROCEDURE — 86900 BLOOD TYPING SEROLOGIC ABO: CPT

## 2024-02-08 PROCEDURE — P9016 RBC LEUKOCYTES REDUCED: HCPCS

## 2024-02-08 PROCEDURE — 82746 ASSAY OF FOLIC ACID SERUM: CPT

## 2024-02-08 PROCEDURE — 82607 VITAMIN B-12: CPT

## 2024-02-08 PROCEDURE — 83615 LACTATE (LD) (LDH) ENZYME: CPT

## 2024-02-08 PROCEDURE — 6360000002 HC RX W HCPCS: Performed by: INTERNAL MEDICINE

## 2024-02-08 PROCEDURE — 96374 THER/PROPH/DIAG INJ IV PUSH: CPT

## 2024-02-08 PROCEDURE — 2580000003 HC RX 258: Performed by: INTERNAL MEDICINE

## 2024-02-08 PROCEDURE — 36430 TRANSFUSION BLD/BLD COMPNT: CPT

## 2024-02-08 PROCEDURE — 86850 RBC ANTIBODY SCREEN: CPT

## 2024-02-08 PROCEDURE — 83540 ASSAY OF IRON: CPT

## 2024-02-08 PROCEDURE — 6370000000 HC RX 637 (ALT 250 FOR IP): Performed by: INTERNAL MEDICINE

## 2024-02-08 PROCEDURE — 83010 ASSAY OF HAPTOGLOBIN QUANT: CPT

## 2024-02-08 PROCEDURE — 96375 TX/PRO/DX INJ NEW DRUG ADDON: CPT

## 2024-02-08 PROCEDURE — 82728 ASSAY OF FERRITIN: CPT

## 2024-02-08 PROCEDURE — 83550 IRON BINDING TEST: CPT

## 2024-02-08 PROCEDURE — 86901 BLOOD TYPING SEROLOGIC RH(D): CPT

## 2024-02-08 RX ORDER — ACETAMINOPHEN 325 MG/1
650 TABLET ORAL
Status: CANCELLED | OUTPATIENT
Start: 2024-02-08

## 2024-02-08 RX ORDER — SODIUM CHLORIDE 0.9 % (FLUSH) 0.9 %
5-40 SYRINGE (ML) INJECTION PRN
Status: DISCONTINUED | OUTPATIENT
Start: 2024-02-08 | End: 2024-02-08

## 2024-02-08 RX ORDER — ONDANSETRON 2 MG/ML
8 INJECTION INTRAMUSCULAR; INTRAVENOUS
Status: CANCELLED | OUTPATIENT
Start: 2024-02-08

## 2024-02-08 RX ORDER — ALBUTEROL SULFATE 90 UG/1
4 AEROSOL, METERED RESPIRATORY (INHALATION) PRN
Status: CANCELLED | OUTPATIENT
Start: 2024-02-08

## 2024-02-08 RX ORDER — DIPHENHYDRAMINE HCL 25 MG
25 TABLET ORAL ONCE
Status: COMPLETED | OUTPATIENT
Start: 2024-02-08 | End: 2024-02-08

## 2024-02-08 RX ORDER — WATER 10 ML/10ML
INJECTION INTRAMUSCULAR; INTRAVENOUS; SUBCUTANEOUS
Status: COMPLETED
Start: 2024-02-08 | End: 2024-02-08

## 2024-02-08 RX ORDER — FAMOTIDINE 10 MG/ML
20 INJECTION, SOLUTION INTRAVENOUS
Status: CANCELLED | OUTPATIENT
Start: 2024-02-08

## 2024-02-08 RX ORDER — EPINEPHRINE 1 MG/ML
0.3 INJECTION, SOLUTION, CONCENTRATE INTRAVENOUS PRN
Status: CANCELLED | OUTPATIENT
Start: 2024-02-08

## 2024-02-08 RX ORDER — SODIUM CHLORIDE 9 MG/ML
20 INJECTION, SOLUTION INTRAVENOUS CONTINUOUS
Status: CANCELLED | OUTPATIENT
Start: 2024-02-08

## 2024-02-08 RX ORDER — FUROSEMIDE 10 MG/ML
40 INJECTION INTRAMUSCULAR; INTRAVENOUS ONCE
Status: COMPLETED | OUTPATIENT
Start: 2024-02-08 | End: 2024-02-08

## 2024-02-08 RX ORDER — DIPHENHYDRAMINE HCL 25 MG
25 TABLET ORAL ONCE
Status: CANCELLED | OUTPATIENT
Start: 2024-02-08 | End: 2024-02-08

## 2024-02-08 RX ORDER — DIPHENHYDRAMINE HYDROCHLORIDE 50 MG/ML
50 INJECTION INTRAMUSCULAR; INTRAVENOUS
Status: CANCELLED | OUTPATIENT
Start: 2024-02-08

## 2024-02-08 RX ORDER — SODIUM CHLORIDE 9 MG/ML
20 INJECTION, SOLUTION INTRAVENOUS CONTINUOUS
Status: DISCONTINUED | OUTPATIENT
Start: 2024-02-08 | End: 2024-02-08

## 2024-02-08 RX ORDER — ACETAMINOPHEN 325 MG/1
650 TABLET ORAL ONCE
Status: CANCELLED | OUTPATIENT
Start: 2024-02-08 | End: 2024-02-08

## 2024-02-08 RX ORDER — METHYLPREDNISOLONE SODIUM SUCCINATE 40 MG/ML
20 INJECTION, POWDER, LYOPHILIZED, FOR SOLUTION INTRAMUSCULAR; INTRAVENOUS ONCE
Status: CANCELLED
Start: 2024-02-08 | End: 2024-02-08

## 2024-02-08 RX ORDER — SODIUM CHLORIDE 0.9 % (FLUSH) 0.9 %
5-40 SYRINGE (ML) INJECTION PRN
Status: CANCELLED | OUTPATIENT
Start: 2024-02-08

## 2024-02-08 RX ORDER — SODIUM CHLORIDE 9 MG/ML
25 INJECTION, SOLUTION INTRAVENOUS PRN
Status: CANCELLED | OUTPATIENT
Start: 2024-02-08

## 2024-02-08 RX ORDER — SODIUM CHLORIDE 9 MG/ML
25 INJECTION, SOLUTION INTRAVENOUS PRN
Status: DISCONTINUED | OUTPATIENT
Start: 2024-02-08 | End: 2024-02-08

## 2024-02-08 RX ORDER — ACETAMINOPHEN 325 MG/1
650 TABLET ORAL ONCE
Status: COMPLETED | OUTPATIENT
Start: 2024-02-08 | End: 2024-02-08

## 2024-02-08 RX ORDER — SODIUM CHLORIDE 9 MG/ML
INJECTION, SOLUTION INTRAVENOUS CONTINUOUS
Status: CANCELLED | OUTPATIENT
Start: 2024-02-08

## 2024-02-08 RX ORDER — METHYLPREDNISOLONE SODIUM SUCCINATE 40 MG/ML
20 INJECTION, POWDER, LYOPHILIZED, FOR SOLUTION INTRAMUSCULAR; INTRAVENOUS ONCE
Status: COMPLETED | OUTPATIENT
Start: 2024-02-08 | End: 2024-02-08

## 2024-02-08 RX ADMIN — METHYLPREDNISOLONE SODIUM SUCCINATE 20 MG: 40 INJECTION INTRAMUSCULAR; INTRAVENOUS at 12:58

## 2024-02-08 RX ADMIN — ACETAMINOPHEN 650 MG: 325 TABLET ORAL at 12:47

## 2024-02-08 RX ADMIN — SODIUM CHLORIDE, PRESERVATIVE FREE 10 ML: 5 INJECTION INTRAVENOUS at 15:16

## 2024-02-08 RX ADMIN — DIPHENHYDRAMINE HYDROCHLORIDE 25 MG: 25 TABLET ORAL at 12:46

## 2024-02-08 RX ADMIN — FUROSEMIDE 40 MG: 10 INJECTION, SOLUTION INTRAVENOUS at 15:17

## 2024-02-08 RX ADMIN — SODIUM CHLORIDE, PRESERVATIVE FREE 20 ML: 5 INJECTION INTRAVENOUS at 13:01

## 2024-02-08 RX ADMIN — WATER 10 ML: 1 INJECTION INTRAMUSCULAR; INTRAVENOUS; SUBCUTANEOUS at 12:59

## 2024-02-08 RX ADMIN — SODIUM CHLORIDE, PRESERVATIVE FREE 10 ML: 5 INJECTION INTRAVENOUS at 15:19

## 2024-02-08 RX ADMIN — SODIUM CHLORIDE 20 ML/HR: 9 INJECTION, SOLUTION INTRAVENOUS at 13:01

## 2024-02-08 NOTE — PROGRESS NOTES
Tolerated transfusion well. Reviewed discharge instruction, voiced understanding. Copies of AVS given. Pt discharged home. Pt to exit via wheelchair.    Orders Placed This Encounter   Medications    0.9 % sodium chloride infusion    sodium chloride flush 0.9 % injection 5-40 mL    0.9 % sodium chloride infusion    acetaminophen (TYLENOL) tablet 650 mg    diphenhydrAMINE (BENADRYL) tablet 25 mg    methylPREDNISolone sodium succ (SOLU-MEDROL) injection 20 mg    sterile water injection     Lynne Davis: cabinet override    furosemide (LASIX) injection 40 mg       
decreased strength

## 2024-02-09 LAB
ABO/RH: NORMAL
ANTIBODY SCREEN: NEGATIVE
COMPONENT: NORMAL
CROSSMATCH RESULT: NORMAL
STATUS: NORMAL
TRANSFUSION STATUS: NORMAL
UNIT DIVISION: 0
UNIT NUMBER: NORMAL

## 2024-02-10 LAB — HAPTOGLOB SERPL-MCNC: 140 MG/DL (ref 30–200)

## 2024-02-20 ENCOUNTER — TELEPHONE (OUTPATIENT)
Dept: ONCOLOGY | Age: 44
End: 2024-02-20

## 2024-02-20 NOTE — TELEPHONE ENCOUNTER
Patient called given time and prep for ct scan to be done on 3/1/24 [facility] arrival at 9:30 AM.

## 2024-02-22 ENCOUNTER — HOSPITAL ENCOUNTER (OUTPATIENT)
Dept: INFUSION THERAPY | Age: 44
Discharge: HOME | End: 2024-02-22
Payer: COMMERCIAL

## 2024-02-22 VITALS — SYSTOLIC BLOOD PRESSURE: 146 MMHG | HEART RATE: 54 BPM | DIASTOLIC BLOOD PRESSURE: 67 MMHG

## 2024-02-22 DIAGNOSIS — D63.1 ANEMIA DUE TO STAGE 4 CHRONIC KIDNEY DISEASE (HCC): Primary | ICD-10-CM

## 2024-02-22 DIAGNOSIS — N18.4 ANEMIA IN STAGE 4 CHRONIC KIDNEY DISEASE (HCC): ICD-10-CM

## 2024-02-22 DIAGNOSIS — N18.4 ANEMIA DUE TO STAGE 4 CHRONIC KIDNEY DISEASE (HCC): Primary | ICD-10-CM

## 2024-02-22 DIAGNOSIS — R76.8 ANA POSITIVE: ICD-10-CM

## 2024-02-22 DIAGNOSIS — E10.59 TYPE 1 DIABETES MELLITUS WITH OTHER CIRCULATORY COMPLICATION (HCC): ICD-10-CM

## 2024-02-22 DIAGNOSIS — D63.1 ANEMIA IN STAGE 4 CHRONIC KIDNEY DISEASE (HCC): ICD-10-CM

## 2024-02-22 DIAGNOSIS — I15.1 SECONDARY HYPERTENSION DUE TO RENAL DISEASE: ICD-10-CM

## 2024-02-22 DIAGNOSIS — R91.1 NODULE OF RIGHT LUNG: ICD-10-CM

## 2024-02-22 LAB
ALBUMIN SERPL-MCNC: 4.3 GM/DL (ref 3.4–5)
ALP BLD-CCNC: 137 IU/L (ref 40–129)
ALT SERPL-CCNC: 14 U/L (ref 10–40)
ANION GAP SERPL CALCULATED.3IONS-SCNC: 16 MMOL/L (ref 7–16)
AST SERPL-CCNC: 21 IU/L (ref 15–37)
BASOPHILS ABSOLUTE: 0.1 K/CU MM
BASOPHILS RELATIVE PERCENT: 0.9 % (ref 0–1)
BILIRUB SERPL-MCNC: 0.4 MG/DL (ref 0–1)
BUN SERPL-MCNC: 62 MG/DL (ref 6–23)
CALCIUM SERPL-MCNC: 8.9 MG/DL (ref 8.3–10.6)
CHLORIDE BLD-SCNC: 104 MMOL/L (ref 99–110)
CO2: 17 MMOL/L (ref 21–32)
CREAT SERPL-MCNC: 3.4 MG/DL (ref 0.6–1.1)
DIFFERENTIAL TYPE: ABNORMAL
EOSINOPHILS ABSOLUTE: 0.3 K/CU MM
EOSINOPHILS RELATIVE PERCENT: 4 % (ref 0–3)
FERRITIN: 116 NG/ML (ref 15–150)
FOLATE SERPL-MCNC: 7.9 NG/ML (ref 3.1–17.5)
GFR SERPL CREATININE-BSD FRML MDRD: 16 ML/MIN/1.73M2
GLUCOSE SERPL-MCNC: 133 MG/DL (ref 70–99)
HCT VFR BLD CALC: 27.1 % (ref 37–47)
HEMOGLOBIN: 8.4 GM/DL (ref 12.5–16)
IRON: 45 UG/DL (ref 37–145)
LYMPHOCYTES ABSOLUTE: 0.8 K/CU MM
LYMPHOCYTES RELATIVE PERCENT: 9.3 % (ref 24–44)
MCH RBC QN AUTO: 29.1 PG (ref 27–31)
MCHC RBC AUTO-ENTMCNC: 31 % (ref 32–36)
MCV RBC AUTO: 93.8 FL (ref 78–100)
MONOCYTES ABSOLUTE: 0.4 K/CU MM
MONOCYTES RELATIVE PERCENT: 5.1 % (ref 0–4)
PCT TRANSFERRIN: 13 % (ref 10–44)
PDW BLD-RTO: 14.8 % (ref 11.7–14.9)
PLATELET # BLD: 285 K/CU MM (ref 140–440)
PMV BLD AUTO: 10.1 FL (ref 7.5–11.1)
POTASSIUM SERPL-SCNC: 4.6 MMOL/L (ref 3.5–5.1)
RBC # BLD: 2.89 M/CU MM (ref 4.2–5.4)
SEGMENTED NEUTROPHILS ABSOLUTE COUNT: 6.5 K/CU MM
SEGMENTED NEUTROPHILS RELATIVE PERCENT: 80.7 % (ref 36–66)
SODIUM BLD-SCNC: 137 MMOL/L (ref 135–145)
TOTAL IRON BINDING CAPACITY: 344 UG/DL (ref 250–450)
TOTAL PROTEIN: 7.3 GM/DL (ref 6.4–8.2)
UNSATURATED IRON BINDING CAPACITY: 299 UG/DL (ref 110–370)
VITAMIN B-12: 1286 PG/ML (ref 211–911)
WBC # BLD: 8.1 K/CU MM (ref 4–10.5)

## 2024-02-22 PROCEDURE — 82607 VITAMIN B-12: CPT

## 2024-02-22 PROCEDURE — 82746 ASSAY OF FOLIC ACID SERUM: CPT

## 2024-02-22 PROCEDURE — 85025 COMPLETE CBC W/AUTO DIFF WBC: CPT

## 2024-02-22 PROCEDURE — 36415 COLL VENOUS BLD VENIPUNCTURE: CPT

## 2024-02-22 PROCEDURE — 80053 COMPREHEN METABOLIC PANEL: CPT

## 2024-02-22 PROCEDURE — 96372 THER/PROPH/DIAG INJ SC/IM: CPT

## 2024-02-22 PROCEDURE — 82728 ASSAY OF FERRITIN: CPT

## 2024-02-22 PROCEDURE — 83540 ASSAY OF IRON: CPT

## 2024-02-22 PROCEDURE — 83550 IRON BINDING TEST: CPT

## 2024-02-22 PROCEDURE — 6360000002 HC RX W HCPCS: Performed by: INTERNAL MEDICINE

## 2024-02-22 RX ORDER — ALBUTEROL SULFATE 90 UG/1
4 AEROSOL, METERED RESPIRATORY (INHALATION) PRN
OUTPATIENT
Start: 2024-03-06

## 2024-02-22 RX ORDER — EPINEPHRINE 1 MG/ML
0.3 INJECTION, SOLUTION, CONCENTRATE INTRAVENOUS PRN
OUTPATIENT
Start: 2024-03-06

## 2024-02-22 RX ORDER — DIPHENHYDRAMINE HYDROCHLORIDE 50 MG/ML
50 INJECTION INTRAMUSCULAR; INTRAVENOUS
OUTPATIENT
Start: 2024-03-06

## 2024-02-22 RX ORDER — ACETAMINOPHEN 325 MG/1
650 TABLET ORAL
OUTPATIENT
Start: 2024-03-06

## 2024-02-22 RX ORDER — ONDANSETRON 2 MG/ML
8 INJECTION INTRAMUSCULAR; INTRAVENOUS
OUTPATIENT
Start: 2024-03-06

## 2024-02-22 RX ORDER — FAMOTIDINE 10 MG/ML
20 INJECTION, SOLUTION INTRAVENOUS
OUTPATIENT
Start: 2024-03-06

## 2024-02-22 RX ORDER — SODIUM CHLORIDE 9 MG/ML
INJECTION, SOLUTION INTRAVENOUS CONTINUOUS
OUTPATIENT
Start: 2024-03-06

## 2024-02-22 RX ADMIN — DARBEPOETIN ALFA 40 MCG: 40 INJECTION, SOLUTION INTRAVENOUS; SUBCUTANEOUS at 11:07

## 2024-03-01 ENCOUNTER — HOSPITAL ENCOUNTER (OUTPATIENT)
Dept: CT IMAGING | Age: 44
Discharge: HOME | End: 2024-03-01
Attending: INTERNAL MEDICINE
Payer: COMMERCIAL

## 2024-03-01 DIAGNOSIS — R91.1 NODULE OF RIGHT LUNG: ICD-10-CM

## 2024-03-01 PROCEDURE — 71250 CT THORAX DX C-: CPT

## 2024-03-07 ENCOUNTER — OFFICE VISIT (OUTPATIENT)
Dept: ONCOLOGY | Age: 44
End: 2024-03-07
Payer: COMMERCIAL

## 2024-03-07 ENCOUNTER — HOSPITAL ENCOUNTER (OUTPATIENT)
Dept: INFUSION THERAPY | Age: 44
Discharge: HOME | End: 2024-03-07
Payer: COMMERCIAL

## 2024-03-07 VITALS
HEART RATE: 60 BPM | SYSTOLIC BLOOD PRESSURE: 148 MMHG | HEIGHT: 60 IN | TEMPERATURE: 97.8 F | WEIGHT: 153.4 LBS | OXYGEN SATURATION: 91 % | BODY MASS INDEX: 30.12 KG/M2 | DIASTOLIC BLOOD PRESSURE: 67 MMHG

## 2024-03-07 DIAGNOSIS — N18.4 ANEMIA DUE TO STAGE 4 CHRONIC KIDNEY DISEASE (HCC): Primary | ICD-10-CM

## 2024-03-07 DIAGNOSIS — D63.1 ANEMIA IN STAGE 4 CHRONIC KIDNEY DISEASE (HCC): ICD-10-CM

## 2024-03-07 DIAGNOSIS — N18.4 ANEMIA IN STAGE 4 CHRONIC KIDNEY DISEASE (HCC): ICD-10-CM

## 2024-03-07 DIAGNOSIS — D63.1 ANEMIA DUE TO STAGE 4 CHRONIC KIDNEY DISEASE (HCC): Primary | ICD-10-CM

## 2024-03-07 DIAGNOSIS — R91.1 NODULE OF RIGHT LUNG: Primary | ICD-10-CM

## 2024-03-07 LAB
BASOPHILS ABSOLUTE: 0.1 K/CU MM
BASOPHILS RELATIVE PERCENT: 0.9 % (ref 0–1)
DIFFERENTIAL TYPE: ABNORMAL
EOSINOPHILS ABSOLUTE: 0.3 K/CU MM
EOSINOPHILS RELATIVE PERCENT: 4 % (ref 0–3)
HCT VFR BLD CALC: 25.7 % (ref 37–47)
HEMOGLOBIN: 7.8 GM/DL (ref 12.5–16)
LYMPHOCYTES ABSOLUTE: 0.9 K/CU MM
LYMPHOCYTES RELATIVE PERCENT: 12.8 % (ref 24–44)
MCH RBC QN AUTO: 28.7 PG (ref 27–31)
MCHC RBC AUTO-ENTMCNC: 30.4 % (ref 32–36)
MCV RBC AUTO: 94.5 FL (ref 78–100)
MONOCYTES ABSOLUTE: 0.5 K/CU MM
MONOCYTES RELATIVE PERCENT: 6.6 % (ref 0–4)
PDW BLD-RTO: 14.7 % (ref 11.7–14.9)
PLATELET # BLD: 275 K/CU MM (ref 140–440)
PMV BLD AUTO: 9.6 FL (ref 7.5–11.1)
RBC # BLD: 2.72 M/CU MM (ref 4.2–5.4)
SEGMENTED NEUTROPHILS ABSOLUTE COUNT: 5.2 K/CU MM
SEGMENTED NEUTROPHILS RELATIVE PERCENT: 75.7 % (ref 36–66)
WBC # BLD: 6.8 K/CU MM (ref 4–10.5)

## 2024-03-07 PROCEDURE — 3078F DIAST BP <80 MM HG: CPT | Performed by: INTERNAL MEDICINE

## 2024-03-07 PROCEDURE — 6360000002 HC RX W HCPCS: Performed by: INTERNAL MEDICINE

## 2024-03-07 PROCEDURE — 99214 OFFICE O/P EST MOD 30 MIN: CPT | Performed by: INTERNAL MEDICINE

## 2024-03-07 PROCEDURE — 3077F SYST BP >= 140 MM HG: CPT | Performed by: INTERNAL MEDICINE

## 2024-03-07 PROCEDURE — 36415 COLL VENOUS BLD VENIPUNCTURE: CPT

## 2024-03-07 PROCEDURE — 85025 COMPLETE CBC W/AUTO DIFF WBC: CPT

## 2024-03-07 PROCEDURE — 96372 THER/PROPH/DIAG INJ SC/IM: CPT

## 2024-03-07 RX ORDER — DIPHENHYDRAMINE HYDROCHLORIDE 50 MG/ML
50 INJECTION INTRAMUSCULAR; INTRAVENOUS
OUTPATIENT
Start: 2024-03-21

## 2024-03-07 RX ORDER — ONDANSETRON 2 MG/ML
8 INJECTION INTRAMUSCULAR; INTRAVENOUS
OUTPATIENT
Start: 2024-03-21

## 2024-03-07 RX ORDER — ALBUTEROL SULFATE 90 UG/1
4 AEROSOL, METERED RESPIRATORY (INHALATION) PRN
OUTPATIENT
Start: 2024-03-21

## 2024-03-07 RX ORDER — SODIUM CHLORIDE 9 MG/ML
INJECTION, SOLUTION INTRAVENOUS CONTINUOUS
OUTPATIENT
Start: 2024-03-21

## 2024-03-07 RX ORDER — EPINEPHRINE 1 MG/ML
0.3 INJECTION, SOLUTION, CONCENTRATE INTRAVENOUS PRN
OUTPATIENT
Start: 2024-03-21

## 2024-03-07 RX ORDER — FAMOTIDINE 10 MG/ML
20 INJECTION, SOLUTION INTRAVENOUS
OUTPATIENT
Start: 2024-03-21

## 2024-03-07 RX ORDER — ACETAMINOPHEN 325 MG/1
650 TABLET ORAL
OUTPATIENT
Start: 2024-03-21

## 2024-03-07 RX ADMIN — DARBEPOETIN ALFA 40 MCG: 40 INJECTION, SOLUTION INTRAVENOUS; SUBCUTANEOUS at 11:32

## 2024-03-07 ASSESSMENT — PATIENT HEALTH QUESTIONNAIRE - PHQ9
SUM OF ALL RESPONSES TO PHQ QUESTIONS 1-9: 2
2. FEELING DOWN, DEPRESSED OR HOPELESS: 1
SUM OF ALL RESPONSES TO PHQ QUESTIONS 1-9: 2
SUM OF ALL RESPONSES TO PHQ QUESTIONS 1-9: 2
1. LITTLE INTEREST OR PLEASURE IN DOING THINGS: 1
SUM OF ALL RESPONSES TO PHQ QUESTIONS 1-9: 2
SUM OF ALL RESPONSES TO PHQ9 QUESTIONS 1 & 2: 2

## 2024-03-07 NOTE — PROGRESS NOTES
Arrived to treatment suite from lab for Aranesp injection.  Hemoglobin 7.8.  Treatment plan approved, released and completed as ordered.    Aranesp injection given SC in right arm. Band aid applied at injection site.  Pt tolerated well. AVS provided.  Discharge ambulatory in stable condition. Lesli Trent RN

## 2024-03-07 NOTE — PROGRESS NOTES
MA Rooming Questions  Patient: Gus Garza  MRN: [id number]    Date: 3/7/2024        1. Do you have any new issues?   no         2. Do you need any refills on medications?    no    3. Have you had any imaging done since your last visit?   Yes - CT chest 3/1    4. Have you been hospitalized or seen in the emergency room since your last visit here?   no    5. Did the patient have a depression screening completed today? Yes    PHQ-9 Total Score: 2 (3/7/2024 10:31 AM)       PHQ-9 Given to (if applicable):               PHQ-9 Score (if applicable):                     [] Positive     [x]  Negative              Does question #9 need addressed (if applicable)                     [] Yes    []  No               Nohemi Ontiveros CMA    
is created with the assistance of a speech-recognition program. While intending to generate a document that accurately reflects the content of the visit, no guarantee can be provided that every mistake has been identified and corrected by editing.    Time Spent with patient for face to face, exam, education, discussing treatment options, reviewing imaging, reviewing labs, decision making, Pre-charting, and documenting today's visit, >30 mins.     VIK

## 2024-03-12 ENCOUNTER — CLINICAL DOCUMENTATION (OUTPATIENT)
Dept: ONCOLOGY | Age: 44
End: 2024-03-12

## 2024-03-12 DIAGNOSIS — R91.1 NODULE OF RIGHT LUNG: Primary | ICD-10-CM

## 2024-03-12 DIAGNOSIS — J90 PLEURAL EFFUSION: ICD-10-CM

## 2024-03-12 NOTE — PROGRESS NOTES
Order placed for PET scan and thoracentesis with request to send fluid for cytology per physician instruction

## 2024-03-13 ENCOUNTER — TELEPHONE (OUTPATIENT)
Dept: ONCOLOGY | Age: 44
End: 2024-03-13

## 2024-03-13 NOTE — TELEPHONE ENCOUNTER
Patient called given time and prep for Pet scan to be done on 3/18/24 [facility] arrival at 1 PM.

## 2024-03-18 ENCOUNTER — HOSPITAL ENCOUNTER (OUTPATIENT)
Age: 44
Discharge: HOME | End: 2024-03-18
Attending: INTERNAL MEDICINE
Payer: COMMERCIAL

## 2024-03-18 ENCOUNTER — HOSPITAL ENCOUNTER (OUTPATIENT)
Dept: PET IMAGING | Age: 44
Discharge: HOME | End: 2024-03-18
Attending: INTERNAL MEDICINE
Payer: COMMERCIAL

## 2024-03-18 DIAGNOSIS — D63.1 ANEMIA DUE TO STAGE 4 CHRONIC KIDNEY DISEASE (HCC): ICD-10-CM

## 2024-03-18 DIAGNOSIS — R91.1 NODULE OF RIGHT LUNG: ICD-10-CM

## 2024-03-18 DIAGNOSIS — N18.4 ANEMIA DUE TO STAGE 4 CHRONIC KIDNEY DISEASE (HCC): ICD-10-CM

## 2024-03-18 LAB
BASOPHILS ABSOLUTE: 0.1 K/CU MM
BASOPHILS RELATIVE PERCENT: 0.9 % (ref 0–1)
DIFFERENTIAL TYPE: ABNORMAL
EOSINOPHILS ABSOLUTE: 0.2 K/CU MM
EOSINOPHILS RELATIVE PERCENT: 2.6 % (ref 0–3)
HCT VFR BLD CALC: 23.3 % (ref 37–47)
HEMOGLOBIN: 7 GM/DL (ref 12.5–16)
IMMATURE NEUTROPHIL %: 0.4 % (ref 0–0.43)
LYMPHOCYTES ABSOLUTE: 0.8 K/CU MM
LYMPHOCYTES RELATIVE PERCENT: 10.7 % (ref 24–44)
MCH RBC QN AUTO: 29 PG (ref 27–31)
MCHC RBC AUTO-ENTMCNC: 30 % (ref 32–36)
MCV RBC AUTO: 96.7 FL (ref 78–100)
MONOCYTES ABSOLUTE: 0.4 K/CU MM
MONOCYTES RELATIVE PERCENT: 5.6 % (ref 0–4)
NUCLEATED RBC %: 0 %
PDW BLD-RTO: 15.3 % (ref 11.7–14.9)
PLATELET # BLD: 267 K/CU MM (ref 140–440)
PMV BLD AUTO: 10.5 FL (ref 7.5–11.1)
RBC # BLD: 2.41 M/CU MM (ref 4.2–5.4)
SEGMENTED NEUTROPHILS ABSOLUTE COUNT: 5.6 K/CU MM
SEGMENTED NEUTROPHILS RELATIVE PERCENT: 79.8 % (ref 36–66)
TOTAL IMMATURE NEUTOROPHIL: 0.03 K/CU MM
TOTAL NUCLEATED RBC: 0 K/CU MM
WBC # BLD: 7 K/CU MM (ref 4–10.5)

## 2024-03-18 PROCEDURE — 85025 COMPLETE CBC W/AUTO DIFF WBC: CPT

## 2024-03-18 PROCEDURE — 2580000003 HC RX 258: Performed by: INTERNAL MEDICINE

## 2024-03-18 PROCEDURE — 3430000000 HC RX DIAGNOSTIC RADIOPHARMACEUTICAL: Performed by: INTERNAL MEDICINE

## 2024-03-18 PROCEDURE — A9609 HC RX DIAGNOSTIC RADIOPHARMACEUTICAL: HCPCS | Performed by: INTERNAL MEDICINE

## 2024-03-18 PROCEDURE — 78815 PET IMAGE W/CT SKULL-THIGH: CPT

## 2024-03-18 PROCEDURE — 36415 COLL VENOUS BLD VENIPUNCTURE: CPT

## 2024-03-18 RX ORDER — SODIUM CHLORIDE 0.9 % (FLUSH) 0.9 %
10 SYRINGE (ML) INJECTION PRN
Status: COMPLETED | OUTPATIENT
Start: 2024-03-18 | End: 2024-03-18

## 2024-03-18 RX ORDER — FLUDEOXYGLUCOSE F 18 200 MCI/ML
16.1 INJECTION, SOLUTION INTRAVENOUS
Status: COMPLETED | OUTPATIENT
Start: 2024-03-18 | End: 2024-03-18

## 2024-03-18 RX ADMIN — SODIUM CHLORIDE, PRESERVATIVE FREE 10 ML: 5 INJECTION INTRAVENOUS at 13:22

## 2024-03-18 RX ADMIN — FLUDEOXYGLUCOSE F 18 16.1 MILLICURIE: 200 INJECTION, SOLUTION INTRAVENOUS at 13:22

## 2024-03-18 NOTE — PROGRESS NOTES
IR Procedure at [facility]: Spoke with patient and she will arrive at 0730 at [facility] on 3/22/2024 for her procedure at 0900. Also went over below instructions and told patient to take her medications as scheduled.  But to check with Dr Grady's office to see if she wants her to stop her aspirin.       (Paracentesis, Thoracentesis, Thyroid Bx and Injections may have a light breakfast)  2.   Follow your directions as prescribed by the doctor for your procedure and medications.  3.   Consult your provider as to when to stop blood thinner  4.   Do not take any pain medication within 6 hours of your procedure  5.   Do not drink any alcoholic beverages or use any street drugs 24 hours before procedure.  6.   Please wear simple, loose fitting clothing to the hospital.  Do not bring valuables (money,             credit cards, checkbooks, etc.)     7.   If you  have a Living Will and Durable Power of  for Healthcare, please bring in a copy.  8.   Please bring picture ID,  insurance card, paperwork from the doctors office            (H & P, Consent,  & card for implantable devices).  9.   Report to the information desk on the ground floor.  10. Take a shower the night before or morning of your procedure, do not apply any lotion, oil or powder.  11. If you are going to be sedated for the procedure, you will need a responsible adult to drive you home.

## 2024-03-19 ENCOUNTER — HOSPITAL ENCOUNTER (OUTPATIENT)
Age: 44
Discharge: HOME | End: 2024-03-19
Payer: COMMERCIAL

## 2024-03-19 DIAGNOSIS — E11.29 MICROALBUMINURIA DUE TO TYPE 2 DIABETES MELLITUS (HCC): ICD-10-CM

## 2024-03-19 DIAGNOSIS — N18.4 ANEMIA IN STAGE 4 CHRONIC KIDNEY DISEASE (HCC): ICD-10-CM

## 2024-03-19 DIAGNOSIS — N18.5 CHRONIC KIDNEY DISEASE, STAGE V (HCC): ICD-10-CM

## 2024-03-19 DIAGNOSIS — D63.1 ANEMIA IN STAGE 4 CHRONIC KIDNEY DISEASE (HCC): ICD-10-CM

## 2024-03-19 DIAGNOSIS — R80.9 MICROALBUMINURIA DUE TO TYPE 2 DIABETES MELLITUS (HCC): ICD-10-CM

## 2024-03-19 DIAGNOSIS — R91.1 NODULE OF RIGHT LUNG: Primary | ICD-10-CM

## 2024-03-19 DIAGNOSIS — N17.9 AKI (ACUTE KIDNEY INJURY) (HCC): ICD-10-CM

## 2024-03-19 LAB
ALBUMIN SERPL-MCNC: 3.8 GM/DL (ref 3.4–5)
ALP BLD-CCNC: 139 IU/L (ref 40–128)
ALT SERPL-CCNC: 15 U/L (ref 10–40)
ANION GAP SERPL CALCULATED.3IONS-SCNC: 12 MMOL/L (ref 7–16)
AST SERPL-CCNC: 23 IU/L (ref 15–37)
BILIRUB SERPL-MCNC: 0.4 MG/DL (ref 0–1)
BUN SERPL-MCNC: 62 MG/DL (ref 6–23)
CALCIUM SERPL-MCNC: 8.7 MG/DL (ref 8.3–10.6)
CHLORIDE BLD-SCNC: 104 MMOL/L (ref 99–110)
CO2: 20 MMOL/L (ref 21–32)
CREAT SERPL-MCNC: 3.6 MG/DL (ref 0.6–1.1)
GFR SERPL CREATININE-BSD FRML MDRD: 15 ML/MIN/1.73M2
GLUCOSE SERPL-MCNC: 150 MG/DL (ref 70–99)
PHOSPHORUS: 4.3 MG/DL (ref 2.5–4.9)
POTASSIUM SERPL-SCNC: 4 MMOL/L (ref 3.5–5.1)
SODIUM BLD-SCNC: 136 MMOL/L (ref 135–145)
TOTAL PROTEIN: 6.6 GM/DL (ref 6.4–8.2)

## 2024-03-19 PROCEDURE — 83970 ASSAY OF PARATHORMONE: CPT

## 2024-03-19 PROCEDURE — 84100 ASSAY OF PHOSPHORUS: CPT

## 2024-03-19 PROCEDURE — 36415 COLL VENOUS BLD VENIPUNCTURE: CPT

## 2024-03-19 PROCEDURE — 80053 COMPREHEN METABOLIC PANEL: CPT

## 2024-03-21 ENCOUNTER — HOSPITAL ENCOUNTER (OUTPATIENT)
Dept: INFUSION THERAPY | Age: 44
Discharge: HOME | End: 2024-03-21
Payer: COMMERCIAL

## 2024-03-21 DIAGNOSIS — D63.1 ANEMIA DUE TO STAGE 4 CHRONIC KIDNEY DISEASE (HCC): Primary | ICD-10-CM

## 2024-03-21 DIAGNOSIS — N18.4 ANEMIA DUE TO STAGE 4 CHRONIC KIDNEY DISEASE (HCC): Primary | ICD-10-CM

## 2024-03-21 LAB
BASOPHILS ABSOLUTE: 0.1 K/CU MM
BASOPHILS RELATIVE PERCENT: 0.8 % (ref 0–1)
DIFFERENTIAL TYPE: ABNORMAL
EOSINOPHILS ABSOLUTE: 0.2 K/CU MM
EOSINOPHILS RELATIVE PERCENT: 2.5 % (ref 0–3)
HCT VFR BLD CALC: 22.4 % (ref 37–47)
HEMOGLOBIN: 7 GM/DL (ref 12.5–16)
LYMPHOCYTES ABSOLUTE: 0.6 K/CU MM
LYMPHOCYTES RELATIVE PERCENT: 10.1 % (ref 24–44)
MCH RBC QN AUTO: 29.3 PG (ref 27–31)
MCHC RBC AUTO-ENTMCNC: 31.3 % (ref 32–36)
MCV RBC AUTO: 93.7 FL (ref 78–100)
MONOCYTES ABSOLUTE: 0.5 K/CU MM
MONOCYTES RELATIVE PERCENT: 8.4 % (ref 0–4)
PDW BLD-RTO: 14.9 % (ref 11.7–14.9)
PLATELET # BLD: 256 K/CU MM (ref 140–440)
PMV BLD AUTO: 9.2 FL (ref 7.5–11.1)
PTH-INTACT SERPL-MCNC: 52 PG/ML (ref 15–65)
RBC # BLD: 2.39 M/CU MM (ref 4.2–5.4)
SEGMENTED NEUTROPHILS ABSOLUTE COUNT: 4.6 K/CU MM
SEGMENTED NEUTROPHILS RELATIVE PERCENT: 78.2 % (ref 36–66)
WBC # BLD: 5.9 K/CU MM (ref 4–10.5)

## 2024-03-21 PROCEDURE — 85025 COMPLETE CBC W/AUTO DIFF WBC: CPT

## 2024-03-21 PROCEDURE — 6360000002 HC RX W HCPCS: Performed by: INTERNAL MEDICINE

## 2024-03-21 PROCEDURE — 36415 COLL VENOUS BLD VENIPUNCTURE: CPT

## 2024-03-21 PROCEDURE — 96372 THER/PROPH/DIAG INJ SC/IM: CPT

## 2024-03-21 RX ORDER — ONDANSETRON 2 MG/ML
8 INJECTION INTRAMUSCULAR; INTRAVENOUS
OUTPATIENT
Start: 2024-04-04

## 2024-03-21 RX ORDER — ACETAMINOPHEN 325 MG/1
650 TABLET ORAL
OUTPATIENT
Start: 2024-04-04

## 2024-03-21 RX ORDER — ALBUTEROL SULFATE 90 UG/1
4 AEROSOL, METERED RESPIRATORY (INHALATION) PRN
OUTPATIENT
Start: 2024-04-04

## 2024-03-21 RX ORDER — EPINEPHRINE 1 MG/ML
0.3 INJECTION, SOLUTION, CONCENTRATE INTRAVENOUS PRN
OUTPATIENT
Start: 2024-04-04

## 2024-03-21 RX ORDER — SODIUM CHLORIDE 9 MG/ML
INJECTION, SOLUTION INTRAVENOUS CONTINUOUS
OUTPATIENT
Start: 2024-04-04

## 2024-03-21 RX ORDER — DIPHENHYDRAMINE HYDROCHLORIDE 50 MG/ML
50 INJECTION INTRAMUSCULAR; INTRAVENOUS
OUTPATIENT
Start: 2024-04-04

## 2024-03-21 RX ORDER — FAMOTIDINE 10 MG/ML
20 INJECTION, SOLUTION INTRAVENOUS
OUTPATIENT
Start: 2024-04-04

## 2024-03-21 RX ADMIN — DARBEPOETIN ALFA 40 MCG: 40 INJECTION, SOLUTION INTRAVENOUS; SUBCUTANEOUS at 11:20

## 2024-03-21 NOTE — PROGRESS NOTES
Arrived to treatment suite from lab for Aranesp injection. Hemoglobin 7.0.  Pt states has had increased shortness of breath, but is scheduled for thoracentesis tomorrow.  Treatment plan approved, released and completed as ordered.    Aranesp injection given SC in right arm. Band aid applied at injection site.  Pt tolerated well. AVS provided.  Discharge ambulatory in stable condition. Lesli Trent RN

## 2024-03-22 ENCOUNTER — HOSPITAL ENCOUNTER (OUTPATIENT)
Dept: INTERVENTIONAL RADIOLOGY/VASCULAR | Age: 44
Discharge: HOME | End: 2024-03-22
Payer: COMMERCIAL

## 2024-03-22 VITALS
HEART RATE: 53 BPM | TEMPERATURE: 96 F | HEIGHT: 60 IN | BODY MASS INDEX: 29.45 KG/M2 | OXYGEN SATURATION: 98 % | WEIGHT: 150 LBS | RESPIRATION RATE: 22 BRPM | DIASTOLIC BLOOD PRESSURE: 71 MMHG | SYSTOLIC BLOOD PRESSURE: 163 MMHG

## 2024-03-22 DIAGNOSIS — R91.1 NODULE OF RIGHT LUNG: ICD-10-CM

## 2024-03-22 DIAGNOSIS — J90 PLEURAL EFFUSION: ICD-10-CM

## 2024-03-22 LAB
APTT: 33 SECONDS (ref 25.1–37.1)
HCT VFR BLD CALC: 23.3 % (ref 37–47)
HEMOGLOBIN: 7.1 GM/DL (ref 12.5–16)
INR BLD: 1.1 INDEX
MCH RBC QN AUTO: 28.9 PG (ref 27–31)
MCHC RBC AUTO-ENTMCNC: 30.5 % (ref 32–36)
MCV RBC AUTO: 94.7 FL (ref 78–100)
PDW BLD-RTO: 15.3 % (ref 11.7–14.9)
PLATELET # BLD: 269 K/CU MM (ref 140–440)
PMV BLD AUTO: 10.4 FL (ref 7.5–11.1)
PROTHROMBIN TIME: 14.8 SECONDS (ref 11.7–14.5)
RBC # BLD: 2.46 M/CU MM (ref 4.2–5.4)
WBC # BLD: 7.1 K/CU MM (ref 4–10.5)

## 2024-03-22 PROCEDURE — 85027 COMPLETE CBC AUTOMATED: CPT

## 2024-03-22 PROCEDURE — 85610 PROTHROMBIN TIME: CPT

## 2024-03-22 PROCEDURE — 85730 THROMBOPLASTIN TIME PARTIAL: CPT

## 2024-04-04 ENCOUNTER — CLINICAL DOCUMENTATION (OUTPATIENT)
Dept: ONCOLOGY | Age: 44
End: 2024-04-04

## 2024-04-04 ENCOUNTER — HOSPITAL ENCOUNTER (OUTPATIENT)
Dept: INFUSION THERAPY | Age: 44
Discharge: HOME | End: 2024-04-04
Payer: COMMERCIAL

## 2024-04-04 VITALS — DIASTOLIC BLOOD PRESSURE: 63 MMHG | HEART RATE: 59 BPM | SYSTOLIC BLOOD PRESSURE: 139 MMHG

## 2024-04-04 DIAGNOSIS — D63.1 ANEMIA DUE TO STAGE 4 CHRONIC KIDNEY DISEASE (HCC): Primary | ICD-10-CM

## 2024-04-04 DIAGNOSIS — N18.4 ANEMIA IN STAGE 4 CHRONIC KIDNEY DISEASE (HCC): Primary | ICD-10-CM

## 2024-04-04 DIAGNOSIS — D63.1 ANEMIA IN STAGE 4 CHRONIC KIDNEY DISEASE (HCC): Primary | ICD-10-CM

## 2024-04-04 DIAGNOSIS — D63.1 ANEMIA IN STAGE 4 CHRONIC KIDNEY DISEASE (HCC): ICD-10-CM

## 2024-04-04 DIAGNOSIS — N18.4 ANEMIA IN STAGE 4 CHRONIC KIDNEY DISEASE (HCC): ICD-10-CM

## 2024-04-04 DIAGNOSIS — N18.4 ANEMIA DUE TO STAGE 4 CHRONIC KIDNEY DISEASE (HCC): Primary | ICD-10-CM

## 2024-04-04 LAB
BASOPHILS ABSOLUTE: 0 K/CU MM
BASOPHILS RELATIVE PERCENT: 0.6 % (ref 0–1)
DIFFERENTIAL TYPE: ABNORMAL
EOSINOPHILS ABSOLUTE: 0.2 K/CU MM
EOSINOPHILS RELATIVE PERCENT: 3 % (ref 0–3)
HCT VFR BLD CALC: 21.8 % (ref 37–47)
HEMOGLOBIN: 6.8 GM/DL (ref 12.5–16)
LYMPHOCYTES ABSOLUTE: 0.6 K/CU MM
LYMPHOCYTES RELATIVE PERCENT: 9.2 % (ref 24–44)
MCH RBC QN AUTO: 29.2 PG (ref 27–31)
MCHC RBC AUTO-ENTMCNC: 31.2 % (ref 32–36)
MCV RBC AUTO: 93.6 FL (ref 78–100)
MONOCYTES ABSOLUTE: 0.4 K/CU MM
MONOCYTES RELATIVE PERCENT: 6.2 % (ref 0–4)
PDW BLD-RTO: 14.8 % (ref 11.7–14.9)
PLATELET # BLD: 295 K/CU MM (ref 140–440)
PMV BLD AUTO: 9.1 FL (ref 7.5–11.1)
RBC # BLD: 2.33 M/CU MM (ref 4.2–5.4)
SEGMENTED NEUTROPHILS ABSOLUTE COUNT: 5.1 K/CU MM
SEGMENTED NEUTROPHILS RELATIVE PERCENT: 81 % (ref 36–66)
WBC # BLD: 6.3 K/CU MM (ref 4–10.5)

## 2024-04-04 PROCEDURE — 86850 RBC ANTIBODY SCREEN: CPT

## 2024-04-04 PROCEDURE — 96372 THER/PROPH/DIAG INJ SC/IM: CPT

## 2024-04-04 PROCEDURE — 85025 COMPLETE CBC W/AUTO DIFF WBC: CPT

## 2024-04-04 PROCEDURE — 86922 COMPATIBILITY TEST ANTIGLOB: CPT

## 2024-04-04 PROCEDURE — 86900 BLOOD TYPING SEROLOGIC ABO: CPT

## 2024-04-04 PROCEDURE — 86901 BLOOD TYPING SEROLOGIC RH(D): CPT

## 2024-04-04 PROCEDURE — 36415 COLL VENOUS BLD VENIPUNCTURE: CPT

## 2024-04-04 PROCEDURE — 6360000002 HC RX W HCPCS: Performed by: INTERNAL MEDICINE

## 2024-04-04 RX ORDER — DIPHENHYDRAMINE HCL 25 MG
25 TABLET ORAL ONCE
Status: CANCELLED | OUTPATIENT
Start: 2024-04-04 | End: 2024-04-04

## 2024-04-04 RX ORDER — ACETAMINOPHEN 325 MG/1
650 TABLET ORAL
OUTPATIENT
Start: 2024-04-18

## 2024-04-04 RX ORDER — EPINEPHRINE 1 MG/ML
0.3 INJECTION, SOLUTION, CONCENTRATE INTRAVENOUS PRN
Status: CANCELLED | OUTPATIENT
Start: 2024-04-04

## 2024-04-04 RX ORDER — SODIUM CHLORIDE 0.9 % (FLUSH) 0.9 %
5-40 SYRINGE (ML) INJECTION PRN
Status: CANCELLED | OUTPATIENT
Start: 2024-04-04

## 2024-04-04 RX ORDER — EPINEPHRINE 1 MG/ML
0.3 INJECTION, SOLUTION, CONCENTRATE INTRAVENOUS PRN
OUTPATIENT
Start: 2024-04-18

## 2024-04-04 RX ORDER — FAMOTIDINE 10 MG/ML
20 INJECTION, SOLUTION INTRAVENOUS
Status: CANCELLED | OUTPATIENT
Start: 2024-04-04

## 2024-04-04 RX ORDER — ALBUTEROL SULFATE 90 UG/1
4 AEROSOL, METERED RESPIRATORY (INHALATION) PRN
OUTPATIENT
Start: 2024-04-18

## 2024-04-04 RX ORDER — SODIUM CHLORIDE 9 MG/ML
25 INJECTION, SOLUTION INTRAVENOUS PRN
Status: CANCELLED | OUTPATIENT
Start: 2024-04-04

## 2024-04-04 RX ORDER — DIPHENHYDRAMINE HYDROCHLORIDE 50 MG/ML
50 INJECTION INTRAMUSCULAR; INTRAVENOUS
Status: CANCELLED | OUTPATIENT
Start: 2024-04-04

## 2024-04-04 RX ORDER — FAMOTIDINE 10 MG/ML
20 INJECTION, SOLUTION INTRAVENOUS
OUTPATIENT
Start: 2024-04-18

## 2024-04-04 RX ORDER — ALBUTEROL SULFATE 90 UG/1
4 AEROSOL, METERED RESPIRATORY (INHALATION) PRN
Status: CANCELLED | OUTPATIENT
Start: 2024-04-04

## 2024-04-04 RX ORDER — ACETAMINOPHEN 325 MG/1
650 TABLET ORAL ONCE
Status: CANCELLED | OUTPATIENT
Start: 2024-04-04 | End: 2024-04-04

## 2024-04-04 RX ORDER — SODIUM CHLORIDE 9 MG/ML
INJECTION, SOLUTION INTRAVENOUS CONTINUOUS
OUTPATIENT
Start: 2024-04-18

## 2024-04-04 RX ORDER — SODIUM CHLORIDE 9 MG/ML
INJECTION, SOLUTION INTRAVENOUS CONTINUOUS
Status: CANCELLED | OUTPATIENT
Start: 2024-04-04

## 2024-04-04 RX ORDER — ACETAMINOPHEN 325 MG/1
650 TABLET ORAL
Status: CANCELLED | OUTPATIENT
Start: 2024-04-04

## 2024-04-04 RX ORDER — ONDANSETRON 2 MG/ML
8 INJECTION INTRAMUSCULAR; INTRAVENOUS
OUTPATIENT
Start: 2024-04-18

## 2024-04-04 RX ORDER — SODIUM CHLORIDE 9 MG/ML
20 INJECTION, SOLUTION INTRAVENOUS CONTINUOUS
Status: CANCELLED | OUTPATIENT
Start: 2024-04-04

## 2024-04-04 RX ORDER — ONDANSETRON 2 MG/ML
8 INJECTION INTRAMUSCULAR; INTRAVENOUS
Status: CANCELLED | OUTPATIENT
Start: 2024-04-04

## 2024-04-04 RX ORDER — DIPHENHYDRAMINE HYDROCHLORIDE 50 MG/ML
50 INJECTION INTRAMUSCULAR; INTRAVENOUS
OUTPATIENT
Start: 2024-04-18

## 2024-04-04 RX ADMIN — DARBEPOETIN ALFA 60 MCG: 60 INJECTION, SOLUTION INTRAVENOUS; SUBCUTANEOUS at 11:23

## 2024-04-04 NOTE — PROGRESS NOTES
Hgb 6.8; per Dr Grady 1 unit of PRBC, called OP Infusion Dept, spoke to Gerri, patient scheduled for 4/5 @ 1300 @ [facility] OP Infusion Dept, patient advised of appointment time and signed consent, blood banded and sent for T&S, band on patient (verifed), also advised to keep band on until they receive their infusion and patient states understanding.

## 2024-04-04 NOTE — PROGRESS NOTES
Patient scheduled for blood transfusion at [facility] OP Infusion on 04/05/2024 @ 1300. Order placed for 1 unit PRBC per physician order. Blood therapy plan added.

## 2024-04-04 NOTE — PROGRESS NOTES
Patient wheeled to infusion area, here today for an Aranesp injection. Patient c/o feeling weak and SOB. Labs reviewed, HgB 6.8. Per Dr. Grady, transfuse 1 unit PRBC. Karina,  updated. Treatment approved and given in right arm SQ. Patient tolerated well. Patient provided discharge instructions. Scheduled for a transfusion tomorrow at 1pm. RTC 04/18 for next injection.

## 2024-04-05 ENCOUNTER — HOSPITAL ENCOUNTER (OUTPATIENT)
Dept: INFUSION THERAPY | Age: 44
Setting detail: INFUSION SERIES
Discharge: HOME | End: 2024-04-05
Payer: COMMERCIAL

## 2024-04-05 VITALS
HEART RATE: 56 BPM | DIASTOLIC BLOOD PRESSURE: 64 MMHG | RESPIRATION RATE: 16 BRPM | SYSTOLIC BLOOD PRESSURE: 147 MMHG | OXYGEN SATURATION: 97 % | TEMPERATURE: 97.7 F

## 2024-04-05 DIAGNOSIS — D63.1 ANEMIA IN STAGE 4 CHRONIC KIDNEY DISEASE (HCC): Primary | ICD-10-CM

## 2024-04-05 DIAGNOSIS — N18.4 ANEMIA IN STAGE 4 CHRONIC KIDNEY DISEASE (HCC): Primary | ICD-10-CM

## 2024-04-05 PROCEDURE — 96375 TX/PRO/DX INJ NEW DRUG ADDON: CPT

## 2024-04-05 PROCEDURE — 36430 TRANSFUSION BLD/BLD COMPNT: CPT

## 2024-04-05 PROCEDURE — 6360000002 HC RX W HCPCS: Performed by: INTERNAL MEDICINE

## 2024-04-05 PROCEDURE — 6370000000 HC RX 637 (ALT 250 FOR IP): Performed by: INTERNAL MEDICINE

## 2024-04-05 PROCEDURE — P9016 RBC LEUKOCYTES REDUCED: HCPCS

## 2024-04-05 PROCEDURE — 96374 THER/PROPH/DIAG INJ IV PUSH: CPT

## 2024-04-05 PROCEDURE — 2580000003 HC RX 258: Performed by: INTERNAL MEDICINE

## 2024-04-05 RX ORDER — EPINEPHRINE 1 MG/ML
0.3 INJECTION, SOLUTION INTRAMUSCULAR; SUBCUTANEOUS PRN
Status: CANCELLED | OUTPATIENT
Start: 2024-04-05

## 2024-04-05 RX ORDER — SODIUM CHLORIDE 0.9 % (FLUSH) 0.9 %
5-40 SYRINGE (ML) INJECTION PRN
Status: DISCONTINUED | OUTPATIENT
Start: 2024-04-05 | End: 2024-04-06 | Stop reason: HOSPADM

## 2024-04-05 RX ORDER — ACETAMINOPHEN 325 MG/1
650 TABLET ORAL
Status: CANCELLED | OUTPATIENT
Start: 2024-04-05

## 2024-04-05 RX ORDER — FUROSEMIDE 10 MG/ML
40 INJECTION INTRAMUSCULAR; INTRAVENOUS ONCE
Status: COMPLETED | OUTPATIENT
Start: 2024-04-05 | End: 2024-04-05

## 2024-04-05 RX ORDER — SODIUM CHLORIDE 9 MG/ML
INJECTION, SOLUTION INTRAVENOUS CONTINUOUS
Status: CANCELLED | OUTPATIENT
Start: 2024-04-05

## 2024-04-05 RX ORDER — FAMOTIDINE 10 MG/ML
20 INJECTION, SOLUTION INTRAVENOUS
Status: CANCELLED | OUTPATIENT
Start: 2024-04-05

## 2024-04-05 RX ORDER — ACETAMINOPHEN 325 MG/1
650 TABLET ORAL ONCE
Status: CANCELLED | OUTPATIENT
Start: 2024-04-05 | End: 2024-04-05

## 2024-04-05 RX ORDER — ACETAMINOPHEN 325 MG/1
650 TABLET ORAL ONCE
Status: COMPLETED | OUTPATIENT
Start: 2024-04-05 | End: 2024-04-05

## 2024-04-05 RX ORDER — DIPHENHYDRAMINE HCL 25 MG
25 TABLET ORAL ONCE
Status: CANCELLED | OUTPATIENT
Start: 2024-04-05 | End: 2024-04-05

## 2024-04-05 RX ORDER — SODIUM CHLORIDE 9 MG/ML
20 INJECTION, SOLUTION INTRAVENOUS CONTINUOUS
Status: CANCELLED | OUTPATIENT
Start: 2024-04-05

## 2024-04-05 RX ORDER — ALBUTEROL SULFATE 90 UG/1
4 AEROSOL, METERED RESPIRATORY (INHALATION) PRN
Status: CANCELLED | OUTPATIENT
Start: 2024-04-05

## 2024-04-05 RX ORDER — DIPHENHYDRAMINE HCL 25 MG
25 TABLET ORAL ONCE
Status: COMPLETED | OUTPATIENT
Start: 2024-04-05 | End: 2024-04-05

## 2024-04-05 RX ORDER — SODIUM CHLORIDE 9 MG/ML
25 INJECTION, SOLUTION INTRAVENOUS PRN
Status: CANCELLED | OUTPATIENT
Start: 2024-04-05

## 2024-04-05 RX ORDER — SODIUM CHLORIDE 0.9 % (FLUSH) 0.9 %
5-40 SYRINGE (ML) INJECTION PRN
Status: CANCELLED | OUTPATIENT
Start: 2024-04-05

## 2024-04-05 RX ORDER — SODIUM CHLORIDE 9 MG/ML
20 INJECTION, SOLUTION INTRAVENOUS CONTINUOUS
Status: DISCONTINUED | OUTPATIENT
Start: 2024-04-05 | End: 2024-04-06 | Stop reason: HOSPADM

## 2024-04-05 RX ORDER — ONDANSETRON 2 MG/ML
8 INJECTION INTRAMUSCULAR; INTRAVENOUS
Status: CANCELLED | OUTPATIENT
Start: 2024-04-05

## 2024-04-05 RX ORDER — FUROSEMIDE 10 MG/ML
40 INJECTION INTRAMUSCULAR; INTRAVENOUS ONCE
Status: CANCELLED
Start: 2024-04-05

## 2024-04-05 RX ORDER — SODIUM CHLORIDE 9 MG/ML
25 INJECTION, SOLUTION INTRAVENOUS PRN
Status: DISCONTINUED | OUTPATIENT
Start: 2024-04-05 | End: 2024-04-06 | Stop reason: HOSPADM

## 2024-04-05 RX ORDER — DIPHENHYDRAMINE HYDROCHLORIDE 50 MG/ML
50 INJECTION INTRAMUSCULAR; INTRAVENOUS
Status: CANCELLED | OUTPATIENT
Start: 2024-04-05

## 2024-04-05 RX ADMIN — DIPHENHYDRAMINE HYDROCHLORIDE 25 MG: 25 TABLET ORAL at 12:45

## 2024-04-05 RX ADMIN — SODIUM CHLORIDE, PRESERVATIVE FREE 10 ML: 5 INJECTION INTRAVENOUS at 15:40

## 2024-04-05 RX ADMIN — WATER 20 MG: 1 INJECTION INTRAMUSCULAR; INTRAVENOUS; SUBCUTANEOUS at 12:54

## 2024-04-05 RX ADMIN — SODIUM CHLORIDE 20 ML/HR: 9 INJECTION, SOLUTION INTRAVENOUS at 12:54

## 2024-04-05 RX ADMIN — ACETAMINOPHEN 650 MG: 325 TABLET ORAL at 12:45

## 2024-04-05 RX ADMIN — FUROSEMIDE 40 MG: 10 INJECTION, SOLUTION INTRAMUSCULAR; INTRAVENOUS at 15:40

## 2024-04-05 RX ADMIN — SODIUM CHLORIDE, PRESERVATIVE FREE 10 ML: 5 INJECTION INTRAVENOUS at 12:53

## 2024-04-05 ASSESSMENT — PAIN SCALES - GENERAL: PAINLEVEL_OUTOF10: 0

## 2024-04-05 NOTE — DISCHARGE SUMMARY
Tolerated Transfusion well.Reviewed discharge instructions, understanding verbalized.Copies of AVS given to take home. Patient discharged home with family.Down to exit per wheelchair.    Orders Placed This Encounter   Medications    0.9 % sodium chloride infusion    sodium chloride flush 0.9 % injection 5-40 mL    acetaminophen (TYLENOL) tablet 650 mg    diphenhydrAMINE (BENADRYL) tablet 25 mg    methylPREDNISolone sodium succ (SOLU-MEDROL) 20 mg in sterile water 0.5 mL injection    0.9 % sodium chloride infusion    furosemide (LASIX) injection 40 mg

## 2024-04-05 NOTE — DISCHARGE INSTRUCTIONS
Call the doctor or if:   You are weaker than normal   Develop a fever, hives, rash, or chills   Notice a yellow tint to the skin or eyes   Have blood in your stools or urine     Seek immediate care (Emergency Room) if you experience:   Chest pain   Shortness of breath    Continue with your medications, diet, and activity    Thank you for choosing [facility] Outpatient Infusion Unit,    Outpatient Infusion Unit  Hours: 8:00am-4:30pm  Phone: [phone]

## 2024-04-05 NOTE — PLAN OF CARE
To unit room 2 for Blood Transfusion.Reorientated to unit.Procedure and plan of care explained.Questions answered.Understanding verbalized.

## 2024-04-15 ENCOUNTER — CLINICAL DOCUMENTATION (OUTPATIENT)
Dept: ONCOLOGY | Age: 44
End: 2024-04-15

## 2024-04-18 ENCOUNTER — HOSPITAL ENCOUNTER (OUTPATIENT)
Dept: INFUSION THERAPY | Age: 44
Discharge: HOME | End: 2024-04-18
Payer: COMMERCIAL

## 2024-04-18 ENCOUNTER — HOSPITAL ENCOUNTER (OUTPATIENT)
Age: 44
Discharge: HOME | End: 2024-04-18
Payer: COMMERCIAL

## 2024-04-18 VITALS — DIASTOLIC BLOOD PRESSURE: 68 MMHG | SYSTOLIC BLOOD PRESSURE: 156 MMHG

## 2024-04-18 DIAGNOSIS — R80.9 MICROALBUMINURIA DUE TO TYPE 2 DIABETES MELLITUS (HCC): ICD-10-CM

## 2024-04-18 DIAGNOSIS — N17.9 AKI (ACUTE KIDNEY INJURY) (HCC): ICD-10-CM

## 2024-04-18 DIAGNOSIS — E11.29 MICROALBUMINURIA DUE TO TYPE 2 DIABETES MELLITUS (HCC): ICD-10-CM

## 2024-04-18 DIAGNOSIS — D63.1 ANEMIA DUE TO STAGE 4 CHRONIC KIDNEY DISEASE (HCC): Primary | ICD-10-CM

## 2024-04-18 DIAGNOSIS — N18.4 ANEMIA DUE TO STAGE 4 CHRONIC KIDNEY DISEASE (HCC): Primary | ICD-10-CM

## 2024-04-18 DIAGNOSIS — N18.4 ANEMIA IN STAGE 4 CHRONIC KIDNEY DISEASE (HCC): ICD-10-CM

## 2024-04-18 DIAGNOSIS — D63.1 ANEMIA IN STAGE 4 CHRONIC KIDNEY DISEASE (HCC): ICD-10-CM

## 2024-04-18 DIAGNOSIS — N18.5 CHRONIC KIDNEY DISEASE, STAGE V (HCC): ICD-10-CM

## 2024-04-18 LAB
ALBUMIN SERPL-MCNC: 4 GM/DL (ref 3.4–5)
ALP BLD-CCNC: 195 IU/L (ref 40–128)
ALT SERPL-CCNC: 23 U/L (ref 10–40)
ANION GAP SERPL CALCULATED.3IONS-SCNC: 15 MMOL/L (ref 7–16)
AST SERPL-CCNC: 46 IU/L (ref 15–37)
BASOPHILS ABSOLUTE: 0 K/CU MM
BASOPHILS RELATIVE PERCENT: 0.6 % (ref 0–1)
BILIRUB SERPL-MCNC: 0.6 MG/DL (ref 0–1)
BUN SERPL-MCNC: 51 MG/DL (ref 6–23)
CALCIUM SERPL-MCNC: 8.4 MG/DL (ref 8.3–10.6)
CHLORIDE BLD-SCNC: 100 MMOL/L (ref 99–110)
CO2: 20 MMOL/L (ref 21–32)
CREAT SERPL-MCNC: 3.3 MG/DL (ref 0.6–1.1)
DIFFERENTIAL TYPE: ABNORMAL
EOSINOPHILS ABSOLUTE: 0.2 K/CU MM
EOSINOPHILS RELATIVE PERCENT: 3.2 % (ref 0–3)
GFR SERPL CREATININE-BSD FRML MDRD: 17 ML/MIN/1.73M2
GLUCOSE SERPL-MCNC: 109 MG/DL (ref 70–99)
HCT VFR BLD CALC: 26.5 % (ref 37–47)
HEMOGLOBIN: 8.3 GM/DL (ref 12.5–16)
LYMPHOCYTES ABSOLUTE: 0.5 K/CU MM
LYMPHOCYTES RELATIVE PERCENT: 7.3 % (ref 24–44)
MCH RBC QN AUTO: 29.1 PG (ref 27–31)
MCHC RBC AUTO-ENTMCNC: 31.3 % (ref 32–36)
MCV RBC AUTO: 93 FL (ref 78–100)
MONOCYTES ABSOLUTE: 0.3 K/CU MM
MONOCYTES RELATIVE PERCENT: 4.3 % (ref 0–4)
NEUTROPHILS RELATIVE PERCENT: 84.6 % (ref 36–66)
PDW BLD-RTO: 14.9 % (ref 11.7–14.9)
PHOSPHORUS: 4.7 MG/DL (ref 2.5–4.9)
PLATELET # BLD: 272 K/CU MM (ref 140–440)
PMV BLD AUTO: 9.6 FL (ref 7.5–11.1)
POTASSIUM SERPL-SCNC: 4 MMOL/L (ref 3.5–5.1)
RBC # BLD: 2.85 M/CU MM (ref 4.2–5.4)
SEGMENTED NEUTROPHILS ABSOLUTE COUNT: 5.6 K/CU MM
SODIUM BLD-SCNC: 135 MMOL/L (ref 135–145)
TOTAL PROTEIN: 6.8 GM/DL (ref 6.4–8.2)
WBC # BLD: 6.6 K/CU MM (ref 4–10.5)

## 2024-04-18 PROCEDURE — 80053 COMPREHEN METABOLIC PANEL: CPT

## 2024-04-18 PROCEDURE — 6360000002 HC RX W HCPCS: Performed by: INTERNAL MEDICINE

## 2024-04-18 PROCEDURE — 96372 THER/PROPH/DIAG INJ SC/IM: CPT

## 2024-04-18 PROCEDURE — 85025 COMPLETE CBC W/AUTO DIFF WBC: CPT

## 2024-04-18 PROCEDURE — 84100 ASSAY OF PHOSPHORUS: CPT

## 2024-04-18 PROCEDURE — 36415 COLL VENOUS BLD VENIPUNCTURE: CPT

## 2024-04-18 RX ORDER — SODIUM CHLORIDE 9 MG/ML
INJECTION, SOLUTION INTRAVENOUS CONTINUOUS
OUTPATIENT
Start: 2024-05-02

## 2024-04-18 RX ORDER — EPINEPHRINE 1 MG/ML
0.3 INJECTION, SOLUTION, CONCENTRATE INTRAVENOUS PRN
OUTPATIENT
Start: 2024-05-02

## 2024-04-18 RX ORDER — ALBUTEROL SULFATE 90 UG/1
4 AEROSOL, METERED RESPIRATORY (INHALATION) PRN
OUTPATIENT
Start: 2024-05-02

## 2024-04-18 RX ORDER — FAMOTIDINE 10 MG/ML
20 INJECTION, SOLUTION INTRAVENOUS
OUTPATIENT
Start: 2024-05-02

## 2024-04-18 RX ORDER — DIPHENHYDRAMINE HYDROCHLORIDE 50 MG/ML
50 INJECTION INTRAMUSCULAR; INTRAVENOUS
OUTPATIENT
Start: 2024-05-02

## 2024-04-18 RX ORDER — ACETAMINOPHEN 325 MG/1
650 TABLET ORAL
OUTPATIENT
Start: 2024-05-02

## 2024-04-18 RX ORDER — ONDANSETRON 2 MG/ML
8 INJECTION INTRAMUSCULAR; INTRAVENOUS
OUTPATIENT
Start: 2024-05-02

## 2024-04-18 RX ADMIN — DARBEPOETIN ALFA 60 MCG: 60 INJECTION, SOLUTION INTRAVENOUS; SUBCUTANEOUS at 11:09

## 2024-04-18 NOTE — PROGRESS NOTES
Pt here for Aranesp injection. CBC results reviewed from 4/18 and tx released. Pt states fatigue and swelling which is not new. Pt states swelling to bilateral legs has been an issue for 2 months and she follows with DR Hunter for this pt instructed if increased SOB, swelling worsens or pain or redness to legs then go to ER and pt also instructed to call Nephrologist to inform him swelling is staying constant. Pt verbalized understanding.     Injection given SQ to right upper arm. Pt tolerated without incident left ambulatory discharge instructions given

## 2024-04-22 ENCOUNTER — HOSPITAL ENCOUNTER (INPATIENT)
Age: 44
LOS: 7 days | Discharge: HOME HEALTH | DRG: 698 | End: 2024-04-29
Attending: STUDENT IN AN ORGANIZED HEALTH CARE EDUCATION/TRAINING PROGRAM | Admitting: STUDENT IN AN ORGANIZED HEALTH CARE EDUCATION/TRAINING PROGRAM
Payer: COMMERCIAL

## 2024-04-22 ENCOUNTER — APPOINTMENT (OUTPATIENT)
Dept: GENERAL RADIOLOGY | Age: 44
DRG: 698 | End: 2024-04-22
Payer: COMMERCIAL

## 2024-04-22 DIAGNOSIS — I50.9 CONGESTIVE HEART FAILURE, UNSPECIFIED HF CHRONICITY, UNSPECIFIED HEART FAILURE TYPE (HCC): ICD-10-CM

## 2024-04-22 DIAGNOSIS — N39.0 URINARY TRACT INFECTION ASSOCIATED WITH INDWELLING URETHRAL CATHETER, INITIAL ENCOUNTER (HCC): ICD-10-CM

## 2024-04-22 DIAGNOSIS — T83.511A URINARY TRACT INFECTION ASSOCIATED WITH INDWELLING URETHRAL CATHETER, INITIAL ENCOUNTER (HCC): ICD-10-CM

## 2024-04-22 DIAGNOSIS — R06.02 SHORTNESS OF BREATH: Primary | ICD-10-CM

## 2024-04-22 DIAGNOSIS — N18.9 CHRONIC KIDNEY DISEASE, UNSPECIFIED CKD STAGE: ICD-10-CM

## 2024-04-22 DIAGNOSIS — R10.9 FLANK PAIN: ICD-10-CM

## 2024-04-22 DIAGNOSIS — R60.0 PERIPHERAL EDEMA: ICD-10-CM

## 2024-04-22 LAB
ALBUMIN SERPL-MCNC: 3.7 GM/DL (ref 3.4–5)
ALP BLD-CCNC: 180 IU/L (ref 40–128)
ALT SERPL-CCNC: 13 U/L (ref 10–40)
ANION GAP SERPL CALCULATED.3IONS-SCNC: 15 MMOL/L (ref 7–16)
AST SERPL-CCNC: 28 IU/L (ref 15–37)
BACTERIA: NEGATIVE /HPF
BASE EXCESS: 0 (ref 0–3)
BASOPHILS ABSOLUTE: 0.1 K/CU MM
BASOPHILS RELATIVE PERCENT: 0.7 % (ref 0–1)
BILIRUB SERPL-MCNC: 0.5 MG/DL (ref 0–1)
BILIRUBIN URINE: NEGATIVE MG/DL
BLOOD, URINE: ABNORMAL
BUN SERPL-MCNC: 55 MG/DL (ref 6–23)
CALCIUM SERPL-MCNC: 8.6 MG/DL (ref 8.3–10.6)
CHLORIDE BLD-SCNC: 96 MMOL/L (ref 99–110)
CLARITY: CLEAR
CO2: 21 MMOL/L (ref 21–32)
COLOR: YELLOW
COMMENT: ABNORMAL
CREAT SERPL-MCNC: 3.4 MG/DL (ref 0.6–1.1)
DIFFERENTIAL TYPE: ABNORMAL
EKG ATRIAL RATE: 56 BPM
EKG DIAGNOSIS: NORMAL
EKG P AXIS: 57 DEGREES
EKG P-R INTERVAL: 184 MS
EKG Q-T INTERVAL: 514 MS
EKG QRS DURATION: 90 MS
EKG QTC CALCULATION (BAZETT): 496 MS
EKG R AXIS: -12 DEGREES
EKG T AXIS: 125 DEGREES
EKG VENTRICULAR RATE: 56 BPM
EOSINOPHILS ABSOLUTE: 0.3 K/CU MM
EOSINOPHILS RELATIVE PERCENT: 3.8 % (ref 0–3)
GFR SERPL CREATININE-BSD FRML MDRD: 16 ML/MIN/1.73M2
GLUCOSE BLD-MCNC: 162 MG/DL (ref 70–99)
GLUCOSE BLD-MCNC: 205 MG/DL (ref 70–99)
GLUCOSE SERPL-MCNC: 117 MG/DL (ref 70–99)
GLUCOSE, URINE: NEGATIVE MG/DL
HCO3 VENOUS: 24.8 MMOL/L (ref 22–29)
HCT VFR BLD CALC: 24.7 % (ref 37–47)
HEMOGLOBIN: 7.5 GM/DL (ref 12.5–16)
IMMATURE NEUTROPHIL %: 0.4 % (ref 0–0.43)
KETONES, URINE: NEGATIVE MG/DL
LACTIC ACID, SEPSIS: 0.6 MMOL/L (ref 0.4–2)
LACTIC ACID, SEPSIS: 0.6 MMOL/L (ref 0.4–2)
LACTIC ACID, SEPSIS: 0.9 MMOL/L (ref 0.4–2)
LEUKOCYTE ESTERASE, URINE: ABNORMAL
LYMPHOCYTES ABSOLUTE: 0.6 K/CU MM
LYMPHOCYTES RELATIVE PERCENT: 9.3 % (ref 24–44)
MAGNESIUM: 2.4 MG/DL (ref 1.8–2.4)
MCH RBC QN AUTO: 28.8 PG (ref 27–31)
MCHC RBC AUTO-ENTMCNC: 30.4 % (ref 32–36)
MCV RBC AUTO: 95 FL (ref 78–100)
MONOCYTES ABSOLUTE: 0.4 K/CU MM
MONOCYTES RELATIVE PERCENT: 5.3 % (ref 0–4)
NEUTROPHILS RELATIVE PERCENT: 80.5 % (ref 36–66)
NITRITE URINE, QUANTITATIVE: POSITIVE
NUCLEATED RBC %: 0 %
O2 SAT, VEN: 92.9 % (ref 50–70)
PCO2, VEN: 41 MMHG (ref 41–51)
PDW BLD-RTO: 15 % (ref 11.7–14.9)
PH VENOUS: 7.39 (ref 7.32–7.43)
PH, URINE: 6 (ref 5–8)
PLATELET # BLD: 284 K/CU MM (ref 140–440)
PMV BLD AUTO: 10.3 FL (ref 7.5–11.1)
PO2, VEN: 99 MMHG (ref 28–48)
POTASSIUM SERPL-SCNC: 3.5 MMOL/L (ref 3.5–5.1)
PRO-BNP: ABNORMAL PG/ML
PROTEIN UA: 100 MG/DL
RBC # BLD: 2.6 M/CU MM (ref 4.2–5.4)
RBC URINE: 23 /HPF (ref 0–6)
SEGMENTED NEUTROPHILS ABSOLUTE COUNT: 5.4 K/CU MM
SODIUM BLD-SCNC: 132 MMOL/L (ref 135–145)
SPECIFIC GRAVITY UA: 1.02 (ref 1–1.03)
SQUAMOUS EPITHELIAL: 1 /HPF
TOTAL IMMATURE NEUTOROPHIL: 0.03 K/CU MM
TOTAL NUCLEATED RBC: 0 K/CU MM
TOTAL PROTEIN: 7 GM/DL (ref 6.4–8.2)
TRICHOMONAS: ABNORMAL /HPF
TROPONIN, HIGH SENSITIVITY: 22 NG/L (ref 0–14)
TROPONIN, HIGH SENSITIVITY: 27 NG/L (ref 0–14)
UROBILINOGEN, URINE: 0.2 MG/DL (ref 0.2–1)
WBC # BLD: 6.8 K/CU MM (ref 4–10.5)
WBC CLUMP: ABNORMAL /HPF
WBC UA: 156 /HPF (ref 0–5)

## 2024-04-22 PROCEDURE — 93005 ELECTROCARDIOGRAM TRACING: CPT | Performed by: PHYSICIAN ASSISTANT

## 2024-04-22 PROCEDURE — 6360000002 HC RX W HCPCS: Performed by: PHYSICIAN ASSISTANT

## 2024-04-22 PROCEDURE — 87040 BLOOD CULTURE FOR BACTERIA: CPT

## 2024-04-22 PROCEDURE — 96375 TX/PRO/DX INJ NEW DRUG ADDON: CPT

## 2024-04-22 PROCEDURE — 99285 EMERGENCY DEPT VISIT HI MDM: CPT

## 2024-04-22 PROCEDURE — 82962 GLUCOSE BLOOD TEST: CPT

## 2024-04-22 PROCEDURE — 36415 COLL VENOUS BLD VENIPUNCTURE: CPT

## 2024-04-22 PROCEDURE — 82805 BLOOD GASES W/O2 SATURATION: CPT

## 2024-04-22 PROCEDURE — 87186 SC STD MICRODIL/AGAR DIL: CPT

## 2024-04-22 PROCEDURE — 83735 ASSAY OF MAGNESIUM: CPT

## 2024-04-22 PROCEDURE — 71045 X-RAY EXAM CHEST 1 VIEW: CPT

## 2024-04-22 PROCEDURE — 83605 ASSAY OF LACTIC ACID: CPT

## 2024-04-22 PROCEDURE — 87086 URINE CULTURE/COLONY COUNT: CPT

## 2024-04-22 PROCEDURE — 87077 CULTURE AEROBIC IDENTIFY: CPT

## 2024-04-22 PROCEDURE — 6370000000 HC RX 637 (ALT 250 FOR IP): Performed by: STUDENT IN AN ORGANIZED HEALTH CARE EDUCATION/TRAINING PROGRAM

## 2024-04-22 PROCEDURE — 96374 THER/PROPH/DIAG INJ IV PUSH: CPT

## 2024-04-22 PROCEDURE — 6370000000 HC RX 637 (ALT 250 FOR IP): Performed by: PHYSICIAN ASSISTANT

## 2024-04-22 PROCEDURE — 83880 ASSAY OF NATRIURETIC PEPTIDE: CPT

## 2024-04-22 PROCEDURE — 1200000000 HC SEMI PRIVATE

## 2024-04-22 PROCEDURE — 93010 ELECTROCARDIOGRAM REPORT: CPT | Performed by: INTERNAL MEDICINE

## 2024-04-22 PROCEDURE — 2580000003 HC RX 258: Performed by: PHYSICIAN ASSISTANT

## 2024-04-22 PROCEDURE — 84484 ASSAY OF TROPONIN QUANT: CPT

## 2024-04-22 PROCEDURE — 81001 URINALYSIS AUTO W/SCOPE: CPT

## 2024-04-22 PROCEDURE — 80053 COMPREHEN METABOLIC PANEL: CPT

## 2024-04-22 PROCEDURE — 85025 COMPLETE CBC W/AUTO DIFF WBC: CPT

## 2024-04-22 PROCEDURE — 6360000002 HC RX W HCPCS: Performed by: STUDENT IN AN ORGANIZED HEALTH CARE EDUCATION/TRAINING PROGRAM

## 2024-04-22 PROCEDURE — 2580000003 HC RX 258: Performed by: STUDENT IN AN ORGANIZED HEALTH CARE EDUCATION/TRAINING PROGRAM

## 2024-04-22 PROCEDURE — 94640 AIRWAY INHALATION TREATMENT: CPT

## 2024-04-22 PROCEDURE — 94664 DEMO&/EVAL PT USE INHALER: CPT

## 2024-04-22 PROCEDURE — 94761 N-INVAS EAR/PLS OXIMETRY MLT: CPT

## 2024-04-22 RX ORDER — HYDRALAZINE HYDROCHLORIDE 50 MG/1
100 TABLET, FILM COATED ORAL 3 TIMES DAILY
Status: DISCONTINUED | OUTPATIENT
Start: 2024-04-22 | End: 2024-04-29 | Stop reason: HOSPADM

## 2024-04-22 RX ORDER — ACETAMINOPHEN 325 MG/1
650 TABLET ORAL EVERY 6 HOURS PRN
Status: DISCONTINUED | OUTPATIENT
Start: 2024-04-22 | End: 2024-04-29 | Stop reason: HOSPADM

## 2024-04-22 RX ORDER — SODIUM CHLORIDE 0.9 % (FLUSH) 0.9 %
5-40 SYRINGE (ML) INJECTION PRN
Status: DISCONTINUED | OUTPATIENT
Start: 2024-04-22 | End: 2024-04-29 | Stop reason: HOSPADM

## 2024-04-22 RX ORDER — IPRATROPIUM BROMIDE AND ALBUTEROL SULFATE 2.5; .5 MG/3ML; MG/3ML
1 SOLUTION RESPIRATORY (INHALATION) ONCE
Status: DISCONTINUED | OUTPATIENT
Start: 2024-04-22 | End: 2024-04-22

## 2024-04-22 RX ORDER — BUSPIRONE HYDROCHLORIDE 5 MG/1
5 TABLET ORAL 2 TIMES DAILY
Status: DISCONTINUED | OUTPATIENT
Start: 2024-04-22 | End: 2024-04-29 | Stop reason: HOSPADM

## 2024-04-22 RX ORDER — SODIUM CHLORIDE 0.9 % (FLUSH) 0.9 %
5-40 SYRINGE (ML) INJECTION EVERY 12 HOURS SCHEDULED
Status: DISCONTINUED | OUTPATIENT
Start: 2024-04-22 | End: 2024-04-29 | Stop reason: HOSPADM

## 2024-04-22 RX ORDER — ONDANSETRON 2 MG/ML
4 INJECTION INTRAMUSCULAR; INTRAVENOUS EVERY 6 HOURS PRN
Status: DISCONTINUED | OUTPATIENT
Start: 2024-04-22 | End: 2024-04-29 | Stop reason: HOSPADM

## 2024-04-22 RX ORDER — ENOXAPARIN SODIUM 100 MG/ML
30 INJECTION SUBCUTANEOUS DAILY
Status: DISCONTINUED | OUTPATIENT
Start: 2024-04-23 | End: 2024-04-29 | Stop reason: HOSPADM

## 2024-04-22 RX ORDER — POLYETHYLENE GLYCOL 3350 17 G/17G
17 POWDER, FOR SOLUTION ORAL DAILY PRN
Status: DISCONTINUED | OUTPATIENT
Start: 2024-04-22 | End: 2024-04-29 | Stop reason: HOSPADM

## 2024-04-22 RX ORDER — FENTANYL CITRATE 50 UG/ML
25 INJECTION, SOLUTION INTRAMUSCULAR; INTRAVENOUS ONCE
Status: DISCONTINUED | OUTPATIENT
Start: 2024-04-22 | End: 2024-04-23

## 2024-04-22 RX ORDER — ACETAMINOPHEN 650 MG/1
650 SUPPOSITORY RECTAL EVERY 6 HOURS PRN
Status: DISCONTINUED | OUTPATIENT
Start: 2024-04-22 | End: 2024-04-29 | Stop reason: HOSPADM

## 2024-04-22 RX ORDER — INSULIN LISPRO 100 [IU]/ML
0-4 INJECTION, SOLUTION INTRAVENOUS; SUBCUTANEOUS NIGHTLY
Status: DISCONTINUED | OUTPATIENT
Start: 2024-04-22 | End: 2024-04-29 | Stop reason: HOSPADM

## 2024-04-22 RX ORDER — INSULIN GLARGINE 100 [IU]/ML
3 INJECTION, SOLUTION SUBCUTANEOUS NIGHTLY
Status: DISCONTINUED | OUTPATIENT
Start: 2024-04-22 | End: 2024-04-23

## 2024-04-22 RX ORDER — ASPIRIN 81 MG/1
81 TABLET, CHEWABLE ORAL DAILY
Status: DISCONTINUED | OUTPATIENT
Start: 2024-04-23 | End: 2024-04-29 | Stop reason: HOSPADM

## 2024-04-22 RX ORDER — FENTANYL CITRATE 50 UG/ML
25 INJECTION, SOLUTION INTRAMUSCULAR; INTRAVENOUS ONCE
Status: COMPLETED | OUTPATIENT
Start: 2024-04-22 | End: 2024-04-22

## 2024-04-22 RX ORDER — ONDANSETRON 4 MG/1
4 TABLET, ORALLY DISINTEGRATING ORAL EVERY 8 HOURS PRN
Status: DISCONTINUED | OUTPATIENT
Start: 2024-04-22 | End: 2024-04-29 | Stop reason: HOSPADM

## 2024-04-22 RX ORDER — 0.9 % SODIUM CHLORIDE 0.9 %
500 INTRAVENOUS SOLUTION INTRAVENOUS ONCE
Status: COMPLETED | OUTPATIENT
Start: 2024-04-22 | End: 2024-04-22

## 2024-04-22 RX ORDER — GLUCAGON 1 MG/ML
1 KIT INJECTION PRN
Status: DISCONTINUED | OUTPATIENT
Start: 2024-04-22 | End: 2024-04-29 | Stop reason: HOSPADM

## 2024-04-22 RX ORDER — CITALOPRAM 20 MG/1
40 TABLET ORAL DAILY
Status: DISCONTINUED | OUTPATIENT
Start: 2024-04-23 | End: 2024-04-29 | Stop reason: HOSPADM

## 2024-04-22 RX ORDER — SODIUM CHLORIDE 9 MG/ML
INJECTION, SOLUTION INTRAVENOUS PRN
Status: DISCONTINUED | OUTPATIENT
Start: 2024-04-22 | End: 2024-04-29 | Stop reason: HOSPADM

## 2024-04-22 RX ORDER — DEXTROSE MONOHYDRATE 100 MG/ML
INJECTION, SOLUTION INTRAVENOUS CONTINUOUS PRN
Status: DISCONTINUED | OUTPATIENT
Start: 2024-04-22 | End: 2024-04-29 | Stop reason: HOSPADM

## 2024-04-22 RX ORDER — ALBUTEROL SULFATE 90 UG/1
2 AEROSOL, METERED RESPIRATORY (INHALATION) ONCE
Status: COMPLETED | OUTPATIENT
Start: 2024-04-22 | End: 2024-04-22

## 2024-04-22 RX ORDER — FERROUS GLUCONATE 324(37.5)
324 TABLET ORAL EVERY OTHER DAY
Status: DISCONTINUED | OUTPATIENT
Start: 2024-04-24 | End: 2024-04-29 | Stop reason: HOSPADM

## 2024-04-22 RX ORDER — FUROSEMIDE 10 MG/ML
40 INJECTION INTRAMUSCULAR; INTRAVENOUS ONCE
Status: COMPLETED | OUTPATIENT
Start: 2024-04-22 | End: 2024-04-22

## 2024-04-22 RX ORDER — INSULIN LISPRO 100 [IU]/ML
0-4 INJECTION, SOLUTION INTRAVENOUS; SUBCUTANEOUS
Status: DISCONTINUED | OUTPATIENT
Start: 2024-04-22 | End: 2024-04-29 | Stop reason: HOSPADM

## 2024-04-22 RX ORDER — AMLODIPINE BESYLATE 5 MG/1
5 TABLET ORAL 2 TIMES DAILY
Status: DISCONTINUED | OUTPATIENT
Start: 2024-04-22 | End: 2024-04-29 | Stop reason: HOSPADM

## 2024-04-22 RX ORDER — ROSUVASTATIN CALCIUM 5 MG/1
10 TABLET, COATED ORAL NIGHTLY
Status: DISCONTINUED | OUTPATIENT
Start: 2024-04-22 | End: 2024-04-29 | Stop reason: HOSPADM

## 2024-04-22 RX ORDER — SEVELAMER CARBONATE 800 MG/1
800 TABLET, FILM COATED ORAL
Status: DISCONTINUED | OUTPATIENT
Start: 2024-04-22 | End: 2024-04-29 | Stop reason: HOSPADM

## 2024-04-22 RX ADMIN — FUROSEMIDE 40 MG: 10 INJECTION, SOLUTION INTRAMUSCULAR; INTRAVENOUS at 20:45

## 2024-04-22 RX ADMIN — SEVELAMER CARBONATE 800 MG: 800 TABLET, FILM COATED ORAL at 19:01

## 2024-04-22 RX ADMIN — WATER 60 MG: 1 INJECTION INTRAMUSCULAR; INTRAVENOUS; SUBCUTANEOUS at 15:36

## 2024-04-22 RX ADMIN — FUROSEMIDE 40 MG: 10 INJECTION, SOLUTION INTRAMUSCULAR; INTRAVENOUS at 17:00

## 2024-04-22 RX ADMIN — HYDRALAZINE HYDROCHLORIDE 100 MG: 50 TABLET ORAL at 20:44

## 2024-04-22 RX ADMIN — METOPROLOL TARTRATE 25 MG: 25 TABLET, FILM COATED ORAL at 20:45

## 2024-04-22 RX ADMIN — ROSUVASTATIN CALCIUM 10 MG: 5 TABLET, COATED ORAL at 20:45

## 2024-04-22 RX ADMIN — SODIUM CHLORIDE 500 ML: 9 INJECTION, SOLUTION INTRAVENOUS at 15:44

## 2024-04-22 RX ADMIN — INSULIN GLARGINE 3 UNITS: 100 INJECTION, SOLUTION SUBCUTANEOUS at 20:55

## 2024-04-22 RX ADMIN — WATER 1000 MG: 1 INJECTION INTRAMUSCULAR; INTRAVENOUS; SUBCUTANEOUS at 17:01

## 2024-04-22 RX ADMIN — AMLODIPINE BESYLATE 5 MG: 5 TABLET ORAL at 20:44

## 2024-04-22 RX ADMIN — IPRATROPIUM BROMIDE 2 PUFF: 17 AEROSOL, METERED RESPIRATORY (INHALATION) at 15:24

## 2024-04-22 RX ADMIN — SODIUM CHLORIDE, PRESERVATIVE FREE 10 ML: 5 INJECTION INTRAVENOUS at 20:46

## 2024-04-22 RX ADMIN — ALBUTEROL SULFATE 2 PUFF: 90 AEROSOL, METERED RESPIRATORY (INHALATION) at 15:23

## 2024-04-22 RX ADMIN — BUSPIRONE HYDROCHLORIDE 5 MG: 5 TABLET ORAL at 20:45

## 2024-04-22 RX ADMIN — FENTANYL CITRATE 25 MCG: 50 INJECTION INTRAMUSCULAR; INTRAVENOUS at 15:33

## 2024-04-22 ASSESSMENT — PAIN SCALES - GENERAL
PAINLEVEL_OUTOF10: 8

## 2024-04-22 ASSESSMENT — PAIN DESCRIPTION - ORIENTATION
ORIENTATION: RIGHT;LEFT
ORIENTATION: RIGHT;LEFT

## 2024-04-22 ASSESSMENT — PAIN DESCRIPTION - LOCATION
LOCATION: FLANK

## 2024-04-22 ASSESSMENT — PAIN DESCRIPTION - DESCRIPTORS
DESCRIPTORS: STABBING

## 2024-04-22 ASSESSMENT — PAIN - FUNCTIONAL ASSESSMENT
PAIN_FUNCTIONAL_ASSESSMENT: PREVENTS OR INTERFERES SOME ACTIVE ACTIVITIES AND ADLS
PAIN_FUNCTIONAL_ASSESSMENT: PREVENTS OR INTERFERES SOME ACTIVE ACTIVITIES AND ADLS

## 2024-04-22 NOTE — H&P
catheterization     H/O echocardiogram 2012-Normal chamber size. Mild regional wall motion abnormalities with normal LV systolic, but abnormal diastolic function. LVEF 50-55%    H/O tubal ligation     History of cholecystectomy     History of complete ECG 2012    Hypertension     Marijuana use     Per ERLINDA harris    Neuropathy     Panic attacks 2012    H/O after she had MI .    Retinopathy     Seasonal allergies     Stroke (HCC) 2022     PSHX:  has a past surgical history that includes  section; Tubal ligation; Cholecystectomy; Diagnostic Cardiac Cath Lab Procedure (2012); Coronary angioplasty with stent (2012); Dilation and curettage of uterus; and CT BONE MARROW ASPIRATION (2023).  Allergies:   Allergies   Allergen Reactions    Tramadol Hives    Hydrocodone-Acetaminophen Anxiety     Fam HX:  family history includes Diabetes in her father; Heart Disease in her mother.  Soc HX:   Social History     Socioeconomic History    Marital status:      Spouse name: None    Number of children: None    Years of education: None    Highest education level: None   Tobacco Use    Smoking status: Former     Current packs/day: 0.00     Average packs/day: 0.5 packs/day for 16.0 years (8.0 ttl pk-yrs)     Types: Cigarettes     Start date: 2013     Quit date: 10/8/2021     Years since quittin.5    Smokeless tobacco: Never    Tobacco comments:     quit in past month   Vaping Use    Vaping Use: Never used   Substance and Sexual Activity    Alcohol use: No     Comment: 2/month/caffeine 1-2 cups of coffee    Drug use: Not Currently     Types: Cocaine, Marijuana (Weed)     Comment: per Gil Howard chart    Sexual activity: Not Currently     Partners: Male     Comment:        Medications:   Medications:    Infusions:   PRN Meds:     Labs      CBC:   Recent Labs     24  1535   WBC 6.8   HGB 7.5*        BMP:    Recent Labs

## 2024-04-22 NOTE — ED TRIAGE NOTES
Patient presents to ED by EMS from home with complaint of shortness of breath and bilateral flank pain.

## 2024-04-22 NOTE — ED PROVIDER NOTES
EMERGENCY DEPARTMENT ENCOUNTER        Pt Name: Gus Garza  MRN: [id number]  Birthdate 1980  Date of evaluation: 4/22/2024  Provider: YANNI VILLAFANA  PCP: Lesly Parekh MD    ABELINO. I have evaluated this patient.        Triage CHIEF COMPLAINT       Chief Complaint   Patient presents with    Flank Pain    Shortness of Breath         HISTORY OF PRESENT ILLNESS      Chief Complaint: Shortness of breath, bilateral flank pains    Gus Garza is a 44 y.o.  female who presents to the ED via EMS with a chief complaint of shortness of breath and bilateral flank pains.  States that she was prompted by her primary care office to call the squad secondary to ongoing shortness of breath.  States has been progressing over the last week, worse when she is up moving around.  Patient is on 2 L of oxygen per nasal cannula on arrival, does not usually wear oxygen.  Does admit that she has a history of some underlying lung issues, history of coronary artery disease-states that she has chronic kidney disease, she was recently seen by nephrologist and placed on torsemide for generalized swelling-she follows with .  Denies fevers chills, significant cough or congestion, has had some mild wheezing.  No significant chest pain, no palpitations.  Admits intermittent dizziness when she is up moving around but no persisting symptoms.  Patient does have history of an indwelling catheter, she states that she always have a component of flank pain, she feels like it is worsened.  She is having good flow of urine into the catheter bag.  No blood, no foul smell.  No significant abdominal pains.  No recent sick contacts.    Nursing Notes were all reviewed and agreed with or any disagreements were addressed in the HPI.    REVIEW OF SYSTEMS     At least 10 systems reviewed and otherwise acutely negative except as in the [place].     PAST MEDICAL HISTORY     Past Medical History:   Diagnosis Date    Anxiety 11/2012

## 2024-04-22 NOTE — ED NOTES
Daniel Notified of SBAR being placed.   
Electronically signed by Vianey Venegas RN on 4/22/2024 at 5:05 PM

## 2024-04-23 ENCOUNTER — APPOINTMENT (OUTPATIENT)
Dept: CT IMAGING | Age: 44
DRG: 698 | End: 2024-04-23
Payer: COMMERCIAL

## 2024-04-23 LAB
ANION GAP SERPL CALCULATED.3IONS-SCNC: 18 MMOL/L (ref 7–16)
BASOPHILS ABSOLUTE: 0 K/CU MM
BASOPHILS RELATIVE PERCENT: 0.2 % (ref 0–1)
BUN SERPL-MCNC: 65 MG/DL (ref 6–23)
CALCIUM SERPL-MCNC: 8.4 MG/DL (ref 8.3–10.6)
CHLORIDE BLD-SCNC: 99 MMOL/L (ref 99–110)
CO2: 20 MMOL/L (ref 21–32)
CREAT SERPL-MCNC: 3.7 MG/DL (ref 0.6–1.1)
DIFFERENTIAL TYPE: ABNORMAL
EOSINOPHILS ABSOLUTE: 0 K/CU MM
EOSINOPHILS RELATIVE PERCENT: 0 % (ref 0–3)
FOLATE SERPL-MCNC: 6.7 NG/ML (ref 3.1–17.5)
GFR SERPL CREATININE-BSD FRML MDRD: 15 ML/MIN/1.73M2
GLUCOSE BLD-MCNC: 214 MG/DL (ref 70–99)
GLUCOSE BLD-MCNC: 220 MG/DL (ref 70–99)
GLUCOSE BLD-MCNC: 224 MG/DL (ref 70–99)
GLUCOSE BLD-MCNC: 272 MG/DL (ref 70–99)
GLUCOSE SERPL-MCNC: 193 MG/DL (ref 70–99)
HCT VFR BLD CALC: 23.9 % (ref 37–47)
HEMOGLOBIN: 7.2 GM/DL (ref 12.5–16)
IMMATURE NEUTROPHIL %: 0.8 % (ref 0–0.43)
IRON: 25 UG/DL (ref 37–145)
LYMPHOCYTES ABSOLUTE: 0.3 K/CU MM
LYMPHOCYTES RELATIVE PERCENT: 4.9 % (ref 24–44)
MCH RBC QN AUTO: 28.5 PG (ref 27–31)
MCHC RBC AUTO-ENTMCNC: 30.1 % (ref 32–36)
MCV RBC AUTO: 94.5 FL (ref 78–100)
MONOCYTES ABSOLUTE: 0.1 K/CU MM
MONOCYTES RELATIVE PERCENT: 1.5 % (ref 0–4)
NEUTROPHILS RELATIVE PERCENT: 92.6 % (ref 36–66)
NUCLEATED RBC %: 0 %
PCT TRANSFERRIN: 7 % (ref 10–44)
PDW BLD-RTO: 15 % (ref 11.7–14.9)
PLATELET # BLD: 293 K/CU MM (ref 140–440)
PMV BLD AUTO: 10.3 FL (ref 7.5–11.1)
POTASSIUM SERPL-SCNC: 3.9 MMOL/L (ref 3.5–5.1)
RBC # BLD: 2.53 M/CU MM (ref 4.2–5.4)
SEGMENTED NEUTROPHILS ABSOLUTE COUNT: 5.5 K/CU MM
SODIUM BLD-SCNC: 137 MMOL/L (ref 135–145)
TOTAL IMMATURE NEUTOROPHIL: 0.05 K/CU MM
TOTAL IRON BINDING CAPACITY: 339 UG/DL (ref 250–450)
TOTAL NUCLEATED RBC: 0 K/CU MM
UNSATURATED IRON BINDING CAPACITY: 314 UG/DL (ref 110–370)
VITAMIN B-12: 1391 PG/ML (ref 211–911)
WBC # BLD: 5.9 K/CU MM (ref 4–10.5)

## 2024-04-23 PROCEDURE — 94761 N-INVAS EAR/PLS OXIMETRY MLT: CPT

## 2024-04-23 PROCEDURE — 6360000002 HC RX W HCPCS: Performed by: FAMILY MEDICINE

## 2024-04-23 PROCEDURE — 1200000000 HC SEMI PRIVATE

## 2024-04-23 PROCEDURE — 85025 COMPLETE CBC W/AUTO DIFF WBC: CPT

## 2024-04-23 PROCEDURE — 83550 IRON BINDING TEST: CPT

## 2024-04-23 PROCEDURE — 82962 GLUCOSE BLOOD TEST: CPT

## 2024-04-23 PROCEDURE — 82607 VITAMIN B-12: CPT

## 2024-04-23 PROCEDURE — 36415 COLL VENOUS BLD VENIPUNCTURE: CPT

## 2024-04-23 PROCEDURE — 82746 ASSAY OF FOLIC ACID SERUM: CPT

## 2024-04-23 PROCEDURE — 2700000000 HC OXYGEN THERAPY PER DAY

## 2024-04-23 PROCEDURE — 6370000000 HC RX 637 (ALT 250 FOR IP): Performed by: STUDENT IN AN ORGANIZED HEALTH CARE EDUCATION/TRAINING PROGRAM

## 2024-04-23 PROCEDURE — 83540 ASSAY OF IRON: CPT

## 2024-04-23 PROCEDURE — 2580000003 HC RX 258: Performed by: STUDENT IN AN ORGANIZED HEALTH CARE EDUCATION/TRAINING PROGRAM

## 2024-04-23 PROCEDURE — 6360000002 HC RX W HCPCS: Performed by: INTERNAL MEDICINE

## 2024-04-23 PROCEDURE — 74176 CT ABD & PELVIS W/O CONTRAST: CPT

## 2024-04-23 PROCEDURE — 6360000002 HC RX W HCPCS: Performed by: STUDENT IN AN ORGANIZED HEALTH CARE EDUCATION/TRAINING PROGRAM

## 2024-04-23 PROCEDURE — 80048 BASIC METABOLIC PNL TOTAL CA: CPT

## 2024-04-23 RX ORDER — FENTANYL CITRATE 50 UG/ML
25 INJECTION, SOLUTION INTRAMUSCULAR; INTRAVENOUS ONCE
Status: DISCONTINUED | OUTPATIENT
Start: 2024-04-23 | End: 2024-04-23

## 2024-04-23 RX ORDER — INSULIN LISPRO 100 [IU]/ML
3 INJECTION, SOLUTION INTRAVENOUS; SUBCUTANEOUS
Status: DISCONTINUED | OUTPATIENT
Start: 2024-04-23 | End: 2024-04-29 | Stop reason: HOSPADM

## 2024-04-23 RX ORDER — FUROSEMIDE 10 MG/ML
40 INJECTION INTRAMUSCULAR; INTRAVENOUS 3 TIMES DAILY
Status: DISCONTINUED | OUTPATIENT
Start: 2024-04-23 | End: 2024-04-23

## 2024-04-23 RX ORDER — FUROSEMIDE 10 MG/ML
40 INJECTION INTRAMUSCULAR; INTRAVENOUS ONCE
Status: COMPLETED | OUTPATIENT
Start: 2024-04-23 | End: 2024-04-23

## 2024-04-23 RX ORDER — FUROSEMIDE 10 MG/ML
40 INJECTION INTRAMUSCULAR; INTRAVENOUS 2 TIMES DAILY
Status: DISCONTINUED | OUTPATIENT
Start: 2024-04-24 | End: 2024-04-26

## 2024-04-23 RX ORDER — INSULIN GLARGINE 100 [IU]/ML
6 INJECTION, SOLUTION SUBCUTANEOUS NIGHTLY
Status: DISCONTINUED | OUTPATIENT
Start: 2024-04-23 | End: 2024-04-29

## 2024-04-23 RX ADMIN — METOPROLOL TARTRATE 25 MG: 25 TABLET, FILM COATED ORAL at 21:05

## 2024-04-23 RX ADMIN — HYDRALAZINE HYDROCHLORIDE 100 MG: 50 TABLET ORAL at 07:19

## 2024-04-23 RX ADMIN — FENTANYL CITRATE 25 MCG: 0.05 INJECTION, SOLUTION INTRAMUSCULAR; INTRAVENOUS at 02:45

## 2024-04-23 RX ADMIN — SODIUM CHLORIDE, PRESERVATIVE FREE 10 ML: 5 INJECTION INTRAVENOUS at 21:04

## 2024-04-23 RX ADMIN — BUSPIRONE HYDROCHLORIDE 5 MG: 5 TABLET ORAL at 07:19

## 2024-04-23 RX ADMIN — FUROSEMIDE 40 MG: 10 INJECTION, SOLUTION INTRAMUSCULAR; INTRAVENOUS at 12:16

## 2024-04-23 RX ADMIN — FUROSEMIDE 40 MG: 10 INJECTION, SOLUTION INTRAMUSCULAR; INTRAVENOUS at 21:04

## 2024-04-23 RX ADMIN — INSULIN LISPRO 1 UNITS: 100 INJECTION, SOLUTION INTRAVENOUS; SUBCUTANEOUS at 08:47

## 2024-04-23 RX ADMIN — ROSUVASTATIN CALCIUM 10 MG: 5 TABLET, COATED ORAL at 21:05

## 2024-04-23 RX ADMIN — CITALOPRAM HYDROBROMIDE 40 MG: 20 TABLET ORAL at 07:19

## 2024-04-23 RX ADMIN — INSULIN LISPRO 3 UNITS: 100 INJECTION, SOLUTION INTRAVENOUS; SUBCUTANEOUS at 17:54

## 2024-04-23 RX ADMIN — SEVELAMER CARBONATE 800 MG: 800 TABLET, FILM COATED ORAL at 12:17

## 2024-04-23 RX ADMIN — INSULIN GLARGINE 6 UNITS: 100 INJECTION, SOLUTION SUBCUTANEOUS at 21:03

## 2024-04-23 RX ADMIN — METOPROLOL TARTRATE 25 MG: 25 TABLET, FILM COATED ORAL at 07:19

## 2024-04-23 RX ADMIN — INSULIN LISPRO 1 UNITS: 100 INJECTION, SOLUTION INTRAVENOUS; SUBCUTANEOUS at 17:45

## 2024-04-23 RX ADMIN — AMLODIPINE BESYLATE 5 MG: 5 TABLET ORAL at 07:19

## 2024-04-23 RX ADMIN — HYDRALAZINE HYDROCHLORIDE 100 MG: 50 TABLET ORAL at 21:05

## 2024-04-23 RX ADMIN — SEVELAMER CARBONATE 800 MG: 800 TABLET, FILM COATED ORAL at 17:45

## 2024-04-23 RX ADMIN — ASPIRIN 81 MG: 81 TABLET, CHEWABLE ORAL at 07:20

## 2024-04-23 RX ADMIN — AMLODIPINE BESYLATE 5 MG: 5 TABLET ORAL at 21:05

## 2024-04-23 RX ADMIN — SODIUM CHLORIDE, PRESERVATIVE FREE 10 ML: 5 INJECTION INTRAVENOUS at 07:20

## 2024-04-23 RX ADMIN — BUSPIRONE HYDROCHLORIDE 5 MG: 5 TABLET ORAL at 21:05

## 2024-04-23 RX ADMIN — INSULIN LISPRO 1 UNITS: 100 INJECTION, SOLUTION INTRAVENOUS; SUBCUTANEOUS at 12:17

## 2024-04-23 RX ADMIN — SEVELAMER CARBONATE 800 MG: 800 TABLET, FILM COATED ORAL at 07:19

## 2024-04-23 RX ADMIN — ENOXAPARIN SODIUM 30 MG: 100 INJECTION SUBCUTANEOUS at 08:48

## 2024-04-23 ASSESSMENT — PAIN DESCRIPTION - ORIENTATION
ORIENTATION: RIGHT;LEFT
ORIENTATION: RIGHT

## 2024-04-23 ASSESSMENT — PAIN DESCRIPTION - LOCATION
LOCATION: FLANK

## 2024-04-23 ASSESSMENT — PAIN SCALES - GENERAL
PAINLEVEL_OUTOF10: 4
PAINLEVEL_OUTOF10: 8
PAINLEVEL_OUTOF10: 8
PAINLEVEL_OUTOF10: 4
PAINLEVEL_OUTOF10: 7
PAINLEVEL_OUTOF10: 4
PAINLEVEL_OUTOF10: 3

## 2024-04-23 ASSESSMENT — PAIN SCALES - WONG BAKER
WONGBAKER_NUMERICALRESPONSE: HURTS A LITTLE BIT
WONGBAKER_NUMERICALRESPONSE: HURTS A LITTLE BIT

## 2024-04-23 ASSESSMENT — PAIN DESCRIPTION - DESCRIPTORS
DESCRIPTORS: STABBING
DESCRIPTORS: STABBING;DISCOMFORT
DESCRIPTORS: STABBING

## 2024-04-23 NOTE — CARE COORDINATION
Reviewed chart, discussed in IDR and spoke with pt about discharge needs/plans.  Pt lives with her mother, in 2 story home but she stays on the 1st floor.  She uses a RW or Rollator for mobility and independent with ADL's other then mother helps her into and out of Bath tub.  Pt's mother takes her to all appointments.  She has a PCP and insurance that coves medications.  Pt's plan is to return home with mother at discharge, we discussed HC and not sure she wants it at this time.  CM will continue to follow for needs.

## 2024-04-23 NOTE — CONSULTS
Nephrology Service Consultation      [address], [address]  [place] [address]  Phone: [phone]  Office Hours: 8:30AM - 4:30PM  Monday - Friday        MEDICAL DECISION MAKING and Recommendations   -Hypervolemia  -Elevated probnp  -BLE edema  -CKD4  -HTN  -UTI  -Malfunction of Chronic suprapubic cath    Suggest:  -Continue IV lasix  -Consider echo  -Avoid nephrotoxins  -Monitor UOP    Thank you      Patient Active Problem List    Diagnosis Date Noted    UTI (urinary tract infection) 04/22/2024    Anemia 02/07/2024    Complicated UTI (urinary tract infection) 10/10/2023    Anemia due to chronic kidney disease 07/13/2023    Anemia in stage 4 chronic kidney disease ([facility]) 05/09/2023    Chronic kidney disease, stage V ([facility]) 03/03/2022    Dysuria 03/03/2022    Primary hypertension 11/18/2021    Dyslipidemia 11/18/2021    Hyperpotassemia 08/18/2021    CRISTIANA (acute kidney injury) ([facility]) 08/18/2021    Microalbuminuria due to type 2 diabetes mellitus ([facility]) 05/14/2021    Microalbuminuria 05/14/2021    Lacunar cerebrovascular accident (CVA) of subthalamic region ([facility]) 05/14/2021    Chest pain     CAD in native artery 11/20/2012    CAD S/P percutaneous coronary angioplasty 11/20/2012    DM type 1 (diabetes mellitus, type 1) ([facility])     NSTEMI (non-ST elevated myocardial infarction) ([facility]) 11/19/2012    Nausea & vomiting 11/19/2012         Patient:  Gus Garza  MRN: [id number]  Consulting physician:  Kain Hough MD  Reason for Consult: office pt with cr 3.4  PCP: Lesly Parekh MD    HISTORY OF PRESENT ILLNESS:   The patient is a 44 y.o. female with CKD4, suprapubic cath, presented due to dyspnea and leg swelling.  She has also noticed some dysfunction in her suprapubic cath.  Renal consult as she is an office pt and cr is 3.6.  Torsemide was recently prescribed but was not working as fast as needed to relieve her dyspnea  She is on iv lasix for volume mgmt currently  ProBNP level in the 
[facility] in ~3 weeks for reevaluation and SPT exchange.    Patient seen and examined, chart reviewed.     Electronically signed by Darío Mercado PA-C on 4/23/2024 at 7:26 AM

## 2024-04-24 ENCOUNTER — APPOINTMENT (OUTPATIENT)
Dept: NON INVASIVE DIAGNOSTICS | Age: 44
DRG: 698 | End: 2024-04-24
Attending: INTERNAL MEDICINE
Payer: COMMERCIAL

## 2024-04-24 LAB
ANION GAP SERPL CALCULATED.3IONS-SCNC: 14 MMOL/L (ref 7–16)
BASOPHILS ABSOLUTE: 0.1 K/CU MM
BASOPHILS RELATIVE PERCENT: 0.6 % (ref 0–1)
BUN SERPL-MCNC: 71 MG/DL (ref 6–23)
CALCIUM SERPL-MCNC: 8.6 MG/DL (ref 8.3–10.6)
CHLORIDE BLD-SCNC: 98 MMOL/L (ref 99–110)
CO2: 24 MMOL/L (ref 21–32)
CREAT SERPL-MCNC: 3.8 MG/DL (ref 0.6–1.1)
DIFFERENTIAL TYPE: ABNORMAL
ECHO AO ROOT DIAM: 2.2 CM
ECHO AO ROOT INDEX: 1.26 CM/M2
ECHO AV AREA PEAK VELOCITY: 2.3 CM2
ECHO AV AREA VTI: 2.2 CM2
ECHO AV AREA/BSA PEAK VELOCITY: 1.3 CM2/M2
ECHO AV AREA/BSA VTI: 1.3 CM2/M2
ECHO AV MEAN GRADIENT: 7 MMHG
ECHO AV MEAN VELOCITY: 1.2 M/S
ECHO AV PEAK GRADIENT: 13 MMHG
ECHO AV PEAK VELOCITY: 1.8 M/S
ECHO AV VELOCITY RATIO: 0.78
ECHO AV VTI: 45.7 CM
ECHO BSA: 1.81 M2
ECHO EST RA PRESSURE: 15 MMHG
ECHO IVC PROX: 2.3 CM
ECHO LA AREA 4C: 21.2 CM2
ECHO LA DIAMETER INDEX: 1.77 CM/M2
ECHO LA DIAMETER: 3.1 CM
ECHO LA MAJOR AXIS: 5.9 CM
ECHO LA TO AORTIC ROOT RATIO: 1.41
ECHO LA VOL MOD A4C: 61 ML (ref 22–52)
ECHO LA VOLUME INDEX MOD A4C: 35 ML/M2 (ref 16–34)
ECHO LV E' LATERAL VELOCITY: 10 CM/S
ECHO LV E' SEPTAL VELOCITY: 7 CM/S
ECHO LV EDV A4C: 85 ML
ECHO LV EDV INDEX A4C: 49 ML/M2
ECHO LV EJECTION FRACTION A4C: 57 %
ECHO LV ESV A4C: 37 ML
ECHO LV ESV INDEX A4C: 21 ML/M2
ECHO LV FRACTIONAL SHORTENING: 34 % (ref 28–44)
ECHO LV INTERNAL DIMENSION DIASTOLE INDEX: 2.86 CM/M2
ECHO LV INTERNAL DIMENSION DIASTOLIC: 5 CM (ref 3.9–5.3)
ECHO LV INTERNAL DIMENSION SYSTOLIC INDEX: 1.89 CM/M2
ECHO LV INTERNAL DIMENSION SYSTOLIC: 3.3 CM
ECHO LV IVSD: 1.1 CM (ref 0.6–0.9)
ECHO LV MASS 2D: 207.1 G (ref 67–162)
ECHO LV MASS INDEX 2D: 118.4 G/M2 (ref 43–95)
ECHO LV POSTERIOR WALL DIASTOLIC: 1.1 CM (ref 0.6–0.9)
ECHO LV RELATIVE WALL THICKNESS RATIO: 0.44
ECHO LVOT AREA: 2.8 CM2
ECHO LVOT AV VTI INDEX: 0.76
ECHO LVOT DIAM: 1.9 CM
ECHO LVOT MEAN GRADIENT: 4 MMHG
ECHO LVOT PEAK GRADIENT: 8 MMHG
ECHO LVOT PEAK VELOCITY: 1.4 M/S
ECHO LVOT STROKE VOLUME INDEX: 56.4 ML/M2
ECHO LVOT SV: 98.6 ML
ECHO LVOT VTI: 34.8 CM
ECHO MV A VELOCITY: 0.73 M/S
ECHO MV E DECELERATION TIME (DT): 187 MS
ECHO MV E VELOCITY: 1.46 M/S
ECHO MV E/A RATIO: 2
ECHO MV E/E' LATERAL: 14.6
ECHO MV E/E' RATIO (AVERAGED): 17.73
ECHO RIGHT VENTRICULAR SYSTOLIC PRESSURE (RVSP): 49 MMHG
ECHO RV MID DIMENSION: 3.4 CM
ECHO TV REGURGITANT MAX VELOCITY: 2.9 M/S
ECHO TV REGURGITANT PEAK GRADIENT: 34 MMHG
EOSINOPHILS ABSOLUTE: 0.1 K/CU MM
EOSINOPHILS RELATIVE PERCENT: 0.7 % (ref 0–3)
GFR SERPL CREATININE-BSD FRML MDRD: 14 ML/MIN/1.73M2
GLUCOSE BLD-MCNC: 128 MG/DL (ref 70–99)
GLUCOSE BLD-MCNC: 135 MG/DL (ref 70–99)
GLUCOSE BLD-MCNC: 176 MG/DL (ref 70–99)
GLUCOSE BLD-MCNC: 203 MG/DL (ref 70–99)
GLUCOSE SERPL-MCNC: 177 MG/DL (ref 70–99)
HCT VFR BLD CALC: 22.7 % (ref 37–47)
HEMOGLOBIN: 6.9 GM/DL (ref 12.5–16)
IMMATURE NEUTROPHIL %: 0.8 % (ref 0–0.43)
LYMPHOCYTES ABSOLUTE: 0.9 K/CU MM
LYMPHOCYTES RELATIVE PERCENT: 9.1 % (ref 24–44)
MCH RBC QN AUTO: 29.1 PG (ref 27–31)
MCHC RBC AUTO-ENTMCNC: 30.4 % (ref 32–36)
MCV RBC AUTO: 95.8 FL (ref 78–100)
MONOCYTES ABSOLUTE: 0.5 K/CU MM
MONOCYTES RELATIVE PERCENT: 5.2 % (ref 0–4)
NEUTROPHILS RELATIVE PERCENT: 83.6 % (ref 36–66)
NUCLEATED RBC %: 0 %
PDW BLD-RTO: 15.2 % (ref 11.7–14.9)
PLATELET # BLD: 290 K/CU MM (ref 140–440)
PMV BLD AUTO: 10.2 FL (ref 7.5–11.1)
POTASSIUM SERPL-SCNC: 3.9 MMOL/L (ref 3.5–5.1)
RBC # BLD: 2.37 M/CU MM (ref 4.2–5.4)
SEGMENTED NEUTROPHILS ABSOLUTE COUNT: 8.1 K/CU MM
SODIUM BLD-SCNC: 136 MMOL/L (ref 135–145)
TOTAL IMMATURE NEUTOROPHIL: 0.08 K/CU MM
TOTAL NUCLEATED RBC: 0 K/CU MM
WBC # BLD: 9.7 K/CU MM (ref 4–10.5)

## 2024-04-24 PROCEDURE — 85025 COMPLETE CBC W/AUTO DIFF WBC: CPT

## 2024-04-24 PROCEDURE — 94761 N-INVAS EAR/PLS OXIMETRY MLT: CPT

## 2024-04-24 PROCEDURE — 93306 TTE W/DOPPLER COMPLETE: CPT | Performed by: INTERNAL MEDICINE

## 2024-04-24 PROCEDURE — 36415 COLL VENOUS BLD VENIPUNCTURE: CPT

## 2024-04-24 PROCEDURE — 82962 GLUCOSE BLOOD TEST: CPT

## 2024-04-24 PROCEDURE — 6370000000 HC RX 637 (ALT 250 FOR IP): Performed by: STUDENT IN AN ORGANIZED HEALTH CARE EDUCATION/TRAINING PROGRAM

## 2024-04-24 PROCEDURE — 6360000002 HC RX W HCPCS: Performed by: INTERNAL MEDICINE

## 2024-04-24 PROCEDURE — 80048 BASIC METABOLIC PNL TOTAL CA: CPT

## 2024-04-24 PROCEDURE — 93306 TTE W/DOPPLER COMPLETE: CPT

## 2024-04-24 PROCEDURE — 2580000003 HC RX 258: Performed by: STUDENT IN AN ORGANIZED HEALTH CARE EDUCATION/TRAINING PROGRAM

## 2024-04-24 PROCEDURE — 6360000002 HC RX W HCPCS: Performed by: STUDENT IN AN ORGANIZED HEALTH CARE EDUCATION/TRAINING PROGRAM

## 2024-04-24 PROCEDURE — 1200000000 HC SEMI PRIVATE

## 2024-04-24 PROCEDURE — 2700000000 HC OXYGEN THERAPY PER DAY

## 2024-04-24 RX ADMIN — FUROSEMIDE 40 MG: 10 INJECTION, SOLUTION INTRAMUSCULAR; INTRAVENOUS at 17:24

## 2024-04-24 RX ADMIN — INSULIN GLARGINE 6 UNITS: 100 INJECTION, SOLUTION SUBCUTANEOUS at 22:05

## 2024-04-24 RX ADMIN — HYDRALAZINE HYDROCHLORIDE 100 MG: 50 TABLET ORAL at 20:34

## 2024-04-24 RX ADMIN — BUSPIRONE HYDROCHLORIDE 5 MG: 5 TABLET ORAL at 20:34

## 2024-04-24 RX ADMIN — METOPROLOL TARTRATE 25 MG: 25 TABLET, FILM COATED ORAL at 08:48

## 2024-04-24 RX ADMIN — BUSPIRONE HYDROCHLORIDE 5 MG: 5 TABLET ORAL at 08:48

## 2024-04-24 RX ADMIN — HYDRALAZINE HYDROCHLORIDE 100 MG: 50 TABLET ORAL at 14:17

## 2024-04-24 RX ADMIN — ROSUVASTATIN CALCIUM 10 MG: 5 TABLET, COATED ORAL at 20:34

## 2024-04-24 RX ADMIN — SODIUM CHLORIDE, PRESERVATIVE FREE 10 ML: 5 INJECTION INTRAVENOUS at 08:49

## 2024-04-24 RX ADMIN — WATER 1000 MG: 1 INJECTION INTRAMUSCULAR; INTRAVENOUS; SUBCUTANEOUS at 17:24

## 2024-04-24 RX ADMIN — INSULIN LISPRO 3 UNITS: 100 INJECTION, SOLUTION INTRAVENOUS; SUBCUTANEOUS at 08:49

## 2024-04-24 RX ADMIN — SEVELAMER CARBONATE 800 MG: 800 TABLET, FILM COATED ORAL at 08:48

## 2024-04-24 RX ADMIN — AMLODIPINE BESYLATE 5 MG: 5 TABLET ORAL at 20:34

## 2024-04-24 RX ADMIN — Medication 324 MG: at 08:52

## 2024-04-24 RX ADMIN — SODIUM CHLORIDE, PRESERVATIVE FREE 10 ML: 5 INJECTION INTRAVENOUS at 20:35

## 2024-04-24 RX ADMIN — FUROSEMIDE 40 MG: 10 INJECTION, SOLUTION INTRAMUSCULAR; INTRAVENOUS at 09:45

## 2024-04-24 RX ADMIN — IRON SUCROSE 100 MG: 20 INJECTION, SOLUTION INTRAVENOUS at 10:24

## 2024-04-24 RX ADMIN — EPOETIN ALFA-EPBX 10000 UNITS: 10000 INJECTION, SOLUTION INTRAVENOUS; SUBCUTANEOUS at 17:27

## 2024-04-24 RX ADMIN — CITALOPRAM HYDROBROMIDE 40 MG: 20 TABLET ORAL at 08:48

## 2024-04-24 RX ADMIN — METOPROLOL TARTRATE 25 MG: 25 TABLET, FILM COATED ORAL at 20:34

## 2024-04-24 RX ADMIN — ASPIRIN 81 MG: 81 TABLET, CHEWABLE ORAL at 08:48

## 2024-04-24 RX ADMIN — ENOXAPARIN SODIUM 30 MG: 100 INJECTION SUBCUTANEOUS at 08:48

## 2024-04-24 RX ADMIN — SEVELAMER CARBONATE 800 MG: 800 TABLET, FILM COATED ORAL at 17:27

## 2024-04-24 RX ADMIN — SEVELAMER CARBONATE 800 MG: 800 TABLET, FILM COATED ORAL at 12:33

## 2024-04-24 RX ADMIN — AMLODIPINE BESYLATE 5 MG: 5 TABLET ORAL at 08:48

## 2024-04-24 RX ADMIN — HYDRALAZINE HYDROCHLORIDE 100 MG: 50 TABLET ORAL at 08:48

## 2024-04-24 ASSESSMENT — PAIN SCALES - GENERAL: PAINLEVEL_OUTOF10: 0

## 2024-04-24 NOTE — CARE COORDINATION
Reviewed chart , discussed in IDR, plan is home with mother in 1-2 days.  Nursing to get pt up and also wean O2.  CM will continue to follow for possible.

## 2024-04-25 LAB
ANION GAP SERPL CALCULATED.3IONS-SCNC: 18 MMOL/L (ref 7–16)
BASOPHILS ABSOLUTE: 0.1 K/CU MM
BASOPHILS RELATIVE PERCENT: 0.7 % (ref 0–1)
BUN SERPL-MCNC: 74 MG/DL (ref 6–23)
CALCIUM SERPL-MCNC: 8.3 MG/DL (ref 8.3–10.6)
CHLORIDE BLD-SCNC: 97 MMOL/L (ref 99–110)
CO2: 21 MMOL/L (ref 21–32)
CREAT SERPL-MCNC: 3.8 MG/DL (ref 0.6–1.1)
CULTURE: ABNORMAL
CULTURE: ABNORMAL
DIFFERENTIAL TYPE: ABNORMAL
EOSINOPHILS ABSOLUTE: 0.3 K/CU MM
EOSINOPHILS RELATIVE PERCENT: 3.4 % (ref 0–3)
GFR SERPL CREATININE-BSD FRML MDRD: 14 ML/MIN/1.73M2
GLUCOSE BLD-MCNC: 146 MG/DL (ref 70–99)
GLUCOSE BLD-MCNC: 178 MG/DL (ref 70–99)
GLUCOSE BLD-MCNC: 196 MG/DL (ref 70–99)
GLUCOSE BLD-MCNC: 98 MG/DL (ref 70–99)
GLUCOSE SERPL-MCNC: 137 MG/DL (ref 70–99)
HCT VFR BLD CALC: 23.8 % (ref 37–47)
HEMOGLOBIN: 7.3 GM/DL (ref 12.5–16)
IMMATURE NEUTROPHIL %: 1.1 % (ref 0–0.43)
LYMPHOCYTES ABSOLUTE: 0.6 K/CU MM
LYMPHOCYTES RELATIVE PERCENT: 6.6 % (ref 24–44)
Lab: ABNORMAL
MCH RBC QN AUTO: 28.7 PG (ref 27–31)
MCHC RBC AUTO-ENTMCNC: 30.7 % (ref 32–36)
MCV RBC AUTO: 93.7 FL (ref 78–100)
MONOCYTES ABSOLUTE: 0.5 K/CU MM
MONOCYTES RELATIVE PERCENT: 5.9 % (ref 0–4)
NEUTROPHILS RELATIVE PERCENT: 82.3 % (ref 36–66)
NUCLEATED RBC %: 0 %
PDW BLD-RTO: 14.9 % (ref 11.7–14.9)
PLATELET # BLD: 307 K/CU MM (ref 140–440)
PMV BLD AUTO: 10.1 FL (ref 7.5–11.1)
POTASSIUM SERPL-SCNC: 3.9 MMOL/L (ref 3.5–5.1)
RBC # BLD: 2.54 M/CU MM (ref 4.2–5.4)
SEGMENTED NEUTROPHILS ABSOLUTE COUNT: 7.6 K/CU MM
SODIUM BLD-SCNC: 136 MMOL/L (ref 135–145)
SPECIMEN: ABNORMAL
TOTAL IMMATURE NEUTOROPHIL: 0.1 K/CU MM
TOTAL NUCLEATED RBC: 0 K/CU MM
WBC # BLD: 9.2 K/CU MM (ref 4–10.5)

## 2024-04-25 PROCEDURE — 82962 GLUCOSE BLOOD TEST: CPT

## 2024-04-25 PROCEDURE — 85025 COMPLETE CBC W/AUTO DIFF WBC: CPT

## 2024-04-25 PROCEDURE — 2700000000 HC OXYGEN THERAPY PER DAY

## 2024-04-25 PROCEDURE — 36415 COLL VENOUS BLD VENIPUNCTURE: CPT

## 2024-04-25 PROCEDURE — 6370000000 HC RX 637 (ALT 250 FOR IP): Performed by: STUDENT IN AN ORGANIZED HEALTH CARE EDUCATION/TRAINING PROGRAM

## 2024-04-25 PROCEDURE — 6360000002 HC RX W HCPCS: Performed by: STUDENT IN AN ORGANIZED HEALTH CARE EDUCATION/TRAINING PROGRAM

## 2024-04-25 PROCEDURE — 6360000002 HC RX W HCPCS: Performed by: INTERNAL MEDICINE

## 2024-04-25 PROCEDURE — 94761 N-INVAS EAR/PLS OXIMETRY MLT: CPT

## 2024-04-25 PROCEDURE — 1200000000 HC SEMI PRIVATE

## 2024-04-25 PROCEDURE — 80048 BASIC METABOLIC PNL TOTAL CA: CPT

## 2024-04-25 PROCEDURE — 2580000003 HC RX 258: Performed by: STUDENT IN AN ORGANIZED HEALTH CARE EDUCATION/TRAINING PROGRAM

## 2024-04-25 RX ADMIN — ENOXAPARIN SODIUM 30 MG: 100 INJECTION SUBCUTANEOUS at 08:30

## 2024-04-25 RX ADMIN — SODIUM CHLORIDE, PRESERVATIVE FREE 10 ML: 5 INJECTION INTRAVENOUS at 20:17

## 2024-04-25 RX ADMIN — AMLODIPINE BESYLATE 5 MG: 5 TABLET ORAL at 08:30

## 2024-04-25 RX ADMIN — FUROSEMIDE 40 MG: 10 INJECTION, SOLUTION INTRAMUSCULAR; INTRAVENOUS at 17:46

## 2024-04-25 RX ADMIN — IRON SUCROSE 100 MG: 20 INJECTION, SOLUTION INTRAVENOUS at 11:13

## 2024-04-25 RX ADMIN — INSULIN GLARGINE 6 UNITS: 100 INJECTION, SOLUTION SUBCUTANEOUS at 20:24

## 2024-04-25 RX ADMIN — METOPROLOL TARTRATE 25 MG: 25 TABLET, FILM COATED ORAL at 20:16

## 2024-04-25 RX ADMIN — METOPROLOL TARTRATE 25 MG: 25 TABLET, FILM COATED ORAL at 08:29

## 2024-04-25 RX ADMIN — HYDRALAZINE HYDROCHLORIDE 100 MG: 50 TABLET ORAL at 08:29

## 2024-04-25 RX ADMIN — ROSUVASTATIN CALCIUM 10 MG: 5 TABLET, COATED ORAL at 20:16

## 2024-04-25 RX ADMIN — SEVELAMER CARBONATE 800 MG: 800 TABLET, FILM COATED ORAL at 08:29

## 2024-04-25 RX ADMIN — CEFEPIME 2000 MG: 2 INJECTION, POWDER, FOR SOLUTION INTRAVENOUS at 12:07

## 2024-04-25 RX ADMIN — BUSPIRONE HYDROCHLORIDE 5 MG: 5 TABLET ORAL at 20:16

## 2024-04-25 RX ADMIN — HYDRALAZINE HYDROCHLORIDE 100 MG: 50 TABLET ORAL at 14:19

## 2024-04-25 RX ADMIN — SODIUM CHLORIDE, PRESERVATIVE FREE 10 ML: 5 INJECTION INTRAVENOUS at 08:30

## 2024-04-25 RX ADMIN — BUSPIRONE HYDROCHLORIDE 5 MG: 5 TABLET ORAL at 08:30

## 2024-04-25 RX ADMIN — SEVELAMER CARBONATE 800 MG: 800 TABLET, FILM COATED ORAL at 17:46

## 2024-04-25 RX ADMIN — ASPIRIN 81 MG: 81 TABLET, CHEWABLE ORAL at 08:29

## 2024-04-25 RX ADMIN — CITALOPRAM HYDROBROMIDE 40 MG: 20 TABLET ORAL at 08:30

## 2024-04-25 RX ADMIN — HYDRALAZINE HYDROCHLORIDE 100 MG: 50 TABLET ORAL at 20:17

## 2024-04-25 RX ADMIN — FUROSEMIDE 40 MG: 10 INJECTION, SOLUTION INTRAMUSCULAR; INTRAVENOUS at 11:13

## 2024-04-25 RX ADMIN — SEVELAMER CARBONATE 800 MG: 800 TABLET, FILM COATED ORAL at 11:57

## 2024-04-25 RX ADMIN — AMLODIPINE BESYLATE 5 MG: 5 TABLET ORAL at 20:16

## 2024-04-25 ASSESSMENT — PAIN SCALES - GENERAL: PAINLEVEL_OUTOF10: 0

## 2024-04-25 ASSESSMENT — PAIN SCALES - WONG BAKER: WONGBAKER_NUMERICALRESPONSE: NO HURT

## 2024-04-25 NOTE — CARE COORDINATION
Reviewed chart, discussed in IDR and nursing states pt is up /independent in room. Plan remains home with mother. CM will continue to follow

## 2024-04-25 NOTE — ACP (ADVANCE CARE PLANNING)
Advance Care Planning     Advance Care Planning Inpatient Note  [facility] Department    Today's Date: 4/25/2024  Unit: [facility] 4N    Received request from patient.  Upon review of chart and communication with care team, patient's decision making abilities are not in question.. Patient was/were present in the room during visit.    Goals of ACP Conversation:  Discuss advance care planning documents    Health Care Decision Makers:       Primary Decision Maker: Diamond Barakat  [phone]    Secondary Decision Maker: Diamond Barakat  [phone]  Summary:  Completed New Documents  Updated Healthcare Decision Maker    Advance Care Planning Documents (Patient Wishes):  Living Will/Advance Directive     Assessment:  Patient was in good spirit during the visit and express satisfaction with service rendered to her.     Interventions:  Provided education on documents for clarity and greater understanding  Assisted in the completion of documents according to patient's wishes at this time    Care Preferences Communicated:   No    Outcomes/Plan:  New advance directive completed.  Copy of advance directive given to staff to scan into medical record.    Electronically signed by Chaplain Rios on 4/25/2024 at 10:29 AM

## 2024-04-26 LAB
ANION GAP SERPL CALCULATED.3IONS-SCNC: 11 MMOL/L (ref 7–16)
BUN SERPL-MCNC: 69 MG/DL (ref 6–23)
CALCIUM SERPL-MCNC: 8.5 MG/DL (ref 8.3–10.6)
CHLORIDE BLD-SCNC: 99 MMOL/L (ref 99–110)
CO2: 27 MMOL/L (ref 21–32)
CREAT SERPL-MCNC: 3.6 MG/DL (ref 0.6–1.1)
GFR SERPL CREATININE-BSD FRML MDRD: 15 ML/MIN/1.73M2
GLUCOSE BLD-MCNC: 111 MG/DL (ref 70–99)
GLUCOSE BLD-MCNC: 157 MG/DL (ref 70–99)
GLUCOSE BLD-MCNC: 194 MG/DL (ref 70–99)
GLUCOSE BLD-MCNC: 89 MG/DL (ref 70–99)
GLUCOSE SERPL-MCNC: 135 MG/DL (ref 70–99)
MAGNESIUM: 2 MG/DL (ref 1.8–2.4)
POTASSIUM SERPL-SCNC: 3.7 MMOL/L (ref 3.5–5.1)
SODIUM BLD-SCNC: 137 MMOL/L (ref 135–145)

## 2024-04-26 PROCEDURE — 36415 COLL VENOUS BLD VENIPUNCTURE: CPT

## 2024-04-26 PROCEDURE — 80048 BASIC METABOLIC PNL TOTAL CA: CPT

## 2024-04-26 PROCEDURE — 6360000002 HC RX W HCPCS: Performed by: INTERNAL MEDICINE

## 2024-04-26 PROCEDURE — 82962 GLUCOSE BLOOD TEST: CPT

## 2024-04-26 PROCEDURE — 6370000000 HC RX 637 (ALT 250 FOR IP): Performed by: STUDENT IN AN ORGANIZED HEALTH CARE EDUCATION/TRAINING PROGRAM

## 2024-04-26 PROCEDURE — 6370000000 HC RX 637 (ALT 250 FOR IP): Performed by: INTERNAL MEDICINE

## 2024-04-26 PROCEDURE — 1200000000 HC SEMI PRIVATE

## 2024-04-26 PROCEDURE — 2580000003 HC RX 258: Performed by: STUDENT IN AN ORGANIZED HEALTH CARE EDUCATION/TRAINING PROGRAM

## 2024-04-26 PROCEDURE — 6360000002 HC RX W HCPCS: Performed by: STUDENT IN AN ORGANIZED HEALTH CARE EDUCATION/TRAINING PROGRAM

## 2024-04-26 PROCEDURE — 2700000000 HC OXYGEN THERAPY PER DAY

## 2024-04-26 PROCEDURE — 83735 ASSAY OF MAGNESIUM: CPT

## 2024-04-26 PROCEDURE — 94761 N-INVAS EAR/PLS OXIMETRY MLT: CPT

## 2024-04-26 RX ORDER — TORSEMIDE 20 MG/1
20 TABLET ORAL 2 TIMES DAILY
Status: DISCONTINUED | OUTPATIENT
Start: 2024-04-26 | End: 2024-04-29 | Stop reason: HOSPADM

## 2024-04-26 RX ADMIN — AMLODIPINE BESYLATE 5 MG: 5 TABLET ORAL at 09:26

## 2024-04-26 RX ADMIN — SEVELAMER CARBONATE 800 MG: 800 TABLET, FILM COATED ORAL at 09:26

## 2024-04-26 RX ADMIN — METOPROLOL TARTRATE 25 MG: 25 TABLET, FILM COATED ORAL at 09:26

## 2024-04-26 RX ADMIN — EPOETIN ALFA-EPBX 10000 UNITS: 10000 INJECTION, SOLUTION INTRAVENOUS; SUBCUTANEOUS at 18:16

## 2024-04-26 RX ADMIN — INSULIN GLARGINE 6 UNITS: 100 INJECTION, SOLUTION SUBCUTANEOUS at 21:46

## 2024-04-26 RX ADMIN — CITALOPRAM HYDROBROMIDE 40 MG: 20 TABLET ORAL at 09:26

## 2024-04-26 RX ADMIN — TORSEMIDE 20 MG: 20 TABLET ORAL at 09:26

## 2024-04-26 RX ADMIN — IRON SUCROSE 100 MG: 20 INJECTION, SOLUTION INTRAVENOUS at 10:20

## 2024-04-26 RX ADMIN — SODIUM CHLORIDE, PRESERVATIVE FREE 10 ML: 5 INJECTION INTRAVENOUS at 09:26

## 2024-04-26 RX ADMIN — HYDRALAZINE HYDROCHLORIDE 100 MG: 50 TABLET ORAL at 09:26

## 2024-04-26 RX ADMIN — Medication 324 MG: at 09:28

## 2024-04-26 RX ADMIN — BUSPIRONE HYDROCHLORIDE 5 MG: 5 TABLET ORAL at 09:26

## 2024-04-26 RX ADMIN — HYDRALAZINE HYDROCHLORIDE 100 MG: 50 TABLET ORAL at 14:54

## 2024-04-26 RX ADMIN — ASPIRIN 81 MG: 81 TABLET, CHEWABLE ORAL at 09:26

## 2024-04-26 RX ADMIN — INSULIN LISPRO 3 UNITS: 100 INJECTION, SOLUTION INTRAVENOUS; SUBCUTANEOUS at 12:24

## 2024-04-26 RX ADMIN — METOPROLOL TARTRATE 25 MG: 25 TABLET, FILM COATED ORAL at 21:46

## 2024-04-26 RX ADMIN — ROSUVASTATIN CALCIUM 10 MG: 5 TABLET, COATED ORAL at 21:46

## 2024-04-26 RX ADMIN — CEFEPIME 1000 MG: 1 INJECTION, POWDER, FOR SOLUTION INTRAMUSCULAR; INTRAVENOUS at 12:28

## 2024-04-26 RX ADMIN — SODIUM CHLORIDE, PRESERVATIVE FREE 10 ML: 5 INJECTION INTRAVENOUS at 21:45

## 2024-04-26 RX ADMIN — SEVELAMER CARBONATE 800 MG: 800 TABLET, FILM COATED ORAL at 12:24

## 2024-04-26 RX ADMIN — BUSPIRONE HYDROCHLORIDE 5 MG: 5 TABLET ORAL at 21:46

## 2024-04-26 RX ADMIN — TORSEMIDE 20 MG: 20 TABLET ORAL at 18:15

## 2024-04-26 RX ADMIN — HYDRALAZINE HYDROCHLORIDE 100 MG: 50 TABLET ORAL at 21:46

## 2024-04-26 RX ADMIN — ENOXAPARIN SODIUM 30 MG: 100 INJECTION SUBCUTANEOUS at 09:26

## 2024-04-26 RX ADMIN — AMLODIPINE BESYLATE 5 MG: 5 TABLET ORAL at 21:46

## 2024-04-26 RX ADMIN — SEVELAMER CARBONATE 800 MG: 800 TABLET, FILM COATED ORAL at 18:15

## 2024-04-26 ASSESSMENT — PAIN SCALES - GENERAL
PAINLEVEL_OUTOF10: 0
PAINLEVEL_OUTOF10: 0

## 2024-04-26 ASSESSMENT — PAIN SCALES - WONG BAKER: WONGBAKER_NUMERICALRESPONSE: NO HURT

## 2024-04-26 NOTE — CARE COORDINATION
Reviewed chart, discussed in IDR and spoke with pt/mother.  Plan remains home with mother and denies any needs at this time.  CM [id number] as needed.

## 2024-04-27 ENCOUNTER — APPOINTMENT (OUTPATIENT)
Dept: INTERVENTIONAL RADIOLOGY/VASCULAR | Age: 44
DRG: 698 | End: 2024-04-27
Payer: COMMERCIAL

## 2024-04-27 LAB
ANION GAP SERPL CALCULATED.3IONS-SCNC: 12 MMOL/L (ref 7–16)
BUN SERPL-MCNC: 69 MG/DL (ref 6–23)
CALCIUM SERPL-MCNC: 8.4 MG/DL (ref 8.3–10.6)
CHLORIDE BLD-SCNC: 97 MMOL/L (ref 99–110)
CO2: 28 MMOL/L (ref 21–32)
CREAT SERPL-MCNC: 3.6 MG/DL (ref 0.6–1.1)
CULTURE: NORMAL
CULTURE: NORMAL
GFR SERPL CREATININE-BSD FRML MDRD: 15 ML/MIN/1.73M2
GLUCOSE BLD-MCNC: 163 MG/DL (ref 70–99)
GLUCOSE BLD-MCNC: 182 MG/DL (ref 70–99)
GLUCOSE BLD-MCNC: 195 MG/DL (ref 70–99)
GLUCOSE BLD-MCNC: 77 MG/DL (ref 70–99)
GLUCOSE SERPL-MCNC: 147 MG/DL (ref 70–99)
Lab: NORMAL
Lab: NORMAL
MAGNESIUM: 1.9 MG/DL (ref 1.8–2.4)
POTASSIUM SERPL-SCNC: 3.6 MMOL/L (ref 3.5–5.1)
SODIUM BLD-SCNC: 137 MMOL/L (ref 135–145)
SPECIMEN: NORMAL
SPECIMEN: NORMAL

## 2024-04-27 PROCEDURE — 2580000003 HC RX 258: Performed by: STUDENT IN AN ORGANIZED HEALTH CARE EDUCATION/TRAINING PROGRAM

## 2024-04-27 PROCEDURE — 6360000002 HC RX W HCPCS: Performed by: STUDENT IN AN ORGANIZED HEALTH CARE EDUCATION/TRAINING PROGRAM

## 2024-04-27 PROCEDURE — 93005 ELECTROCARDIOGRAM TRACING: CPT | Performed by: STUDENT IN AN ORGANIZED HEALTH CARE EDUCATION/TRAINING PROGRAM

## 2024-04-27 PROCEDURE — 2709999900 IR FLUORO GUIDED CVA DEVICE PLMT/REPLACE/REMOVAL

## 2024-04-27 PROCEDURE — 76937 US GUIDE VASCULAR ACCESS: CPT

## 2024-04-27 PROCEDURE — 36558 INSERT TUNNELED CV CATH: CPT

## 2024-04-27 PROCEDURE — 83735 ASSAY OF MAGNESIUM: CPT

## 2024-04-27 PROCEDURE — 36415 COLL VENOUS BLD VENIPUNCTURE: CPT

## 2024-04-27 PROCEDURE — 94761 N-INVAS EAR/PLS OXIMETRY MLT: CPT

## 2024-04-27 PROCEDURE — 77001 FLUOROGUIDE FOR VEIN DEVICE: CPT

## 2024-04-27 PROCEDURE — 2500000003 HC RX 250 WO HCPCS: Performed by: RADIOLOGY

## 2024-04-27 PROCEDURE — 02HV33Z INSERTION OF INFUSION DEVICE INTO SUPERIOR VENA CAVA, PERCUTANEOUS APPROACH: ICD-10-PCS | Performed by: RADIOLOGY

## 2024-04-27 PROCEDURE — 6360000002 HC RX W HCPCS: Performed by: INTERNAL MEDICINE

## 2024-04-27 PROCEDURE — 6370000000 HC RX 637 (ALT 250 FOR IP): Performed by: STUDENT IN AN ORGANIZED HEALTH CARE EDUCATION/TRAINING PROGRAM

## 2024-04-27 PROCEDURE — 2500000003 HC RX 250 WO HCPCS

## 2024-04-27 PROCEDURE — 80048 BASIC METABOLIC PNL TOTAL CA: CPT

## 2024-04-27 PROCEDURE — 0JH63XZ INSERTION OF TUNNELED VASCULAR ACCESS DEVICE INTO CHEST SUBCUTANEOUS TISSUE AND FASCIA, PERCUTANEOUS APPROACH: ICD-10-PCS | Performed by: RADIOLOGY

## 2024-04-27 PROCEDURE — 6360000002 HC RX W HCPCS

## 2024-04-27 PROCEDURE — 6370000000 HC RX 637 (ALT 250 FOR IP): Performed by: INTERNAL MEDICINE

## 2024-04-27 PROCEDURE — 82962 GLUCOSE BLOOD TEST: CPT

## 2024-04-27 PROCEDURE — 1200000000 HC SEMI PRIVATE

## 2024-04-27 RX ORDER — LIDOCAINE HYDROCHLORIDE 10 MG/ML
INJECTION, SOLUTION EPIDURAL; INFILTRATION; INTRACAUDAL; PERINEURAL PRN
Status: COMPLETED | OUTPATIENT
Start: 2024-04-27 | End: 2024-04-27

## 2024-04-27 RX ADMIN — INSULIN GLARGINE 6 UNITS: 100 INJECTION, SOLUTION SUBCUTANEOUS at 21:27

## 2024-04-27 RX ADMIN — HYDRALAZINE HYDROCHLORIDE 100 MG: 50 TABLET ORAL at 09:07

## 2024-04-27 RX ADMIN — ROSUVASTATIN CALCIUM 10 MG: 5 TABLET, COATED ORAL at 21:27

## 2024-04-27 RX ADMIN — ASPIRIN 81 MG: 81 TABLET, CHEWABLE ORAL at 09:07

## 2024-04-27 RX ADMIN — LIDOCAINE HYDROCHLORIDE 10 ML: 10 INJECTION, SOLUTION EPIDURAL; INFILTRATION; INTRACAUDAL; PERINEURAL at 14:12

## 2024-04-27 RX ADMIN — AMLODIPINE BESYLATE 5 MG: 5 TABLET ORAL at 09:07

## 2024-04-27 RX ADMIN — TORSEMIDE 20 MG: 20 TABLET ORAL at 09:07

## 2024-04-27 RX ADMIN — HYDRALAZINE HYDROCHLORIDE 100 MG: 50 TABLET ORAL at 15:24

## 2024-04-27 RX ADMIN — SODIUM CHLORIDE, PRESERVATIVE FREE 10 ML: 5 INJECTION INTRAVENOUS at 09:10

## 2024-04-27 RX ADMIN — BUSPIRONE HYDROCHLORIDE 5 MG: 5 TABLET ORAL at 09:07

## 2024-04-27 RX ADMIN — SEVELAMER CARBONATE 800 MG: 800 TABLET, FILM COATED ORAL at 09:07

## 2024-04-27 RX ADMIN — SEVELAMER CARBONATE 800 MG: 800 TABLET, FILM COATED ORAL at 12:54

## 2024-04-27 RX ADMIN — SODIUM CHLORIDE, PRESERVATIVE FREE 10 ML: 5 INJECTION INTRAVENOUS at 21:28

## 2024-04-27 RX ADMIN — INSULIN LISPRO 3 UNITS: 100 INJECTION, SOLUTION INTRAVENOUS; SUBCUTANEOUS at 17:37

## 2024-04-27 RX ADMIN — SODIUM CHLORIDE, PRESERVATIVE FREE 10 ML: 5 INJECTION INTRAVENOUS at 10:18

## 2024-04-27 RX ADMIN — CITALOPRAM HYDROBROMIDE 40 MG: 20 TABLET ORAL at 09:07

## 2024-04-27 RX ADMIN — TORSEMIDE 20 MG: 20 TABLET ORAL at 17:37

## 2024-04-27 RX ADMIN — ACETAMINOPHEN 650 MG: 325 TABLET ORAL at 15:24

## 2024-04-27 RX ADMIN — BUSPIRONE HYDROCHLORIDE 5 MG: 5 TABLET ORAL at 21:27

## 2024-04-27 RX ADMIN — IRON SUCROSE 100 MG: 20 INJECTION, SOLUTION INTRAVENOUS at 10:18

## 2024-04-27 RX ADMIN — ENOXAPARIN SODIUM 30 MG: 100 INJECTION SUBCUTANEOUS at 09:07

## 2024-04-27 RX ADMIN — METOPROLOL TARTRATE 25 MG: 25 TABLET, FILM COATED ORAL at 09:07

## 2024-04-27 RX ADMIN — SODIUM CHLORIDE: 9 INJECTION, SOLUTION INTRAVENOUS at 12:53

## 2024-04-27 RX ADMIN — CEFEPIME 1000 MG: 1 INJECTION, POWDER, FOR SOLUTION INTRAMUSCULAR; INTRAVENOUS at 12:54

## 2024-04-27 RX ADMIN — SEVELAMER CARBONATE 800 MG: 800 TABLET, FILM COATED ORAL at 17:37

## 2024-04-27 ASSESSMENT — PAIN DESCRIPTION - DESCRIPTORS
DESCRIPTORS: STABBING
DESCRIPTORS: STABBING

## 2024-04-27 ASSESSMENT — PAIN DESCRIPTION - LOCATION: LOCATION: CHEST

## 2024-04-27 ASSESSMENT — PAIN SCALES - WONG BAKER: WONGBAKER_NUMERICALRESPONSE: HURTS A LITTLE BIT

## 2024-04-27 ASSESSMENT — PAIN DESCRIPTION - ORIENTATION
ORIENTATION: RIGHT
ORIENTATION: RIGHT

## 2024-04-27 ASSESSMENT — PAIN SCALES - GENERAL
PAINLEVEL_OUTOF10: 6
PAINLEVEL_OUTOF10: 5

## 2024-04-27 NOTE — BRIEF OP NOTE
Brief Postoperative Note      Patient: Gus Garza  YOB: 1980  MRN: [id number]    Date of Procedure: 4/27/2024    * No Diagnosis Codes entered *tunneled central venous access needed for outpt medical mgmt/iv antibiotics    Post-Op Diagnosis: Same       * No procedures listed *tunneled dual lumen powerpicc via us guided right internal jugular vein    * No surgeons listed *conrado murdock do    Assistant:  * No surgical staff found *    Anesthesia: * No anesthesia type entered *local    Estimated Blood Loss (mL): Minimal    Complications: None    Specimens:   * No specimens in log *    Implants:  * No implants in log *      Drains:   Suprapubic Catheter (Active)   Site Assessment Moist 04/25/24 0545   Urine Color Yellow 04/26/24 1814   Urine Appearance Clear 04/26/24 1814   Urine Odor Malodorous 04/26/24 1814   Collection Container Standard 04/26/24 1425   Securement Method Securing device (Describe) 04/26/24 1425   Catheter Care  Soap and water 04/26/24 1425   Catheter Best Practices  Bag below bladder;Bag not on floor;Lack of dependent loop in tubing;Drainage bag less than half full 04/25/24 0545   Status Draining 04/26/24 1204   Output (mL) 700 mL 04/27/24 0713       Findings:  Infection Present At Time Of Surgery (PATOS) (choose all levels that have infection present):  No infection present at puncture site  Other Findings: dual lumen powerpicc tip at superior cavoatrial junction.  Ports flushed, fixed to skin.  Dressing applied.  No complication suggested    Electronically signed by Conrado Murdock DO on 4/27/2024 at 2:26 PM

## 2024-04-28 LAB
ANION GAP SERPL CALCULATED.3IONS-SCNC: 12 MMOL/L (ref 7–16)
BUN SERPL-MCNC: 62 MG/DL (ref 6–23)
CALCIUM SERPL-MCNC: 8.8 MG/DL (ref 8.3–10.6)
CHLORIDE BLD-SCNC: 95 MMOL/L (ref 99–110)
CO2: 30 MMOL/L (ref 21–32)
CREAT SERPL-MCNC: 3.6 MG/DL (ref 0.6–1.1)
GFR SERPL CREATININE-BSD FRML MDRD: 15 ML/MIN/1.73M2
GLUCOSE BLD-MCNC: 105 MG/DL (ref 70–99)
GLUCOSE BLD-MCNC: 120 MG/DL (ref 70–99)
GLUCOSE BLD-MCNC: 146 MG/DL (ref 70–99)
GLUCOSE BLD-MCNC: 184 MG/DL (ref 70–99)
GLUCOSE BLD-MCNC: 65 MG/DL (ref 70–99)
GLUCOSE SERPL-MCNC: 75 MG/DL (ref 70–99)
MAGNESIUM: 1.9 MG/DL (ref 1.8–2.4)
POTASSIUM SERPL-SCNC: 3.8 MMOL/L (ref 3.5–5.1)
SODIUM BLD-SCNC: 137 MMOL/L (ref 135–145)

## 2024-04-28 PROCEDURE — 6360000002 HC RX W HCPCS: Performed by: INTERNAL MEDICINE

## 2024-04-28 PROCEDURE — 6370000000 HC RX 637 (ALT 250 FOR IP): Performed by: INTERNAL MEDICINE

## 2024-04-28 PROCEDURE — 82962 GLUCOSE BLOOD TEST: CPT

## 2024-04-28 PROCEDURE — 1200000000 HC SEMI PRIVATE

## 2024-04-28 PROCEDURE — 83735 ASSAY OF MAGNESIUM: CPT

## 2024-04-28 PROCEDURE — 36415 COLL VENOUS BLD VENIPUNCTURE: CPT

## 2024-04-28 PROCEDURE — 80048 BASIC METABOLIC PNL TOTAL CA: CPT

## 2024-04-28 PROCEDURE — 2580000003 HC RX 258: Performed by: STUDENT IN AN ORGANIZED HEALTH CARE EDUCATION/TRAINING PROGRAM

## 2024-04-28 PROCEDURE — 94761 N-INVAS EAR/PLS OXIMETRY MLT: CPT

## 2024-04-28 PROCEDURE — 6370000000 HC RX 637 (ALT 250 FOR IP): Performed by: STUDENT IN AN ORGANIZED HEALTH CARE EDUCATION/TRAINING PROGRAM

## 2024-04-28 PROCEDURE — 6360000002 HC RX W HCPCS: Performed by: STUDENT IN AN ORGANIZED HEALTH CARE EDUCATION/TRAINING PROGRAM

## 2024-04-28 RX ADMIN — HYDRALAZINE HYDROCHLORIDE 100 MG: 50 TABLET ORAL at 09:27

## 2024-04-28 RX ADMIN — ROSUVASTATIN CALCIUM 10 MG: 5 TABLET, COATED ORAL at 20:57

## 2024-04-28 RX ADMIN — SEVELAMER CARBONATE 800 MG: 800 TABLET, FILM COATED ORAL at 12:38

## 2024-04-28 RX ADMIN — CEFEPIME 1000 MG: 1 INJECTION, POWDER, FOR SOLUTION INTRAMUSCULAR; INTRAVENOUS at 12:42

## 2024-04-28 RX ADMIN — HYDRALAZINE HYDROCHLORIDE 100 MG: 50 TABLET ORAL at 20:57

## 2024-04-28 RX ADMIN — SEVELAMER CARBONATE 800 MG: 800 TABLET, FILM COATED ORAL at 09:27

## 2024-04-28 RX ADMIN — Medication 324 MG: at 09:30

## 2024-04-28 RX ADMIN — ASPIRIN 81 MG: 81 TABLET, CHEWABLE ORAL at 09:27

## 2024-04-28 RX ADMIN — METOPROLOL TARTRATE 25 MG: 25 TABLET, FILM COATED ORAL at 20:58

## 2024-04-28 RX ADMIN — METOPROLOL TARTRATE 25 MG: 25 TABLET, FILM COATED ORAL at 09:27

## 2024-04-28 RX ADMIN — SODIUM CHLORIDE, PRESERVATIVE FREE 10 ML: 5 INJECTION INTRAVENOUS at 09:26

## 2024-04-28 RX ADMIN — BUSPIRONE HYDROCHLORIDE 5 MG: 5 TABLET ORAL at 20:58

## 2024-04-28 RX ADMIN — INSULIN GLARGINE 6 UNITS: 100 INJECTION, SOLUTION SUBCUTANEOUS at 20:56

## 2024-04-28 RX ADMIN — BUSPIRONE HYDROCHLORIDE 5 MG: 5 TABLET ORAL at 09:27

## 2024-04-28 RX ADMIN — SODIUM CHLORIDE: 9 INJECTION, SOLUTION INTRAVENOUS at 12:41

## 2024-04-28 RX ADMIN — SODIUM CHLORIDE, PRESERVATIVE FREE 10 ML: 5 INJECTION INTRAVENOUS at 20:58

## 2024-04-28 RX ADMIN — CITALOPRAM HYDROBROMIDE 40 MG: 20 TABLET ORAL at 09:26

## 2024-04-28 RX ADMIN — HYDRALAZINE HYDROCHLORIDE 100 MG: 50 TABLET ORAL at 15:28

## 2024-04-28 RX ADMIN — IRON SUCROSE 100 MG: 20 INJECTION, SOLUTION INTRAVENOUS at 11:01

## 2024-04-28 RX ADMIN — AMLODIPINE BESYLATE 5 MG: 5 TABLET ORAL at 09:27

## 2024-04-28 RX ADMIN — SEVELAMER CARBONATE 800 MG: 800 TABLET, FILM COATED ORAL at 18:00

## 2024-04-28 RX ADMIN — TORSEMIDE 20 MG: 20 TABLET ORAL at 18:00

## 2024-04-28 RX ADMIN — TORSEMIDE 20 MG: 20 TABLET ORAL at 09:27

## 2024-04-28 RX ADMIN — AMLODIPINE BESYLATE 5 MG: 5 TABLET ORAL at 20:57

## 2024-04-28 ASSESSMENT — PAIN DESCRIPTION - ORIENTATION: ORIENTATION: RIGHT;LEFT

## 2024-04-28 ASSESSMENT — PAIN DESCRIPTION - DESCRIPTORS: DESCRIPTORS: STABBING

## 2024-04-28 ASSESSMENT — PAIN - FUNCTIONAL ASSESSMENT: PAIN_FUNCTIONAL_ASSESSMENT: PREVENTS OR INTERFERES SOME ACTIVE ACTIVITIES AND ADLS

## 2024-04-28 ASSESSMENT — PAIN DESCRIPTION - LOCATION: LOCATION: FLANK

## 2024-04-28 ASSESSMENT — PAIN SCALES - GENERAL: PAINLEVEL_OUTOF10: 4

## 2024-04-29 VITALS
SYSTOLIC BLOOD PRESSURE: 131 MMHG | HEIGHT: 60 IN | WEIGHT: 170.19 LBS | OXYGEN SATURATION: 95 % | HEART RATE: 61 BPM | BODY MASS INDEX: 33.41 KG/M2 | DIASTOLIC BLOOD PRESSURE: 54 MMHG | TEMPERATURE: 98.1 F | RESPIRATION RATE: 16 BRPM

## 2024-04-29 LAB
EKG ATRIAL RATE: 59 BPM
EKG DIAGNOSIS: NORMAL
EKG P AXIS: 19 DEGREES
EKG P-R INTERVAL: 174 MS
EKG Q-T INTERVAL: 570 MS
EKG QRS DURATION: 88 MS
EKG QTC CALCULATION (BAZETT): 564 MS
EKG R AXIS: -32 DEGREES
EKG T AXIS: 0 DEGREES
EKG VENTRICULAR RATE: 59 BPM
GLUCOSE BLD-MCNC: 127 MG/DL (ref 70–99)
GLUCOSE BLD-MCNC: 65 MG/DL (ref 70–99)
GLUCOSE BLD-MCNC: 82 MG/DL (ref 70–99)

## 2024-04-29 PROCEDURE — 6370000000 HC RX 637 (ALT 250 FOR IP): Performed by: INTERNAL MEDICINE

## 2024-04-29 PROCEDURE — 82962 GLUCOSE BLOOD TEST: CPT

## 2024-04-29 PROCEDURE — 93010 ELECTROCARDIOGRAM REPORT: CPT | Performed by: INTERNAL MEDICINE

## 2024-04-29 PROCEDURE — 6360000002 HC RX W HCPCS: Performed by: STUDENT IN AN ORGANIZED HEALTH CARE EDUCATION/TRAINING PROGRAM

## 2024-04-29 PROCEDURE — 94761 N-INVAS EAR/PLS OXIMETRY MLT: CPT

## 2024-04-29 PROCEDURE — 2580000003 HC RX 258: Performed by: STUDENT IN AN ORGANIZED HEALTH CARE EDUCATION/TRAINING PROGRAM

## 2024-04-29 PROCEDURE — 6370000000 HC RX 637 (ALT 250 FOR IP): Performed by: STUDENT IN AN ORGANIZED HEALTH CARE EDUCATION/TRAINING PROGRAM

## 2024-04-29 RX ORDER — INSULIN DEGLUDEC 100 U/ML
3 INJECTION, SOLUTION SUBCUTANEOUS NIGHTLY
Qty: 2 EACH | Refills: 1
Start: 2024-04-29

## 2024-04-29 RX ORDER — INSULIN GLARGINE 100 [IU]/ML
3 INJECTION, SOLUTION SUBCUTANEOUS NIGHTLY
Status: DISCONTINUED | OUTPATIENT
Start: 2024-04-29 | End: 2024-04-29 | Stop reason: HOSPADM

## 2024-04-29 RX ORDER — TORSEMIDE 20 MG/1
TABLET ORAL
Qty: 60 TABLET | Refills: 1 | Status: SHIPPED | OUTPATIENT
Start: 2024-04-29

## 2024-04-29 RX ADMIN — SEVELAMER CARBONATE 800 MG: 800 TABLET, FILM COATED ORAL at 12:13

## 2024-04-29 RX ADMIN — BUSPIRONE HYDROCHLORIDE 5 MG: 5 TABLET ORAL at 08:36

## 2024-04-29 RX ADMIN — AMLODIPINE BESYLATE 5 MG: 5 TABLET ORAL at 08:36

## 2024-04-29 RX ADMIN — HYDRALAZINE HYDROCHLORIDE 100 MG: 50 TABLET ORAL at 08:36

## 2024-04-29 RX ADMIN — TORSEMIDE 20 MG: 20 TABLET ORAL at 08:36

## 2024-04-29 RX ADMIN — ENOXAPARIN SODIUM 30 MG: 100 INJECTION SUBCUTANEOUS at 08:35

## 2024-04-29 RX ADMIN — METOPROLOL TARTRATE 25 MG: 25 TABLET, FILM COATED ORAL at 08:36

## 2024-04-29 RX ADMIN — CEFEPIME 1000 MG: 1 INJECTION, POWDER, FOR SOLUTION INTRAMUSCULAR; INTRAVENOUS at 10:39

## 2024-04-29 RX ADMIN — ASPIRIN 81 MG: 81 TABLET, CHEWABLE ORAL at 08:36

## 2024-04-29 RX ADMIN — CITALOPRAM HYDROBROMIDE 40 MG: 20 TABLET ORAL at 08:35

## 2024-04-29 RX ADMIN — SEVELAMER CARBONATE 800 MG: 800 TABLET, FILM COATED ORAL at 08:36

## 2024-04-29 RX ADMIN — SODIUM CHLORIDE, PRESERVATIVE FREE 10 ML: 5 INJECTION INTRAVENOUS at 08:35

## 2024-04-29 NOTE — PLAN OF CARE
Problem: Discharge Planning  Goal: Discharge to home or other facility with appropriate resources  4/22/2024 2133 by Suresh Calzada RN  Outcome: Progressing  4/22/2024 1854 by Cici Malik LPN  Outcome: Progressing     Problem: Skin/Tissue Integrity  Goal: Absence of new skin breakdown  4/22/2024 2133 by Suresh Calzada RN  Outcome: Progressing  4/22/2024 1854 by Cici Malik LPN  Outcome: Progressing     Problem: Safety - Adult  Goal: Free from fall injury  4/22/2024 2133 by Suresh Calzada RN  Outcome: Progressing  4/22/2024 1854 by Cici Malik LPN  Outcome: Progressing     Problem: Pain  Goal: Verbalizes/displays adequate comfort level or baseline comfort level  Outcome: Progressing     
  Problem: Discharge Planning  Goal: Discharge to home or other facility with appropriate resources  4/24/2024 0223 by Janette Palacios, RN  Outcome: Progressing  4/23/2024 2051 by Ariana Wang, RN  Outcome: Progressing     Problem: Skin/Tissue Integrity  Goal: Absence of new skin breakdown  Description: 1.  Monitor for areas of redness and/or skin breakdown  2.  Assess vascular access sites hourly  3.  Every 4-6 hours minimum:  Change oxygen saturation probe site  4.  Every 4-6 hours:  If on nasal continuous positive airway pressure, respiratory therapy assess nares and determine need for appliance change or resting period.  4/24/2024 0223 by Janette Palacios, RN  Outcome: Progressing  4/23/2024 2051 by Ariana Wang, RN  Outcome: Progressing     Problem: Safety - Adult  Goal: Free from fall injury  4/24/2024 0223 by Janette Palacios, RN  Outcome: Progressing  4/23/2024 2051 by Ariana Wang, RN  Outcome: Progressing     Problem: Pain  Goal: Verbalizes/displays adequate comfort level or baseline comfort level  4/24/2024 0223 by Janette Palacios, RN  Outcome: Progressing  4/23/2024 2051 by Ariana Wang, RN  Outcome: Progressing     
  Problem: Discharge Planning  Goal: Discharge to home or other facility with appropriate resources  4/24/2024 1043 by Tara Ronquillo LPN  Outcome: Progressing  4/24/2024 0223 by Janette Palacios RN  Outcome: Progressing  4/23/2024 2051 by Ariana Wang RN  Outcome: Progressing     Problem: Skin/Tissue Integrity  Goal: Absence of new skin breakdown  Description: 1.  Monitor for areas of redness and/or skin breakdown  2.  Assess vascular access sites hourly  3.  Every 4-6 hours minimum:  Change oxygen saturation probe site  4.  Every 4-6 hours:  If on nasal continuous positive airway pressure, respiratory therapy assess nares and determine need for appliance change or resting period.  4/24/2024 1043 by Tara Ronquillo LPN  Outcome: Progressing  4/24/2024 0223 by Janette Palacios RN  Outcome: Progressing  4/23/2024 2051 by Ariana Wang RN  Outcome: Progressing     Problem: Safety - Adult  Goal: Free from fall injury  4/24/2024 1043 by Tara Ronquillo LPN  Outcome: Progressing  4/24/2024 0223 by Janette Palacios RN  Outcome: Progressing  4/23/2024 2051 by Ariana Wang RN  Outcome: Progressing     Problem: Pain  Goal: Verbalizes/displays adequate comfort level or baseline comfort level  4/24/2024 1043 by Tara Ronquillo LPN  Outcome: Progressing  4/24/2024 0223 by Janette Palacios, RN  Outcome: Progressing  4/23/2024 2051 by Ariana Wang, RN  Outcome: Progressing     
  Problem: Discharge Planning  Goal: Discharge to home or other facility with appropriate resources  4/25/2024 0952 by Tara Ronquillo LPN  Outcome: Progressing  4/24/2024 2358 by Rosmery Fontaine RN  Outcome: Progressing     Problem: Skin/Tissue Integrity  Goal: Absence of new skin breakdown  Description: 1.  Monitor for areas of redness and/or skin breakdown  2.  Assess vascular access sites hourly  3.  Every 4-6 hours minimum:  Change oxygen saturation probe site  4.  Every 4-6 hours:  If on nasal continuous positive airway pressure, respiratory therapy assess nares and determine need for appliance change or resting period.  4/25/2024 0952 by Tara Ronquillo LPN  Outcome: Progressing  4/24/2024 2358 by Rosmery Fontaine RN  Outcome: Progressing     Problem: Safety - Adult  Goal: Free from fall injury  4/25/2024 0952 by Tara Ronquillo LPN  Outcome: Progressing  4/24/2024 2358 by Rosmery Fontaine RN  Outcome: Progressing     Problem: Pain  Goal: Verbalizes/displays adequate comfort level or baseline comfort level  4/25/2024 0952 by Tara Ronquillo LPN  Outcome: Progressing  4/24/2024 2358 by Rosmery Fontaine RN  Outcome: Progressing     
  Problem: Discharge Planning  Goal: Discharge to home or other facility with appropriate resources  4/25/2024 2019 by Rosmery Fontaine RN  Outcome: Progressing  4/25/2024 0952 by Tara Ronquillo LPN  Outcome: Progressing     Problem: Skin/Tissue Integrity  Goal: Absence of new skin breakdown  Description: 1.  Monitor for areas of redness and/or skin breakdown  2.  Assess vascular access sites hourly  3.  Every 4-6 hours minimum:  Change oxygen saturation probe site  4.  Every 4-6 hours:  If on nasal continuous positive airway pressure, respiratory therapy assess nares and determine need for appliance change or resting period.  4/25/2024 2019 by Rosmery Fontaine RN  Outcome: Progressing  4/25/2024 0952 by Tara Ronquillo LPN  Outcome: Progressing     Problem: Safety - Adult  Goal: Free from fall injury  4/25/2024 2019 by Rosmery Fontaine RN  Outcome: Progressing  4/25/2024 0952 by Tara Ronquillo LPN  Outcome: Progressing     Problem: Pain  Goal: Verbalizes/displays adequate comfort level or baseline comfort level  4/25/2024 2019 by Rosmery Fontaine RN  Outcome: Progressing  4/25/2024 0952 by Tara Ronquillo LPN  Outcome: Progressing     Problem: Neurosensory - Adult  Goal: Achieves stable or improved neurological status  4/25/2024 2019 by Rosmery Fontaine RN  Outcome: Progressing  4/25/2024 0952 by Tara Ronquillo LPN  Outcome: Progressing     Problem: Respiratory - Adult  Goal: Achieves optimal ventilation and oxygenation  4/25/2024 2019 by Rosmery Fnotaine RN  Outcome: Progressing  4/25/2024 0952 by Tara Ronquillo LPN  Outcome: Progressing     Problem: Cardiovascular - Adult  Goal: Maintains optimal cardiac output and hemodynamic stability  4/25/2024 2019 by Rosmery Fontaine RN  Outcome: Progressing  4/25/2024 0952 by Tara Ronquillo LPN  Outcome: Progressing  Goal: Absence of cardiac dysrhythmias or at baseline  4/25/2024 0952 by Tara Ronquillo LPN  Outcome: Progressing     Problem: Skin/Tissue Integrity - Adult  Goal: Skin integrity 
  Problem: Discharge Planning  Goal: Discharge to home or other facility with appropriate resources  4/26/2024 2150 by Rosmery Fontaine RN  Outcome: Progressing  4/26/2024 1105 by Sierra Wan LPN  Outcome: Progressing     Problem: Skin/Tissue Integrity  Goal: Absence of new skin breakdown  Description: 1.  Monitor for areas of redness and/or skin breakdown  2.  Assess vascular access sites hourly  3.  Every 4-6 hours minimum:  Change oxygen saturation probe site  4.  Every 4-6 hours:  If on nasal continuous positive airway pressure, respiratory therapy assess nares and determine need for appliance change or resting period.  4/26/2024 2150 by Rosmery Fontaine RN  Outcome: Progressing  4/26/2024 1105 by Sierra Wan LPN  Outcome: Progressing     Problem: Safety - Adult  Goal: Free from fall injury  4/26/2024 2150 by Rosmery Fontaine RN  Outcome: Progressing  4/26/2024 1105 by Sierra Wan LPN  Outcome: Progressing     Problem: Pain  Goal: Verbalizes/displays adequate comfort level or baseline comfort level  4/26/2024 2150 by Rosmery Fontaine RN  Outcome: Progressing  4/26/2024 1105 by Sierra Wan LPN  Outcome: Progressing     Problem: Neurosensory - Adult  Goal: Achieves stable or improved neurological status  4/26/2024 2150 by Rosmery Fontaine RN  Outcome: Progressing  4/26/2024 1105 by Sierra Wan LPN  Outcome: Progressing     Problem: Respiratory - Adult  Goal: Achieves optimal ventilation and oxygenation  4/26/2024 2150 by Rosmery Fontaine RN  Outcome: Progressing  4/26/2024 1105 by Sierra Wan LPN  Outcome: Progressing     Problem: Cardiovascular - Adult  Goal: Maintains optimal cardiac output and hemodynamic stability  4/26/2024 2150 by Rosmery Fontaine RN  Outcome: Progressing  Flowsheets (Taken 4/26/2024 2150)  Maintains optimal cardiac output and hemodynamic stability: Administer fluid and/or volume expanders as ordered  4/26/2024 1105 by Sierra Wan LPN  Outcome: Progressing  Goal: Absence 
  Problem: Discharge Planning  Goal: Discharge to home or other facility with appropriate resources  4/27/2024 2226 by Suresh Calzada RN  Outcome: Progressing  4/27/2024 1118 by Joan Mendiola LPN  Outcome: Progressing     Problem: Skin/Tissue Integrity  Goal: Absence of new skin breakdown  4/27/2024 2226 by Suresh Calzada RN  Outcome: Progressing  4/27/2024 1118 by Joan Mendiola LPN  Outcome: Progressing     Problem: Safety - Adult  Goal: Free from fall injury  4/27/2024 2226 by Suresh Calzada RN  Outcome: Progressing  4/27/2024 1118 by Joan Mendiola LPN  Outcome: Progressing     Problem: Pain  Goal: Verbalizes/displays adequate comfort level or baseline comfort level  4/27/2024 2226 by Suresh Calzada RN  Outcome: Progressing  4/27/2024 1118 by Joan Mendiola LPN  Outcome: Progressing     Problem: Neurosensory - Adult  Goal: Achieves stable or improved neurological status  4/27/2024 2226 by Suresh Calzada RN  Outcome: Progressing  4/27/2024 1118 by Joan Mendiola LPN  Outcome: Progressing     Problem: Respiratory - Adult  Goal: Achieves optimal ventilation and oxygenation  4/27/2024 2226 by Suresh Calzada RN  Outcome: Progressing  4/27/2024 1118 by Joan Mendiola LPN  Outcome: Progressing     Problem: Cardiovascular - Adult  Goal: Maintains optimal cardiac output and hemodynamic stability  4/27/2024 2226 by Suresh Calzada RN  Outcome: Progressing  4/27/2024 1118 by Joan Mendiola LPN  Outcome: Progressing  Goal: Absence of cardiac dysrhythmias or at baseline  4/27/2024 2226 by Suresh Calzada RN  Outcome: Progressing  4/27/2024 1118 by Joan Mendiola LPN  Outcome: Progressing     Problem: Skin/Tissue Integrity - Adult  Goal: Skin integrity remains intact  4/27/2024 2226 by Suresh Calzada RN  Outcome: Progressing  4/27/2024 1118 by Joan Mendiola LPN  Outcome: Progressing  Goal: Incisions, wounds, or drain sites healing without S/S of infection  4/27/2024 2226 by Suresh Calzada RN  Outcome: Progressing  4/27/2024 1118 by Maury 
  Problem: Discharge Planning  Goal: Discharge to home or other facility with appropriate resources  4/28/2024 2217 by Suresh Calzada RN  Outcome: Progressing  4/28/2024 1047 by Joan Mendiola LPN  Outcome: Progressing     Problem: Skin/Tissue Integrity  Goal: Absence of new skin breakdown  4/28/2024 2217 by Suresh Calzada RN  Outcome: Progressing  4/28/2024 1047 by Joan Mendiola LPN  Outcome: Progressing     Problem: Safety - Adult  Goal: Free from fall injury  4/28/2024 2217 by Suresh Calzada RN  Outcome: Progressing  4/28/2024 1047 by Joan Mendiola LPN  Outcome: Progressing     Problem: Pain  Goal: Verbalizes/displays adequate comfort level or baseline comfort level  4/28/2024 2217 by Suresh Calzada RN  Outcome: Progressing  4/28/2024 1047 by Joan Mendiola LPN  Outcome: Progressing     Problem: Neurosensory - Adult  Goal: Achieves stable or improved neurological status  4/28/2024 2217 by Suresh Calzada RN  Outcome: Progressing  4/28/2024 1047 by Joan Mendiola LPN  Outcome: Progressing     Problem: Respiratory - Adult  Goal: Achieves optimal ventilation and oxygenation  4/28/2024 2217 by Suresh Calzada RN  Outcome: Progressing  4/28/2024 1047 by Joan Mendiola LPN  Outcome: Progressing     Problem: Cardiovascular - Adult  Goal: Maintains optimal cardiac output and hemodynamic stability  4/28/2024 2217 by Suresh Calzada RN  Outcome: Progressing  4/28/2024 1047 by Joan Mendiola LPN  Outcome: Progressing  Goal: Absence of cardiac dysrhythmias or at baseline  4/28/2024 2217 by Suresh Calzada RN  Outcome: Progressing  4/28/2024 1047 by Joan Mendiola LPN  Outcome: Progressing     Problem: Skin/Tissue Integrity - Adult  Goal: Skin integrity remains intact  4/28/2024 2217 by Suresh Calzada RN  Outcome: Progressing  4/28/2024 1047 by Joan Mendiola LPN  Outcome: Progressing  Goal: Incisions, wounds, or drain sites healing without S/S of infection  4/28/2024 2217 by Suresh Calzada RN  Outcome: Progressing  4/28/2024 1047 by Maury 
  Problem: Discharge Planning  Goal: Discharge to home or other facility with appropriate resources  4/29/2024 0935 by Tara Ronquillo LPN  Outcome: Progressing  4/28/2024 2217 by Suresh Calzada RN  Outcome: Progressing     Problem: Skin/Tissue Integrity  Goal: Absence of new skin breakdown  Description: 1.  Monitor for areas of redness and/or skin breakdown  2.  Assess vascular access sites hourly  3.  Every 4-6 hours minimum:  Change oxygen saturation probe site  4.  Every 4-6 hours:  If on nasal continuous positive airway pressure, respiratory therapy assess nares and determine need for appliance change or resting period.  4/29/2024 0935 by Tara Ronquillo LPN  Outcome: Progressing  4/28/2024 2217 by Suresh Calzada RN  Outcome: Progressing     Problem: Safety - Adult  Goal: Free from fall injury  4/29/2024 0935 by Tara Ronquillo LPN  Outcome: Progressing  4/28/2024 2217 by Suresh Calzada RN  Outcome: Progressing     Problem: Pain  Goal: Verbalizes/displays adequate comfort level or baseline comfort level  4/29/2024 0935 by Tara Ronquillo LPN  Outcome: Progressing  4/28/2024 2217 by Suresh Calzada RN  Outcome: Progressing     
  Problem: Discharge Planning  Goal: Discharge to home or other facility with appropriate resources  4/29/2024 1015 by Tara Ronquillo LPN  Outcome: Adequate for Discharge  4/29/2024 0935 by Tara Ronquillo LPN  Outcome: Progressing  4/28/2024 2217 by Suresh Calzada RN  Outcome: Progressing     
[facility] [address], [facility] [address]  [facility] [id number]      Baylee Khan :1977 MRN:[id number]    3/3/2021 Time Session Began: 1000  Time Session Ended: 1047    Due to COVID-19 precautions, this visit was performed via live interactive two-way Video visit with patient's verbal consent.   Clinician Location:Home.  Patient Location: Home.  Verified patient identity:  [x] Yes    Session Type:45 Minute Therapy (67115)    Others Present: NONE    Intervention: Cognitive, Supportive    Suicide/Homicide/Violence Ideation: No    If Yes, explain: N/A    Current Outpatient Medications   Medication Sig   • buPROPion XL (WELLBUTRIN XL) 150 MG 24 hr tablet TAKE 1 TABLET BY MOUTH DAILY   • albuterol 108 (90 Base) MCG/ACT inhaler Inhale 2 puffs into the lungs every 4 hours as needed for Shortness of Breath or Wheezing.   • fluticasone (Flonase) 50 MCG/ACT nasal spray Spray 1 spray in each nostril 2 times daily for 10 days.   • citalopram (CeleXA) 20 MG tablet Take 1 tablet by mouth daily.   • ketoconazole (NIZORAL) 2 % cream Apply topically daily.   • busPIRone (BUSPAR) 10 MG tablet    • ALPRAZolam (XANAX) 0.5 MG tablet Take 1 tablet by mouth daily as needed for Sleep.   • pantoprazole (PROTONIX) 40 MG tablet Take 1 tablet by mouth daily.   • Vitamin D, Ergocalciferol, 84639 units capsule TK ONE C PO  TWO TIMES PER WEEK     No current facility-administered medications for this visit.        Change in Medication(s) Reported: No  If Yes, explain: n/a    Patient/Family Education Provided: N/A  Patient/Family Displays Understanding: N/A    If No, explain: n/a    Chief complaint in patient's own words: \"It' hard to let go of control.\"    Progress Note containing chief complaint and symptoms/problems related to the complaint:    (Data/Action/Response/Plan)    D: Patient reports struggling with the homework exercise citing inability to quiet her 
Progressing     Problem: Infection - Adult  Goal: Absence of infection at discharge  Outcome: Progressing  Goal: Absence of infection during hospitalization  Outcome: Progressing     Problem: Metabolic/Fluid and Electrolytes - Adult  Goal: Electrolytes maintained within normal limits  Outcome: Progressing  Goal: Hemodynamic stability and optimal renal function maintained  Outcome: Progressing  Goal: Glucose maintained within prescribed range  Outcome: Progressing     Problem: Hematologic - Adult  Goal: Maintains hematologic stability  Outcome: Progressing     
mind. She also indicates she gets fixed on a thought, and begins to question and analyze a thought. She reports feeling guilty about her inability to keep her random thoughts at bay.  A: Processed patient's anxiety, normalized her feelings and provided supportive listening. Explored past experiences related to a belief of a lack of control and the impact on present day. Introduced additional deep breathing skills.  R: Patient presents with mildly anxious mood, affect is mood congruent. She is friendly, talkative and insightful. She is receptive to feedback and homework.  P: Follow up in two weeks.    Need for Community Resources Assessed: Yes    Resources Needed: No    If Yes, what resources: N/A     Primary Diagnosis: Generalized Anxiety Disorder and Major Depression Recurrent  : 2 Moderate    Treatment Plan: Unchanged    Discharge Plan: Strategies Discussed to Maintain Gains    Next Appointment: 3-17-21  Kenna Brown LCSW  
1043 by Oppy, Tara, LPN  Outcome: Progressing  Goal: Hemodynamic stability and optimal renal function maintained  4/24/2024 2358 by Rosmery Fontaine RN  Outcome: Progressing  4/24/2024 1043 by Tara Ronquillo LPN  Outcome: Progressing  Goal: Glucose maintained within prescribed range  4/24/2024 2358 by Rosmery Fontaine RN  Outcome: Progressing  4/24/2024 1043 by Tara Ronquillo LPN  Outcome: Progressing     Problem: Hematologic - Adult  Goal: Maintains hematologic stability  4/24/2024 2358 by Rosmery Fontaine RN  Outcome: Progressing  4/24/2024 1043 by Tara Ronquillo LPN  Outcome: Progressing     
2150 by Sacro, Rosmery, RN  Outcome: Progressing  Goal: Absence of infection during hospitalization  4/27/2024 1118 by Joan Mendiola LPN  Outcome: Progressing  4/26/2024 2150 by Rosmery Fontaine RN  Outcome: Progressing     Problem: Metabolic/Fluid and Electrolytes - Adult  Goal: Electrolytes maintained within normal limits  4/27/2024 1118 by Joan Mendiola LPN  Outcome: Progressing  4/26/2024 2150 by Rosmery Fontaine RN  Outcome: Progressing  Goal: Hemodynamic stability and optimal renal function maintained  Outcome: Progressing  Goal: Glucose maintained within prescribed range  Outcome: Progressing     Problem: Hematologic - Adult  Goal: Maintains hematologic stability  4/27/2024 1118 by Joan Mendiola LPN  Outcome: Progressing  4/26/2024 2150 by Rosmery Fontaine RN  Outcome: Progressing     
Makori, Suresh, RN  Outcome: Progressing     Problem: Metabolic/Fluid and Electrolytes - Adult  Goal: Electrolytes maintained within normal limits  4/28/2024 1047 by Joan Mendiola LPN  Outcome: Progressing  4/27/2024 2226 by Suresh Calzada RN  Outcome: Progressing  Goal: Hemodynamic stability and optimal renal function maintained  4/28/2024 1047 by Joan Mendiola LPN  Outcome: Progressing  4/27/2024 2226 by Suresh Calzada RN  Outcome: Progressing  Goal: Glucose maintained within prescribed range  4/28/2024 1047 by Joan Mendiola LPN  Outcome: Progressing  4/27/2024 2226 by Suresh Calzada RN  Outcome: Progressing     Problem: Hematologic - Adult  Goal: Maintains hematologic stability  4/28/2024 1047 by Joan Mendiola LPN  Outcome: Progressing  4/27/2024 2226 by Suresh Calzada RN  Outcome: Progressing

## 2024-04-29 NOTE — DISCHARGE SUMMARY
No hydronephrosis  No renal stones. URINARY BLADDER: Normal. Suprapubic catheter in the urinary bladder. REPRODUCTIVE ORGANS: No mass BOWEL: Normal caliber.  Normal appendix. LYMPH NODES: Mild bilateral inguinal lymphadenopathy is identified. PERITONEUM/RETROPERITONEUM: No ascites or free air. VESSELS: Aorta normal caliber. ABDOMINAL WALL: There is anasarca. BONES: No destructive process.     1. Anasarca. Nonspecific bilateral inguinal lymphadenopathy. Electronically signed by Ridge Roth DO    XR CHEST PORTABLE    Result Date: 4/22/2024  EXAM: PORTABLE AP CHEST X-RAY, 4/22/2024 3:31 PM INDICATION: sob Reason for exam:->sob COMPARISON: 10/12/2023 FINDINGS: Trachea is midline. Cardiomegaly Lungs are clear without active pulmonary opacities or effusion.     1. No acute cardiopulmonary abnormalities 2. No interval changes.      CBC: No results for input(s): \"WBC\", \"HGB\", \"PLT\" in the last 72 hours.  BMP:    Recent Labs     04/26/24  1147 04/27/24  0042 04/28/24  0917    137 137   K 3.7 3.6 3.8   CL 99 97* 95*   CO2 27 28 30   BUN 69* 69* 62*   CREATININE 3.6* 3.6* 3.6*   GLUCOSE 135* 147* 75       Lipids:   Lab Results   Component Value Date/Time    CHOL 102 04/21/2023 10:48 AM    CHOL 145 11/04/2021 12:00 AM    HDL 61 04/21/2023 10:48 AM    TRIG 71 04/21/2023 10:48 AM     Hemoglobin A1C:   Lab Results   Component Value Date/Time    LABA1C 5.6 10/10/2023 04:45 PM       Troponin:   Lab Results   Component Value Date/Time    TROPONINT <0.010 05/10/2023 03:57 PM    TROPONINT <0.010 08/16/2021 07:04 AM    TROPONINT <0.010 02/27/2015 11:16 PM       UA:  Lab Results   Component Value Date/Time    NITRU POSITIVE 04/22/2024 04:04 PM    NITRU POSITIVE 11/19/2012 04:00 PM    COLORU YELLOW 04/22/2024 04:04 PM    WBCUA 156 04/22/2024 04:04 PM    WBCUA 0-5 03/24/2022 07:09 AM    RBCUA 23 04/22/2024 04:04 PM    MUCUS RARE 10/10/2023 08:12 AM    TRICHOMONAS NONE SEEN 04/22/2024 04:04 PM    BACTERIA NEGATIVE 04/22/2024 04:04

## 2024-04-29 NOTE — CARE COORDINATION
[facility] Liaison aware of discharge & will initiate [facility]. Verified address, pcp and number. Pt good with co-pay of $75 for 2 days atbs. Delivery after 5p tonight and [facility] will start tomorrow 4/30/2024. [facility] (Katharine) & [facility] (Katharine) aware of plan.

## 2024-04-29 NOTE — CARE COORDINATION
Reviewed chart, discussed in IDR plan is home today with iv atb.  PS to Samira BENITEZ to see if [facility] and [facility] can take pt.

## 2024-04-29 NOTE — PROGRESS NOTES
V2.0    [facility] Progress Note      Name:  Gus Garza /Age/Sex: 1980  (44 y.o. female)   MRN & CSN:  [id number] & [id number] Encounter Date/Time: 2024 10:50 AM EDT   Location:  [address] [place] [address] PCP: Lesly Parekh MD     Attending:Kain Hough MD       Hospital Day: 4    Assessment and Recommendations   Gus Garza is a 44 y.o. female who presents with UTI (urinary tract infection)      Plan:     UTI, suprapubic catheter dysfunction  Acute volume overload in the setting of acute on chronic diastolic heart failure, acute hypoxia  CKD 5  Shortness of breath, hypoxia  CAD status post stent  Diabetes mellitus     Started on ceftriaxone.  However urine cultures with Pseudomonas, transition from ceftriaxone to cefepime now.  Plan to complete total 5-day course.  Urology consult: Recommended clamping suprapubic catheter and void with high residuals.  Plan to follow-up in office within 3 weeks for suprapubic catheter exchange.    CT abdomen pelvis notable for anasarca.  Some nonspecific bilateral inguinal lymphadenopathy is noted and will need oncology follow-up as outpatient.  Elevated proBNP and bilateral lower extremity edema noted.  Started on IV Lasix 40 mg twice daily.  Weaned off supplemental oxygen.  Echo without reduced EF.  IVC still dilated without  collapsibility, and with edema suggestive of state of volume overload indicating need for continued diuresis.  Strict ins and outs and daily weights.  Nephrology consulted, appreciate recommendations on diuretics..  Monitor daily BMPs.  Blood sugar improved with increase in basal bolus regimen...  Park glucose checks and hypoglycemia protocol ordered.      Diet ADULT DIET; Regular; 4 carb choices (60 gm/meal); Low Potassium (Less than 3000 mg/day); 1500 ml   DVT Prophylaxis [x] Lovenox, []  Heparin, [] SCDs, [] Ambulation,  [] Eliquis, [] Xarelto  [] Coumadin   Code Status Full Code   Disposition From: Home  Expected Disposition: 
    V2.0    [facility] [place]      Name:  Gus Garza /Age/Sex: 1980  (44 y.o. female)   MRN & CSN:  [id number] & [id number] Encounter Date/Time: 2024 10:50 AM EDT   Location:  [address] [place], [place] [address] PCP: Lesly Parekh MD     Attending:Kain Hough MD       Hospital Day: 2    Assessment and Recommendations   Gus Garza is a 44 y.o. female who presents with UTI (urinary tract infection)      Plan:     UTI, suprapubic catheter dysfunction  Acute volume overload in the setting of acute on chronic diastolic heart failure, acute hypoxia  CKD 5  Shortness of breath, hypoxia  CAD status post stent  Diabetes mellitus     Started on ceftriaxone.  Follow results of the urine culture.  Urology consult: Recommended clamping suprapubic catheter and void with high residuals.  Plan to follow-up in office within 3 weeks for suprapubic catheter exchange.  Obtain CT abdomen pelvis without contrast for bilateral flank pain which is persistent.  Elevated proBNP and bilateral lower extremity edema noted.  Started on IV Lasix 40 mg twice daily.  Wean nasal Oxygen as tolerated.  Strict ins and outs and daily weights.  Nephrology consulted, appreciate recommendations on diuretics..  Monitor daily BMPs.  Continue home basal regimen with sliding scale insulin.  For glucose checks and hypoglycemia protocol ordered.      Diet ADULT DIET; Regular; 4 carb choices (60 gm/meal); Low Potassium (Less than 3000 mg/day); 1500 ml   DVT Prophylaxis [] Lovenox, []  Heparin, [] SCDs, [] Ambulation,  [] Eliquis, [] Xarelto  [] Coumadin   Code Status Full Code   Disposition From: Home  Expected Disposition: Home  Estimated Date of Discharge: 2 to 3 days  Patient requires continued admission due to volume overload   Surrogate Decision Maker/ POA  mmother     Personally reviewed Lab Studies and Imaging     EKG interpreted personally and results sinus rhythm no ST-T changes     Imaging that was interpreted personally includes chest x-ray 
    V2.0    [facility] [place]      Name:  Gus Garza /Age/Sex: 1980  (44 y.o. female)   MRN & CSN:  [id number] & [id number] Encounter Date/Time: 2024 10:50 AM EDT   Location:  [address] [place] [address] PCP: Lesly Parekh MD     Attending:Kain Hough MD       Hospital Day: 3    Assessment and Recommendations   Gus Garza is a 44 y.o. female who presents with UTI (urinary tract infection)      Plan:     UTI, suprapubic catheter dysfunction  Acute volume overload in the setting of acute on chronic diastolic heart failure, acute hypoxia  CKD 5  Shortness of breath, hypoxia  CAD status post stent  Diabetes mellitus     Started on ceftriaxone.  Follow results of the urine culture.  Urology consult: Recommended clamping suprapubic catheter and void with high residuals.  Plan to follow-up in office within 3 weeks for suprapubic catheter exchange.    CT abdomen pelvis notable for anasarca.  Some nonspecific bilateral inguinal lymphadenopathy is noted and will need oncology follow-up as outpatient.  Elevated proBNP and bilateral lower extremity edema noted.  Started on IV Lasix 40 mg twice daily.  Wean nasal Oxygen as tolerated.  Echo pending.  Strict ins and outs and daily weights.  Nephrology consulted, appreciate recommendations on diuretics..  Monitor daily BMPs.  Increase home basal bolus regimen due to hyperglycemia..  For glucose checks and hypoglycemia protocol ordered.      Diet ADULT DIET; Regular; 4 carb choices (60 gm/meal); Low Potassium (Less than 3000 mg/day); 1500 ml   DVT Prophylaxis [] Lovenox, []  Heparin, [] SCDs, [] Ambulation,  [] Eliquis, [] Xarelto  [] Coumadin   Code Status Full Code   Disposition From: Home  Expected Disposition: Home  Estimated Date of Discharge: 2 to 3 days  Patient requires continued admission due to volume overload   Surrogate Decision Maker/ POA  mmother     Personally reviewed Lab Studies and Imaging     EKG interpreted personally and results sinus rhythm no 
    V2.0    [facility] [place] Progress Note      Name:  Gus Garza /Age/Sex: 1980  (44 y.o. female)   MRN & CSN:  [id number] & [id number] Encounter Date/Time: 2024 10:50 AM EDT   Location:  [address] [place], [place] [address] PCP: Lesly Parekh MD     Attending:Kain Hough MD       Hospital Day: 7    Assessment and Recommendations   Gus Garza is a 44 y.o. female who presents with UTI (urinary tract infection)      Plan:     UTI, suprapubic catheter dysfunction  Acute volume overload in the setting of acute on chronic diastolic heart failure, acute hypoxia  CKD 5  Shortness of breath, hypoxia  CAD status post stent  Diabetes mellitus     Started on ceftriaxone.  However urine cultures with Pseudomonas, transitioned from ceftriaxone to cefepime now.  Unable to transition to oral ciprofloxacin or Levaquin due to prolonged QTc.  tunneled PICC line in place, social work notified of IV antibiotics plan to complete total 7-day course.  Urology consult: Recommended clamping suprapubic catheter and void with high residuals.  Plan to follow-up in office within 3 weeks for suprapubic catheter exchange.    CT abdomen pelvis notable for anasarca.  Some nonspecific bilateral inguinal lymphadenopathy is noted and will need oncology follow-up as outpatient.  Elevated proBNP and bilateral lower extremity edema noted.  Started on IV Lasix 40 mg twice daily.  Weaned off supplemental oxygen.  Echo without reduced EF.  IVC still dilated without  collapsibility, and with edema suggestive of state of volume overload.  Now transitioned to oral torsemide.  Strict ins and outs and daily weights.  Nephrology consulted, appreciate recommendations on diuretics..  Monitor daily BMPs.  Blood sugar improved with increase in basal bolus regimen... Block glucose checks and hypoglycemia protocol ordered.        Diet ADULT DIET; Regular; 4 carb choices (60 gm/meal); Low Potassium (Less than 3000 mg/day); 1500 ml   DVT Prophylaxis [x] Lovenox, []  
   04/23/24 1727   Encounter Summary   Encounter Overview/Reason  Attempted Encounter   Service Provided For: Patient not available   Referral/Consult From: [facility]   Support System Parent;Children   Last Encounter  04/23/24  (Patient was not available at the time of visit. Patient was in the middle of nursing care.)   Complexity of Encounter Low   Begin Time 1030   End Time  1035   Total Time Calculated 5 min   Assessment/Intervention/Outcome   Assessment Unable to assess   Intervention Sustaining Presence/Ministry of presence   Plan and Referrals   Plan/Referrals Continue Support (comment)       
4 Eyes Skin Assessment     NAME:  Gus Garza  YOB: 1980  MEDICAL RECORD NUMBER:  [id number]    The patient is being assessed for  Admission    I agree that at least one RN has performed a thorough Head to Toe Skin Assessment on the patient. ALL assessment sites listed below have been assessed.      Areas assessed by both nurses:    Head, Face, Ears, Shoulders, Back, Chest, Arms, Elbows, Hands, Sacrum. Buttock, Coccyx, Ischium, and Legs. Feet and Heels        Does the Patient have a Wound? No noted wound(s)       Saleem Prevention initiated by RN: Yes  Wound Care Orders initiated by RN: No    Pressure Injury (Stage 3,4, Unstageable, DTI, NWPT, and Complex wounds) if present, place Wound referral order by RN under : No    New Ostomies, if present place, Ostomy referral order under : No     Nurse 1 eSignature: Electronically signed by Cici Malik LPN on 4/22/24 at 7:22 PM EDT    **SHARE this note so that the co-signing nurse can place an eSignature**    Nurse 2 eSignature: {Esignature:[id number]}   
4 Eyes Skin Assessment     NAME:  Gus Garza  YOB: 1980  MEDICAL RECORD NUMBER:  [id number]    The patient is being assessed for  Admission    I agree that at least one RN has performed a thorough Head to Toe Skin Assessment on the patient. ALL assessment sites listed below have been assessed.      Areas assessed by both nurses:    Head, Face, Ears, Shoulders, Back, Chest, Arms, Elbows, Hands, Sacrum. Buttock, Coccyx, Ischium, Legs. Feet and Heels, and Under Medical Devices         Does the Patient have a Wound? No noted wound(s)       Saleem Prevention initiated by RN: Yes  Wound Care Orders initiated by RN: No    Pressure Injury (Stage 3,4, Unstageable, DTI, NWPT, and Complex wounds) if present, place Wound referral order by RN under : No    New Ostomies, if present place, Ostomy referral order under : No     Nurse 1 eSignature: Electronically signed by Gaby Rodriguez LPN on 4/22/24 at 7:29 PM EDT    **SHARE this note so that the co-signing nurse can place an eSignature**    Nurse 2 eSignature: {Esignature:[id number]}    
IR PREPROCEDURE NOTE    DATE:  4/27/24    HISTOR\Y:  TUNNELED CENTRAL VENOUS ACCESS NEEDED FOR OUTPT MEDICAL MGMT/IV ANTIBIOTICS    PROCEDURE:  TUNNELED PICC LINE PLACEMENT    PROVIDER:  PANDA GONSALES DO    EXPECTED ANESTHESIA:  LOCAL    PANDA GONSALES DO  
Nephrology Progress Note        [address] [facility], [address]  [place] [address]  Phone: [phone]  Office Hours: 8:30AM - 4:30PM  Monday - Friday 4/25/2024 8:15 AM  Subjective:   Admit Date: 4/22/2024  PCP: Lesly Parekh MD  Interval History:   Tired  2L Urine yesterday    Diet: ADULT DIET; Regular; 4 carb choices (60 gm/meal); Low Potassium (Less than 3000 mg/day); 1500 ml      Data:   Scheduled Meds:   epoetin home-epbx  10,000 Units SubCUTAneous Once per day on Mon Wed Fri    iron sucrose  100 mg IntraVENous Q24H    insulin glargine  6 Units SubCUTAneous Nightly    insulin lispro  3 Units SubCUTAneous TID WC    furosemide  40 mg IntraVENous BID    amLODIPine  5 mg Oral BID    aspirin  81 mg Oral Daily    busPIRone  5 mg Oral BID    citalopram  40 mg Oral Daily    ferrous gluconate  324 mg Oral Every Other Day    hydrALAZINE  100 mg Oral TID    metoprolol tartrate  25 mg Oral BID    rosuvastatin  10 mg Oral Nightly    sevelamer  800 mg Oral TID WC    insulin lispro  0-4 Units SubCUTAneous TID WC    insulin lispro  0-4 Units SubCUTAneous Nightly    sodium chloride flush  5-40 mL IntraVENous 2 times per day    enoxaparin  30 mg SubCUTAneous Daily    cefTRIAXone (ROCEPHIN) IV  1,000 mg IntraVENous Q24H     Continuous Infusions:   sodium chloride      dextrose       PRN Meds:sodium chloride flush, sodium chloride, ondansetron **OR** ondansetron, polyethylene glycol, acetaminophen **OR** acetaminophen, glucose, dextrose bolus **OR** dextrose bolus, glucagon (rDNA), dextrose  I/O last 3 completed shifts:  In: 140 [P.O.:120; I.V.:20]  Out: 3125 [Urine:3125]  No intake/output data recorded.    Intake/Output Summary (Last 24 hours) at 4/25/2024 0815  Last data filed at 4/25/2024 0545  Gross per 24 hour   Intake --   Output 2325 ml   Net -2325 ml       CBC:   Recent Labs     04/22/24  1535 04/23/24  0837 04/24/24  0730   WBC 6.8 5.9 9.7   HGB 7.5* 7.2* 6.9*    293 290       BMP:    Recent 
Nephrology Progress Note        [address] [facility], [address] [place] [address]  Phone: [phone]  Office Hours: 8:30AM - 4:30PM  Monday - Friday 4/28/2024 8:50 AM  Subjective:   Admit Date: 4/22/2024  PCP: Lesly Parekh MD  Interval History:   Good UOP  On room air    Diet: ADULT DIET; Regular; 4 carb choices (60 gm/meal); Low Potassium (Less than 3000 mg/day); 1500 ml      Data:   Scheduled Meds:   torsemide  20 mg Oral BID    cefepime  1,000 mg IntraVENous Q24H    epoetin home-epbx  10,000 Units SubCUTAneous Once per day on Mon Wed Fri    iron sucrose  100 mg IntraVENous Q24H    insulin glargine  6 Units SubCUTAneous Nightly    insulin lispro  3 Units SubCUTAneous TID WC    amLODIPine  5 mg Oral BID    aspirin  81 mg Oral Daily    busPIRone  5 mg Oral BID    citalopram  40 mg Oral Daily    ferrous gluconate  324 mg Oral Every Other Day    hydrALAZINE  100 mg Oral TID    metoprolol tartrate  25 mg Oral BID    rosuvastatin  10 mg Oral Nightly    sevelamer  800 mg Oral TID WC    insulin lispro  0-4 Units SubCUTAneous TID WC    insulin lispro  0-4 Units SubCUTAneous Nightly    sodium chloride flush  5-40 mL IntraVENous 2 times per day    enoxaparin  30 mg SubCUTAneous Daily     Continuous Infusions:   sodium chloride 5 mL/hr at 04/27/24 1253    dextrose       PRN Meds:sodium chloride flush, sodium chloride, ondansetron **OR** ondansetron, polyethylene glycol, acetaminophen **OR** acetaminophen, glucose, dextrose bolus **OR** dextrose bolus, glucagon (rDNA), dextrose  I/O last 3 completed shifts:  In: 20 [I.V.:20]  Out: 4000 [Urine:4000]  No intake/output data recorded.    Intake/Output Summary (Last 24 hours) at 4/28/2024 0850  Last data filed at 4/28/2024 0638  Gross per 24 hour   Intake 20 ml   Output 2050 ml   Net -2030 ml       CBC:   Recent Labs     04/25/24  1022   WBC 9.2   HGB 7.3*          BMP:    Recent Labs     04/25/24  1022 04/26/24  1147 04/27/24  0042    137 137 
Nephrology Progress Note        [address] [facility], [address]  [place] [address]  Phone: [phone]  Office Hours: 8:30AM - 4:30PM  Monday - Friday 4/24/2024 8:35 AM  Subjective:   Admit Date: 4/22/2024  PCP: Lesly Parekh MD  Interval History:   On NC  Doing ok  Reports arms are less swollen    Diet: ADULT DIET; Regular; 4 carb choices (60 gm/meal); Low Potassium (Less than 3000 mg/day); 1500 ml      Data:   Scheduled Meds:   insulin glargine  6 Units SubCUTAneous Nightly    insulin lispro  3 Units SubCUTAneous TID WC    furosemide  40 mg IntraVENous BID    amLODIPine  5 mg Oral BID    aspirin  81 mg Oral Daily    busPIRone  5 mg Oral BID    citalopram  40 mg Oral Daily    ferrous gluconate  324 mg Oral Every Other Day    hydrALAZINE  100 mg Oral TID    metoprolol tartrate  25 mg Oral BID    rosuvastatin  10 mg Oral Nightly    sevelamer  800 mg Oral TID WC    insulin lispro  0-4 Units SubCUTAneous TID WC    insulin lispro  0-4 Units SubCUTAneous Nightly    sodium chloride flush  5-40 mL IntraVENous 2 times per day    enoxaparin  30 mg SubCUTAneous Daily    cefTRIAXone (ROCEPHIN) IV  1,000 mg IntraVENous Q24H     Continuous Infusions:   sodium chloride      dextrose       PRN Meds:sodium chloride flush, sodium chloride, ondansetron **OR** ondansetron, polyethylene glycol, acetaminophen **OR** acetaminophen, glucose, dextrose bolus **OR** dextrose bolus, glucagon (rDNA), dextrose  I/O last 3 completed shifts:  In: 420 [P.O.:400; I.V.:20]  Out: 2650 [Urine:2650]  No intake/output data recorded.    Intake/Output Summary (Last 24 hours) at 4/24/2024 0835  Last data filed at 4/24/2024 0615  Gross per 24 hour   Intake 420 ml   Output 1450 ml   Net -1030 ml       CBC:   Recent Labs     04/22/24  1535 04/23/24  0837   WBC 6.8 5.9   HGB 7.5* 7.2*    293       BMP:    Recent Labs     04/22/24  1535 04/23/24  0837   * 137   K 3.5 3.9   CL 96* 99   CO2 21 20*   BUN 55* 65*   CREATININE 3.4* 
Nephrology Progress Note        [address] [facility], [address]  [place] [address]  Phone: [phone]  Office Hours: 8:30AM - 4:30PM  Monday - Friday 4/26/2024 8:04 AM  Subjective:   Admit Date: 4/22/2024  PCP: Lesly Parekh MD  Interval History:   On room air today  Very good UOP  No leg edema today    Diet: ADULT DIET; Regular; 4 carb choices (60 gm/meal); Low Potassium (Less than 3000 mg/day); 1500 ml      Data:   Scheduled Meds:   cefepime  1,000 mg IntraVENous Q24H    epoetin home-epbx  10,000 Units SubCUTAneous Once per day on Mon Wed Fri    iron sucrose  100 mg IntraVENous Q24H    insulin glargine  6 Units SubCUTAneous Nightly    insulin lispro  3 Units SubCUTAneous TID WC    furosemide  40 mg IntraVENous BID    amLODIPine  5 mg Oral BID    aspirin  81 mg Oral Daily    busPIRone  5 mg Oral BID    citalopram  40 mg Oral Daily    ferrous gluconate  324 mg Oral Every Other Day    hydrALAZINE  100 mg Oral TID    metoprolol tartrate  25 mg Oral BID    rosuvastatin  10 mg Oral Nightly    sevelamer  800 mg Oral TID WC    insulin lispro  0-4 Units SubCUTAneous TID WC    insulin lispro  0-4 Units SubCUTAneous Nightly    sodium chloride flush  5-40 mL IntraVENous 2 times per day    enoxaparin  30 mg SubCUTAneous Daily     Continuous Infusions:   sodium chloride      dextrose       PRN Meds:sodium chloride flush, sodium chloride, ondansetron **OR** ondansetron, polyethylene glycol, acetaminophen **OR** acetaminophen, glucose, dextrose bolus **OR** dextrose bolus, glucagon (rDNA), dextrose  I/O last 3 completed shifts:  In: 240 [P.O.:240]  Out: 3250 [Urine:3250]  No intake/output data recorded.    Intake/Output Summary (Last 24 hours) at 4/26/2024 0804  Last data filed at 4/26/2024 0244  Gross per 24 hour   Intake 240 ml   Output 2050 ml   Net -1810 ml       CBC:   Recent Labs     04/23/24  0837 04/24/24  0730 04/25/24  1022   WBC 5.9 9.7 9.2   HGB 7.2* 6.9* 7.3*    290 307       BMP:  
Outpatient Pharmacy Progress Note for Meds-to-Beds    Total number of Prescriptions Filled: 0    Additional Documentation:  The following prescription(s) were refilled to soon per insurance (patient has some at home): Torsemide       Thank you for letting us serve your patients.  [facility] - [place] [id number]    Phone: [phone]    Fax: [phone]        
TRANSFER - OUT REPORT:    Verbal report given to Joan MENDEZ(name) on Gus Garza being transferred to [facility](unit) for routine post-op       Report consisted of patient's Situation, Background, Assessment and   Recommendations(SBAR).     Information from the following report(s) Nurse Handoff Report was reviewed with the receiving nurse.    Opportunity for questions and clarification was provided.      Patient transported with:   Tech    
   137 137   K 3.9 3.7 3.6   CL 97* 99 97*   CO2 21 27 28   BUN 74* 69* 69*   CREATININE 3.8* 3.6* 3.6*   GLUCOSE 137* 135* 147*   CALCIUM 8.3 8.5 8.4         Objective:   Vitals: BP (!) 142/66   Pulse 57   Temp 97.7 °F (36.5 °C) (Oral)   Resp 18   Ht 1.524 m (5')   Wt 77.2 kg (170 lb 3.1 oz)   LMP 04/18/2015   SpO2 94%   BMI 33.24 kg/m²   General appearance: alert and cooperative with exam, in no acute distress  HEENT: normocephalic, atraumatic,   Neck: supple, trachea midline  Lungs: breathing comfortably on room air  Extremities: extremities atraumatic, no cyanosis or edema  Neurologic: alert, oriented, follows commands, interactive    MEDICAL DECISION MAKING     -Hypervolemia  -Elevated probnp  -Acute on chronic diastolic CHF  -BLE edema  -CKD4  -HTN  -UTI  -Malfunction of Chronic suprapubic cath  -Anemia from iron deficiency and renal dz        Suggest:  -Cr stable at 3.6, nonoliguric   -Continue torsemide 20mg po BID here and on dc  -Continue venofer and retacrit for the anemia mgmt  -Avoid nephrotoxins  -Monitor UOP                     Electronically signed by Earnest Roca DO on 4/27/2024 at 8:53 AM    [facility]  MD JEFF POSEY DO  [address] [facility],  [address]  [place] [address]  PHONE: [phone]  FAX: [phone]       
97* 95*   CO2 27 28 30   BUN 69* 69* 62*   CREATININE 3.6* 3.6* 3.6*   GLUCOSE 135* 147* 75   CALCIUM 8.5 8.4 8.8       Objective:   Vitals: /61   Pulse 59   Temp 97.3 °F (36.3 °C)   Resp 16   Ht 1.524 m (5')   Wt 77.2 kg (170 lb 3.1 oz)   LMP 04/18/2015   SpO2 94%   BMI 33.24 kg/m²   General appearance: alert and cooperative with exam, in no acute distress  HEENT: normocephalic, atraumatic,   Neck: supple, trachea midline  Lungs: breathing comfortably on room air  Abdomen: soft, non-tender; non distended,  Extremities: extremities atraumatic, no cyanosis or edema  Neurologic: alert, oriented, follows commands, interactive    MEDICAL DECISION MAKING     -Hypervolemia; resolved with diuresis  -Elevated probnp  -Acute on chronic diastolic CHF  -BLE edema  -CKD4  -HTN  -UTI; will need iv abx on dc  -Malfunction of Chronic suprapubic cath  -Anemia from iron deficiency and renal dz        Suggest:  -Cr stable at 3.6, nonoliguric   -Continue torsemide 20mg po BID here and on dc for 7 days then go down to 20mg daily  -Continue venofer and retacrit for the anemia mgmt while inpt.  She can continue her oral iron and her darbopoetin as outpt  -Avoid nephrotoxins  -Monitor UOP                        Electronically signed by Earnest Roca DO on 4/29/2024 at 7:39 AM    [facility]  MD JEFF POSEY DO  [address] [facility],  [address]  [place] [address]  PHONE: [phone]  FAX: [phone]       
bilaterally  Neurologic: Alert and oriented to time place and person.  Strength 5 out of 5 in bilateral upper and lower extremities.  Sensation to light touch is intact and equal in bilateral upper and lower extremities.        Medications:   Medications:    torsemide  20 mg Oral BID    cefepime  1,000 mg IntraVENous Q24H    epoetin home-epbx  10,000 Units SubCUTAneous Once per day on Mon Wed Fri    iron sucrose  100 mg IntraVENous Q24H    insulin glargine  6 Units SubCUTAneous Nightly    insulin lispro  3 Units SubCUTAneous TID     amLODIPine  5 mg Oral BID    aspirin  81 mg Oral Daily    busPIRone  5 mg Oral BID    citalopram  40 mg Oral Daily    ferrous gluconate  324 mg Oral Every Other Day    hydrALAZINE  100 mg Oral TID    metoprolol tartrate  25 mg Oral BID    rosuvastatin  10 mg Oral Nightly    sevelamer  800 mg Oral TID WC    insulin lispro  0-4 Units SubCUTAneous TID WC    insulin lispro  0-4 Units SubCUTAneous Nightly    sodium chloride flush  5-40 mL IntraVENous 2 times per day    enoxaparin  30 mg SubCUTAneous Daily      Infusions:    sodium chloride 5 mL/hr at 04/27/24 1253    dextrose       PRN Meds: sodium chloride flush, 5-40 mL, PRN  sodium chloride, , PRN  ondansetron, 4 mg, Q8H PRN   Or  ondansetron, 4 mg, Q6H PRN  polyethylene glycol, 17 g, Daily PRN  acetaminophen, 650 mg, Q6H PRN   Or  acetaminophen, 650 mg, Q6H PRN  glucose, 4 tablet, PRN  dextrose bolus, 125 mL, PRN   Or  dextrose bolus, 250 mL, PRN  glucagon (rDNA), 1 mg, PRN  dextrose, , Continuous PRN        Labs and Imaging   XR CHEST PORTABLE    Result Date: 4/22/2024  EXAM: PORTABLE AP CHEST X-RAY, 4/22/2024 3:31 PM INDICATION: sob Reason for exam:->sob COMPARISON: 10/12/2023 FINDINGS: Trachea is midline. Cardiomegaly Lungs are clear without active pulmonary opacities or effusion.     1. No acute cardiopulmonary abnormalities 2. No interval changes.      CBC:   Recent Labs     04/25/24  1022   WBC 9.2   HGB 7.3*          BMP:  
  BILITOT 0.5   ALKPHOS 180*       Lipids:   Lab Results   Component Value Date/Time    CHOL 102 04/21/2023 10:48 AM    CHOL 145 11/04/2021 12:00 AM    HDL 61 04/21/2023 10:48 AM    TRIG 71 04/21/2023 10:48 AM     Hemoglobin A1C:   Lab Results   Component Value Date/Time    LABA1C 5.6 10/10/2023 04:45 PM       Troponin:   Lab Results   Component Value Date/Time    TROPONINT <0.010 05/10/2023 03:57 PM    TROPONINT <0.010 08/16/2021 07:04 AM    TROPONINT <0.010 02/27/2015 11:16 PM       BNP:   Recent Labs     04/22/24  1535   PROBNP 34,075*       UA:  Lab Results   Component Value Date/Time    NITRU POSITIVE 04/22/2024 04:04 PM    NITRU POSITIVE 11/19/2012 04:00 PM    COLORU YELLOW 04/22/2024 04:04 PM    WBCUA 156 04/22/2024 04:04 PM    WBCUA 0-5 03/24/2022 07:09 AM    RBCUA 23 04/22/2024 04:04 PM    MUCUS RARE 10/10/2023 08:12 AM    TRICHOMONAS NONE SEEN 04/22/2024 04:04 PM    BACTERIA NEGATIVE 04/22/2024 04:04 PM    CLARITYU CLEAR 04/22/2024 04:04 PM    SPECGRAV 1.020 04/22/2024 04:04 PM    LEUKOCYTESUR MODERATE NUMBER OR AMOUNT OBSERVED 04/22/2024 04:04 PM    UROBILINOGEN 0.2 04/22/2024 04:04 PM    BILIRUBINUR NEGATIVE 04/22/2024 04:04 PM    BLOODU SMALL NUMBER OR AMOUNT OBSERVED 04/22/2024 04:04 PM    GLUCOSEU 500 03/24/2022 07:09 AM    KETUA NEGATIVE 04/22/2024 04:04 PM    AMORPHOUS OCCASIONAL 06/23/2023 01:00 PM     Urine Cultures:   Lab Results   Component Value Date/Time    LABURIN SEE RESULTS BELOW 03/03/2022 12:12 PM           Electronically signed by Kain Hough MD on 4/25/2024 at 10:03 AM  
  No results for input(s): \"AST\", \"ALT\", \"ALB\", \"BILITOT\", \"ALKPHOS\" in the last 72 hours.    Lipids:   Lab Results   Component Value Date/Time    CHOL 102 04/21/2023 10:48 AM    CHOL 145 11/04/2021 12:00 AM    HDL 61 04/21/2023 10:48 AM    TRIG 71 04/21/2023 10:48 AM     Hemoglobin A1C:   Lab Results   Component Value Date/Time    LABA1C 5.6 10/10/2023 04:45 PM       Troponin:   Lab Results   Component Value Date/Time    TROPONINT <0.010 05/10/2023 03:57 PM    TROPONINT <0.010 08/16/2021 07:04 AM    TROPONINT <0.010 02/27/2015 11:16 PM       BNP:   No results for input(s): \"PROBNP\" in the last 72 hours.    UA:  Lab Results   Component Value Date/Time    NITRU POSITIVE 04/22/2024 04:04 PM    NITRU POSITIVE 11/19/2012 04:00 PM    COLORU YELLOW 04/22/2024 04:04 PM    WBCUA 156 04/22/2024 04:04 PM    WBCUA 0-5 03/24/2022 07:09 AM    RBCUA 23 04/22/2024 04:04 PM    MUCUS RARE 10/10/2023 08:12 AM    TRICHOMONAS NONE SEEN 04/22/2024 04:04 PM    BACTERIA NEGATIVE 04/22/2024 04:04 PM    CLARITYU CLEAR 04/22/2024 04:04 PM    SPECGRAV 1.020 04/22/2024 04:04 PM    LEUKOCYTESUR MODERATE NUMBER OR AMOUNT OBSERVED 04/22/2024 04:04 PM    UROBILINOGEN 0.2 04/22/2024 04:04 PM    BILIRUBINUR NEGATIVE 04/22/2024 04:04 PM    BLOODU SMALL NUMBER OR AMOUNT OBSERVED 04/22/2024 04:04 PM    GLUCOSEU 500 03/24/2022 07:09 AM    KETUA NEGATIVE 04/22/2024 04:04 PM    AMORPHOUS OCCASIONAL 06/23/2023 01:00 PM     Urine Cultures:   Lab Results   Component Value Date/Time    LABURIN SEE RESULTS BELOW 03/03/2022 12:12 PM           Electronically signed by Kain Hough MD on 4/26/2024 at 10:52 AM

## 2024-04-29 NOTE — DISCHARGE INSTRUCTIONS
Finish course of antibiotics.  Do not miss any doses.  Follow-up with urology within 2 weeks as previously discussed.     Urinary Tract Infection (UTI) in Women: Care Instructions  Overview     A urinary tract infection (UTI) is an infection caused by bacteria. It can happen anywhere in the urinary tract. A UTI can happen in the:  Kidneys.  Ureters, the tubes that connect the kidneys to the bladder.  Bladder.  Urethra, where the urine comes out.  Most UTIs are bladder infections. They often cause pain or burning when you urinate.  Most UTIs can be cured with antibiotics. If you are prescribed antibiotics, be sure to complete your treatment so that the infection does not get worse.  Follow-up care is a key part of your treatment and safety. Be sure to make and go to all appointments, and call your doctor if you are having problems. It's also a good idea to know your test results and keep a list of the medicines you take.  How can you care for yourself at home?  Take your antibiotics as directed. Do not stop taking them just because you feel better. You need to take the full course of antibiotics.  Drink extra water and other fluids for the next day or two. This will help make the urine less concentrated and help wash out the bacteria that are causing the infection. (If you have kidney, heart, or liver disease and have to limit fluids, talk with your doctor before you increase the amount of fluids you drink.)  Avoid drinks that are carbonated or have caffeine. They can irritate the bladder.  Urinate often. Try to empty your bladder each time.  To relieve pain, take a hot bath or lay a heating pad set on low over your lower belly or genital area. Never go to sleep with a heating pad in place.  To prevent UTIs  Drink plenty of water each day. This helps you urinate often, which clears bacteria from your system. (If you have kidney, heart, or liver disease and have to limit fluids, talk with your doctor before you

## 2024-05-02 ENCOUNTER — HOSPITAL ENCOUNTER (OUTPATIENT)
Dept: INFUSION THERAPY | Age: 44
Discharge: HOME | End: 2024-05-02
Payer: COMMERCIAL

## 2024-05-02 VITALS
DIASTOLIC BLOOD PRESSURE: 62 MMHG | SYSTOLIC BLOOD PRESSURE: 136 MMHG | BODY MASS INDEX: 33.24 KG/M2 | HEIGHT: 60 IN | HEART RATE: 57 BPM

## 2024-05-02 DIAGNOSIS — D63.1 ANEMIA DUE TO STAGE 4 CHRONIC KIDNEY DISEASE (HCC): Primary | ICD-10-CM

## 2024-05-02 DIAGNOSIS — N18.4 ANEMIA DUE TO STAGE 4 CHRONIC KIDNEY DISEASE (HCC): Primary | ICD-10-CM

## 2024-05-02 LAB
BASOPHILS ABSOLUTE: 0.1 K/CU MM
BASOPHILS RELATIVE PERCENT: 0.9 % (ref 0–1)
DIFFERENTIAL TYPE: ABNORMAL
EOSINOPHILS ABSOLUTE: 0.5 K/CU MM
EOSINOPHILS RELATIVE PERCENT: 6.1 % (ref 0–3)
HCT VFR BLD CALC: 31.3 % (ref 37–47)
HEMOGLOBIN: 9.5 GM/DL (ref 12.5–16)
LYMPHOCYTES ABSOLUTE: 1 K/CU MM
LYMPHOCYTES RELATIVE PERCENT: 12.4 % (ref 24–44)
MCH RBC QN AUTO: 29.8 PG (ref 27–31)
MCHC RBC AUTO-ENTMCNC: 30.4 % (ref 32–36)
MCV RBC AUTO: 98.1 FL (ref 78–100)
MONOCYTES ABSOLUTE: 0.7 K/CU MM
MONOCYTES RELATIVE PERCENT: 8.4 % (ref 0–4)
NEUTROPHILS ABSOLUTE: 5.6 K/CU MM
NEUTROPHILS RELATIVE PERCENT: 72.2 % (ref 36–66)
PDW BLD-RTO: 17.4 % (ref 11.7–14.9)
PLATELET # BLD: 389 K/CU MM (ref 140–440)
PMV BLD AUTO: 9 FL (ref 7.5–11.1)
RBC # BLD: 3.19 M/CU MM (ref 4.2–5.4)
WBC # BLD: 7.8 K/CU MM (ref 4–10.5)

## 2024-05-02 PROCEDURE — 85025 COMPLETE CBC W/AUTO DIFF WBC: CPT

## 2024-05-02 PROCEDURE — 96372 THER/PROPH/DIAG INJ SC/IM: CPT

## 2024-05-02 PROCEDURE — 6360000002 HC RX W HCPCS: Performed by: INTERNAL MEDICINE

## 2024-05-02 PROCEDURE — 36415 COLL VENOUS BLD VENIPUNCTURE: CPT

## 2024-05-02 RX ORDER — SODIUM CHLORIDE 9 MG/ML
INJECTION, SOLUTION INTRAVENOUS CONTINUOUS
OUTPATIENT
Start: 2024-05-16

## 2024-05-02 RX ORDER — FAMOTIDINE 10 MG/ML
20 INJECTION, SOLUTION INTRAVENOUS
OUTPATIENT
Start: 2024-05-16

## 2024-05-02 RX ORDER — ALBUTEROL SULFATE 90 UG/1
4 AEROSOL, METERED RESPIRATORY (INHALATION) PRN
OUTPATIENT
Start: 2024-05-16

## 2024-05-02 RX ORDER — ACETAMINOPHEN 325 MG/1
650 TABLET ORAL
OUTPATIENT
Start: 2024-05-16

## 2024-05-02 RX ORDER — EPINEPHRINE 1 MG/ML
0.3 INJECTION, SOLUTION, CONCENTRATE INTRAVENOUS PRN
OUTPATIENT
Start: 2024-05-16

## 2024-05-02 RX ORDER — ONDANSETRON 2 MG/ML
8 INJECTION INTRAMUSCULAR; INTRAVENOUS
OUTPATIENT
Start: 2024-05-16

## 2024-05-02 RX ORDER — DIPHENHYDRAMINE HYDROCHLORIDE 50 MG/ML
50 INJECTION INTRAMUSCULAR; INTRAVENOUS
OUTPATIENT
Start: 2024-05-16

## 2024-05-02 RX ADMIN — DARBEPOETIN ALFA 60 MCG: 60 INJECTION, SOLUTION INTRAVENOUS; SUBCUTANEOUS at 11:36

## 2024-05-02 NOTE — PROGRESS NOTES
Arrived to treatment suite from lab for scheduled Aranesp injection. Hgb 9.5.  B/P 136/62.  Pt denies concerns at this time.  Treatment plan approved, released and completed as ordered.  Pt tolerated well.  AVS provided.  Discharge ambulatory in stable condition.  Lesli Trent RN

## 2024-05-14 NOTE — PROGRESS NOTES
.IR Procedure at [facility]:  5/20/24 at 1000, arrival 0830    NPO at Midnight     (Paracentesis, Thoracentesis, Thyroid Bx and Injections may have a light breakfast)  2.   Follow your directions as prescribed by the doctor for your procedure and medications.  3.   Consult your provider as to when to stop blood thinner  4.   Do not take any pain medication within 6 hours of your procedure  5.   Do not drink any alcoholic beverages or use any street drugs 24 hours before procedure.  6.   Please wear simple, loose fitting clothing to the hospital.  Do not bring valuables (money,             credit cards, checkbooks, etc.)     7.   If you  have a Living Will and Durable Power of  for Healthcare, please bring in a copy.  8.   Please bring picture ID,  insurance card, paperwork from the doctors office            (H & P, Consent,  & card for implantable devices).  9.   Report to the information desk on the ground floor.  10. Take a shower the night before or morning of your procedure, do not apply any lotion, oil or powder.  11. If you are going to be sedated for the procedure, you will need a responsible adult to drive you home.

## 2024-05-15 ENCOUNTER — TELEPHONE (OUTPATIENT)
Dept: ONCOLOGY | Age: 44
End: 2024-05-15

## 2024-05-15 NOTE — TELEPHONE ENCOUNTER
This RN called patient to give her upcoming lung biopsy appointment information. Appointment is Wednesday 5/22/2024 arrive at [facility] at 1000 for an 1130 procedure. Patient informed she will be called closer to procedure date with further instructions from pre-testing nurse. Patient verbalizes understanding and has no further questions at this time. Appointment made for patient to see Dr. Grady 5/30/2024 (patient will already be at clinic that day)

## 2024-05-16 ENCOUNTER — HOSPITAL ENCOUNTER (OUTPATIENT)
Dept: INFUSION THERAPY | Age: 44
Discharge: HOME | End: 2024-05-16
Payer: COMMERCIAL

## 2024-05-16 DIAGNOSIS — D63.1 ANEMIA DUE TO STAGE 4 CHRONIC KIDNEY DISEASE (HCC): Primary | ICD-10-CM

## 2024-05-16 DIAGNOSIS — N18.4 ANEMIA DUE TO STAGE 4 CHRONIC KIDNEY DISEASE (HCC): Primary | ICD-10-CM

## 2024-05-16 LAB
BASOPHILS ABSOLUTE: 0.1 K/CU MM
BASOPHILS RELATIVE PERCENT: 0.9 % (ref 0–1)
DIFFERENTIAL TYPE: ABNORMAL
EOSINOPHILS ABSOLUTE: 0.5 K/CU MM
EOSINOPHILS RELATIVE PERCENT: 5.8 % (ref 0–3)
HCT VFR BLD CALC: 30.9 % (ref 37–47)
HEMOGLOBIN: 9.9 GM/DL (ref 12.5–16)
LYMPHOCYTES ABSOLUTE: 1 K/CU MM
LYMPHOCYTES RELATIVE PERCENT: 12.5 % (ref 24–44)
MCH RBC QN AUTO: 30.4 PG (ref 27–31)
MCHC RBC AUTO-ENTMCNC: 32 % (ref 32–36)
MCV RBC AUTO: 94.8 FL (ref 78–100)
MONOCYTES ABSOLUTE: 0.7 K/CU MM
MONOCYTES RELATIVE PERCENT: 9.3 % (ref 0–4)
NEUTROPHILS ABSOLUTE: 5.6 K/CU MM
NEUTROPHILS RELATIVE PERCENT: 71.5 % (ref 36–66)
PDW BLD-RTO: 15.4 % (ref 11.7–14.9)
PLATELET # BLD: 242 K/CU MM (ref 140–440)
PMV BLD AUTO: 9.1 FL (ref 7.5–11.1)
RBC # BLD: 3.26 M/CU MM (ref 4.2–5.4)
WBC # BLD: 7.8 K/CU MM (ref 4–10.5)

## 2024-05-16 PROCEDURE — 6360000002 HC RX W HCPCS: Performed by: INTERNAL MEDICINE

## 2024-05-16 PROCEDURE — 85025 COMPLETE CBC W/AUTO DIFF WBC: CPT

## 2024-05-16 PROCEDURE — 96372 THER/PROPH/DIAG INJ SC/IM: CPT

## 2024-05-16 PROCEDURE — 36415 COLL VENOUS BLD VENIPUNCTURE: CPT

## 2024-05-16 RX ORDER — ACETAMINOPHEN 325 MG/1
650 TABLET ORAL
OUTPATIENT
Start: 2024-05-30

## 2024-05-16 RX ORDER — ONDANSETRON 2 MG/ML
8 INJECTION INTRAMUSCULAR; INTRAVENOUS
OUTPATIENT
Start: 2024-05-30

## 2024-05-16 RX ORDER — EPINEPHRINE 1 MG/ML
0.3 INJECTION, SOLUTION, CONCENTRATE INTRAVENOUS PRN
OUTPATIENT
Start: 2024-05-30

## 2024-05-16 RX ORDER — ALBUTEROL SULFATE 90 UG/1
4 AEROSOL, METERED RESPIRATORY (INHALATION) PRN
OUTPATIENT
Start: 2024-05-30

## 2024-05-16 RX ORDER — FAMOTIDINE 10 MG/ML
20 INJECTION, SOLUTION INTRAVENOUS
OUTPATIENT
Start: 2024-05-30

## 2024-05-16 RX ORDER — DIPHENHYDRAMINE HYDROCHLORIDE 50 MG/ML
50 INJECTION INTRAMUSCULAR; INTRAVENOUS
OUTPATIENT
Start: 2024-05-30

## 2024-05-16 RX ORDER — SODIUM CHLORIDE 9 MG/ML
INJECTION, SOLUTION INTRAVENOUS CONTINUOUS
OUTPATIENT
Start: 2024-05-30

## 2024-05-16 RX ADMIN — DARBEPOETIN ALFA 60 MCG: 60 INJECTION, SOLUTION INTRAVENOUS; SUBCUTANEOUS at 13:42

## 2024-05-16 NOTE — PROGRESS NOTES
Patient arrived to treatment suite ffrom lab for Aranesp injection.  No questions or concerns for the doctor at this time.  Hgb 9.9.  Treatment approved and given subcutaneously in right arm, band-aid applied.  Patient tolerated well.  Left treatment suite ambulatory.  Discharge instructions provided.

## 2024-05-18 ENCOUNTER — HOSPITAL ENCOUNTER (OUTPATIENT)
Age: 44
Discharge: HOME | End: 2024-05-18
Payer: COMMERCIAL

## 2024-05-18 DIAGNOSIS — R80.9 MICROALBUMINURIA DUE TO TYPE 2 DIABETES MELLITUS (HCC): ICD-10-CM

## 2024-05-18 DIAGNOSIS — N18.4 ANEMIA IN STAGE 4 CHRONIC KIDNEY DISEASE (HCC): ICD-10-CM

## 2024-05-18 DIAGNOSIS — N18.5 CHRONIC KIDNEY DISEASE, STAGE V (HCC): ICD-10-CM

## 2024-05-18 DIAGNOSIS — D63.1 ANEMIA IN STAGE 4 CHRONIC KIDNEY DISEASE (HCC): ICD-10-CM

## 2024-05-18 DIAGNOSIS — N17.9 AKI (ACUTE KIDNEY INJURY) (HCC): ICD-10-CM

## 2024-05-18 DIAGNOSIS — E11.29 MICROALBUMINURIA DUE TO TYPE 2 DIABETES MELLITUS (HCC): ICD-10-CM

## 2024-05-18 LAB
ALBUMIN SERPL-MCNC: 4.4 GM/DL (ref 3.4–5)
ALBUMIN SERPL-MCNC: 4.4 GM/DL (ref 3.4–5)
ALP BLD-CCNC: 220 IU/L (ref 40–129)
ALP BLD-CCNC: 220 IU/L (ref 40–129)
ALT SERPL-CCNC: 26 U/L (ref 10–40)
ALT SERPL-CCNC: 26 U/L (ref 10–40)
ANION GAP SERPL CALCULATED.3IONS-SCNC: 12 MMOL/L (ref 7–16)
AST SERPL-CCNC: 42 IU/L (ref 15–37)
AST SERPL-CCNC: 42 IU/L (ref 15–37)
BILIRUB SERPL-MCNC: 0.4 MG/DL (ref 0–1)
BILIRUB SERPL-MCNC: 0.4 MG/DL (ref 0–1)
BILIRUBIN DIRECT: 0.2 MG/DL (ref 0–0.3)
BILIRUBIN, INDIRECT: 0.2 MG/DL (ref 0–0.7)
BUN SERPL-MCNC: 85 MG/DL (ref 6–23)
CALCIUM SERPL-MCNC: 9 MG/DL (ref 8.3–10.6)
CHLORIDE BLD-SCNC: 94 MMOL/L (ref 99–110)
CO2: 26 MMOL/L (ref 21–32)
CREAT SERPL-MCNC: 4.5 MG/DL (ref 0.6–1.1)
GFR, ESTIMATED: 12 ML/MIN/1.73M2
GLUCOSE SERPL-MCNC: 114 MG/DL (ref 70–99)
HCV AB SERPL QL IA: NON REACTIVE
PHOSPHORUS: 6.3 MG/DL (ref 2.5–4.9)
POTASSIUM SERPL-SCNC: 4.7 MMOL/L (ref 3.5–5.1)
SODIUM BLD-SCNC: 132 MMOL/L (ref 135–145)
TOTAL PROTEIN: 7.3 GM/DL (ref 6.4–8.2)
TOTAL PROTEIN: 7.3 GM/DL (ref 6.4–8.2)

## 2024-05-18 PROCEDURE — 83970 ASSAY OF PARATHORMONE: CPT

## 2024-05-18 PROCEDURE — 86803 HEPATITIS C AB TEST: CPT

## 2024-05-18 PROCEDURE — 84100 ASSAY OF PHOSPHORUS: CPT

## 2024-05-18 PROCEDURE — 80053 COMPREHEN METABOLIC PANEL: CPT

## 2024-05-18 PROCEDURE — 80076 HEPATIC FUNCTION PANEL: CPT

## 2024-05-18 PROCEDURE — 36415 COLL VENOUS BLD VENIPUNCTURE: CPT

## 2024-05-20 ENCOUNTER — HOSPITAL ENCOUNTER (OUTPATIENT)
Dept: INTERVENTIONAL RADIOLOGY/VASCULAR | Age: 44
Discharge: HOME | End: 2024-05-20
Payer: COMMERCIAL

## 2024-05-20 ENCOUNTER — HOSPITAL ENCOUNTER (OUTPATIENT)
Dept: GENERAL RADIOLOGY | Age: 44
Discharge: HOME | End: 2024-05-20
Attending: RADIOLOGY
Payer: COMMERCIAL

## 2024-05-20 ENCOUNTER — HOSPITAL ENCOUNTER (OUTPATIENT)
Dept: GENERAL RADIOLOGY | Age: 44
End: 2024-05-20
Payer: COMMERCIAL

## 2024-05-20 ENCOUNTER — HOSPITAL ENCOUNTER (OUTPATIENT)
Dept: CT IMAGING | Age: 44
Discharge: HOME | End: 2024-05-20
Payer: COMMERCIAL

## 2024-05-20 VITALS
HEART RATE: 65 BPM | RESPIRATION RATE: 18 BRPM | WEIGHT: 135 LBS | TEMPERATURE: 97.7 F | OXYGEN SATURATION: 95 % | DIASTOLIC BLOOD PRESSURE: 65 MMHG | SYSTOLIC BLOOD PRESSURE: 140 MMHG | HEIGHT: 60 IN | BODY MASS INDEX: 26.5 KG/M2

## 2024-05-20 DIAGNOSIS — R91.1 LUNG NODULE: ICD-10-CM

## 2024-05-20 LAB
ALBUMIN SERPL-MCNC: 4.3 GM/DL (ref 3.4–5)
ALP BLD-CCNC: 216 IU/L (ref 40–129)
ALT SERPL-CCNC: 25 U/L (ref 10–40)
ANION GAP SERPL CALCULATED.3IONS-SCNC: 15 MMOL/L (ref 7–16)
APTT: 32.9 SECONDS (ref 25.1–37.1)
AST SERPL-CCNC: 41 IU/L (ref 15–37)
BILIRUB SERPL-MCNC: 0.3 MG/DL (ref 0–1)
BUN SERPL-MCNC: 85 MG/DL (ref 6–23)
CALCIUM SERPL-MCNC: 9 MG/DL (ref 8.3–10.6)
CHLORIDE BLD-SCNC: 92 MMOL/L (ref 99–110)
CO2: 24 MMOL/L (ref 21–32)
CREAT SERPL-MCNC: 4.3 MG/DL (ref 0.6–1.1)
GFR, ESTIMATED: 12 ML/MIN/1.73M2
GLUCOSE SERPL-MCNC: 115 MG/DL (ref 70–99)
HCT VFR BLD CALC: 30.8 % (ref 37–47)
HEMOGLOBIN: 10 GM/DL (ref 12.5–16)
INR BLD: 1 INDEX
MCH RBC QN AUTO: 30.2 PG (ref 27–31)
MCHC RBC AUTO-ENTMCNC: 32.5 % (ref 32–36)
MCV RBC AUTO: 93.1 FL (ref 78–100)
PDW BLD-RTO: 14.6 % (ref 11.7–14.9)
PHOSPHORUS: 6.3 MG/DL (ref 2.5–4.9)
PLATELET # BLD: 248 K/CU MM (ref 140–440)
PMV BLD AUTO: 10 FL (ref 7.5–11.1)
POTASSIUM SERPL-SCNC: 4.7 MMOL/L (ref 3.5–5.1)
PROTHROMBIN TIME: 13.2 SECONDS (ref 11.7–14.5)
RBC # BLD: 3.31 M/CU MM (ref 4.2–5.4)
SODIUM BLD-SCNC: 131 MMOL/L (ref 135–145)
TOTAL PROTEIN: 7.3 GM/DL (ref 6.4–8.2)
WBC # BLD: 7.8 K/CU MM (ref 4–10.5)

## 2024-05-20 PROCEDURE — 88334 PATH CONSLTJ SURG CYTO XM EA: CPT | Performed by: PATHOLOGY

## 2024-05-20 PROCEDURE — 6370000000 HC RX 637 (ALT 250 FOR IP): Performed by: RADIOLOGY

## 2024-05-20 PROCEDURE — 85610 PROTHROMBIN TIME: CPT

## 2024-05-20 PROCEDURE — 88305 TISSUE EXAM BY PATHOLOGIST: CPT | Performed by: PATHOLOGY

## 2024-05-20 PROCEDURE — 88312 SPECIAL STAINS GROUP 1: CPT | Performed by: PATHOLOGY

## 2024-05-20 PROCEDURE — 87075 CULTR BACTERIA EXCEPT BLOOD: CPT

## 2024-05-20 PROCEDURE — 88342 IMHCHEM/IMCYTCHM 1ST ANTB: CPT | Performed by: PATHOLOGY

## 2024-05-20 PROCEDURE — 2500000003 HC RX 250 WO HCPCS: Performed by: RADIOLOGY

## 2024-05-20 PROCEDURE — 85027 COMPLETE CBC AUTOMATED: CPT

## 2024-05-20 PROCEDURE — 87070 CULTURE OTHR SPECIMN AEROBIC: CPT

## 2024-05-20 PROCEDURE — 87205 SMEAR GRAM STAIN: CPT

## 2024-05-20 PROCEDURE — 7100000010 HC PHASE II RECOVERY - FIRST 15 MIN

## 2024-05-20 PROCEDURE — 88333 PATH CONSLTJ SURG CYTO XM 1: CPT | Performed by: PATHOLOGY

## 2024-05-20 PROCEDURE — 32408 CORE NDL BX LNG/MED PERQ: CPT

## 2024-05-20 PROCEDURE — 71045 X-RAY EXAM CHEST 1 VIEW: CPT

## 2024-05-20 PROCEDURE — 7100000011 HC PHASE II RECOVERY - ADDTL 15 MIN

## 2024-05-20 PROCEDURE — 85730 THROMBOPLASTIN TIME PARTIAL: CPT

## 2024-05-20 RX ORDER — ACETAMINOPHEN 325 MG/1
650 TABLET ORAL ONCE
Status: COMPLETED | OUTPATIENT
Start: 2024-05-20 | End: 2024-05-20

## 2024-05-20 RX ORDER — LIDOCAINE HYDROCHLORIDE 10 MG/ML
INJECTION, SOLUTION EPIDURAL; INFILTRATION; INTRACAUDAL; PERINEURAL PRN
Status: COMPLETED | OUTPATIENT
Start: 2024-05-20 | End: 2024-05-20

## 2024-05-20 RX ADMIN — ACETAMINOPHEN 650 MG: 325 TABLET ORAL at 11:33

## 2024-05-20 RX ADMIN — LIDOCAINE HYDROCHLORIDE 14 ML: 10 INJECTION, SOLUTION EPIDURAL; INFILTRATION; INTRACAUDAL; PERINEURAL at 10:35

## 2024-05-20 ASSESSMENT — PAIN - FUNCTIONAL ASSESSMENT: PAIN_FUNCTIONAL_ASSESSMENT: NONE - DENIES PAIN

## 2024-05-20 ASSESSMENT — PAIN DESCRIPTION - DESCRIPTORS: DESCRIPTORS: STABBING

## 2024-05-20 ASSESSMENT — PAIN SCALES - GENERAL: PAINLEVEL_OUTOF10: 7

## 2024-05-20 ASSESSMENT — PAIN DESCRIPTION - LOCATION: LOCATION: OTHER (COMMENT)

## 2024-05-20 NOTE — PROGRESS NOTES
Returned to cath holding room # 4, tolerated procedure well. Vitals stable. Mom updated at bedside. Bandaid clean, dry, intact. Denies needs at this time. Call light in reach.

## 2024-05-20 NOTE — PROGRESS NOTES
Pt complains of pain at site, informed Dr Cortez.  New order from Dr Cortez 650 mg of tylenol po one time dose.    Pt states she can take tylenol she has no allergy to tylenol.

## 2024-05-20 NOTE — PROGRESS NOTES
TRANSFER - OUT REPORT:    Verbal report given to Cyndi  on Gus Garza being transferred to IR holding for routine post-op       Report consisted of patient's Situation, Background, Assessment and   Recommendations(SBAR).     Information from the following report(s) Nurse Handoff Report was reviewed with the receiving nurse.    Opportunity for questions and clarification was provided.      Patient transported with:   Registered Nurse

## 2024-05-20 NOTE — BRIEF OP NOTE
Brief Postoperative Note      Patient: Gus Garza  YOB: 1980  MRN: [id number]    Date of Procedure: 5/20/2024    * No Diagnosis Codes entered *right upper lobe pulmonary nodule    Post-Op Diagnosis: Same       * No procedures listed *ct guided right upper lobe nodule biopsy    * No surgeons listed *conrado murdock do    Assistant:  * No surgical staff found *    Anesthesia: * No anesthesia type entered *local    Estimated Blood Loss (mL): Minimal    Complications: None    Specimens:   * No specimens in log *    Implants:  * No implants in log *      Drains:   [REMOVED] Suprapubic Catheter (Removed)   Site Assessment Moist 04/28/24 2247   Urine Color Yellow 04/28/24 2247   Urine Appearance Clear 04/28/24 2247   Urine Odor Malodorous 04/28/24 2247   Collection Container Standard 04/28/24 2247   Securement Method Leg strap 04/28/24 2247   Catheter Care  Other (comment) 04/28/24 2051   Catheter Best Practices  Drainage tube clipped to bed;Bag below bladder 04/28/24 2247   Status Draining 04/28/24 2247   Output (mL) 1150 mL 04/29/24 0325       Findings:  Infection Present At Time Of Surgery (PATOS) (choose all levels that have infection present):  No infection present  Other Findings: needle confirmed within target lesion.  20 ga cores and 19 ga aspiration specimens collected and sent to lab.  No complication suggested    Electronically signed by Conrado Murdock DO on 5/20/2024 at 10:46 AM

## 2024-05-20 NOTE — PROGRESS NOTES
Time Out completed.  Name, , allergies, procedure, labs, fire risk all agreed upon by Dr. Cortez, Chica Espinoza, Wendy Hilton, Phyllis Posadas and patient.

## 2024-05-20 NOTE — H&P
IR PREPROCEDURE NOTE:    DATE 5/20/24    PREOP DX.. RIGHT LUNG MASS    POST OP DX:  SAME    RISKS, BENEFITS AND ALTERNATIVES AND PT ELECTS TO PROCEED WITH BIOPSY.    PANDA GONSALES DO

## 2024-05-21 LAB — PTH-INTACT SERPL-MCNC: 61 PG/ML (ref 15–65)

## 2024-05-22 PROBLEM — N39.0 UTI (URINARY TRACT INFECTION): Status: RESOLVED | Noted: 2024-04-22 | Resolved: 2024-05-22

## 2024-05-25 LAB
CULTURE: NORMAL
Lab: NORMAL
SPECIMEN: NORMAL

## 2024-05-30 ENCOUNTER — HOSPITAL ENCOUNTER (OUTPATIENT)
Dept: INFUSION THERAPY | Age: 44
Discharge: HOME | End: 2024-05-30
Payer: COMMERCIAL

## 2024-05-30 ENCOUNTER — OFFICE VISIT (OUTPATIENT)
Dept: ONCOLOGY | Age: 44
End: 2024-05-30
Payer: COMMERCIAL

## 2024-05-30 VITALS
WEIGHT: 130 LBS | SYSTOLIC BLOOD PRESSURE: 145 MMHG | DIASTOLIC BLOOD PRESSURE: 70 MMHG | TEMPERATURE: 97.8 F | HEIGHT: 60 IN | HEART RATE: 58 BPM | BODY MASS INDEX: 25.52 KG/M2 | OXYGEN SATURATION: 97 %

## 2024-05-30 VITALS
DIASTOLIC BLOOD PRESSURE: 70 MMHG | HEIGHT: 60 IN | BODY MASS INDEX: 25.52 KG/M2 | WEIGHT: 130 LBS | OXYGEN SATURATION: 97 % | HEART RATE: 58 BPM | TEMPERATURE: 97.8 F | SYSTOLIC BLOOD PRESSURE: 145 MMHG

## 2024-05-30 DIAGNOSIS — N18.4 ANEMIA IN STAGE 4 CHRONIC KIDNEY DISEASE (HCC): ICD-10-CM

## 2024-05-30 DIAGNOSIS — J90 PLEURAL EFFUSION: ICD-10-CM

## 2024-05-30 DIAGNOSIS — R91.1 NODULE OF RIGHT LUNG: Primary | ICD-10-CM

## 2024-05-30 DIAGNOSIS — D63.1 ANEMIA DUE TO STAGE 4 CHRONIC KIDNEY DISEASE (HCC): ICD-10-CM

## 2024-05-30 DIAGNOSIS — N18.4 ANEMIA DUE TO STAGE 4 CHRONIC KIDNEY DISEASE (HCC): ICD-10-CM

## 2024-05-30 DIAGNOSIS — D63.1 ANEMIA IN STAGE 4 CHRONIC KIDNEY DISEASE (HCC): ICD-10-CM

## 2024-05-30 DIAGNOSIS — R76.8 ANA POSITIVE: ICD-10-CM

## 2024-05-30 LAB
BASOPHILS ABSOLUTE: 0.1 K/CU MM
BASOPHILS RELATIVE PERCENT: 0.9 % (ref 0–1)
DIFFERENTIAL TYPE: ABNORMAL
EOSINOPHILS ABSOLUTE: 0.3 K/CU MM
EOSINOPHILS RELATIVE PERCENT: 3.4 % (ref 0–3)
HCT VFR BLD CALC: 38.5 % (ref 37–47)
HEMOGLOBIN: 12.3 GM/DL (ref 12.5–16)
LYMPHOCYTES ABSOLUTE: 0.9 K/CU MM
LYMPHOCYTES RELATIVE PERCENT: 11.5 % (ref 24–44)
MCH RBC QN AUTO: 29.6 PG (ref 27–31)
MCHC RBC AUTO-ENTMCNC: 31.9 % (ref 32–36)
MCV RBC AUTO: 92.8 FL (ref 78–100)
MONOCYTES ABSOLUTE: 0.6 K/CU MM
MONOCYTES RELATIVE PERCENT: 7.2 % (ref 0–4)
NEUTROPHILS ABSOLUTE: 6.3 K/CU MM
NEUTROPHILS RELATIVE PERCENT: 77 % (ref 36–66)
PDW BLD-RTO: 14.1 % (ref 11.7–14.9)
PLATELET # BLD: 312 K/CU MM (ref 140–440)
PMV BLD AUTO: 8.6 FL (ref 7.5–11.1)
RBC # BLD: 4.15 M/CU MM (ref 4.2–5.4)
WBC # BLD: 8.2 K/CU MM (ref 4–10.5)

## 2024-05-30 PROCEDURE — 36415 COLL VENOUS BLD VENIPUNCTURE: CPT

## 2024-05-30 PROCEDURE — 85025 COMPLETE CBC W/AUTO DIFF WBC: CPT

## 2024-05-30 PROCEDURE — 99214 OFFICE O/P EST MOD 30 MIN: CPT | Performed by: INTERNAL MEDICINE

## 2024-05-30 PROCEDURE — 3078F DIAST BP <80 MM HG: CPT | Performed by: INTERNAL MEDICINE

## 2024-05-30 PROCEDURE — 3077F SYST BP >= 140 MM HG: CPT | Performed by: INTERNAL MEDICINE

## 2024-05-30 NOTE — PROGRESS NOTES
Patient Name:  Gus Garza  Patient :  1980  Patient MRN:  [id number]     Primary Oncologist: Debo Grady MD  Referring Provider: Lesly Parekh MD     Date of Service: 2024      Reason for Consult:  Anemia in stage 4 CKD      Chief Complaint:    Chief Complaint   Patient presents with    Follow-up    Results       Encounter Diagnoses   Name Primary?    Nodule of right lung Yes    Pleural effusion     Anemia in stage 4 chronic kidney disease (HCC)     SHELLY positive             HPI: 23: She arrived with her mother to the clinic today. She reported that she was diagnosed with type 1 diabetes at the age of 16 and has not been taking care of herself controlling blood glucose levels  Reported that she had hemorrhagic CVA in 2022 at which time the CT scan of the head was done with contrast and hence worsen the renal function.  She reported a right-sided upper and lower extremity weakness.  Is undergoing physical therapy.  Denied any bleeding.  Denied any weight loss.  Denied chest pain.  Reported increased shortness of breath on exertion.  Denied any abdominal pain, nausea, vomiting, diarrhea, constipation.    23 CT Head  IMPRESSION:     1.  Resolution of the pontine and intraventricular hemorrhage. No new hemorrhage or developing hydrocephalus.   2.  Stable small area of hypoattenuation in the lateral left cerebellum that may reflect sequelae of a remote infarct.     2023 vitamin B12 540.1  CBC WBC 9.1 hemoglobin 8.1 hematocrit 26.0 MCV 97.7 platelets 310 ANC 6600  CMP BUN 51 creatinine 2.9 GFR 20  Ferritin 71  Iron 55 TIBC 256 sat 21    5/10/2023 CMP WNL  Magnesium 1.7  CBC WBC 9.6 hemoglobin 8.6 hematocrit 27.2 MCV 95.4 platelets 340 ANC 6300  BNP 61.99    2023 vitamin B12 502.6, folate 10.2  CBC WBC 7.5 hemoglobin 7.7 hematocrit 25.2 MCV 97.3 platelets 325  Ferritin 86    Renal function BUN 52 creatinine 3.4 GFR 16 phosphorus 5.2  Reticulocytes 1.5  Iron 44

## 2024-05-30 NOTE — PROGRESS NOTES
Patient arrived to treatment suite for Aranesp injection.  Hgb 12.3, injection not indicated.  Left treatment suite ambulatory to doctor appointment.

## 2024-05-30 NOTE — PROGRESS NOTES
MA Rooming Questions  Patient: Gus Garza  MRN: [id number]    Date: 5/30/2024        1. Do you have any new issues?   no         2. Do you need any refills on medications?    no    3. Have you had any imaging done since your last visit?   yes - ct biopsy 5/20 EKG and echo 4/22    4. Have you been hospitalized or seen in the emergency room since your last visit here?   yes - ed 5/3    5. Did the patient have a depression screening completed today? No    No data recorded     PHQ-9 Given to (if applicable):               PHQ-9 Score (if applicable):                     [] Positive     []  Negative              Does question #9 need addressed (if applicable)                     [] Yes    []  No               Veronica Poole CMA

## 2024-05-31 ENCOUNTER — HOSPITAL ENCOUNTER (OUTPATIENT)
Dept: ULTRASOUND IMAGING | Age: 44
Discharge: HOME | End: 2024-05-31
Attending: SPECIALIST
Payer: COMMERCIAL

## 2024-05-31 ENCOUNTER — TRANSCRIBE ORDERS (OUTPATIENT)
Dept: ADMINISTRATIVE | Age: 44
End: 2024-05-31

## 2024-05-31 DIAGNOSIS — M79.661 PAIN OF RIGHT LOWER LEG: ICD-10-CM

## 2024-05-31 DIAGNOSIS — R22.41 LOWER LEG MASS, RIGHT: ICD-10-CM

## 2024-05-31 DIAGNOSIS — M79.661 PAIN OF RIGHT LOWER LEG: Primary | ICD-10-CM

## 2024-05-31 PROCEDURE — 93971 EXTREMITY STUDY: CPT

## 2024-06-10 RX ORDER — LANOLIN ALCOHOL/MO/W.PET/CERES
1000 CREAM (GRAM) TOPICAL DAILY
Qty: 90 TABLET | Refills: 1 | Status: SHIPPED | OUTPATIENT
Start: 2024-06-10

## 2024-06-13 RX ORDER — FERROUS GLUCONATE 324(37.5)
324 TABLET ORAL EVERY OTHER DAY
Qty: 45 TABLET | Refills: 1 | Status: SHIPPED | OUTPATIENT
Start: 2024-06-13 | End: 2024-12-10

## 2024-06-14 RX ORDER — LANOLIN ALCOHOL/MO/W.PET/CERES
1000 CREAM (GRAM) TOPICAL DAILY
Qty: 90 TABLET | Refills: 1 | OUTPATIENT
Start: 2024-06-14

## 2024-06-24 ENCOUNTER — HOSPITAL ENCOUNTER (OUTPATIENT)
Age: 44
Discharge: HOME | End: 2024-06-24
Payer: COMMERCIAL

## 2024-06-24 DIAGNOSIS — R80.9 MICROALBUMINURIA DUE TO TYPE 2 DIABETES MELLITUS (HCC): ICD-10-CM

## 2024-06-24 DIAGNOSIS — N18.4 ANEMIA IN STAGE 4 CHRONIC KIDNEY DISEASE (HCC): ICD-10-CM

## 2024-06-24 DIAGNOSIS — N17.9 AKI (ACUTE KIDNEY INJURY) (HCC): ICD-10-CM

## 2024-06-24 DIAGNOSIS — D63.1 ANEMIA IN STAGE 4 CHRONIC KIDNEY DISEASE (HCC): ICD-10-CM

## 2024-06-24 DIAGNOSIS — E11.29 MICROALBUMINURIA DUE TO TYPE 2 DIABETES MELLITUS (HCC): ICD-10-CM

## 2024-06-24 DIAGNOSIS — N18.5 CHRONIC KIDNEY DISEASE, STAGE V (HCC): ICD-10-CM

## 2024-06-24 LAB
ALBUMIN SERPL-MCNC: 4.3 GM/DL (ref 3.4–5)
ALP BLD-CCNC: 223 IU/L (ref 40–128)
ALT SERPL-CCNC: 20 U/L (ref 10–40)
ANION GAP SERPL CALCULATED.3IONS-SCNC: 16 MMOL/L (ref 7–16)
AST SERPL-CCNC: 56 IU/L (ref 15–37)
BILIRUB SERPL-MCNC: 0.2 MG/DL (ref 0–1)
BUN SERPL-MCNC: 70 MG/DL (ref 6–23)
CALCIUM SERPL-MCNC: 9.7 MG/DL (ref 8.3–10.6)
CHLORIDE BLD-SCNC: 93 MMOL/L (ref 99–110)
CO2: 25 MMOL/L (ref 21–32)
CREAT SERPL-MCNC: 4.6 MG/DL (ref 0.6–1.1)
GFR, ESTIMATED: 11 ML/MIN/1.73M2
GLUCOSE SERPL-MCNC: 134 MG/DL (ref 70–99)
PHOSPHORUS: 5.4 MG/DL (ref 2.5–4.9)
POTASSIUM SERPL-SCNC: 5.7 MMOL/L (ref 3.5–5.1)
SODIUM BLD-SCNC: 134 MMOL/L (ref 135–145)
TOTAL PROTEIN: 8 GM/DL (ref 6.4–8.2)

## 2024-06-24 PROCEDURE — 84100 ASSAY OF PHOSPHORUS: CPT

## 2024-06-24 PROCEDURE — 36415 COLL VENOUS BLD VENIPUNCTURE: CPT

## 2024-06-24 PROCEDURE — 83970 ASSAY OF PARATHORMONE: CPT

## 2024-06-24 PROCEDURE — 80053 COMPREHEN METABOLIC PANEL: CPT

## 2024-06-26 LAB — PTH-INTACT SERPL-MCNC: 60 PG/ML (ref 15–65)

## 2024-06-27 ENCOUNTER — HOSPITAL ENCOUNTER (OUTPATIENT)
Dept: INFUSION THERAPY | Age: 44
Discharge: HOME | End: 2024-06-27
Payer: COMMERCIAL

## 2024-06-27 DIAGNOSIS — D63.1 ANEMIA IN STAGE 4 CHRONIC KIDNEY DISEASE (HCC): ICD-10-CM

## 2024-06-27 DIAGNOSIS — N18.4 ANEMIA IN STAGE 4 CHRONIC KIDNEY DISEASE (HCC): ICD-10-CM

## 2024-06-27 DIAGNOSIS — R91.1 NODULE OF RIGHT LUNG: ICD-10-CM

## 2024-06-27 LAB
ALBUMIN SERPL-MCNC: 4.4 GM/DL (ref 3.4–5)
ALP BLD-CCNC: 223 IU/L (ref 40–128)
ALT SERPL-CCNC: 21 U/L (ref 10–40)
ANION GAP SERPL CALCULATED.3IONS-SCNC: 18 MMOL/L (ref 7–16)
AST SERPL-CCNC: 54 IU/L (ref 15–37)
BASOPHILS ABSOLUTE: 0.1 K/CU MM
BASOPHILS RELATIVE PERCENT: 0.9 % (ref 0–1)
BILIRUB SERPL-MCNC: 0.3 MG/DL (ref 0–1)
BUN SERPL-MCNC: 78 MG/DL (ref 6–23)
CALCIUM SERPL-MCNC: 9.8 MG/DL (ref 8.3–10.6)
CHLORIDE BLD-SCNC: 92 MMOL/L (ref 99–110)
CO2: 23 MMOL/L (ref 21–32)
CREAT SERPL-MCNC: 4.3 MG/DL (ref 0.6–1.1)
DIFFERENTIAL TYPE: ABNORMAL
EOSINOPHILS ABSOLUTE: 0.5 K/CU MM
EOSINOPHILS RELATIVE PERCENT: 6.2 % (ref 0–3)
FERRITIN: 317 NG/ML (ref 15–150)
GFR, ESTIMATED: 12 ML/MIN/1.73M2
GLUCOSE SERPL-MCNC: 92 MG/DL (ref 70–99)
HCT VFR BLD CALC: 35.5 % (ref 37–47)
HEMOGLOBIN: 11.4 GM/DL (ref 12.5–16)
IRON: 90 UG/DL (ref 37–145)
LACTATE DEHYDROGENASE: 373 IU/L (ref 120–246)
LYMPHOCYTES ABSOLUTE: 0.9 K/CU MM
LYMPHOCYTES RELATIVE PERCENT: 11.3 % (ref 24–44)
MCH RBC QN AUTO: 29.9 PG (ref 27–31)
MCHC RBC AUTO-ENTMCNC: 32.1 % (ref 32–36)
MCV RBC AUTO: 93.2 FL (ref 78–100)
MONOCYTES ABSOLUTE: 0.6 K/CU MM
MONOCYTES RELATIVE PERCENT: 8.1 % (ref 0–4)
NEUTROPHILS ABSOLUTE: 5.7 K/CU MM
NEUTROPHILS RELATIVE PERCENT: 73.5 % (ref 36–66)
PCT TRANSFERRIN: 26 % (ref 10–44)
PDW BLD-RTO: 13.3 % (ref 11.7–14.9)
PLATELET # BLD: 318 K/CU MM (ref 140–440)
PMV BLD AUTO: 9.9 FL (ref 7.5–11.1)
POTASSIUM SERPL-SCNC: 4.7 MMOL/L (ref 3.5–5.1)
RBC # BLD: 3.81 M/CU MM (ref 4.2–5.4)
RETICULOCYTE COUNT PCT: 0.6 % (ref 0.2–2.2)
SED RATE, AUTOMATED: 72 MM/HR (ref 0–20)
SODIUM BLD-SCNC: 133 MMOL/L (ref 135–145)
TOTAL IRON BINDING CAPACITY: 345 UG/DL (ref 250–450)
TOTAL PROTEIN: 8.9 GM/DL (ref 6.4–8.2)
UNSATURATED IRON BINDING CAPACITY: 255 UG/DL (ref 110–370)
WBC # BLD: 7.8 K/CU MM (ref 4–10.5)

## 2024-06-27 PROCEDURE — 85025 COMPLETE CBC W/AUTO DIFF WBC: CPT

## 2024-06-27 PROCEDURE — 83615 LACTATE (LD) (LDH) ENZYME: CPT

## 2024-06-27 PROCEDURE — 80053 COMPREHEN METABOLIC PANEL: CPT

## 2024-06-27 PROCEDURE — 85045 AUTOMATED RETICULOCYTE COUNT: CPT

## 2024-06-27 PROCEDURE — 83550 IRON BINDING TEST: CPT

## 2024-06-27 PROCEDURE — 83010 ASSAY OF HAPTOGLOBIN QUANT: CPT

## 2024-06-27 PROCEDURE — 82728 ASSAY OF FERRITIN: CPT

## 2024-06-27 PROCEDURE — 83540 ASSAY OF IRON: CPT

## 2024-06-27 PROCEDURE — 85652 RBC SED RATE AUTOMATED: CPT

## 2024-06-27 PROCEDURE — 36415 COLL VENOUS BLD VENIPUNCTURE: CPT

## 2024-06-27 NOTE — PROGRESS NOTES
Arrived to treatment suite for scheduled Aranesp injection.  Labs reviewed, Hgb 11.3, no treatment is due at this time.  AVS provided.  Discharge ambulatory in stable condition. Lesli Trent RN

## 2024-06-29 ENCOUNTER — HOSPITAL ENCOUNTER (EMERGENCY)
Age: 44
Discharge: HOME | End: 2024-06-29
Attending: EMERGENCY MEDICINE
Payer: COMMERCIAL

## 2024-06-29 VITALS
WEIGHT: 130 LBS | DIASTOLIC BLOOD PRESSURE: 70 MMHG | RESPIRATION RATE: 16 BRPM | HEART RATE: 60 BPM | BODY MASS INDEX: 25.52 KG/M2 | TEMPERATURE: 97.7 F | SYSTOLIC BLOOD PRESSURE: 149 MMHG | HEIGHT: 60 IN | OXYGEN SATURATION: 98 %

## 2024-06-29 DIAGNOSIS — R32 URINARY INCONTINENCE, UNSPECIFIED TYPE: Primary | ICD-10-CM

## 2024-06-29 DIAGNOSIS — E87.1 HYPONATREMIA: ICD-10-CM

## 2024-06-29 LAB
ANION GAP SERPL CALCULATED.3IONS-SCNC: 16 MMOL/L (ref 7–16)
BACTERIA: NEGATIVE /HPF
BASOPHILS ABSOLUTE: 0.1 K/CU MM
BASOPHILS RELATIVE PERCENT: 1.4 % (ref 0–1)
BILIRUBIN, URINE: NEGATIVE MG/DL
BLOOD, URINE: NEGATIVE
BUN SERPL-MCNC: 74 MG/DL (ref 6–23)
CALCIUM SERPL-MCNC: 9.2 MG/DL (ref 8.3–10.6)
CHLORIDE BLD-SCNC: 92 MMOL/L (ref 99–110)
CLARITY, UA: CLEAR
CO2: 20 MMOL/L (ref 21–32)
COLOR, UA: YELLOW
CREAT SERPL-MCNC: 4.3 MG/DL (ref 0.6–1.1)
DIFFERENTIAL TYPE: ABNORMAL
EOSINOPHILS ABSOLUTE: 0.4 K/CU MM
EOSINOPHILS RELATIVE PERCENT: 5.9 % (ref 0–3)
GFR, ESTIMATED: 12 ML/MIN/1.73M2
GLUCOSE SERPL-MCNC: 95 MG/DL (ref 70–99)
GLUCOSE URINE: 100 MG/DL
HAPTOGLOB SERPL-MCNC: 185 MG/DL (ref 30–200)
HCT VFR BLD CALC: 34.3 % (ref 37–47)
HEMOGLOBIN: 10.9 GM/DL (ref 12.5–16)
IMMATURE NEUTROPHIL %: 0.3 % (ref 0–0.43)
KETONES, URINE: NEGATIVE MG/DL
LEUKOCYTE ESTERASE, URINE: ABNORMAL
LYMPHOCYTES ABSOLUTE: 0.9 K/CU MM
LYMPHOCYTES RELATIVE PERCENT: 13.2 % (ref 24–44)
MCH RBC QN AUTO: 29.5 PG (ref 27–31)
MCHC RBC AUTO-ENTMCNC: 31.8 % (ref 32–36)
MCV RBC AUTO: 93 FL (ref 78–100)
MONOCYTES ABSOLUTE: 0.6 K/CU MM
MONOCYTES RELATIVE PERCENT: 8.9 % (ref 0–4)
NEUTROPHILS ABSOLUTE: 4.7 K/CU MM
NEUTROPHILS RELATIVE PERCENT: 70.3 % (ref 36–66)
NITRITE URINE, QUANTITATIVE: POSITIVE
NON SQUAM EPI CELLS: <1 /HPF
NUCLEATED RBC %: 0 %
PDW BLD-RTO: 12.8 % (ref 11.7–14.9)
PH, URINE: 6 (ref 5–8)
PLATELET # BLD: 268 K/CU MM (ref 140–440)
PMV BLD AUTO: 9.5 FL (ref 7.5–11.1)
POTASSIUM SERPL-SCNC: 4.1 MMOL/L (ref 3.5–5.1)
PROTEIN UA: 100 MG/DL
RBC # BLD: 3.69 M/CU MM (ref 4.2–5.4)
RBC URINE: 3 /HPF (ref 0–6)
SODIUM BLD-SCNC: 128 MMOL/L (ref 135–145)
SPECIFIC GRAVITY UA: 1.01 (ref 1–1.03)
SQUAMOUS EPITHELIAL: 3 /HPF
TOTAL IMMATURE NEUTOROPHIL: 0.02 K/CU MM
TOTAL NUCLEATED RBC: 0 K/CU MM
TRICHOMONAS: ABNORMAL /HPF
UROBILINOGEN, URINE: 0.2 MG/DL (ref 0.2–1)
WBC # BLD: 6.7 K/CU MM (ref 4–10.5)
WBC CLUMP: ABNORMAL /HPF
WBC UA: 23 /HPF (ref 0–5)

## 2024-06-29 PROCEDURE — 51700 IRRIGATION OF BLADDER: CPT

## 2024-06-29 PROCEDURE — 96360 HYDRATION IV INFUSION INIT: CPT

## 2024-06-29 PROCEDURE — 80048 BASIC METABOLIC PNL TOTAL CA: CPT

## 2024-06-29 PROCEDURE — 2580000003 HC RX 258: Performed by: EMERGENCY MEDICINE

## 2024-06-29 PROCEDURE — 85025 COMPLETE CBC W/AUTO DIFF WBC: CPT

## 2024-06-29 PROCEDURE — 99284 EMERGENCY DEPT VISIT MOD MDM: CPT

## 2024-06-29 PROCEDURE — 81001 URINALYSIS AUTO W/SCOPE: CPT

## 2024-06-29 RX ORDER — 0.9 % SODIUM CHLORIDE 0.9 %
1000 INTRAVENOUS SOLUTION INTRAVENOUS ONCE
Status: COMPLETED | OUTPATIENT
Start: 2024-06-29 | End: 2024-06-29

## 2024-06-29 RX ADMIN — SODIUM CHLORIDE 1000 ML: 9 INJECTION, SOLUTION INTRAVENOUS at 09:28

## 2024-06-29 ASSESSMENT — PAIN SCALES - GENERAL: PAINLEVEL_OUTOF10: 6

## 2024-06-29 ASSESSMENT — PAIN DESCRIPTION - LOCATION: LOCATION: BACK

## 2024-06-29 ASSESSMENT — PAIN DESCRIPTION - ORIENTATION: ORIENTATION: LOWER;MID

## 2024-06-29 NOTE — ED NOTES
Irrigated cath with 60ml of saline. No leaking noted around cath site while irrigating. Pt tolerated well.

## 2024-06-29 NOTE — ED PROVIDER NOTES
Emergency Department Encounter    Patient: Gus Garza  MRN: [id number]  : 1980  Date of Evaluation: 2024  ED Provider:  Roddy Waite MD    Triage Chief Complaint:   suprapubic cath leaking (Leaking about 2 weeks, has f/u with urology, started on antibiotics but still leaking. )    [place]:  Gus Garza is a 44 y.o. female history of anxiety, coronary artery disease with NSTEMI and stent placement, cocaine abuse, chronic kidney disease status post indwelling Candelario catheter being followed by urology, type 1 diabetic, depression as well as history of hypertension that presents emergency department concerning about some urine leak around the suprapubic catheter.  Urine is going into the bag but she noticed some leakage around the catheter for a while for which she contacted her urologist.  She was told that she may have UTI and was started Macrobid.  She states the leakage has not stopped.  She did not notice any discoloration of urine or clots.  No suprapubic or abdominal tenderness or fever or chills or flank or lower back pain reported.  Patient's mother who is here is speaking for the patient for most part and she is concerned about this because she stated \"this never happened before even though she has had the suprapubic catheter over a year.\"    ROS - see HPI, below listed is current ROS at time of my eval:  General:  No fevers, no chills, no weakness  Eyes:  No recent vison changes, no discharge  ENT:  No sore throat, no nasal congestion, no hearing changes  Cardiovascular:  No chest pain, no palpitations  Respiratory:  No shortness of breath, no cough, no wheezing  Gastrointestinal:  No pain, no nausea, no vomiting, no diarrhea  Musculoskeletal:  No muscle pain, no joint pain  Skin:  No rash, no pruritis, no easy bruising  Neurologic:  No speech problems, no headache, no extremity numbness, no extremity tingling, no extremity weakness  Psychiatric:  No anxiety  Genitourinary: Urine leak  normal saline 1 L for her hypontremia in the emergency department.  She is discharged home with instruction to consult with her primary care physician for recheck of her chemistry next week to make sure hypontremia is resolving    Clinical Impression:  1. Urinary incontinence, unspecified type    2. Hyponatremia      Disposition referral (if applicable):  No follow-up provider specified.  Disposition medications (if applicable):  New Prescriptions    No medications on file     ED Provider Disposition Time  DISPOSITION Decision To Discharge 06/29/2024 09:03:04 AM      Comment: Please note this report has been produced using speech recognition software and may contain errors related to that system including errors in grammar, punctuation, and spelling, as well as words and phrases that may be inappropriate.  Efforts were made to edit the dictations.       Roddy Waite MD  06/29/24 7817

## 2024-06-29 NOTE — DISCHARGE INSTRUCTIONS
Please continue your antibiotics per your doctors instruct  If you continue to be concerned about your catheter urine leak please consult with your urologist  Return to ER as needed

## 2024-06-29 NOTE — ED TRIAGE NOTES
Leaking about 2 weeks, has f/u with urology, started on antibiotics but still leaking. States she has been on macrobid. Cath was leaking, they changed the tube & now leaking worse than before.

## 2024-07-18 ENCOUNTER — HOSPITAL ENCOUNTER (OUTPATIENT)
Age: 44
Discharge: HOME | End: 2024-07-18
Payer: COMMERCIAL

## 2024-07-18 DIAGNOSIS — N18.5 CHRONIC KIDNEY DISEASE, STAGE V (HCC): ICD-10-CM

## 2024-07-18 LAB
ANION GAP SERPL CALCULATED.3IONS-SCNC: 13 MMOL/L (ref 7–16)
BUN SERPL-MCNC: 73 MG/DL (ref 6–23)
CALCIUM SERPL-MCNC: 9 MG/DL (ref 8.3–10.6)
CHLORIDE BLD-SCNC: 95 MMOL/L (ref 99–110)
CO2: 26 MMOL/L (ref 21–32)
CREAT SERPL-MCNC: 4.5 MG/DL (ref 0.6–1.1)
GFR, ESTIMATED: 12 ML/MIN/1.73M2
GLUCOSE SERPL-MCNC: 101 MG/DL (ref 70–99)
POTASSIUM SERPL-SCNC: 5 MMOL/L (ref 3.5–5.1)
SODIUM BLD-SCNC: 134 MMOL/L (ref 135–145)

## 2024-07-18 PROCEDURE — 36415 COLL VENOUS BLD VENIPUNCTURE: CPT

## 2024-07-18 PROCEDURE — 80048 BASIC METABOLIC PNL TOTAL CA: CPT

## 2024-07-25 ENCOUNTER — OFFICE VISIT (OUTPATIENT)
Dept: ONCOLOGY | Age: 44
End: 2024-07-25
Payer: COMMERCIAL

## 2024-07-25 ENCOUNTER — HOSPITAL ENCOUNTER (OUTPATIENT)
Dept: INFUSION THERAPY | Age: 44
Discharge: HOME | End: 2024-07-25
Payer: COMMERCIAL

## 2024-07-25 ENCOUNTER — OFFICE VISIT (OUTPATIENT)
Dept: CARDIOLOGY CLINIC | Age: 44
End: 2024-07-25
Payer: COMMERCIAL

## 2024-07-25 VITALS
HEIGHT: 60 IN | HEART RATE: 58 BPM | OXYGEN SATURATION: 98 % | WEIGHT: 130.4 LBS | DIASTOLIC BLOOD PRESSURE: 60 MMHG | SYSTOLIC BLOOD PRESSURE: 122 MMHG | BODY MASS INDEX: 25.6 KG/M2

## 2024-07-25 VITALS
OXYGEN SATURATION: 95 % | HEART RATE: 64 BPM | WEIGHT: 131.2 LBS | TEMPERATURE: 97.7 F | DIASTOLIC BLOOD PRESSURE: 58 MMHG | HEIGHT: 60 IN | BODY MASS INDEX: 25.76 KG/M2 | SYSTOLIC BLOOD PRESSURE: 120 MMHG

## 2024-07-25 DIAGNOSIS — I25.10 CAD IN NATIVE ARTERY: Primary | ICD-10-CM

## 2024-07-25 DIAGNOSIS — D63.1 ANEMIA IN STAGE 4 CHRONIC KIDNEY DISEASE (HCC): ICD-10-CM

## 2024-07-25 DIAGNOSIS — Z98.61 CAD S/P PERCUTANEOUS CORONARY ANGIOPLASTY: ICD-10-CM

## 2024-07-25 DIAGNOSIS — I21.4 NSTEMI (NON-ST ELEVATED MYOCARDIAL INFARCTION) (HCC): ICD-10-CM

## 2024-07-25 DIAGNOSIS — I25.10 CAD S/P PERCUTANEOUS CORONARY ANGIOPLASTY: ICD-10-CM

## 2024-07-25 DIAGNOSIS — E78.5 DYSLIPIDEMIA: ICD-10-CM

## 2024-07-25 DIAGNOSIS — D63.1 ANEMIA DUE TO STAGE 4 CHRONIC KIDNEY DISEASE (HCC): Primary | ICD-10-CM

## 2024-07-25 DIAGNOSIS — N18.4 ANEMIA IN STAGE 4 CHRONIC KIDNEY DISEASE (HCC): ICD-10-CM

## 2024-07-25 DIAGNOSIS — J90 PLEURAL EFFUSION: ICD-10-CM

## 2024-07-25 DIAGNOSIS — E10.59 TYPE 1 DIABETES MELLITUS WITH OTHER CIRCULATORY COMPLICATION (HCC): ICD-10-CM

## 2024-07-25 DIAGNOSIS — I10 PRIMARY HYPERTENSION: ICD-10-CM

## 2024-07-25 DIAGNOSIS — N18.4 ANEMIA DUE TO STAGE 4 CHRONIC KIDNEY DISEASE (HCC): Primary | ICD-10-CM

## 2024-07-25 DIAGNOSIS — N18.5 CHRONIC KIDNEY DISEASE, STAGE V (HCC): ICD-10-CM

## 2024-07-25 DIAGNOSIS — R91.1 NODULE OF RIGHT LUNG: Primary | ICD-10-CM

## 2024-07-25 LAB
BASOPHILS ABSOLUTE: 0.1 K/CU MM
BASOPHILS RELATIVE PERCENT: 1.2 % (ref 0–1)
DIFFERENTIAL TYPE: ABNORMAL
EOSINOPHILS ABSOLUTE: 0.3 K/CU MM
EOSINOPHILS RELATIVE PERCENT: 4.5 % (ref 0–3)
HCT VFR BLD CALC: 31.9 % (ref 37–47)
HEMOGLOBIN: 10.3 GM/DL (ref 12.5–16)
LYMPHOCYTES ABSOLUTE: 0.9 K/CU MM
LYMPHOCYTES RELATIVE PERCENT: 15.4 % (ref 24–44)
MCH RBC QN AUTO: 29.8 PG (ref 27–31)
MCHC RBC AUTO-ENTMCNC: 32.3 % (ref 32–36)
MCV RBC AUTO: 92.2 FL (ref 78–100)
MONOCYTES ABSOLUTE: 0.5 K/CU MM
MONOCYTES RELATIVE PERCENT: 7.8 % (ref 0–4)
NEUTROPHILS ABSOLUTE: 4.3 K/CU MM
NEUTROPHILS RELATIVE PERCENT: 71.1 % (ref 36–66)
PDW BLD-RTO: 13.3 % (ref 11.7–14.9)
PLATELET # BLD: 261 K/CU MM (ref 140–440)
PMV BLD AUTO: 8.6 FL (ref 7.5–11.1)
RBC # BLD: 3.46 M/CU MM (ref 4.2–5.4)
WBC # BLD: 6 K/CU MM (ref 4–10.5)

## 2024-07-25 PROCEDURE — 99214 OFFICE O/P EST MOD 30 MIN: CPT | Performed by: INTERNAL MEDICINE

## 2024-07-25 PROCEDURE — 36415 COLL VENOUS BLD VENIPUNCTURE: CPT

## 2024-07-25 PROCEDURE — 3074F SYST BP LT 130 MM HG: CPT | Performed by: INTERNAL MEDICINE

## 2024-07-25 PROCEDURE — 99213 OFFICE O/P EST LOW 20 MIN: CPT | Performed by: INTERNAL MEDICINE

## 2024-07-25 PROCEDURE — 96372 THER/PROPH/DIAG INJ SC/IM: CPT

## 2024-07-25 PROCEDURE — 6360000002 HC RX W HCPCS: Performed by: INTERNAL MEDICINE

## 2024-07-25 PROCEDURE — 85025 COMPLETE CBC W/AUTO DIFF WBC: CPT

## 2024-07-25 PROCEDURE — 3078F DIAST BP <80 MM HG: CPT | Performed by: INTERNAL MEDICINE

## 2024-07-25 RX ORDER — FAMOTIDINE 10 MG/ML
20 INJECTION, SOLUTION INTRAVENOUS
Status: CANCELLED | OUTPATIENT
Start: 2024-07-25

## 2024-07-25 RX ORDER — ACETAMINOPHEN 325 MG/1
650 TABLET ORAL
Status: CANCELLED | OUTPATIENT
Start: 2024-07-25

## 2024-07-25 RX ORDER — ACETAMINOPHEN 325 MG/1
650 TABLET ORAL
OUTPATIENT
Start: 2024-08-08

## 2024-07-25 RX ORDER — ONDANSETRON 2 MG/ML
8 INJECTION INTRAMUSCULAR; INTRAVENOUS
Status: CANCELLED | OUTPATIENT
Start: 2024-07-25

## 2024-07-25 RX ORDER — SODIUM CHLORIDE 9 MG/ML
INJECTION, SOLUTION INTRAVENOUS CONTINUOUS
Status: CANCELLED | OUTPATIENT
Start: 2024-07-25

## 2024-07-25 RX ORDER — ALBUTEROL SULFATE 90 UG/1
4 AEROSOL, METERED RESPIRATORY (INHALATION) PRN
Status: CANCELLED | OUTPATIENT
Start: 2024-07-25

## 2024-07-25 RX ORDER — FAMOTIDINE 10 MG/ML
20 INJECTION, SOLUTION INTRAVENOUS
OUTPATIENT
Start: 2024-08-08

## 2024-07-25 RX ORDER — DIPHENHYDRAMINE HYDROCHLORIDE 50 MG/ML
50 INJECTION INTRAMUSCULAR; INTRAVENOUS
OUTPATIENT
Start: 2024-08-08

## 2024-07-25 RX ORDER — ONDANSETRON 2 MG/ML
8 INJECTION INTRAMUSCULAR; INTRAVENOUS
OUTPATIENT
Start: 2024-08-08

## 2024-07-25 RX ORDER — ALBUTEROL SULFATE 90 UG/1
4 AEROSOL, METERED RESPIRATORY (INHALATION) PRN
OUTPATIENT
Start: 2024-08-08

## 2024-07-25 RX ORDER — EPINEPHRINE 1 MG/ML
0.3 INJECTION, SOLUTION, CONCENTRATE INTRAVENOUS PRN
Status: CANCELLED | OUTPATIENT
Start: 2024-07-25

## 2024-07-25 RX ORDER — DIPHENHYDRAMINE HYDROCHLORIDE 50 MG/ML
50 INJECTION INTRAMUSCULAR; INTRAVENOUS
Status: CANCELLED | OUTPATIENT
Start: 2024-07-25

## 2024-07-25 RX ORDER — EPINEPHRINE 1 MG/ML
0.3 INJECTION, SOLUTION, CONCENTRATE INTRAVENOUS PRN
OUTPATIENT
Start: 2024-08-08

## 2024-07-25 RX ORDER — SODIUM CHLORIDE 9 MG/ML
INJECTION, SOLUTION INTRAVENOUS CONTINUOUS
OUTPATIENT
Start: 2024-08-08

## 2024-07-25 RX ADMIN — DARBEPOETIN ALFA 60 MCG: 60 INJECTION, SOLUTION INTRAVENOUS; SUBCUTANEOUS at 11:46

## 2024-07-25 NOTE — PROGRESS NOTES
Patient here today for an Aranesp injection. Had an OV with Dr. Grady today. No concerns at this time. Labs reviewed, HgB 10.2, per Dr. Grady, give injection. Treatment approved and given in left arm SQ. Patient tolerated well. RT 08/01 for next injection.

## 2024-07-25 NOTE — PROGRESS NOTES
OFFICE PROGRESS NOTES      Gus is a 44 y.o. female who has    CHIEF COMPLAINT AS FOLLOWS:  CHEST PAIN: Patient denies any C/O chest pains at this time.      SOB: Has SOB with exertion but no change over previous noted.                 LEG EDEMA: No leg edema   PALPITATIONS: Denies any C/O Palpitations   DIZZINESS:  C/O balance problems   SYNCOPE: None   OTHER/ ADDITIONAL COMPLAINTS:                                     HPI: Patient is here for F/U on his CAD, HTN & Dyslipidemia problems.   CAD: Patient has known CAD. Had angioplasty in the past.  HTN: Patient has known essential HTN. Has been treated with guideline recommended medical / physical/ diet therapy as stated below.  Dyslipidemia: Patient has known mixed dyslipidemia. Has been treated with guideline recommended medical / physical/ diet therapy as stated below.                Current Outpatient Medications   Medication Sig Dispense Refill    TRESIBA FLEXTOUCH 100 UNIT/ML SOPN 6 Units      ondansetron (ZOFRAN) 4 MG tablet Take 1 tablet by mouth every 8 hours as needed      MONOJECT 60CC SYRINGE CATH TIP 60 ML MISC USE AS DIRECTED DAILY      sodium zirconium cyclosilicate (LOKELMA) 10 g PACK oral suspension Take 1 packet by mouth daily (Patient taking differently: Take 1 packet by mouth daily Every third day) 30 packet 0    patiromer sorbitex calcium (VELTASSA) 8.4 g PACK packet Take 1 packet by mouth daily (Patient taking differently: Take 1 packet by mouth Every 3 days) 30 each 5    ferrous gluconate 324 (37.5 Fe) MG TABS TAKE 1 TABLET BY MOUTH EVERY OTHER DAY (Patient taking differently: Take 1 tablet by mouth every other day Jxxclp-Zemyddlbn-Cpwkuq) 45 tablet 1    vitamin B-12 (CYANOCOBALAMIN) 1000 MCG tablet Take 1 tablet by mouth daily 90 tablet 1    amLODIPine (NORVASC) 5 MG tablet Take 1 tablet by mouth in the morning and at bedtime 60 tablet 5    torsemide (DEMADEX) 20 MG tablet Take 1 tablet by mouth daily 90 tablet 1    sevelamer (RENVELA) 800

## 2024-07-25 NOTE — PATIENT INSTRUCTIONS
Please be informed that if you contact our office outside of normal business hours the physician on call cannot help with any scheduling or rescheduling issues, procedure instruction questions or any type of medication issue.    We advise you for any urgent/emergency that you go to the nearest emergency room!    PLEASE CALL OUR OFFICE DURING NORMAL BUSINESS HOURS    Monday - Friday   8 am to 5 pm    [place]: [phone]    CORONARY ARTERY DISEASE:Yes, mild to moderate.  clinically stable. Patient is on optimal medical regimen ( see medication list above )  Patient is currently  not significantly symptomatic from CAD.   -changes in  treatment:   no, on  ASA & Metoprolol     11/18/2023   Left Main: Mild distal tapering.   LAD: Moderate segmental Luminal irregularities + 50 to 60 % very   proximal (hazy) & mid vessel stenosis.   LEFT CIRCUMFLEX: Moderate segmental Luminal irregularities + Sub   branch of OM1 which is very small 1.5 mm vessel has ostial &   proximal 95 % stenosis.   RCA: Moderate segmental Luminal irregularities + 40 to 50 %   proximal & 50 to 60 & mid vessel in-stent re-stenosis.      IFR:   Proximal RCA: 95   Mid RCA: 95   Proximal LAD: 92     EKG today: NSR 58/min, Old IMI.     12/19/2023  This is a normal perfusion Cardiolite study.  Patient's baseline EKG reveals sinus bradycardia at 58 bpm otherwise EKG is normal.  No ischemic EKG changes noted with Lexiscan infusion.     Perfusion imaging reveals normal uptake of Cardiolite in all myocardial territories.  No ischemia or myocardial infarction findings noted.  Normal LV function and wall motion.  Left ventricular ejection fraction is 58%.     HYPERTENSION:Yes  well controlled on current medical regimen, see list above.  - changes in  treatment:   no, takes Amlodipine & Hygroton     DYSLIPIDEMIA: yes,   Patient's profile is at / near Goal.yes,   HDL is high  Tolerating current medical regimen evangelista, on Crestor  LIPID PROFILE  [place]

## 2024-07-25 NOTE — PROGRESS NOTES
MA Rooming Questions  Patient: Gus Garza  MRN: [id number]    Date: 7/25/2024        1. Do you have any new issues?   no         2. Do you need any refills on medications?    no    3. Have you had any imaging done since your last visit?   yes - labs 6/27 and today    4. Have you been hospitalized or seen in the emergency room since your last visit here?   yes - er 6/29    5. Did the patient have a depression screening completed today? No    No data recorded     PHQ-9 Given to (if applicable):               PHQ-9 Score (if applicable):                     [] Positive     []  Negative              Does question #9 need addressed (if applicable)                     [] Yes    []  No               Veronica Poole CMA    
with PET/CT.  5. Small pericardial effusion.  6. Coronary artery disease.    2/22/2024 CBC with WBC of 8.1 hemoglobin of 8.4 hematocrit 27.1 MCV of 93.8 platelets of 285CMP with creatinine of 3.4, alk phos of 137 ferritin 116 iron saturations of 13 B12 and folic acid 1286 and 7.9 respectively    3/1/24: Ct chest:IMPRESSION:  1. Moderate size right pleural effusion.  2. Mild airspace disease right lower lobe.  3. Mild mediastinal adenopathy, increased.  4. Right upper lobe pulmonary nodule, 10 mm, similar to October 2023. Recommend either follow-up study in 6-12 months or further evaluation with PET/CT.  5. Small pericardial effusion.  6. Coronary artery disease.    3/18/24: PET:IMPRESSION:  1. Evidence of hypermetabolic right upper lobe posterior subpleural nodule with mediastinal and right hilar FDG avid lymphadenopathy. Evidence of lung neoplasm and or metastatic disease.  2. Additional extensive retroperitoneal lymphadenopathy which shows no significant activity above visceral background  3. Interstitial lung disease with subpleural air trapping. No honeycombing or fibrosis  4. Small right pleural effusion.  5. Increased uptake at the subcutaneous tract of the suprapubic catheter, local inflammation, reactive changes or infection.    3/22/2024, right pleural effusion very small for thoracentesis.    CT scan of the abdomen pelvis on 4/23/2024 with anasarca and bilateral inguinal lymphadenopathy    5/20/2024 underwent needle biopsy, pathology negative for malignancy.  Favors necrotizing granulomatous process.  GMS positive forHistoplasma like fungal microorganisms.    5/30/2024 hemoglobin of 12.3    5/31/24: Ultrasound:IMPRESSION:     1. No evidence of right lower extremity deep venous thrombosis.    2. Subcutaneous soft tissue swelling in the area of concern which may represent a hematoma. No liquefied cystic area at this time.    7/25/24: CBC with wbc 6, Hb 10.3, Hct 31.9, mcv 92, plt 261K    Past Medical

## 2024-08-20 NOTE — PROGRESS NOTES
Received VM from patient inquiring name and dosage of injection. Patient states she is to have kidney transplant and needs to inform coordinator. Attempted to call patient back @ [phone] to provide requested information: aranesp 60 mcg Q2 weeks; however, no answer. VM left with this RN direct number should patient have any further needs.

## 2024-08-22 ENCOUNTER — HOSPITAL ENCOUNTER (OUTPATIENT)
Dept: INFUSION THERAPY | Age: 44
Discharge: HOME | End: 2024-08-22
Payer: COMMERCIAL

## 2024-08-22 VITALS — DIASTOLIC BLOOD PRESSURE: 67 MMHG | SYSTOLIC BLOOD PRESSURE: 142 MMHG

## 2024-08-22 DIAGNOSIS — N18.4 ANEMIA DUE TO STAGE 4 CHRONIC KIDNEY DISEASE (HCC): Primary | ICD-10-CM

## 2024-08-22 DIAGNOSIS — D63.1 ANEMIA DUE TO STAGE 4 CHRONIC KIDNEY DISEASE (HCC): Primary | ICD-10-CM

## 2024-08-22 LAB
BASOPHILS ABSOLUTE: 0.1 K/CU MM
BASOPHILS RELATIVE PERCENT: 1.3 % (ref 0–1)
DIFFERENTIAL TYPE: ABNORMAL
EOSINOPHILS ABSOLUTE: 0.3 K/CU MM
EOSINOPHILS RELATIVE PERCENT: 4.2 % (ref 0–3)
HCT VFR BLD CALC: 29.3 % (ref 37–47)
HEMOGLOBIN: 9.4 GM/DL (ref 12.5–16)
LYMPHOCYTES ABSOLUTE: 1 K/CU MM
LYMPHOCYTES RELATIVE PERCENT: 15.1 % (ref 24–44)
MCH RBC QN AUTO: 30.8 PG (ref 27–31)
MCHC RBC AUTO-ENTMCNC: 32.1 % (ref 32–36)
MCV RBC AUTO: 96.1 FL (ref 78–100)
MONOCYTES ABSOLUTE: 0.5 K/CU MM
MONOCYTES RELATIVE PERCENT: 7.9 % (ref 0–4)
NEUTROPHILS ABSOLUTE: 4.9 K/CU MM
NEUTROPHILS RELATIVE PERCENT: 71.5 % (ref 36–66)
PDW BLD-RTO: 13.2 % (ref 11.7–14.9)
PLATELET # BLD: 245 K/CU MM (ref 140–440)
PMV BLD AUTO: 8.6 FL (ref 7.5–11.1)
RBC # BLD: 3.05 M/CU MM (ref 4.2–5.4)
WBC # BLD: 6.8 K/CU MM (ref 4–10.5)

## 2024-08-22 PROCEDURE — 96372 THER/PROPH/DIAG INJ SC/IM: CPT

## 2024-08-22 PROCEDURE — 36415 COLL VENOUS BLD VENIPUNCTURE: CPT

## 2024-08-22 PROCEDURE — 85025 COMPLETE CBC W/AUTO DIFF WBC: CPT

## 2024-08-22 PROCEDURE — 6360000002 HC RX W HCPCS: Performed by: INTERNAL MEDICINE

## 2024-08-22 RX ORDER — EPINEPHRINE 1 MG/ML
0.3 INJECTION, SOLUTION, CONCENTRATE INTRAVENOUS PRN
OUTPATIENT
Start: 2024-09-05

## 2024-08-22 RX ORDER — ONDANSETRON 2 MG/ML
8 INJECTION INTRAMUSCULAR; INTRAVENOUS
OUTPATIENT
Start: 2024-09-05

## 2024-08-22 RX ORDER — ACETAMINOPHEN 325 MG/1
650 TABLET ORAL
OUTPATIENT
Start: 2024-09-05

## 2024-08-22 RX ORDER — DIPHENHYDRAMINE HYDROCHLORIDE 50 MG/ML
50 INJECTION INTRAMUSCULAR; INTRAVENOUS
OUTPATIENT
Start: 2024-09-05

## 2024-08-22 RX ORDER — SODIUM CHLORIDE 9 MG/ML
INJECTION, SOLUTION INTRAVENOUS CONTINUOUS
OUTPATIENT
Start: 2024-09-05

## 2024-08-22 RX ORDER — FAMOTIDINE 10 MG/ML
20 INJECTION, SOLUTION INTRAVENOUS
OUTPATIENT
Start: 2024-09-05

## 2024-08-22 RX ORDER — ALBUTEROL SULFATE 90 UG/1
4 AEROSOL, METERED RESPIRATORY (INHALATION) PRN
OUTPATIENT
Start: 2024-09-05

## 2024-08-22 RX ADMIN — DARBEPOETIN ALFA 60 MCG: 60 INJECTION, SOLUTION INTRAVENOUS; SUBCUTANEOUS at 12:12

## 2024-08-22 NOTE — PROGRESS NOTES
Arrived to treatment suite for scheduled Aranesp injection.  Labs reviewed, within treatment parameters.  Treatment plan approved, released and completed as ordered.  Injection given SC in left arm, band aid applied at injection site. Pt tolerated well.  AVS provided.  Discharge via wheelchair in stable condition.  Lesli Trent RN

## 2024-08-30 ENCOUNTER — HOSPITAL ENCOUNTER (OUTPATIENT)
Age: 44
Discharge: HOME | End: 2024-08-30
Payer: COMMERCIAL

## 2024-08-30 LAB
ALBUMIN SERPL-MCNC: 4 GM/DL (ref 3.4–5)
ALP BLD-CCNC: 163 IU/L (ref 40–128)
ALT SERPL-CCNC: 20 U/L (ref 10–40)
ANION GAP SERPL CALCULATED.3IONS-SCNC: 13 MMOL/L (ref 7–16)
AST SERPL-CCNC: 33 IU/L (ref 15–37)
BILIRUB SERPL-MCNC: 0.3 MG/DL (ref 0–1)
BUN SERPL-MCNC: 77 MG/DL (ref 6–23)
CALCIUM SERPL-MCNC: 9.1 MG/DL (ref 8.3–10.6)
CHLORIDE BLD-SCNC: 95 MMOL/L (ref 99–110)
CO2: 26 MMOL/L (ref 21–32)
CREAT SERPL-MCNC: 4.9 MG/DL (ref 0.6–1.1)
GFR, ESTIMATED: 11 ML/MIN/1.73M2
GLUCOSE SERPL-MCNC: 139 MG/DL (ref 70–99)
PHOSPHORUS: 5.5 MG/DL (ref 2.5–4.9)
POTASSIUM SERPL-SCNC: 4.9 MMOL/L (ref 3.5–5.1)
SODIUM BLD-SCNC: 134 MMOL/L (ref 135–145)
TOTAL PROTEIN: 7 GM/DL (ref 6.4–8.2)

## 2024-08-30 PROCEDURE — 84100 ASSAY OF PHOSPHORUS: CPT

## 2024-08-30 PROCEDURE — 83970 ASSAY OF PARATHORMONE: CPT

## 2024-08-30 PROCEDURE — 36415 COLL VENOUS BLD VENIPUNCTURE: CPT

## 2024-08-30 PROCEDURE — 80053 COMPREHEN METABOLIC PANEL: CPT

## 2024-09-01 LAB — PTH-INTACT SERPL-MCNC: 87 PG/ML (ref 15–65)

## 2024-09-05 ENCOUNTER — HOSPITAL ENCOUNTER (OUTPATIENT)
Dept: INFUSION THERAPY | Age: 44
Discharge: HOME | End: 2024-09-05
Payer: COMMERCIAL

## 2024-09-05 VITALS
DIASTOLIC BLOOD PRESSURE: 63 MMHG | TEMPERATURE: 98.1 F | SYSTOLIC BLOOD PRESSURE: 137 MMHG | HEART RATE: 59 BPM | OXYGEN SATURATION: 98 %

## 2024-09-05 DIAGNOSIS — D63.1 ANEMIA DUE TO STAGE 4 CHRONIC KIDNEY DISEASE (HCC): Primary | ICD-10-CM

## 2024-09-05 DIAGNOSIS — N18.4 ANEMIA DUE TO STAGE 4 CHRONIC KIDNEY DISEASE (HCC): Primary | ICD-10-CM

## 2024-09-05 LAB
BASOPHILS ABSOLUTE: 0 K/CU MM
BASOPHILS RELATIVE PERCENT: 0.7 % (ref 0–1)
DIFFERENTIAL TYPE: ABNORMAL
EOSINOPHILS ABSOLUTE: 0.3 K/CU MM
EOSINOPHILS RELATIVE PERCENT: 4.1 % (ref 0–3)
HCT VFR BLD CALC: 29.9 % (ref 37–47)
HEMOGLOBIN: 9.7 GM/DL (ref 12.5–16)
LYMPHOCYTES ABSOLUTE: 0.8 K/CU MM
LYMPHOCYTES RELATIVE PERCENT: 13 % (ref 24–44)
MCH RBC QN AUTO: 32.6 PG (ref 27–31)
MCHC RBC AUTO-ENTMCNC: 32.4 % (ref 32–36)
MCV RBC AUTO: 100.3 FL (ref 78–100)
MONOCYTES ABSOLUTE: 0.7 K/CU MM
MONOCYTES RELATIVE PERCENT: 10.9 % (ref 0–4)
NEUTROPHILS ABSOLUTE: 4.4 K/CU MM
NEUTROPHILS RELATIVE PERCENT: 71.3 % (ref 36–66)
PDW BLD-RTO: 14.1 % (ref 11.7–14.9)
PLATELET # BLD: 243 K/CU MM (ref 140–440)
PMV BLD AUTO: 8.9 FL (ref 7.5–11.1)
RBC # BLD: 2.98 M/CU MM (ref 4.2–5.4)
WBC # BLD: 6.1 K/CU MM (ref 4–10.5)

## 2024-09-05 PROCEDURE — 6360000002 HC RX W HCPCS: Performed by: INTERNAL MEDICINE

## 2024-09-05 PROCEDURE — 96372 THER/PROPH/DIAG INJ SC/IM: CPT

## 2024-09-05 PROCEDURE — 36415 COLL VENOUS BLD VENIPUNCTURE: CPT

## 2024-09-05 PROCEDURE — 85025 COMPLETE CBC W/AUTO DIFF WBC: CPT

## 2024-09-05 RX ORDER — DIPHENHYDRAMINE HYDROCHLORIDE 50 MG/ML
50 INJECTION INTRAMUSCULAR; INTRAVENOUS
OUTPATIENT
Start: 2024-09-19

## 2024-09-05 RX ORDER — FAMOTIDINE 10 MG/ML
20 INJECTION, SOLUTION INTRAVENOUS
OUTPATIENT
Start: 2024-09-19

## 2024-09-05 RX ORDER — ONDANSETRON 2 MG/ML
8 INJECTION INTRAMUSCULAR; INTRAVENOUS
OUTPATIENT
Start: 2024-09-19

## 2024-09-05 RX ORDER — SODIUM CHLORIDE 9 MG/ML
INJECTION, SOLUTION INTRAVENOUS CONTINUOUS
OUTPATIENT
Start: 2024-09-19

## 2024-09-05 RX ORDER — EPINEPHRINE 1 MG/ML
0.3 INJECTION, SOLUTION, CONCENTRATE INTRAVENOUS PRN
OUTPATIENT
Start: 2024-09-19

## 2024-09-05 RX ORDER — ACETAMINOPHEN 325 MG/1
650 TABLET ORAL
OUTPATIENT
Start: 2024-09-19

## 2024-09-05 RX ORDER — ALBUTEROL SULFATE 90 UG/1
4 AEROSOL, METERED RESPIRATORY (INHALATION) PRN
OUTPATIENT
Start: 2024-09-19

## 2024-09-05 RX ADMIN — DARBEPOETIN ALFA 60 MCG: 60 INJECTION, SOLUTION INTRAVENOUS; SUBCUTANEOUS at 11:21

## 2024-09-05 ASSESSMENT — PAIN SCALES - GENERAL: PAINLEVEL_OUTOF10: 0

## 2024-09-05 NOTE — PROGRESS NOTES
Patient ambulated to infusion area, here today for an Aranesp injection. No concerns at this time. Labs reviewed, HgB 9.7. Treatment approved and given in left arm sq. Patient tolerated well. Patient declined AVS. RTC 09/19 for next injection.

## 2024-09-19 ENCOUNTER — HOSPITAL ENCOUNTER (OUTPATIENT)
Dept: INFUSION THERAPY | Age: 44
Discharge: HOME | End: 2024-09-19
Payer: COMMERCIAL

## 2024-09-19 DIAGNOSIS — D63.1 ANEMIA DUE TO STAGE 4 CHRONIC KIDNEY DISEASE (HCC): ICD-10-CM

## 2024-09-19 DIAGNOSIS — N18.4 ANEMIA DUE TO STAGE 4 CHRONIC KIDNEY DISEASE (HCC): ICD-10-CM

## 2024-09-19 LAB
BASOPHILS # BLD: 0.09 K/UL
BASOPHILS NFR BLD: 1 % (ref 0–1)
EOSINOPHIL # BLD: 0.24 K/UL
EOSINOPHILS RELATIVE PERCENT: 3 % (ref 0–3)
ERYTHROCYTE [DISTWIDTH] IN BLOOD BY AUTOMATED COUNT: 12.9 % (ref 11.7–14.9)
HCT VFR BLD AUTO: 32.4 % (ref 37–47)
HGB BLD-MCNC: 10.4 G/DL (ref 12.5–16)
LYMPHOCYTES NFR BLD: 0.99 K/UL
LYMPHOCYTES RELATIVE PERCENT: 14 % (ref 24–44)
MCH RBC QN AUTO: 33.1 PG (ref 27–31)
MCHC RBC AUTO-ENTMCNC: 32.1 G/DL (ref 32–36)
MCV RBC AUTO: 103.2 FL (ref 78–100)
MONOCYTES NFR BLD: 0.57 K/UL
MONOCYTES NFR BLD: 8 % (ref 0–4)
NEUTROPHILS NFR BLD: 74 % (ref 36–66)
NEUTS SEG NFR BLD: 5.41 K/UL
PLATELET # BLD AUTO: 267 K/UL (ref 140–440)
PMV BLD AUTO: 8.8 FL (ref 7.5–11.1)
RBC # BLD AUTO: 3.14 M/UL (ref 4.2–5.4)
WBC OTHER # BLD: 7.3 K/UL (ref 4–10.5)

## 2024-09-19 PROCEDURE — 85025 COMPLETE CBC W/AUTO DIFF WBC: CPT

## 2024-09-19 PROCEDURE — 36415 COLL VENOUS BLD VENIPUNCTURE: CPT

## 2024-09-27 ENCOUNTER — HOSPITAL ENCOUNTER (OUTPATIENT)
Age: 44
Discharge: HOME | End: 2024-09-27
Payer: COMMERCIAL

## 2024-09-27 DIAGNOSIS — N17.9 AKI (ACUTE KIDNEY INJURY) (HCC): ICD-10-CM

## 2024-09-27 DIAGNOSIS — I10 PRIMARY HYPERTENSION: ICD-10-CM

## 2024-09-27 DIAGNOSIS — R80.9 MICROALBUMINURIA DUE TO TYPE 2 DIABETES MELLITUS (HCC): ICD-10-CM

## 2024-09-27 DIAGNOSIS — N18.4 ANEMIA IN STAGE 4 CHRONIC KIDNEY DISEASE (HCC): ICD-10-CM

## 2024-09-27 DIAGNOSIS — N18.5 CHRONIC KIDNEY DISEASE, STAGE V (HCC): ICD-10-CM

## 2024-09-27 DIAGNOSIS — E11.29 MICROALBUMINURIA DUE TO TYPE 2 DIABETES MELLITUS (HCC): ICD-10-CM

## 2024-09-27 DIAGNOSIS — D63.1 ANEMIA IN STAGE 4 CHRONIC KIDNEY DISEASE (HCC): ICD-10-CM

## 2024-09-27 LAB
ALBUMIN SERPL-MCNC: 4.2 G/DL (ref 3.4–5)
ALBUMIN/GLOB SERPL: 1.4 {RATIO} (ref 1.1–2.2)
ALP SERPL-CCNC: 151 U/L (ref 40–129)
ALT SERPL-CCNC: 25 U/L (ref 10–40)
ANION GAP SERPL CALCULATED.3IONS-SCNC: 15 MMOL/L (ref 9–17)
AST SERPL-CCNC: 42 U/L (ref 15–37)
BILIRUB SERPL-MCNC: 0.3 MG/DL (ref 0–1)
BUN SERPL-MCNC: 69 MG/DL (ref 7–20)
CALCIUM SERPL-MCNC: 9.3 MG/DL (ref 8.3–10.6)
CHLORIDE SERPL-SCNC: 96 MMOL/L (ref 99–110)
CO2 SERPL-SCNC: 23 MMOL/L (ref 21–32)
CREAT SERPL-MCNC: 4.8 MG/DL (ref 0.6–1.1)
GFR, ESTIMATED: 10 ML/MIN/1.73M2
GLUCOSE SERPL-MCNC: 105 MG/DL (ref 74–99)
PHOSPHATE SERPL-MCNC: 5.7 MG/DL (ref 2.5–4.9)
POTASSIUM SERPL-SCNC: 4.6 MMOL/L (ref 3.5–5.1)
PROT SERPL-MCNC: 7.2 G/DL (ref 6.4–8.2)
SODIUM SERPL-SCNC: 134 MMOL/L (ref 136–145)

## 2024-09-27 PROCEDURE — 36415 COLL VENOUS BLD VENIPUNCTURE: CPT

## 2024-09-27 PROCEDURE — 84100 ASSAY OF PHOSPHORUS: CPT

## 2024-09-27 PROCEDURE — 80053 COMPREHEN METABOLIC PANEL: CPT

## 2024-10-03 ENCOUNTER — HOSPITAL ENCOUNTER (OUTPATIENT)
Dept: INFUSION THERAPY | Age: 44
Discharge: HOME | End: 2024-10-03
Payer: COMMERCIAL

## 2024-10-03 VITALS — DIASTOLIC BLOOD PRESSURE: 67 MMHG | SYSTOLIC BLOOD PRESSURE: 150 MMHG

## 2024-10-03 DIAGNOSIS — D63.1 ANEMIA DUE TO STAGE 4 CHRONIC KIDNEY DISEASE (HCC): ICD-10-CM

## 2024-10-03 DIAGNOSIS — N18.4 ANEMIA DUE TO STAGE 4 CHRONIC KIDNEY DISEASE (HCC): ICD-10-CM

## 2024-10-03 LAB
BASOPHILS # BLD: 0.07 K/UL
BASOPHILS NFR BLD: 1 % (ref 0–1)
EOSINOPHIL # BLD: 0.2 K/UL
EOSINOPHILS RELATIVE PERCENT: 3 % (ref 0–3)
ERYTHROCYTE [DISTWIDTH] IN BLOOD BY AUTOMATED COUNT: 11.6 % (ref 11.7–14.9)
HCT VFR BLD AUTO: 31.9 % (ref 37–47)
HGB BLD-MCNC: 10.4 G/DL (ref 12.5–16)
LYMPHOCYTES NFR BLD: 1.05 K/UL
LYMPHOCYTES RELATIVE PERCENT: 14 % (ref 24–44)
MCH RBC QN AUTO: 33 PG (ref 27–31)
MCHC RBC AUTO-ENTMCNC: 32.6 G/DL (ref 32–36)
MCV RBC AUTO: 101.3 FL (ref 78–100)
MONOCYTES NFR BLD: 0.59 K/UL
MONOCYTES NFR BLD: 8 % (ref 0–4)
NEUTROPHILS NFR BLD: 75 % (ref 36–66)
NEUTS SEG NFR BLD: 5.69 K/UL
PLATELET # BLD AUTO: 263 K/UL (ref 140–440)
PMV BLD AUTO: 9.1 FL (ref 7.5–11.1)
RBC # BLD AUTO: 3.15 M/UL (ref 4.2–5.4)
WBC OTHER # BLD: 7.6 K/UL (ref 4–10.5)

## 2024-10-03 PROCEDURE — 85025 COMPLETE CBC W/AUTO DIFF WBC: CPT

## 2024-10-03 PROCEDURE — 36415 COLL VENOUS BLD VENIPUNCTURE: CPT

## 2024-10-03 NOTE — PROGRESS NOTES
Pt arrived from lab for Aranesp injection. Denies concerns or questions at this time.  Hgb 10.4.  No treatment needed today.  Discharge ambulatory in stable condition.  Lesli Trent RN

## 2024-10-09 ENCOUNTER — TELEPHONE (OUTPATIENT)
Dept: ONCOLOGY | Age: 44
End: 2024-10-09

## 2024-10-09 NOTE — TELEPHONE ENCOUNTER
10/9/24 - left pt vm for the 11/5/24 CT chest at [facility] arrival time of 11:00 a and no contrast no prep. R/S Ysabel to after the scan on 11/11/24 at 1:30 pm. Mailed appt info

## 2024-10-17 ENCOUNTER — HOSPITAL ENCOUNTER (OUTPATIENT)
Dept: INFUSION THERAPY | Age: 44
Discharge: HOME | End: 2024-10-17
Payer: COMMERCIAL

## 2024-10-17 VITALS — SYSTOLIC BLOOD PRESSURE: 126 MMHG | DIASTOLIC BLOOD PRESSURE: 60 MMHG

## 2024-10-17 DIAGNOSIS — D63.1 ANEMIA DUE TO STAGE 4 CHRONIC KIDNEY DISEASE (HCC): Primary | ICD-10-CM

## 2024-10-17 DIAGNOSIS — N18.4 ANEMIA DUE TO STAGE 4 CHRONIC KIDNEY DISEASE (HCC): Primary | ICD-10-CM

## 2024-10-17 LAB
BASOPHILS # BLD: 0.07 K/UL
BASOPHILS NFR BLD: 1 % (ref 0–1)
EOSINOPHIL # BLD: 0.28 K/UL
EOSINOPHILS RELATIVE PERCENT: 4 % (ref 0–3)
ERYTHROCYTE [DISTWIDTH] IN BLOOD BY AUTOMATED COUNT: 11.2 % (ref 11.7–14.9)
HCT VFR BLD AUTO: 30.3 % (ref 37–47)
HGB BLD-MCNC: 9.9 G/DL (ref 12.5–16)
LYMPHOCYTES NFR BLD: 0.97 K/UL
LYMPHOCYTES RELATIVE PERCENT: 13 % (ref 24–44)
MCH RBC QN AUTO: 32.9 PG (ref 27–31)
MCHC RBC AUTO-ENTMCNC: 32.7 G/DL (ref 32–36)
MCV RBC AUTO: 100.7 FL (ref 78–100)
MONOCYTES NFR BLD: 0.7 K/UL
MONOCYTES NFR BLD: 10 % (ref 0–4)
NEUTROPHILS NFR BLD: 73 % (ref 36–66)
NEUTS SEG NFR BLD: 5.33 K/UL
PLATELET # BLD AUTO: 269 K/UL (ref 140–440)
PMV BLD AUTO: 9.2 FL (ref 7.5–11.1)
RBC # BLD AUTO: 3.01 M/UL (ref 4.2–5.4)
WBC OTHER # BLD: 7.4 K/UL (ref 4–10.5)

## 2024-10-17 PROCEDURE — 36415 COLL VENOUS BLD VENIPUNCTURE: CPT

## 2024-10-17 PROCEDURE — 96372 THER/PROPH/DIAG INJ SC/IM: CPT

## 2024-10-17 PROCEDURE — 85025 COMPLETE CBC W/AUTO DIFF WBC: CPT

## 2024-10-17 PROCEDURE — 6360000002 HC RX W HCPCS: Performed by: INTERNAL MEDICINE

## 2024-10-17 RX ORDER — DIPHENHYDRAMINE HYDROCHLORIDE 50 MG/ML
50 INJECTION INTRAMUSCULAR; INTRAVENOUS
OUTPATIENT
Start: 2024-10-31

## 2024-10-17 RX ORDER — ONDANSETRON 2 MG/ML
8 INJECTION INTRAMUSCULAR; INTRAVENOUS
OUTPATIENT
Start: 2024-10-31

## 2024-10-17 RX ORDER — EPINEPHRINE 1 MG/ML
0.3 INJECTION, SOLUTION, CONCENTRATE INTRAVENOUS PRN
OUTPATIENT
Start: 2024-10-31

## 2024-10-17 RX ORDER — ALBUTEROL SULFATE 90 UG/1
4 INHALANT RESPIRATORY (INHALATION) PRN
OUTPATIENT
Start: 2024-10-31

## 2024-10-17 RX ORDER — ACETAMINOPHEN 325 MG/1
650 TABLET ORAL
OUTPATIENT
Start: 2024-10-31

## 2024-10-17 RX ORDER — FAMOTIDINE 10 MG/ML
20 INJECTION, SOLUTION INTRAVENOUS
OUTPATIENT
Start: 2024-10-31

## 2024-10-17 RX ORDER — SODIUM CHLORIDE 9 MG/ML
INJECTION, SOLUTION INTRAVENOUS CONTINUOUS
OUTPATIENT
Start: 2024-10-31

## 2024-10-17 RX ADMIN — DARBEPOETIN ALFA 60 MCG: 60 INJECTION, SOLUTION INTRAVENOUS; SUBCUTANEOUS at 10:50

## 2024-10-17 NOTE — PROGRESS NOTES
Pt arrived from lab for Aranesp injection. Denies concerns or questions at this time.  Hgb 9.9.  Treatment plan approved, released and completed as ordered.  Injection given SC in left arm.  Pt tolerated well. Discharge ambulatory in stable condition.  Lesli Trent RN

## 2024-10-29 ENCOUNTER — HOSPITAL ENCOUNTER (OUTPATIENT)
Age: 44
Discharge: HOME | End: 2024-10-29
Payer: COMMERCIAL

## 2024-10-29 DIAGNOSIS — I10 PRIMARY HYPERTENSION: ICD-10-CM

## 2024-10-29 DIAGNOSIS — N17.9 AKI (ACUTE KIDNEY INJURY) (HCC): ICD-10-CM

## 2024-10-29 DIAGNOSIS — N18.5 CHRONIC KIDNEY DISEASE, STAGE V (HCC): ICD-10-CM

## 2024-10-29 DIAGNOSIS — E11.29 MICROALBUMINURIA DUE TO TYPE 2 DIABETES MELLITUS (HCC): ICD-10-CM

## 2024-10-29 DIAGNOSIS — R80.9 MICROALBUMINURIA DUE TO TYPE 2 DIABETES MELLITUS (HCC): ICD-10-CM

## 2024-10-29 LAB
ANION GAP SERPL CALCULATED.3IONS-SCNC: 14 MMOL/L (ref 9–17)
BUN SERPL-MCNC: 65 MG/DL (ref 7–20)
CALCIUM SERPL-MCNC: 9.6 MG/DL (ref 8.3–10.6)
CHLORIDE SERPL-SCNC: 97 MMOL/L (ref 99–110)
CO2 SERPL-SCNC: 21 MMOL/L (ref 21–32)
CREAT SERPL-MCNC: 4.4 MG/DL (ref 0.6–1.1)
GFR, ESTIMATED: 11 ML/MIN/1.73M2
GLUCOSE SERPL-MCNC: 69 MG/DL (ref 74–99)
PHOSPHATE SERPL-MCNC: 5.2 MG/DL (ref 2.5–4.9)
POTASSIUM SERPL-SCNC: 4.9 MMOL/L (ref 3.5–5.1)
PTH-INTACT SERPL-MCNC: 64.2 PG/ML (ref 14–72)
SODIUM SERPL-SCNC: 132 MMOL/L (ref 136–145)

## 2024-10-29 PROCEDURE — 36415 COLL VENOUS BLD VENIPUNCTURE: CPT

## 2024-10-29 PROCEDURE — 84100 ASSAY OF PHOSPHORUS: CPT

## 2024-10-29 PROCEDURE — 80048 BASIC METABOLIC PNL TOTAL CA: CPT

## 2024-10-29 PROCEDURE — 83970 ASSAY OF PARATHORMONE: CPT

## 2024-10-31 ENCOUNTER — HOSPITAL ENCOUNTER (OUTPATIENT)
Dept: INFUSION THERAPY | Age: 44
Discharge: HOME | End: 2024-10-31
Payer: COMMERCIAL

## 2024-10-31 DIAGNOSIS — N18.4 ANEMIA DUE TO STAGE 4 CHRONIC KIDNEY DISEASE (HCC): Primary | ICD-10-CM

## 2024-10-31 DIAGNOSIS — D63.1 ANEMIA DUE TO STAGE 4 CHRONIC KIDNEY DISEASE (HCC): Primary | ICD-10-CM

## 2024-10-31 LAB
BASOPHILS # BLD: 0.06 K/UL
BASOPHILS NFR BLD: 1 % (ref 0–1)
EOSINOPHIL # BLD: 0.3 K/UL
EOSINOPHILS RELATIVE PERCENT: 4 % (ref 0–3)
ERYTHROCYTE [DISTWIDTH] IN BLOOD BY AUTOMATED COUNT: 12 % (ref 11.7–14.9)
HCT VFR BLD AUTO: 34.1 % (ref 37–47)
HGB BLD-MCNC: 10.9 G/DL (ref 12.5–16)
LYMPHOCYTES NFR BLD: 1.09 K/UL
LYMPHOCYTES RELATIVE PERCENT: 16 % (ref 24–44)
MCH RBC QN AUTO: 32.2 PG (ref 27–31)
MCHC RBC AUTO-ENTMCNC: 32 G/DL (ref 32–36)
MCV RBC AUTO: 100.9 FL (ref 78–100)
MONOCYTES NFR BLD: 0.7 K/UL
MONOCYTES NFR BLD: 10 % (ref 0–4)
NEUTROPHILS NFR BLD: 69 % (ref 36–66)
NEUTS SEG NFR BLD: 4.84 K/UL
PLATELET # BLD AUTO: 281 K/UL (ref 140–440)
PMV BLD AUTO: 8.8 FL (ref 7.5–11.1)
RBC # BLD AUTO: 3.38 M/UL (ref 4.2–5.4)
WBC OTHER # BLD: 7 K/UL (ref 4–10.5)

## 2024-10-31 PROCEDURE — 36415 COLL VENOUS BLD VENIPUNCTURE: CPT

## 2024-10-31 PROCEDURE — 85025 COMPLETE CBC W/AUTO DIFF WBC: CPT

## 2024-10-31 RX ORDER — ACETAMINOPHEN 325 MG/1
650 TABLET ORAL
OUTPATIENT
Start: 2024-11-14

## 2024-10-31 RX ORDER — DIPHENHYDRAMINE HYDROCHLORIDE 50 MG/ML
50 INJECTION INTRAMUSCULAR; INTRAVENOUS
OUTPATIENT
Start: 2024-11-14

## 2024-10-31 RX ORDER — SODIUM CHLORIDE 9 MG/ML
INJECTION, SOLUTION INTRAVENOUS CONTINUOUS
OUTPATIENT
Start: 2024-11-14

## 2024-10-31 RX ORDER — EPINEPHRINE 1 MG/ML
0.3 INJECTION, SOLUTION, CONCENTRATE INTRAVENOUS PRN
OUTPATIENT
Start: 2024-11-14

## 2024-10-31 RX ORDER — ONDANSETRON 2 MG/ML
8 INJECTION INTRAMUSCULAR; INTRAVENOUS
OUTPATIENT
Start: 2024-11-14

## 2024-10-31 RX ORDER — FAMOTIDINE 10 MG/ML
20 INJECTION, SOLUTION INTRAVENOUS
OUTPATIENT
Start: 2024-11-14

## 2024-10-31 RX ORDER — ALBUTEROL SULFATE 90 UG/1
4 INHALANT RESPIRATORY (INHALATION) PRN
OUTPATIENT
Start: 2024-11-14

## 2024-11-02 ENCOUNTER — APPOINTMENT (OUTPATIENT)
Dept: CT IMAGING | Age: 44
End: 2024-11-02
Payer: COMMERCIAL

## 2024-11-02 ENCOUNTER — HOSPITAL ENCOUNTER (EMERGENCY)
Age: 44
Discharge: HOME | End: 2024-11-02
Payer: COMMERCIAL

## 2024-11-02 VITALS
DIASTOLIC BLOOD PRESSURE: 79 MMHG | HEART RATE: 62 BPM | SYSTOLIC BLOOD PRESSURE: 132 MMHG | OXYGEN SATURATION: 99 % | RESPIRATION RATE: 18 BRPM | TEMPERATURE: 98.2 F

## 2024-11-02 DIAGNOSIS — R30.0 DYSURIA: ICD-10-CM

## 2024-11-02 DIAGNOSIS — N18.9 CHRONIC KIDNEY DISEASE, UNSPECIFIED CKD STAGE: ICD-10-CM

## 2024-11-02 DIAGNOSIS — R10.9 FLANK PAIN: Primary | ICD-10-CM

## 2024-11-02 LAB
ALBUMIN SERPL-MCNC: 4.6 G/DL (ref 3.4–5)
ALBUMIN/GLOB SERPL: 1.1 {RATIO} (ref 1.1–2.2)
ALP SERPL-CCNC: 137 U/L (ref 40–129)
ALT SERPL-CCNC: 18 U/L (ref 10–40)
ANION GAP SERPL CALCULATED.3IONS-SCNC: 14 MMOL/L (ref 9–17)
AST SERPL-CCNC: 58 U/L (ref 15–37)
BASOPHILS # BLD: 0.09 K/UL
BASOPHILS NFR BLD: 1 % (ref 0–1)
BILIRUB SERPL-MCNC: 0.3 MG/DL (ref 0–1)
BILIRUB UR QL STRIP: NEGATIVE
BUN SERPL-MCNC: 68 MG/DL (ref 7–20)
CALCIUM SERPL-MCNC: 10.3 MG/DL (ref 8.3–10.6)
CHLORIDE SERPL-SCNC: 96 MMOL/L (ref 99–110)
CLARITY UR: CLEAR
CO2 SERPL-SCNC: 24 MMOL/L (ref 21–32)
COLOR UR: YELLOW
CREAT SERPL-MCNC: 4.3 MG/DL (ref 0.6–1.1)
EOSINOPHIL # BLD: 0.3 K/UL
EOSINOPHILS RELATIVE PERCENT: 5 % (ref 0–3)
EPI CELLS #/AREA URNS HPF: <1 /HPF
ERYTHROCYTE [DISTWIDTH] IN BLOOD BY AUTOMATED COUNT: 11.7 % (ref 11.7–14.9)
GFR, ESTIMATED: 11 ML/MIN/1.73M2
GLUCOSE SERPL-MCNC: 69 MG/DL (ref 74–99)
GLUCOSE UR STRIP-MCNC: NEGATIVE MG/DL
HCT VFR BLD AUTO: 36.4 % (ref 37–47)
HGB BLD-MCNC: 12 G/DL (ref 12.5–16)
HGB UR QL STRIP.AUTO: NEGATIVE
IMM GRANULOCYTES # BLD AUTO: 0.02 K/UL
IMM GRANULOCYTES NFR BLD: 0 %
KETONES UR STRIP-MCNC: NEGATIVE MG/DL
LEUKOCYTE ESTERASE UR QL STRIP: NEGATIVE
LYMPHOCYTES NFR BLD: 1.07 K/UL
LYMPHOCYTES RELATIVE PERCENT: 16 % (ref 24–44)
MCH RBC QN AUTO: 32.7 PG (ref 27–31)
MCHC RBC AUTO-ENTMCNC: 33 G/DL (ref 32–36)
MCV RBC AUTO: 99.2 FL (ref 78–100)
MONOCYTES NFR BLD: 0.53 K/UL
MONOCYTES NFR BLD: 8 % (ref 0–4)
NEUTROPHILS NFR BLD: 69 % (ref 36–66)
NEUTS SEG NFR BLD: 4.55 K/UL
NITRITE UR QL STRIP: NEGATIVE
PH UR STRIP: 6 [PH] (ref 5–8)
PLATELET CONFIRMATION: NORMAL
PLATELET, FLUORESCENCE: 270 K/UL (ref 140–440)
PMV BLD AUTO: 9.7 FL (ref 7.5–11.1)
POTASSIUM SERPL-SCNC: 5.4 MMOL/L (ref 3.5–5.1)
PROT SERPL-MCNC: 8.6 G/DL (ref 6.4–8.2)
PROT UR STRIP-MCNC: 100 MG/DL
RBC # BLD AUTO: 3.67 M/UL (ref 4.2–5.4)
RBC #/AREA URNS HPF: 0 /HPF (ref 0–2)
SODIUM SERPL-SCNC: 133 MMOL/L (ref 136–145)
SP GR UR STRIP: 1.01 (ref 1–1.03)
TRICHOMONAS #/AREA URNS HPF: NORMAL /[HPF]
UROBILINOGEN UR STRIP-ACNC: 0.2 EU/DL (ref 0–1)
WBC #/AREA URNS HPF: <1 /HPF (ref 0–5)
WBC OTHER # BLD: 6.6 K/UL (ref 4–10.5)

## 2024-11-02 PROCEDURE — 51798 US URINE CAPACITY MEASURE: CPT

## 2024-11-02 PROCEDURE — 87086 URINE CULTURE/COLONY COUNT: CPT

## 2024-11-02 PROCEDURE — 99284 EMERGENCY DEPT VISIT MOD MDM: CPT

## 2024-11-02 PROCEDURE — 81001 URINALYSIS AUTO W/SCOPE: CPT

## 2024-11-02 PROCEDURE — 85025 COMPLETE CBC W/AUTO DIFF WBC: CPT

## 2024-11-02 PROCEDURE — 2580000003 HC RX 258: Performed by: PHYSICIAN ASSISTANT

## 2024-11-02 PROCEDURE — 6370000000 HC RX 637 (ALT 250 FOR IP): Performed by: PHYSICIAN ASSISTANT

## 2024-11-02 PROCEDURE — 74176 CT ABD & PELVIS W/O CONTRAST: CPT

## 2024-11-02 PROCEDURE — 80053 COMPREHEN METABOLIC PANEL: CPT

## 2024-11-02 RX ORDER — 0.9 % SODIUM CHLORIDE 0.9 %
1000 INTRAVENOUS SOLUTION INTRAVENOUS ONCE
Status: COMPLETED | OUTPATIENT
Start: 2024-11-02 | End: 2024-11-02

## 2024-11-02 RX ORDER — SULFAMETHOXAZOLE AND TRIMETHOPRIM 800; 160 MG/1; MG/1
1 TABLET ORAL 2 TIMES DAILY
Qty: 14 TABLET | Refills: 0 | Status: SHIPPED | OUTPATIENT
Start: 2024-11-02 | End: 2024-11-04

## 2024-11-02 RX ORDER — OXYCODONE AND ACETAMINOPHEN 5; 325 MG/1; MG/1
1 TABLET ORAL ONCE
Status: COMPLETED | OUTPATIENT
Start: 2024-11-02 | End: 2024-11-02

## 2024-11-02 RX ORDER — SULFAMETHOXAZOLE AND TRIMETHOPRIM 800; 160 MG/1; MG/1
1 TABLET ORAL ONCE
Status: COMPLETED | OUTPATIENT
Start: 2024-11-02 | End: 2024-11-02

## 2024-11-02 RX ADMIN — SODIUM CHLORIDE 1000 ML: 9 INJECTION, SOLUTION INTRAVENOUS at 09:28

## 2024-11-02 RX ADMIN — SULFAMETHOXAZOLE AND TRIMETHOPRIM 1 TABLET: 800; 160 TABLET ORAL at 12:12

## 2024-11-02 RX ADMIN — OXYCODONE HYDROCHLORIDE AND ACETAMINOPHEN 1 TABLET: 5; 325 TABLET ORAL at 09:29

## 2024-11-02 ASSESSMENT — PAIN SCALES - GENERAL
PAINLEVEL_OUTOF10: 5
PAINLEVEL_OUTOF10: 5
PAINLEVEL_OUTOF10: 7

## 2024-11-02 ASSESSMENT — PAIN DESCRIPTION - DESCRIPTORS: DESCRIPTORS: ACHING

## 2024-11-02 ASSESSMENT — PAIN - FUNCTIONAL ASSESSMENT
PAIN_FUNCTIONAL_ASSESSMENT: PREVENTS OR INTERFERES SOME ACTIVE ACTIVITIES AND ADLS
PAIN_FUNCTIONAL_ASSESSMENT: 0-10

## 2024-11-02 ASSESSMENT — PAIN DESCRIPTION - ORIENTATION: ORIENTATION: LEFT;RIGHT;LOWER

## 2024-11-02 ASSESSMENT — PAIN DESCRIPTION - LOCATION: LOCATION: BACK

## 2024-11-02 NOTE — ED PROVIDER NOTES
calcification. No large abdominal wall defect. No focal abnormality of the urinary bladder. The uterus is absent. No adnexal mass. No abnormally dilated bowel segments or free intraperitoneal air. Some patchy wall thickening of the colon may be due to lack of distention versus peristalsis. Moderate stool in the colon. Normal-appearing appendix. No abdominal/pelvic adenopathy or ascites. Some patchy wall thickening of the stomach may be due to lack of distention. Surgical clips in the gallbladder fossa. Evaluation of upper abdominal structures is limited due to streak artifact from the patient's arms. The liver, adrenal glands, spleen, and pancreas show no specific abnormality. The kidneys are symmetric in size and attenuation. There is relatively similar bilateral perinephric fat stranding. Diffuse body wall edema on the prior study has largely resolved. Tortuous appearance of the right ureter is not significantly changed. No evidence of significant obstructive uropathy. No radiopaque ureteral calculus is demonstrated.     No definite acute findings in the abdomen/pelvis. There is similar bilateral perinephric fat stranding compared to 4/23/2024. Suggest correlation with urinalysis to exclude the possibility of urinary tract infection. No evidence of significant obstructive uropathy. Diffuse body wall edema on the prior study has largely resolved. No bowel obstruction or perforation. Normal appendix. Prior cholecystectomy and hysterectomy. Moderate atherosclerotic vascular calcifications, prominent for age. Moderate coronary artery calcifications are present. Electronically signed by Cristiano HITCHCOCK   Unless otherwise noted below, none     CRITICAL CARE   CRITICAL CARE NOTE:   N/A    CONSULTS:  None      EMERGENCY DEPARTMENT COURSE and MDM:   Vitals:    Vitals:    11/02/24 0757 11/02/24 1213   BP: 138/64 132/79   Pulse: 64 62   Resp: 19 18   Temp: 98.2 °F (36.8 °C) 98.2 °F (36.8 °C)   TempSrc:   Patient was given oral pain medication, doing well on reevaluation, eager to be discharged.  Will advise follow-up with primary care, nephrology, urology as necessary, will follow culture.  Will be given strict return precautions.  Will discharge home in stable condition.    History from : Patient and Family mother    Limitations to history : None    Patient was given the following medications:  Medications   oxyCODONE-acetaminophen (PERCOCET) 5-325 MG per tablet 1 tablet (1 tablet Oral Given 11/2/24 0954)   sodium chloride 0.9 % bolus 1,000 mL (0 mLs IntraVENous Stopped 11/2/24 1055)   sulfamethoxazole-trimethoprim (BACTRIM DS;SEPTRA DS) 800-160 MG per tablet 1 tablet (1 tablet Oral Given 11/2/24 1212)       Independent Imaging Interpretation by me: CT abdomen pelvis without contrast demonstrates no definitive acute findings, did have similar bilateral perinephric fat stranding no evidence of obstructive uropathy    Chronic conditions affecting care: Chronic kidney disease    Discussion with Other Profesionals : None    Social Determinants : None    Records Reviewed : Source reviewed EMR, reviewed history of urinary cultures    Appropriate for outpatient management close follow-up with primary care/nephrology-recommend return for any new or developing symptoms.    I am the Primary Clinician of Record.        CLINICAL IMPRESSION      1. Flank pain    2. Dysuria    3. Chronic kidney disease, unspecified CKD stage          DISPOSITION/PLAN   DISPOSITION Decision To Discharge 11/02/2024 11:55:13 AM           PATIENT REFERREDTO:  Lesly Parekh MD  [address] [facility] [id number] [phone]    Schedule an appointment as soon as possible for a visit       Shane Scales MD  [address] [facility] [address]  [facility] [address]  [phone]    Schedule an appointment as soon as possible for a visit         DISCHARGE

## 2024-11-03 LAB
MICROORGANISM SPEC CULT: NORMAL
SERVICE CMNT-IMP: NORMAL
SPECIMEN DESCRIPTION: NORMAL

## 2024-11-04 ENCOUNTER — TELEPHONE (OUTPATIENT)
Dept: PHARMACY | Age: 44
End: 2024-11-04

## 2024-11-04 NOTE — TELEPHONE ENCOUNTER
Pharmacy Note  ED Culture Follow-up    Gus Garza is a 44 y.o. female.     Allergies: Tramadol and Hydrocodone-acetaminophen     Labs:  Lab Results   Component Value Date    BUN 68 (H) 11/02/2024    CREATININE 4.3 (H) 11/02/2024    WBC 6.6 11/02/2024     CrCl cannot be calculated (Unknown ideal weight.).    Current antimicrobials:   Bactrim    ASSESSMENT:  Micro results:   Urine culture no growth at 18 to 36 hours     PLAN:  Need for intervention: Yes  Discussed with: Dr. Waite  Chosen treatment:    Informed patient of negative urine culture and instructed patient to discontinue Bactrim.    Patient response:   Called and spoke with patient.   Counseled patient on following up with PCP    Called/sent in prescription to: Not applicable    Please call with any questions. Ext. [phone]    Martha Harris RPH, PharmD 3:26 PM 11/4/2024

## 2024-11-04 NOTE — PROGRESS NOTES
Pharmacy Note  ED Culture Follow-up    Gus Garza is a 44 y.o. female.     Allergies: Tramadol and Hydrocodone-acetaminophen     Labs:  Lab Results   Component Value Date    BUN 68 (H) 11/02/2024    CREATININE 4.3 (H) 11/02/2024    WBC 6.6 11/02/2024     CrCl cannot be calculated (Unknown ideal weight.).    Current antimicrobials:   Bactrim    ASSESSMENT:  Micro results:   Urine culture no growth at 18 to 36 hours     PLAN:  Need for intervention: Yes  Discussed with: Dr. Waite  Chosen treatment:    Informed patient of negative urine culture and instructed patient to discontinue Bactrim.    Patient response:   Called and spoke with patient.    Counseled patient on following up with PCP    Called/sent in prescription to: Not applicable    Please call with any questions. Ext. [phone]    Martha Harris RPH, PharmD 3:26 PM 11/4/2024

## 2024-11-08 ENCOUNTER — HOSPITAL ENCOUNTER (OUTPATIENT)
Dept: CT IMAGING | Age: 44
Discharge: HOME | End: 2024-11-08
Attending: INTERNAL MEDICINE
Payer: COMMERCIAL

## 2024-11-08 DIAGNOSIS — R91.1 NODULE OF RIGHT LUNG: ICD-10-CM

## 2024-11-08 PROCEDURE — 71250 CT THORAX DX C-: CPT

## 2024-11-11 ENCOUNTER — HOSPITAL ENCOUNTER (OUTPATIENT)
Dept: INFUSION THERAPY | Age: 44
Discharge: HOME | End: 2024-11-11
Payer: COMMERCIAL

## 2024-11-11 ENCOUNTER — HOSPITAL ENCOUNTER (OUTPATIENT)
Dept: INFUSION THERAPY | Age: 44
End: 2024-11-11
Payer: COMMERCIAL

## 2024-11-11 ENCOUNTER — OFFICE VISIT (OUTPATIENT)
Dept: ONCOLOGY | Age: 44
End: 2024-11-11
Payer: COMMERCIAL

## 2024-11-11 VITALS
TEMPERATURE: 98.3 F | BODY MASS INDEX: 26.46 KG/M2 | WEIGHT: 134.8 LBS | DIASTOLIC BLOOD PRESSURE: 63 MMHG | OXYGEN SATURATION: 95 % | SYSTOLIC BLOOD PRESSURE: 137 MMHG | HEART RATE: 63 BPM | HEIGHT: 60 IN

## 2024-11-11 VITALS
OXYGEN SATURATION: 95 % | TEMPERATURE: 98.3 F | DIASTOLIC BLOOD PRESSURE: 63 MMHG | HEIGHT: 60 IN | HEART RATE: 63 BPM | SYSTOLIC BLOOD PRESSURE: 137 MMHG | BODY MASS INDEX: 26.56 KG/M2

## 2024-11-11 DIAGNOSIS — D63.1 ANEMIA IN STAGE 4 CHRONIC KIDNEY DISEASE (HCC): Primary | ICD-10-CM

## 2024-11-11 DIAGNOSIS — D63.1 ANEMIA DUE TO STAGE 4 CHRONIC KIDNEY DISEASE (HCC): Primary | ICD-10-CM

## 2024-11-11 DIAGNOSIS — N18.4 ANEMIA IN STAGE 4 CHRONIC KIDNEY DISEASE (HCC): Primary | ICD-10-CM

## 2024-11-11 DIAGNOSIS — N18.4 ANEMIA DUE TO STAGE 4 CHRONIC KIDNEY DISEASE (HCC): Primary | ICD-10-CM

## 2024-11-11 LAB
BASOPHILS # BLD: 0.05 K/UL
BASOPHILS NFR BLD: 1 % (ref 0–1)
EOSINOPHIL # BLD: 0.32 K/UL
EOSINOPHILS RELATIVE PERCENT: 6 % (ref 0–3)
ERYTHROCYTE [DISTWIDTH] IN BLOOD BY AUTOMATED COUNT: 11.5 % (ref 11.7–14.9)
HCT VFR BLD AUTO: 31.4 % (ref 37–47)
HGB BLD-MCNC: 10.2 G/DL (ref 12.5–16)
LYMPHOCYTES NFR BLD: 1.4 K/UL
LYMPHOCYTES RELATIVE PERCENT: 25 % (ref 24–44)
MCH RBC QN AUTO: 32.5 PG (ref 27–31)
MCHC RBC AUTO-ENTMCNC: 32.5 G/DL (ref 32–36)
MCV RBC AUTO: 100 FL (ref 78–100)
MONOCYTES NFR BLD: 0.36 K/UL
MONOCYTES NFR BLD: 7 % (ref 0–4)
NEUTROPHILS NFR BLD: 62 % (ref 36–66)
NEUTS SEG NFR BLD: 3.41 K/UL
PLATELET # BLD AUTO: 258 K/UL (ref 140–440)
PMV BLD AUTO: 9.2 FL (ref 7.5–11.1)
RBC # BLD AUTO: 3.14 M/UL (ref 4.2–5.4)
WBC OTHER # BLD: 5.5 K/UL (ref 4–10.5)

## 2024-11-11 PROCEDURE — 36415 COLL VENOUS BLD VENIPUNCTURE: CPT

## 2024-11-11 PROCEDURE — 6360000002 HC RX W HCPCS: Performed by: INTERNAL MEDICINE

## 2024-11-11 PROCEDURE — 3075F SYST BP GE 130 - 139MM HG: CPT | Performed by: INTERNAL MEDICINE

## 2024-11-11 PROCEDURE — 3078F DIAST BP <80 MM HG: CPT | Performed by: INTERNAL MEDICINE

## 2024-11-11 PROCEDURE — 99214 OFFICE O/P EST MOD 30 MIN: CPT | Performed by: INTERNAL MEDICINE

## 2024-11-11 PROCEDURE — 85025 COMPLETE CBC W/AUTO DIFF WBC: CPT

## 2024-11-11 PROCEDURE — 96372 THER/PROPH/DIAG INJ SC/IM: CPT

## 2024-11-11 RX ORDER — ALBUTEROL SULFATE 90 UG/1
4 INHALANT RESPIRATORY (INHALATION) PRN
OUTPATIENT
Start: 2024-11-14

## 2024-11-11 RX ORDER — HYDROCORTISONE SODIUM SUCCINATE 100 MG/2ML
100 INJECTION INTRAMUSCULAR; INTRAVENOUS
OUTPATIENT
Start: 2024-11-14

## 2024-11-11 RX ORDER — DIPHENHYDRAMINE HYDROCHLORIDE 50 MG/ML
50 INJECTION INTRAMUSCULAR; INTRAVENOUS
OUTPATIENT
Start: 2024-11-14

## 2024-11-11 RX ORDER — FAMOTIDINE 10 MG/ML
20 INJECTION, SOLUTION INTRAVENOUS
OUTPATIENT
Start: 2024-11-14

## 2024-11-11 RX ORDER — ONDANSETRON 2 MG/ML
8 INJECTION INTRAMUSCULAR; INTRAVENOUS
OUTPATIENT
Start: 2024-11-14

## 2024-11-11 RX ORDER — ACETAMINOPHEN 325 MG/1
650 TABLET ORAL
OUTPATIENT
Start: 2024-11-14

## 2024-11-11 RX ORDER — SODIUM CHLORIDE 9 MG/ML
INJECTION, SOLUTION INTRAVENOUS CONTINUOUS
OUTPATIENT
Start: 2024-11-14

## 2024-11-11 RX ORDER — EPINEPHRINE 1 MG/ML
0.3 INJECTION, SOLUTION, CONCENTRATE INTRAVENOUS PRN
OUTPATIENT
Start: 2024-11-14

## 2024-11-11 RX ADMIN — DARBEPOETIN ALFA 60 MCG: 60 INJECTION, SOLUTION INTRAVENOUS; SUBCUTANEOUS at 13:46

## 2024-11-11 NOTE — PROGRESS NOTES
MA Rooming Questions  Patient: Gus Garza  MRN: [id number]    Date: 11/11/2024        1. Do you have any new issues?   no         2. Do you need any refills on medications?    no    3. Have you had any imaging done since your last visit?   yes - Ct abd pelv 11/2 and Ct chest 11/8    4. Have you been hospitalized or seen in the emergency room since your last visit here?   yes - ER 11/2    5. Did the patient have a depression screening completed today? No    No data recorded     PHQ-9 Given to (if applicable):               PHQ-9 Score (if applicable):                     [] Positive     []  Negative              Does question #9 need addressed (if applicable)                     [] Yes    []  No               Dayna Arango MA    
disease.    2/22/2024 CBC with WBC of 8.1 hemoglobin of 8.4 hematocrit 27.1 MCV of 93.8 platelets of 285CMP with creatinine of 3.4, alk phos of 137 ferritin 116 iron saturations of 13 B12 and folic acid 1286 and 7.9 respectively    3/1/24: Ct chest:IMPRESSION:  1. Moderate size right pleural effusion.  2. Mild airspace disease right lower lobe.  3. Mild mediastinal adenopathy, increased.  4. Right upper lobe pulmonary nodule, 10 mm, similar to October 2023. Recommend either follow-up study in 6-12 months or further evaluation with PET/CT.  5. Small pericardial effusion.  6. Coronary artery disease.    3/18/24: PET:IMPRESSION:  1. Evidence of hypermetabolic right upper lobe posterior subpleural nodule with mediastinal and right hilar FDG avid lymphadenopathy. Evidence of lung neoplasm and or metastatic disease.  2. Additional extensive retroperitoneal lymphadenopathy which shows no significant activity above visceral background  3. Interstitial lung disease with subpleural air trapping. No honeycombing or fibrosis  4. Small right pleural effusion.  5. Increased uptake at the subcutaneous tract of the suprapubic catheter, local inflammation, reactive changes or infection.    3/22/2024, right pleural effusion very small for thoracentesis.    CT scan of the abdomen pelvis on 4/23/2024 with anasarca and bilateral inguinal lymphadenopathy    5/20/2024 underwent needle biopsy, pathology negative for malignancy.  Favors necrotizing granulomatous process.  GMS positive forHistoplasma like fungal microorganisms.    5/30/2024 hemoglobin of 12.3    5/31/24: Ultrasound:IMPRESSION:     1. No evidence of right lower extremity deep venous thrombosis.    2. Subcutaneous soft tissue swelling in the area of concern which may represent a hematoma. No liquefied cystic area at this time.    7/25/24: CBC with wbc 6, Hb 10.3, Hct 31.9, mcv 92, plt 261K    11/2/2024 CT scan of the abdomen pelvis:No definite acute findings in the 
Murmurs

## 2024-11-11 NOTE — PROGRESS NOTES
Patient ambulated to infusion area, here today for a Aranesp injection. Labs drawn and reviewed HGB 10.2 above parameter okay per Dr. Grady to given injection No concerns at this time. Treatment approved and given in L arm. Patient tolerated well. Pt declined discharge instructions.

## 2024-11-13 ENCOUNTER — TELEPHONE (OUTPATIENT)
Dept: CARDIOLOGY CLINIC | Age: 44
End: 2024-11-13

## 2024-11-13 NOTE — TELEPHONE ENCOUNTER
Faxed the report the Echo and NM-last- to the [facility]- [phone]. Also- mailed a CD of the tests via [employer]-  Tracking# [id number].

## 2024-11-21 ENCOUNTER — OFFICE VISIT (OUTPATIENT)
Dept: CARDIOLOGY CLINIC | Age: 44
End: 2024-11-21
Payer: COMMERCIAL

## 2024-11-21 VITALS
HEIGHT: 60 IN | BODY MASS INDEX: 26.7 KG/M2 | SYSTOLIC BLOOD PRESSURE: 124 MMHG | WEIGHT: 136 LBS | HEART RATE: 63 BPM | DIASTOLIC BLOOD PRESSURE: 70 MMHG

## 2024-11-21 DIAGNOSIS — E10.59 TYPE 1 DIABETES MELLITUS WITH OTHER CIRCULATORY COMPLICATION (HCC): ICD-10-CM

## 2024-11-21 DIAGNOSIS — I25.10 CAD IN NATIVE ARTERY: ICD-10-CM

## 2024-11-21 DIAGNOSIS — Z98.61 CAD S/P PERCUTANEOUS CORONARY ANGIOPLASTY: ICD-10-CM

## 2024-11-21 DIAGNOSIS — R07.9 CHEST PAIN, UNSPECIFIED TYPE: ICD-10-CM

## 2024-11-21 DIAGNOSIS — I25.10 CAD S/P PERCUTANEOUS CORONARY ANGIOPLASTY: ICD-10-CM

## 2024-11-21 DIAGNOSIS — E78.5 DYSLIPIDEMIA: ICD-10-CM

## 2024-11-21 DIAGNOSIS — I10 PRIMARY HYPERTENSION: Primary | ICD-10-CM

## 2024-11-21 DIAGNOSIS — I21.4 NSTEMI (NON-ST ELEVATED MYOCARDIAL INFARCTION) (HCC): ICD-10-CM

## 2024-11-21 PROCEDURE — 3078F DIAST BP <80 MM HG: CPT | Performed by: INTERNAL MEDICINE

## 2024-11-21 PROCEDURE — 93000 ELECTROCARDIOGRAM COMPLETE: CPT | Performed by: INTERNAL MEDICINE

## 2024-11-21 PROCEDURE — 99214 OFFICE O/P EST MOD 30 MIN: CPT | Performed by: INTERNAL MEDICINE

## 2024-11-21 PROCEDURE — 3074F SYST BP LT 130 MM HG: CPT | Performed by: INTERNAL MEDICINE

## 2024-11-21 NOTE — PROGRESS NOTES
OFFICE PROGRESS NOTES      Gus is a 44 y.o. female who has    CHIEF COMPLAINT AS FOLLOWS:  CHEST PAIN: Patient  C/O chest pains at this time.     When did it began: Few weeks.  How long does it last: 2 to 3 minutes  How severe: 7 out of 10 in intensity  Radiation: Left shoulder and back.  In fact back pain is constant  Aggravating factors: None  Relieving factors: Just relaxing  Associated features: Rapid breathing  Tenderness to palpation: No   SOB: No C/O SOB at this time.                LEG EDEMA: No leg edema   PALPITATIONS: Denies any C/O Palpitations   DIZZINESS: No C/O Dizziness   SYNCOPE: None   OTHER/ ADDITIONAL COMPLAINTS:                                     HPI: Patient is here for F/U on her CAD, HTN & Dyslipidemia problems.   CAD: Patient has known CAD. Had angioplasty in the past.  HTN: Patient has known essential HTN. Has been treated with guideline recommended medical / physical/ diet therapy as stated below.  Dyslipidemia: Patient has known mixed dyslipidemia. Has been treated with guideline recommended medical / physical/ diet therapy as stated below.                Current Outpatient Medications   Medication Sig Dispense Refill    darbepoetin home-polysorbate (ARANESP) 60 MCG/0.3ML SOSY injection Inject 0.3 mLs into the skin every 30 days      torsemide (DEMADEX) 20 MG tablet Take 1 tablet by mouth daily 90 tablet 1    sevelamer (RENVELA) 800 MG tablet Take 1 tablet by mouth 3 times daily (with meals) 90 tablet 3    TRESIBA FLEXTOUCH 100 UNIT/ML SOPN 6 Units      ondansetron (ZOFRAN) 4 MG tablet Take 1 tablet by mouth every 8 hours as needed      patiromer sorbitex calcium (VELTASSA) 8.4 g PACK packet Take 1 packet by mouth daily (Patient taking differently: Take 1 packet by mouth Every 3 days) 30 each 5    ferrous gluconate 324 (37.5 Fe) MG TABS TAKE 1 TABLET BY MOUTH EVERY OTHER DAY (Patient taking differently: Take 1 tablet by mouth every other day Okkyzs-Afnecjadp-Kmfwtp) 45 tablet 1

## 2024-11-21 NOTE — PATIENT INSTRUCTIONS
Please be informed that if you contact our office outside of normal business hours the physician on call cannot help with any scheduling or rescheduling issues, procedure instruction questions or any type of medication issue.    We advise you for any urgent/emergency that you go to the nearest emergency room!    PLEASE CALL OUR OFFICE DURING NORMAL BUSINESS HOURS    Monday - Friday   8 am to 5 pm    [place]: [phone]    [place]: [phone]    [place]:  [phone]  Thank you for allowing us to care for you today!   We want to ensure we can follow your treatment plan and we strive to give you the best outcomes and experience possible.   If you ever have a life threatening emergency and call 911 - for an ambulance (EMS)   Our providers can only care for you at:   [facility] or [facility].   Even if you have someone take you or you drive yourself we can only care for you in a [facility] facility. Our providers are not setup at the other healthcare locations!   **It is YOUR responsibilty to bring medication bottles and/or updated medication list to EACH APPOINTMENT. This will allow us to better serve you and all your healthcare needs**  We are committed to providing you the best care possible.    If you receive a survey after visiting one of our offices, please take time to share your experience concerning your physician office visit.  These surveys are confidential and no health information about you is shared.    We are eager to improve for you and we are counting on your feedback to help make that happen.    CORONARY ARTERY DISEASE:Yes, mild to moderate.  clinically stable. Patient is on optimal medical regimen ( see medication list above )  Patient is currently  not significantly symptomatic from CAD.   -changes in  treatment:   no, on  ASA & Metoprolol     11/18/2021   Left Main: Mild distal tapering.   LAD: Moderate segmental Luminal irregularities + 50 to 60 % very   proximal

## 2024-11-25 ENCOUNTER — TELEPHONE (OUTPATIENT)
Dept: CARDIOLOGY CLINIC | Age: 44
End: 2024-11-25

## 2024-11-25 NOTE — TELEPHONE ENCOUNTER
Left message for patient advising that nuclear has been authorized by Dr. Manning at [facility] and that case must be withdrawn in order to submit a case for our facility.  Advised patient that she will need to call to withdraw case, patient voiced understanding.

## 2024-11-26 ENCOUNTER — HOSPITAL ENCOUNTER (OUTPATIENT)
Age: 44
Discharge: HOME | End: 2024-11-26
Payer: COMMERCIAL

## 2024-11-26 DIAGNOSIS — E87.5 HYPERPOTASSEMIA: ICD-10-CM

## 2024-11-26 DIAGNOSIS — R80.9 MICROALBUMINURIA DUE TO TYPE 2 DIABETES MELLITUS (HCC): ICD-10-CM

## 2024-11-26 DIAGNOSIS — R80.9 MICROALBUMINURIA: ICD-10-CM

## 2024-11-26 DIAGNOSIS — E11.29 MICROALBUMINURIA DUE TO TYPE 2 DIABETES MELLITUS (HCC): ICD-10-CM

## 2024-11-26 DIAGNOSIS — N18.5 CHRONIC KIDNEY DISEASE, STAGE V (HCC): ICD-10-CM

## 2024-11-26 LAB
ANION GAP SERPL CALCULATED.3IONS-SCNC: 14 MMOL/L (ref 9–17)
BUN SERPL-MCNC: 80 MG/DL (ref 7–20)
CALCIUM SERPL-MCNC: 9.2 MG/DL (ref 8.3–10.6)
CHLORIDE SERPL-SCNC: 95 MMOL/L (ref 99–110)
CO2 SERPL-SCNC: 25 MMOL/L (ref 21–32)
CREAT SERPL-MCNC: 4.7 MG/DL (ref 0.6–1.1)
GFR, ESTIMATED: 10 ML/MIN/1.73M2
GLUCOSE SERPL-MCNC: 71 MG/DL (ref 74–99)
POTASSIUM SERPL-SCNC: 4.7 MMOL/L (ref 3.5–5.1)
SODIUM SERPL-SCNC: 134 MMOL/L (ref 136–145)

## 2024-11-26 PROCEDURE — 36415 COLL VENOUS BLD VENIPUNCTURE: CPT

## 2024-11-26 PROCEDURE — 80048 BASIC METABOLIC PNL TOTAL CA: CPT

## 2024-12-02 NOTE — TELEPHONE ENCOUNTER
Left another message with patient requesting update on status of previous case; did patient call insurance to withdraw case or does patient want to cancel test.

## 2024-12-02 NOTE — TELEPHONE ENCOUNTER
Test Ordered: Nuclear   /  Insurance: [employer]  /  Authorization Status: Approved:  Auth# [id number], Exp 12/31/24

## 2024-12-05 ENCOUNTER — HOSPITAL ENCOUNTER (OUTPATIENT)
Dept: INFUSION THERAPY | Age: 44
Discharge: HOME | End: 2024-12-05
Payer: COMMERCIAL

## 2024-12-05 VITALS — SYSTOLIC BLOOD PRESSURE: 135 MMHG | DIASTOLIC BLOOD PRESSURE: 66 MMHG

## 2024-12-05 DIAGNOSIS — N18.4 ANEMIA DUE TO STAGE 4 CHRONIC KIDNEY DISEASE (HCC): Primary | ICD-10-CM

## 2024-12-05 DIAGNOSIS — D63.1 ANEMIA DUE TO STAGE 4 CHRONIC KIDNEY DISEASE (HCC): Primary | ICD-10-CM

## 2024-12-05 DIAGNOSIS — D63.1 ANEMIA IN STAGE 4 CHRONIC KIDNEY DISEASE (HCC): ICD-10-CM

## 2024-12-05 DIAGNOSIS — N18.4 ANEMIA IN STAGE 4 CHRONIC KIDNEY DISEASE (HCC): ICD-10-CM

## 2024-12-05 LAB
BASOPHILS # BLD: 0.08 K/UL
BASOPHILS NFR BLD: 1 % (ref 0–1)
EOSINOPHIL # BLD: 0.26 K/UL
EOSINOPHILS RELATIVE PERCENT: 4 % (ref 0–3)
ERYTHROCYTE [DISTWIDTH] IN BLOOD BY AUTOMATED COUNT: 11.9 % (ref 11.7–14.9)
FERRITIN SERPL-MCNC: 192 NG/ML (ref 15–150)
FOLATE SERPL-MCNC: 6.9 NG/ML (ref 4.8–24.2)
HCT VFR BLD AUTO: 32 % (ref 37–47)
HGB BLD-MCNC: 10.2 G/DL (ref 12.5–16)
IRON SATN MFR SERPL: 24 % (ref 15–50)
IRON SERPL-MCNC: 72 UG/DL (ref 37–145)
LYMPHOCYTES NFR BLD: 1.02 K/UL
LYMPHOCYTES RELATIVE PERCENT: 14 % (ref 24–44)
MCH RBC QN AUTO: 32.5 PG (ref 27–31)
MCHC RBC AUTO-ENTMCNC: 31.9 G/DL (ref 32–36)
MCV RBC AUTO: 101.9 FL (ref 78–100)
MONOCYTES NFR BLD: 0.61 K/UL
MONOCYTES NFR BLD: 8 % (ref 0–4)
NEUTROPHILS NFR BLD: 73 % (ref 36–66)
NEUTS SEG NFR BLD: 5.34 K/UL
PLATELET # BLD AUTO: 251 K/UL (ref 140–440)
PMV BLD AUTO: 9.6 FL (ref 7.5–11.1)
RBC # BLD AUTO: 3.14 M/UL (ref 4.2–5.4)
TIBC SERPL-MCNC: NORMAL UG/DL (ref 260–445)
UNSATURATED IRON BINDING CAPACITY: 226 UG/DL (ref 110–370)
VIT B12 SERPL-MCNC: 1010 PG/ML (ref 211–911)
WBC OTHER # BLD: 7.3 K/UL (ref 4–10.5)

## 2024-12-05 PROCEDURE — 82607 VITAMIN B-12: CPT

## 2024-12-05 PROCEDURE — 82728 ASSAY OF FERRITIN: CPT

## 2024-12-05 PROCEDURE — 6360000002 HC RX W HCPCS: Performed by: INTERNAL MEDICINE

## 2024-12-05 PROCEDURE — 96372 THER/PROPH/DIAG INJ SC/IM: CPT

## 2024-12-05 PROCEDURE — 83550 IRON BINDING TEST: CPT

## 2024-12-05 PROCEDURE — 82746 ASSAY OF FOLIC ACID SERUM: CPT

## 2024-12-05 PROCEDURE — 83540 ASSAY OF IRON: CPT

## 2024-12-05 PROCEDURE — 36415 COLL VENOUS BLD VENIPUNCTURE: CPT

## 2024-12-05 PROCEDURE — 85025 COMPLETE CBC W/AUTO DIFF WBC: CPT

## 2024-12-05 RX ORDER — HYDROCORTISONE SODIUM SUCCINATE 100 MG/2ML
100 INJECTION INTRAMUSCULAR; INTRAVENOUS
OUTPATIENT
Start: 2024-12-09

## 2024-12-05 RX ORDER — EPINEPHRINE 1 MG/ML
0.3 INJECTION, SOLUTION, CONCENTRATE INTRAVENOUS PRN
OUTPATIENT
Start: 2024-12-09

## 2024-12-05 RX ORDER — SODIUM CHLORIDE 9 MG/ML
INJECTION, SOLUTION INTRAVENOUS CONTINUOUS
OUTPATIENT
Start: 2024-12-09

## 2024-12-05 RX ORDER — FAMOTIDINE 10 MG/ML
20 INJECTION, SOLUTION INTRAVENOUS
OUTPATIENT
Start: 2024-12-09

## 2024-12-05 RX ORDER — ACETAMINOPHEN 325 MG/1
650 TABLET ORAL
OUTPATIENT
Start: 2024-12-09

## 2024-12-05 RX ORDER — ONDANSETRON 2 MG/ML
8 INJECTION INTRAMUSCULAR; INTRAVENOUS
OUTPATIENT
Start: 2024-12-09

## 2024-12-05 RX ORDER — DIPHENHYDRAMINE HYDROCHLORIDE 50 MG/ML
50 INJECTION INTRAMUSCULAR; INTRAVENOUS
OUTPATIENT
Start: 2024-12-09

## 2024-12-05 RX ORDER — ALBUTEROL SULFATE 90 UG/1
4 INHALANT RESPIRATORY (INHALATION) PRN
OUTPATIENT
Start: 2024-12-09

## 2024-12-05 RX ADMIN — DARBEPOETIN ALFA 60 MCG: 60 INJECTION, SOLUTION INTRAVENOUS; SUBCUTANEOUS at 11:05

## 2024-12-05 NOTE — PROGRESS NOTES
Patient ambulated to infusion area, here today for an Aranesp injection. No concerns at this time. HgB 10.2, per Dr. Grady, ok to give. Treatment approved and given in left arm SQ. Patient tolerated well. RTC 12/19 for next injection.

## 2024-12-06 ENCOUNTER — TELEPHONE (OUTPATIENT)
Dept: CARDIOLOGY CLINIC | Age: 44
End: 2024-12-06

## 2024-12-06 NOTE — TELEPHONE ENCOUNTER
Notified        Nuclear lexiscan stress test with myocardial perfusion   IMPRESSION:  This is a normal perfusion Lexiscan Cardiolite study.

## 2024-12-09 RX ORDER — FERROUS GLUCONATE 324(37.5)
324 TABLET ORAL EVERY OTHER DAY
Qty: 45 TABLET | Refills: 1 | Status: ON HOLD | OUTPATIENT
Start: 2024-12-09 | End: 2025-06-07

## 2024-12-09 NOTE — TELEPHONE ENCOUNTER
Patient left message requesting a refill for ferrous gluconate 324 to be sent to [facility]. Pending RX to Provider to be sent to pharmacy.

## 2024-12-10 ENCOUNTER — OFFICE VISIT (OUTPATIENT)
Dept: CARDIOLOGY CLINIC | Age: 44
End: 2024-12-10
Payer: COMMERCIAL

## 2024-12-10 VITALS
OXYGEN SATURATION: 99 % | HEART RATE: 63 BPM | SYSTOLIC BLOOD PRESSURE: 118 MMHG | WEIGHT: 137.8 LBS | BODY MASS INDEX: 27.05 KG/M2 | DIASTOLIC BLOOD PRESSURE: 62 MMHG | HEIGHT: 60 IN

## 2024-12-10 DIAGNOSIS — I21.4 NSTEMI (NON-ST ELEVATED MYOCARDIAL INFARCTION) (HCC): ICD-10-CM

## 2024-12-10 DIAGNOSIS — I25.10 CAD S/P PERCUTANEOUS CORONARY ANGIOPLASTY: Primary | ICD-10-CM

## 2024-12-10 DIAGNOSIS — I10 PRIMARY HYPERTENSION: ICD-10-CM

## 2024-12-10 DIAGNOSIS — E10.59 TYPE 1 DIABETES MELLITUS WITH OTHER CIRCULATORY COMPLICATION (HCC): ICD-10-CM

## 2024-12-10 DIAGNOSIS — E78.5 DYSLIPIDEMIA: ICD-10-CM

## 2024-12-10 DIAGNOSIS — Z98.61 CAD S/P PERCUTANEOUS CORONARY ANGIOPLASTY: Primary | ICD-10-CM

## 2024-12-10 PROCEDURE — 99214 OFFICE O/P EST MOD 30 MIN: CPT | Performed by: INTERNAL MEDICINE

## 2024-12-10 PROCEDURE — 3078F DIAST BP <80 MM HG: CPT | Performed by: INTERNAL MEDICINE

## 2024-12-10 PROCEDURE — 3074F SYST BP LT 130 MM HG: CPT | Performed by: INTERNAL MEDICINE

## 2024-12-10 NOTE — PROGRESS NOTES
OFFICE PROGRESS NOTES      Gus is a 44 y.o. female who has    CHIEF COMPLAINT AS FOLLOWS:  CHEST PAIN: Patient denies any C/O chest pains at this time.      SOB: No C/O SOB at this time.                LEG EDEMA: No leg edema   PALPITATIONS: Denies any C/O Palpitations   DIZZINESS: No C/O Dizziness   SYNCOPE: None   OTHER/ ADDITIONAL COMPLAINTS:                                     HPI: Patient is here for F/U on her CAD, HTN & Dyslipidemia problems.   CAD: Patient has known CAD. Had angioplasty in the past.  HTN: Patient has known essential HTN. Has been treated with guideline recommended medical / physical/ diet therapy as stated below.  Dyslipidemia: Patient has known mixed dyslipidemia. Has been treated with guideline recommended medical / physical/ diet therapy as stated below.                Current Outpatient Medications   Medication Sig Dispense Refill    ferrous gluconate 324 (37.5 Fe) MG TABS TAKE 1 TABLET BY MOUTH EVERY OTHER DAY 45 tablet 1    amLODIPine (NORVASC) 5 MG tablet Take 1 tablet by mouth in the morning and at bedtime 60 tablet 5    darbepoetin home-polysorbate (ARANESP) 60 MCG/0.3ML SOSY injection Inject 0.3 mLs into the skin every 30 days      torsemide (DEMADEX) 20 MG tablet Take 1 tablet by mouth daily 90 tablet 1    sevelamer (RENVELA) 800 MG tablet Take 1 tablet by mouth 3 times daily (with meals) 90 tablet 3    TRESIBA FLEXTOUCH 100 UNIT/ML SOPN 6 Units      ondansetron (ZOFRAN) 4 MG tablet Take 1 tablet by mouth every 8 hours as needed      vitamin B-12 (CYANOCOBALAMIN) 1000 MCG tablet Take 1 tablet by mouth daily (Patient taking differently: Take 1 tablet by mouth Every 3 days) 90 tablet 1    metoprolol tartrate (LOPRESSOR) 25 MG tablet Take 1 tablet by mouth 2 times daily      busPIRone (BUSPAR) 5 MG tablet Take 1 tablet by mouth 2 times daily      Melatonin 1 MG CAPS Take by mouth at bedtime      hydrALAZINE (APRESOLINE) 100 MG tablet Take 1 tablet by mouth 3 times daily

## 2024-12-10 NOTE — PATIENT INSTRUCTIONS
PROFILE  LIPID PROFILE   [facility]18[facility]                   Component 11/18/2024 02/12/2024 11/08/2023 08/11/2023 04/21/2023 10/11/2022 09/20/2022                         Cholesterol, Total 127 87 104 101 -- 106 125 Load older lab results   Triglycerides 112 76 101 66 -- 130 88 Load older lab results   HDL-C -- 54 Low      56 Low      54 -- 55 69 Load older lab results   LDL Cholesterol -- 18 28 34 27 25 38 Load older lab results   VLDL -- 15 20 13 -- 26 18 Load older lab results   HDL Cholesterol 70 -- -- -- 61 -- -- Load older lab results   VLDL Cholesterol Turner 20 -- -- -- -- -- -- Load older lab results   LDL CHOL CALC (NIH) 37 -- -- -- -- -- -- Load older lab results   CHOLESTEROL -- -- -- -- 102 -- --     TRIGLYCERIDES --                    VALVULAR HEART DISEASE:yes,   4/2024    Left Ventricle: Normal left ventricular systolic function with a visually estimated EF of 55 - 60%. Left ventricle size is normal. Mildly increased wall thickness. Normal wall motion. Diastolic dysfunction present with normal LV EF.    Left Atrium: Left atrial volume index is mildly increased (35-41 mL/m2).    Mild pulmonic and mitral regurgitation.    Tricuspid Valve: Moderate regurgitation. The estimated RVSP is 49 mmHg.    Pericardium: Physiologic amount of pericardial fluid noted.    Dilated IVC with poor inspiratory collapse.     TESTS ORDERED:none this visit     PREVIOUSLY ORDERED TESTS REVIEWED & DISCUSSED WITH THE PATIENT:     I personally reviewed & interpreted, all previously ordered tests as copied above. Latest Labs are pulled in to the note with dates.   Labs, specially in Reference to Lipid profile, Cardiac testing in the form of Echo ( dated: ), stress tests ( dated: ) & other relevant cardiac testing reviewed with patient & recommendations made based on assessment of the results.    Discussed role of Cardiac risk factors & effects + treatment of co morbidities with patient & advised accordingly.

## 2024-12-14 ENCOUNTER — HOSPITAL ENCOUNTER (INPATIENT)
Age: 44
LOS: 2 days | Discharge: HOME | DRG: 683 | End: 2024-12-16
Attending: STUDENT IN AN ORGANIZED HEALTH CARE EDUCATION/TRAINING PROGRAM | Admitting: INTERNAL MEDICINE
Payer: COMMERCIAL

## 2024-12-14 PROBLEM — R33.9 URINARY RETENTION: Status: ACTIVE | Noted: 2024-12-14

## 2024-12-14 LAB — GLUCOSE BLD-MCNC: 83 MG/DL (ref 74–99)

## 2024-12-14 PROCEDURE — 6370000000 HC RX 637 (ALT 250 FOR IP): Performed by: INTERNAL MEDICINE

## 2024-12-14 PROCEDURE — 82962 GLUCOSE BLOOD TEST: CPT

## 2024-12-14 PROCEDURE — 2580000003 HC RX 258: Performed by: INTERNAL MEDICINE

## 2024-12-14 PROCEDURE — 6360000002 HC RX W HCPCS: Performed by: INTERNAL MEDICINE

## 2024-12-14 PROCEDURE — 1200000000 HC SEMI PRIVATE

## 2024-12-14 RX ORDER — FERROUS GLUCONATE 324(37.5)
324 TABLET ORAL
Status: DISCONTINUED | OUTPATIENT
Start: 2024-12-16 | End: 2024-12-16 | Stop reason: HOSPADM

## 2024-12-14 RX ORDER — INSULIN GLARGINE 100 [IU]/ML
4.8 INJECTION, SOLUTION SUBCUTANEOUS NIGHTLY
Status: DISCONTINUED | OUTPATIENT
Start: 2024-12-14 | End: 2024-12-16 | Stop reason: HOSPADM

## 2024-12-14 RX ORDER — POLYETHYLENE GLYCOL 3350 17 G/17G
17 POWDER, FOR SOLUTION ORAL DAILY PRN
Status: DISCONTINUED | OUTPATIENT
Start: 2024-12-14 | End: 2024-12-16 | Stop reason: HOSPADM

## 2024-12-14 RX ORDER — GLUCAGON 1 MG/ML
1 KIT INJECTION PRN
Status: DISCONTINUED | OUTPATIENT
Start: 2024-12-14 | End: 2024-12-16 | Stop reason: HOSPADM

## 2024-12-14 RX ORDER — CITALOPRAM HYDROBROMIDE 40 MG/1
40 TABLET ORAL DAILY
Status: DISCONTINUED | OUTPATIENT
Start: 2024-12-15 | End: 2024-12-16 | Stop reason: HOSPADM

## 2024-12-14 RX ORDER — ACETAMINOPHEN 325 MG/1
650 TABLET ORAL EVERY 6 HOURS PRN
Status: DISCONTINUED | OUTPATIENT
Start: 2024-12-14 | End: 2024-12-16 | Stop reason: HOSPADM

## 2024-12-14 RX ORDER — ROSUVASTATIN CALCIUM 5 MG/1
10 TABLET, COATED ORAL NIGHTLY
Status: DISCONTINUED | OUTPATIENT
Start: 2024-12-14 | End: 2024-12-16 | Stop reason: HOSPADM

## 2024-12-14 RX ORDER — ONDANSETRON 4 MG/1
4 TABLET, ORALLY DISINTEGRATING ORAL EVERY 8 HOURS PRN
Status: DISCONTINUED | OUTPATIENT
Start: 2024-12-14 | End: 2024-12-16 | Stop reason: HOSPADM

## 2024-12-14 RX ORDER — SODIUM CHLORIDE 9 MG/ML
INJECTION, SOLUTION INTRAVENOUS PRN
Status: DISCONTINUED | OUTPATIENT
Start: 2024-12-14 | End: 2024-12-16 | Stop reason: HOSPADM

## 2024-12-14 RX ORDER — BUSPIRONE HYDROCHLORIDE 5 MG/1
10 TABLET ORAL 2 TIMES DAILY
Status: DISCONTINUED | OUTPATIENT
Start: 2024-12-14 | End: 2024-12-16 | Stop reason: HOSPADM

## 2024-12-14 RX ORDER — SODIUM CHLORIDE 0.9 % (FLUSH) 0.9 %
5-40 SYRINGE (ML) INJECTION EVERY 12 HOURS SCHEDULED
Status: DISCONTINUED | OUTPATIENT
Start: 2024-12-14 | End: 2024-12-16 | Stop reason: HOSPADM

## 2024-12-14 RX ORDER — HEPARIN SODIUM 5000 [USP'U]/ML
5000 INJECTION, SOLUTION INTRAVENOUS; SUBCUTANEOUS EVERY 8 HOURS SCHEDULED
Status: DISCONTINUED | OUTPATIENT
Start: 2024-12-14 | End: 2024-12-16 | Stop reason: HOSPADM

## 2024-12-14 RX ORDER — AMLODIPINE BESYLATE 5 MG/1
5 TABLET ORAL 2 TIMES DAILY
Status: DISCONTINUED | OUTPATIENT
Start: 2024-12-14 | End: 2024-12-16 | Stop reason: HOSPADM

## 2024-12-14 RX ORDER — ONDANSETRON 2 MG/ML
4 INJECTION INTRAMUSCULAR; INTRAVENOUS EVERY 6 HOURS PRN
Status: DISCONTINUED | OUTPATIENT
Start: 2024-12-14 | End: 2024-12-16 | Stop reason: HOSPADM

## 2024-12-14 RX ORDER — TORSEMIDE 20 MG/1
20 TABLET ORAL DAILY
Status: DISCONTINUED | OUTPATIENT
Start: 2024-12-15 | End: 2024-12-16 | Stop reason: HOSPADM

## 2024-12-14 RX ORDER — DEXTROSE MONOHYDRATE 100 MG/ML
INJECTION, SOLUTION INTRAVENOUS CONTINUOUS PRN
Status: DISCONTINUED | OUTPATIENT
Start: 2024-12-14 | End: 2024-12-16 | Stop reason: HOSPADM

## 2024-12-14 RX ORDER — OXYCODONE AND ACETAMINOPHEN 5; 325 MG/1; MG/1
1 TABLET ORAL EVERY 8 HOURS PRN
Status: DISCONTINUED | OUTPATIENT
Start: 2024-12-14 | End: 2024-12-16 | Stop reason: HOSPADM

## 2024-12-14 RX ORDER — SODIUM CHLORIDE 9 MG/ML
INJECTION, SOLUTION INTRAVENOUS CONTINUOUS
Status: DISCONTINUED | OUTPATIENT
Start: 2024-12-14 | End: 2024-12-16

## 2024-12-14 RX ORDER — LANOLIN ALCOHOL/MO/W.PET/CERES
1000 CREAM (GRAM) TOPICAL DAILY
Status: DISCONTINUED | OUTPATIENT
Start: 2024-12-15 | End: 2024-12-16 | Stop reason: HOSPADM

## 2024-12-14 RX ORDER — ASPIRIN 81 MG/1
81 TABLET, CHEWABLE ORAL DAILY
Status: DISCONTINUED | OUTPATIENT
Start: 2024-12-15 | End: 2024-12-16 | Stop reason: HOSPADM

## 2024-12-14 RX ORDER — INSULIN LISPRO 100 [IU]/ML
0-8 INJECTION, SOLUTION INTRAVENOUS; SUBCUTANEOUS
Status: DISCONTINUED | OUTPATIENT
Start: 2024-12-14 | End: 2024-12-16 | Stop reason: HOSPADM

## 2024-12-14 RX ORDER — HYDRALAZINE HYDROCHLORIDE 50 MG/1
100 TABLET, FILM COATED ORAL 3 TIMES DAILY
Status: DISCONTINUED | OUTPATIENT
Start: 2024-12-14 | End: 2024-12-16 | Stop reason: HOSPADM

## 2024-12-14 RX ORDER — METOPROLOL TARTRATE 25 MG/1
25 TABLET, FILM COATED ORAL 2 TIMES DAILY
Status: DISCONTINUED | OUTPATIENT
Start: 2024-12-14 | End: 2024-12-16 | Stop reason: HOSPADM

## 2024-12-14 RX ORDER — SODIUM CHLORIDE 0.9 % (FLUSH) 0.9 %
5-40 SYRINGE (ML) INJECTION PRN
Status: DISCONTINUED | OUTPATIENT
Start: 2024-12-14 | End: 2024-12-16 | Stop reason: HOSPADM

## 2024-12-14 RX ORDER — ACETAMINOPHEN 650 MG/1
650 SUPPOSITORY RECTAL EVERY 6 HOURS PRN
Status: DISCONTINUED | OUTPATIENT
Start: 2024-12-14 | End: 2024-12-16 | Stop reason: HOSPADM

## 2024-12-14 RX ORDER — SEVELAMER CARBONATE 800 MG/1
800 TABLET, FILM COATED ORAL
Status: DISCONTINUED | OUTPATIENT
Start: 2024-12-15 | End: 2024-12-16 | Stop reason: HOSPADM

## 2024-12-14 RX ADMIN — SODIUM CHLORIDE: 9 INJECTION, SOLUTION INTRAVENOUS at 22:50

## 2024-12-14 RX ADMIN — SODIUM CHLORIDE, PRESERVATIVE FREE 10 ML: 5 INJECTION INTRAVENOUS at 22:48

## 2024-12-14 RX ADMIN — ROSUVASTATIN CALCIUM 10 MG: 5 TABLET, FILM COATED ORAL at 23:06

## 2024-12-14 RX ADMIN — HEPARIN SODIUM 5000 UNITS: 5000 INJECTION INTRAVENOUS; SUBCUTANEOUS at 23:06

## 2024-12-14 RX ADMIN — OXYCODONE HYDROCHLORIDE AND ACETAMINOPHEN 1 TABLET: 5; 325 TABLET ORAL at 23:22

## 2024-12-14 RX ADMIN — AMLODIPINE BESYLATE 5 MG: 5 TABLET ORAL at 23:06

## 2024-12-14 RX ADMIN — METOPROLOL TARTRATE 25 MG: 25 TABLET, FILM COATED ORAL at 23:06

## 2024-12-14 RX ADMIN — HYDRALAZINE HYDROCHLORIDE 100 MG: 50 TABLET ORAL at 23:05

## 2024-12-14 RX ADMIN — BUSPIRONE HYDROCHLORIDE 10 MG: 5 TABLET ORAL at 23:05

## 2024-12-14 ASSESSMENT — PAIN DESCRIPTION - FREQUENCY
FREQUENCY: INTERMITTENT
FREQUENCY: CONTINUOUS

## 2024-12-14 ASSESSMENT — PAIN DESCRIPTION - PAIN TYPE
TYPE: ACUTE PAIN
TYPE: ACUTE PAIN

## 2024-12-14 ASSESSMENT — PAIN DESCRIPTION - LOCATION
LOCATION: BACK
LOCATION: BACK

## 2024-12-14 ASSESSMENT — PAIN SCALES - GENERAL
PAINLEVEL_OUTOF10: 3
PAINLEVEL_OUTOF10: 4
PAINLEVEL_OUTOF10: 7

## 2024-12-14 ASSESSMENT — PAIN - FUNCTIONAL ASSESSMENT: PAIN_FUNCTIONAL_ASSESSMENT: ACTIVITIES ARE NOT PREVENTED

## 2024-12-14 ASSESSMENT — PAIN DESCRIPTION - ONSET: ONSET: ON-GOING

## 2024-12-14 ASSESSMENT — PAIN DESCRIPTION - ORIENTATION
ORIENTATION: LOWER
ORIENTATION: RIGHT;LOWER

## 2024-12-14 ASSESSMENT — PAIN DESCRIPTION - DESCRIPTORS
DESCRIPTORS: ACHING
DESCRIPTORS: STABBING

## 2024-12-14 NOTE — PLAN OF CARE
[facility] Hospitalist Transfer accept note  Transfer center PS received  Case reviewed with ER physician  Reason for Transfer:  Gus Garza 44 y.o. female  - p/w lack of urination in over 10 hours.  Noted to have urinary retention on bladder scan over a liter.  Creatinine up to 5.1 from baseline of 4.7.  Had a suprapubic catheter removed last month.  - Recommendations: Transfer to [place].  Candelario catheter already placed in the ER.  Will need urology consultation and nephrology consultation upon presentation.     Prelim diagnosis: Urinary retention.  Admit to [facility] with telemetry     Patient has been accepted for transfer to [facility].   Once patient arrive please page ON CALL HOSPITALIST so patient can be seen.        PCP: Lesly Parekh MD     Thanks  Kain Hough MD  Hospitalist

## 2024-12-15 ENCOUNTER — APPOINTMENT (OUTPATIENT)
Dept: CT IMAGING | Age: 44
DRG: 683 | End: 2024-12-15
Attending: STUDENT IN AN ORGANIZED HEALTH CARE EDUCATION/TRAINING PROGRAM
Payer: COMMERCIAL

## 2024-12-15 LAB
ANION GAP SERPL CALCULATED.3IONS-SCNC: 12 MMOL/L (ref 9–17)
BASOPHILS # BLD: 0.08 K/UL
BASOPHILS NFR BLD: 1 % (ref 0–1)
BUN SERPL-MCNC: 79 MG/DL (ref 7–20)
CALCIUM SERPL-MCNC: 9.1 MG/DL (ref 8.3–10.6)
CHLORIDE SERPL-SCNC: 100 MMOL/L (ref 99–110)
CO2 SERPL-SCNC: 25 MMOL/L (ref 21–32)
CREAT SERPL-MCNC: 4.8 MG/DL (ref 0.6–1.1)
EOSINOPHIL # BLD: 0.44 K/UL
EOSINOPHILS RELATIVE PERCENT: 5 % (ref 0–3)
ERYTHROCYTE [DISTWIDTH] IN BLOOD BY AUTOMATED COUNT: 12.4 % (ref 11.7–14.9)
GFR, ESTIMATED: 10 ML/MIN/1.73M2
GLUCOSE BLD-MCNC: 112 MG/DL (ref 74–99)
GLUCOSE BLD-MCNC: 140 MG/DL (ref 74–99)
GLUCOSE BLD-MCNC: 147 MG/DL (ref 74–99)
GLUCOSE BLD-MCNC: 95 MG/DL (ref 74–99)
GLUCOSE SERPL-MCNC: 69 MG/DL (ref 74–99)
HCT VFR BLD AUTO: 30 % (ref 37–47)
HGB BLD-MCNC: 9.5 G/DL (ref 12.5–16)
IMM GRANULOCYTES # BLD AUTO: 0.02 K/UL
IMM GRANULOCYTES NFR BLD: 0 %
LYMPHOCYTES NFR BLD: 1.13 K/UL
LYMPHOCYTES RELATIVE PERCENT: 13 % (ref 24–44)
MCH RBC QN AUTO: 32 PG (ref 27–31)
MCHC RBC AUTO-ENTMCNC: 31.7 G/DL (ref 32–36)
MCV RBC AUTO: 101 FL (ref 78–100)
MONOCYTES NFR BLD: 0.75 K/UL
MONOCYTES NFR BLD: 9 % (ref 0–4)
NEUTROPHILS NFR BLD: 72 % (ref 36–66)
NEUTS SEG NFR BLD: 6.14 K/UL
PLATELET # BLD AUTO: 296 K/UL (ref 140–440)
PMV BLD AUTO: 9.4 FL (ref 7.5–11.1)
POTASSIUM SERPL-SCNC: 4.4 MMOL/L (ref 3.5–5.1)
RBC # BLD AUTO: 2.97 M/UL (ref 4.2–5.4)
SODIUM SERPL-SCNC: 137 MMOL/L (ref 136–145)
WBC OTHER # BLD: 8.6 K/UL (ref 4–10.5)

## 2024-12-15 PROCEDURE — 6360000002 HC RX W HCPCS: Performed by: INTERNAL MEDICINE

## 2024-12-15 PROCEDURE — 2580000003 HC RX 258: Performed by: INTERNAL MEDICINE

## 2024-12-15 PROCEDURE — 1200000000 HC SEMI PRIVATE

## 2024-12-15 PROCEDURE — 6370000000 HC RX 637 (ALT 250 FOR IP): Performed by: INTERNAL MEDICINE

## 2024-12-15 PROCEDURE — 72131 CT LUMBAR SPINE W/O DYE: CPT

## 2024-12-15 PROCEDURE — 87086 URINE CULTURE/COLONY COUNT: CPT

## 2024-12-15 PROCEDURE — 85025 COMPLETE CBC W/AUTO DIFF WBC: CPT

## 2024-12-15 PROCEDURE — 82962 GLUCOSE BLOOD TEST: CPT

## 2024-12-15 PROCEDURE — 80048 BASIC METABOLIC PNL TOTAL CA: CPT

## 2024-12-15 PROCEDURE — 36415 COLL VENOUS BLD VENIPUNCTURE: CPT

## 2024-12-15 RX ADMIN — BUSPIRONE HYDROCHLORIDE 10 MG: 5 TABLET ORAL at 09:08

## 2024-12-15 RX ADMIN — HYDRALAZINE HYDROCHLORIDE 100 MG: 50 TABLET ORAL at 20:59

## 2024-12-15 RX ADMIN — SODIUM CHLORIDE: 9 INJECTION, SOLUTION INTRAVENOUS at 11:58

## 2024-12-15 RX ADMIN — AMLODIPINE BESYLATE 5 MG: 5 TABLET ORAL at 09:08

## 2024-12-15 RX ADMIN — HEPARIN SODIUM 5000 UNITS: 5000 INJECTION INTRAVENOUS; SUBCUTANEOUS at 06:09

## 2024-12-15 RX ADMIN — AMLODIPINE BESYLATE 5 MG: 5 TABLET ORAL at 20:59

## 2024-12-15 RX ADMIN — HYDRALAZINE HYDROCHLORIDE 100 MG: 50 TABLET ORAL at 14:20

## 2024-12-15 RX ADMIN — ASPIRIN 81 MG: 81 TABLET, CHEWABLE ORAL at 09:08

## 2024-12-15 RX ADMIN — HEPARIN SODIUM 5000 UNITS: 5000 INJECTION INTRAVENOUS; SUBCUTANEOUS at 21:58

## 2024-12-15 RX ADMIN — HEPARIN SODIUM 5000 UNITS: 5000 INJECTION INTRAVENOUS; SUBCUTANEOUS at 14:20

## 2024-12-15 RX ADMIN — WATER 1000 MG: 1 INJECTION INTRAMUSCULAR; INTRAVENOUS; SUBCUTANEOUS at 19:35

## 2024-12-15 RX ADMIN — Medication 1000 MCG: at 09:08

## 2024-12-15 RX ADMIN — BUSPIRONE HYDROCHLORIDE 10 MG: 5 TABLET ORAL at 20:59

## 2024-12-15 RX ADMIN — METOPROLOL TARTRATE 25 MG: 25 TABLET, FILM COATED ORAL at 20:58

## 2024-12-15 RX ADMIN — SEVELAMER CARBONATE 800 MG: 800 TABLET, FILM COATED ORAL at 09:07

## 2024-12-15 RX ADMIN — ROSUVASTATIN CALCIUM 10 MG: 5 TABLET, FILM COATED ORAL at 20:59

## 2024-12-15 RX ADMIN — SODIUM CHLORIDE, PRESERVATIVE FREE 10 ML: 5 INJECTION INTRAVENOUS at 09:09

## 2024-12-15 RX ADMIN — SEVELAMER CARBONATE 800 MG: 800 TABLET, FILM COATED ORAL at 12:00

## 2024-12-15 RX ADMIN — TORSEMIDE 20 MG: 20 TABLET ORAL at 09:08

## 2024-12-15 RX ADMIN — HYDRALAZINE HYDROCHLORIDE 100 MG: 50 TABLET ORAL at 09:11

## 2024-12-15 RX ADMIN — METOPROLOL TARTRATE 25 MG: 25 TABLET, FILM COATED ORAL at 09:08

## 2024-12-15 RX ADMIN — SEVELAMER CARBONATE 800 MG: 800 TABLET, FILM COATED ORAL at 19:34

## 2024-12-15 RX ADMIN — ACETAMINOPHEN 650 MG: 325 TABLET ORAL at 09:07

## 2024-12-15 RX ADMIN — CITALOPRAM 40 MG: 40 TABLET, FILM COATED ORAL at 09:07

## 2024-12-15 RX ADMIN — EPOETIN ALFA-EPBX 3000 UNITS: 3000 INJECTION, SOLUTION INTRAVENOUS; SUBCUTANEOUS at 11:06

## 2024-12-15 ASSESSMENT — PAIN DESCRIPTION - FREQUENCY
FREQUENCY: INTERMITTENT
FREQUENCY: INTERMITTENT

## 2024-12-15 ASSESSMENT — PAIN DESCRIPTION - DESCRIPTORS
DESCRIPTORS: ACHING
DESCRIPTORS: ACHING

## 2024-12-15 ASSESSMENT — PAIN SCALES - GENERAL
PAINLEVEL_OUTOF10: 7
PAINLEVEL_OUTOF10: 5
PAINLEVEL_OUTOF10: 3
PAINLEVEL_OUTOF10: 4
PAINLEVEL_OUTOF10: 4
PAINLEVEL_OUTOF10: 2
PAINLEVEL_OUTOF10: 5

## 2024-12-15 ASSESSMENT — PAIN DESCRIPTION - LOCATION
LOCATION: BACK
LOCATION: BACK
LOCATION: BACK;FLANK

## 2024-12-15 ASSESSMENT — PAIN DESCRIPTION - ORIENTATION
ORIENTATION: MID;LOWER
ORIENTATION: LOWER

## 2024-12-15 ASSESSMENT — PAIN DESCRIPTION - PAIN TYPE: TYPE: ACUTE PAIN

## 2024-12-15 ASSESSMENT — PAIN - FUNCTIONAL ASSESSMENT
PAIN_FUNCTIONAL_ASSESSMENT: ACTIVITIES ARE NOT PREVENTED
PAIN_FUNCTIONAL_ASSESSMENT: ACTIVITIES ARE NOT PREVENTED

## 2024-12-15 NOTE — H&P
History and Physical      Name:  Gus Garza /Age/Sex: 1980  (44 y.o. female)   MRN & CSN:  [id number] & [id number] Encounter Date/Time: 2024 10:21 PM EST   Location:  [address] [place] [address]-A PCP: Lesly Parekh MD       Hospital Day: 1    Assessment and Plan:     #.  Urinary retention  -Status post Candelario catheter in ER  -Urology consulted  -Strict I's/O    #.  Acute cystitis  -UA-leukocyte esterase 2+, WBC 10-20, bacteria 1+  -Urine culture ordered  -Continue ceftriaxone    #.  CRISTIANA on CKD stage V  -BUN/creatinine 80/4.7-2024, 82/5.13  -Continue IV fluids repeat BMP in a.m.  -Consult nephrology-Dr. Scales    #.  Hyponatremia  -Continue IV percent repeat BMP    #.  Fall  -Patient reported having a fall on Monday.  Reported having back pain since then.  -CT lumbar spine ordered.    #.  Hypertension  -Patient is on amlodipine 5 mg twice daily, hydralazine 100 mg 3 times daily, metoprolol 25 mg twice daily-resume    #.  Diabetes mellitus type 1, on long-term insulin  -QdY8w-2.7-2024  -Patient is on Tresiba 6 units nightly-continue with holding parameters  -Insulin sliding scale with hypoglycemia protocol ordered    #.  Coronary artery disease  -PCI and stenting to RCA-2012, repeat LHC-2021-no intervention  -Stress test-2024-reported normal  -On aspirin, Crestor, metoprolol tartrate    #.  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  History of urinary retention-during admission for CVA-2022  -Patient had SPC 2022 and was removed 10/2024    #.  CKD stage V  -Patient has right AV fistula placed 2024  -On sevelamer    #.  History of pulmonary nodule-s/p CT-guided biopsy-2024-no malignancy    #.  Anemia of chronic disease  -On ferrous gluconate QOD    #.  Depression/anxiety  -On

## 2024-12-16 VITALS
TEMPERATURE: 98.3 F | OXYGEN SATURATION: 97 % | BODY MASS INDEX: 27.53 KG/M2 | SYSTOLIC BLOOD PRESSURE: 143 MMHG | HEIGHT: 60 IN | HEART RATE: 66 BPM | DIASTOLIC BLOOD PRESSURE: 66 MMHG | RESPIRATION RATE: 17 BRPM | WEIGHT: 140.21 LBS

## 2024-12-16 LAB
GLUCOSE BLD-MCNC: 109 MG/DL (ref 74–99)
GLUCOSE BLD-MCNC: 131 MG/DL (ref 74–99)
MICROORGANISM SPEC CULT: NORMAL
SERVICE CMNT-IMP: NORMAL
SPECIMEN DESCRIPTION: NORMAL

## 2024-12-16 PROCEDURE — 6360000002 HC RX W HCPCS: Performed by: INTERNAL MEDICINE

## 2024-12-16 PROCEDURE — 2580000003 HC RX 258: Performed by: INTERNAL MEDICINE

## 2024-12-16 PROCEDURE — 94761 N-INVAS EAR/PLS OXIMETRY MLT: CPT

## 2024-12-16 PROCEDURE — 97166 OT EVAL MOD COMPLEX 45 MIN: CPT

## 2024-12-16 PROCEDURE — 6370000000 HC RX 637 (ALT 250 FOR IP): Performed by: INTERNAL MEDICINE

## 2024-12-16 PROCEDURE — 97530 THERAPEUTIC ACTIVITIES: CPT

## 2024-12-16 PROCEDURE — 82962 GLUCOSE BLOOD TEST: CPT

## 2024-12-16 RX ADMIN — Medication 324 MG: at 09:11

## 2024-12-16 RX ADMIN — Medication 1000 MCG: at 09:12

## 2024-12-16 RX ADMIN — CITALOPRAM 40 MG: 40 TABLET, FILM COATED ORAL at 09:12

## 2024-12-16 RX ADMIN — HEPARIN SODIUM 5000 UNITS: 5000 INJECTION INTRAVENOUS; SUBCUTANEOUS at 05:46

## 2024-12-16 RX ADMIN — HYDRALAZINE HYDROCHLORIDE 100 MG: 50 TABLET ORAL at 09:12

## 2024-12-16 RX ADMIN — AMLODIPINE BESYLATE 5 MG: 5 TABLET ORAL at 09:12

## 2024-12-16 RX ADMIN — METOPROLOL TARTRATE 25 MG: 25 TABLET, FILM COATED ORAL at 09:12

## 2024-12-16 RX ADMIN — TORSEMIDE 20 MG: 20 TABLET ORAL at 09:12

## 2024-12-16 RX ADMIN — SEVELAMER CARBONATE 800 MG: 800 TABLET, FILM COATED ORAL at 09:12

## 2024-12-16 RX ADMIN — BUSPIRONE HYDROCHLORIDE 10 MG: 5 TABLET ORAL at 09:12

## 2024-12-16 RX ADMIN — SODIUM CHLORIDE, PRESERVATIVE FREE 10 ML: 5 INJECTION INTRAVENOUS at 09:15

## 2024-12-16 RX ADMIN — ASPIRIN 81 MG: 81 TABLET, CHEWABLE ORAL at 09:12

## 2024-12-16 ASSESSMENT — PAIN SCALES - GENERAL: PAINLEVEL_OUTOF10: 0

## 2024-12-16 NOTE — PLAN OF CARE
Problem: Chronic Conditions and Co-morbidities  Goal: Patient's chronic conditions and co-morbidity symptoms are monitored and maintained or improved  12/16/2024 1103 by Wendy Colin LPN  Outcome: Progressing  12/16/2024 1102 by Wendy Colin LPN  Outcome: Progressing  12/16/2024 0700 by Orlin Lucio RN  Outcome: Progressing     Problem: Discharge Planning  Goal: Discharge to home or other facility with appropriate resources  12/16/2024 1103 by Wendy Colin LPN  Outcome: Progressing  12/16/2024 1102 by Wendy Colin LPN  Outcome: Progressing  12/16/2024 0700 by Orlin Lucio RN  Outcome: Progressing     Problem: Pain  Goal: Verbalizes/displays adequate comfort level or baseline comfort level  12/16/2024 1103 by Wendy Colin LPN  Outcome: Progressing  12/16/2024 1102 by Wendy Colin LPN  Outcome: Progressing  12/16/2024 0700 by Orlin Lucio, RN  Outcome: Progressing     Problem: Safety - Adult  Goal: Free from fall injury  12/16/2024 1103 by Wendy Colin LPN  Outcome: Progressing  12/16/2024 1102 by Wendy Colin LPN  Outcome: Progressing  12/16/2024 0700 by Orlin Lucio, RN  Outcome: Progressing

## 2024-12-16 NOTE — DISCHARGE SUMMARY
Labs     12/15/24  0315      K 4.4      CO2 25   BUN 79*   CREATININE 4.8*   GLUCOSE 69*     Hepatic: No results for input(s): \"AST\", \"ALT\", \"BILITOT\", \"ALKPHOS\" in the last 72 hours.    Invalid input(s): \"ALB\"  Lipids:   Lab Results   Component Value Date/Time    CHOL 102 04/21/2023 10:48 AM    CHOL 145 11/04/2021 12:00 AM    HDL 61 04/21/2023 10:48 AM    TRIG 71 04/21/2023 10:48 AM     Hemoglobin A1C:   Lab Results   Component Value Date/Time    LABA1C 5.6 10/10/2023 04:45 PM     TSH: No results found for: \"TSH\"  Troponin:   Lab Results   Component Value Date/Time    TROPONINT <0.010 05/10/2023 03:57 PM    TROPONINT <0.010 08/16/2021 07:04 AM    TROPONINT <0.010 02/27/2015 11:16 PM     Lactic Acid: No results for input(s): \"LACTA\" in the last 72 hours.  BNP: No results for input(s): \"PROBNP\" in the last 72 hours.  UA:  Lab Results   Component Value Date/Time    NITRU NEGATIVE 11/02/2024 11:19 AM    NITRU POSITIVE 11/19/2012 04:00 PM    COLORU Yellow 11/02/2024 11:19 AM    PHUR 6.0 11/02/2024 11:19 AM    WBCUA <1 11/02/2024 11:19 AM    WBCUA 0-5 03/24/2022 07:09 AM    RBCUA 0 11/02/2024 11:19 AM    RBCUA 3 06/29/2024 07:50 AM    MUCUS RARE 10/10/2023 08:12 AM    TRICHOMONAS None seen 11/02/2024 11:19 AM    BACTERIA NEGATIVE 06/29/2024 07:50 AM    CLARITYU CLEAR 06/29/2024 07:50 AM    LEUKOCYTESUR NEGATIVE 11/02/2024 11:19 AM    UROBILINOGEN 0.2 11/02/2024 11:19 AM    BILIRUBINUR NEGATIVE 11/02/2024 11:19 AM    BLOODU NEGATIVE 06/29/2024 07:50 AM    GLUCOSEU NEGATIVE 11/02/2024 11:19 AM    KETUA NEGATIVE 11/02/2024 11:19 AM    AMORPHOUS OCCASIONAL 06/23/2023 01:00 PM     Urine Cultures:   Lab Results   Component Value Date/Time    LABURIN SEE RESULTS BELOW 03/03/2022 12:12 PM     Blood Cultures: No results found for: \"BC\"  No results found for: \"BLOODCULT2\"  Organism: No results found for: \"ORG\"    Time Spent Discharging patient 35 minutes    Electronically signed by Bryanna Manley PA-C on 12/16/2024 at

## 2024-12-16 NOTE — PLAN OF CARE
Problem: Chronic Conditions and Co-morbidities  Goal: Patient's chronic conditions and co-morbidity symptoms are monitored and maintained or improved  12/16/2024 1102 by Wendy Colin LPN  Outcome: Progressing  12/16/2024 0700 by Orlin Lucio, RN  Outcome: Progressing     Problem: Discharge Planning  Goal: Discharge to home or other facility with appropriate resources  12/16/2024 1102 by Wendy Colin LPN  Outcome: Progressing  12/16/2024 0700 by Orlin Lucio, RN  Outcome: Progressing     Problem: Pain  Goal: Verbalizes/displays adequate comfort level or baseline comfort level  12/16/2024 1102 by Wendy Colin LPN  Outcome: Progressing  12/16/2024 0700 by Orlin Lucio, RN  Outcome: Progressing     Problem: Safety - Adult  Goal: Free from fall injury  12/16/2024 1102 by Wendy Colin LPN  Outcome: Progressing  12/16/2024 0700 by Orlin Lucio, RN  Outcome: Progressing

## 2024-12-16 NOTE — PROGRESS NOTES
4 Eyes Skin Assessment     NAME:  Gus Garza  YOB: 1980  MEDICAL RECORD NUMBER:  [id number]    The patient is being assess for  Admission    I agree that 2 RN's have performed a thorough Head to Toe Skin Assessment on the patient. ALL assessment sites listed below have been assessed.      Areas assessed by both nurses:    Head, Face, Ears, Shoulders, Back, Chest, Arms, Elbows, Hands, Sacrum. Buttock, Coccyx, Ischium, Legs. Feet and Heels, and Under Medical Devices   - scattered bruising on LLE, LUE and RUE  - old suprapubic catheter stoma site on RLQ  - scattered scratches on Left flank        Does the Patient have a Wound? No noted wound(s)       Saleem Prevention initiated:  NA   Wound Care Orders initiated:  NA    Pressure Injury (Stage 3,4, Unstageable, DTI, NWPT, and Complex wounds) if present place consult order under :: NA    New and Established Ostomies if present place consult order under : NA      Nurse 1 eSignature: Electronically signed by Orlin Lucio RN on 12/15/24 at 12:41 AM EST    **SHARE this note so that the co-signing nurse is able to place an eSignature**    Nurse 2 eSignature: Electronically signed by Nicole Garcia RN on 12/16/24 at 5:49 AM EST         
Nephrology Progress Note        [address] [facility], [address]  [place] [address]  Phone: [phone]  Office Hours: 8:30AM - 4:30PM  Monday - Friday 12/16/2024 6:21 AM  Subjective:   Admit Date: 12/14/2024  PCP: Lesly Parekh MD  Interval History:   On room air  Good UOP  Wants to go home    Diet: ADULT DIET; Regular; 4 carb choices (60 gm/meal); Low Sodium (2 gm); Low Potassium (Less than 3000 mg/day); Low Phosphorus (Less than 1000 mg)      Data:   Scheduled Meds:   cefTRIAXone (ROCEPHIN) IV  1,000 mg IntraVENous Q24H    epoetin home-epbx  3,000 Units SubCUTAneous Weekly    sodium chloride flush  5-40 mL IntraVENous 2 times per day    heparin (porcine)  5,000 Units SubCUTAneous 3 times per day    insulin lispro  0-8 Units SubCUTAneous 4x Daily AC & HS    amLODIPine  5 mg Oral BID    aspirin  81 mg Oral Daily    busPIRone  10 mg Oral BID    citalopram  40 mg Oral Daily    ferrous gluconate  324 mg Oral Once per day on Monday Wednesday Friday    hydrALAZINE  100 mg Oral TID    metoprolol tartrate  25 mg Oral BID    rosuvastatin  10 mg Oral Nightly    sevelamer  800 mg Oral TID WC    torsemide  20 mg Oral Daily    insulin glargine  5 Units SubCUTAneous Nightly    vitamin B-12  1,000 mcg Oral Daily     Continuous Infusions:   sodium chloride      dextrose       PRN Meds:sodium chloride flush, sodium chloride, ondansetron **OR** ondansetron, polyethylene glycol, acetaminophen **OR** acetaminophen, glucose, dextrose bolus **OR** dextrose bolus, glucagon (rDNA), dextrose, oxyCODONE-acetaminophen  I/O last 3 completed shifts:  In: -   Out: 1850 [Urine:1850]  I/O this shift:  In: -   Out: 850 [Urine:850]    Intake/Output Summary (Last 24 hours) at 12/16/2024 0621  Last data filed at 12/16/2024 0317  Gross per 24 hour   Intake --   Output 2250 ml   Net -2250 ml       CBC:   Recent Labs     12/15/24  0315   WBC 8.6   HGB 9.5*          BMP:    Recent Labs     12/15/24  0315      K 4.4   CL 
Occupational Therapy    [facility] ACUTE CARE OCCUPATIONAL THERAPY EVALUATION    Gus Garza, 1980, 1113/1113-A, 12/16/2024    Discharge Recommendation: Home with initial 24 hour supervision/assistance    Subjective:  Patient states: \"I'm glad you stopped by!\"  Pain: Pt denied pain this date  Communication with other providers: VANESSA Velazquez  Restrictions: General Precautions, Low Fall Risk, Telemetry, Candelario, Bed/chair alarm    Home Setup/Prior level of function:  Social/Functional History  Lives With: Parent (Mother; who is in good health per pt)  Type of Home: House  Home Layout: Two level, Able to Live on Main level with bedroom/bathroom, Performs ADL's on one level  Home Access: Ramped entrance  Bathroom Shower/Tub: Tub/Shower unit  Bathroom Toilet: Standard  Bathroom Accessibility: Accessible  Home Equipment: Walker - Rolling, Wheelchair - Manual  Prior Level of Assist for ADLs: Independent  Prior Level of Assist for Homemaking: Needs assistance  Homemaking Responsibilities: No (mother manages)  Prior Level of Assist for Ambulation: Independent household ambulator, with or without device (mod I with RW)  Prior Level of Assist for Transfers: Independent  Active : No (mother drives)  Occupation: On disability    Examination:  Observation: Supine in bed upon arrival. Pleasant and agreeable to evaluation.  Vision: WFL (Glasses)  Hearing: WFL  Vitals: Stable vitals throughout session    Body Systems and functions:  ROM: WFL all joint in BL UEs  Strength: Lt UE 4+/5 MMT all major muscle groups, Rt UE 4/5 MMT all major muscle groups (Rt UE slightly weaker from old CVA)  Sensation: WFL (denies numbness/tingling)  Tone: Normal  Coordination: Mild gross and fine motor coordination deficits in Rt UE from prior CVA but grossly WFL for ADLs    Activities of Daily Living (ADLs):  Feeding: Independent   Grooming: Supervision (able to complete in standing at sink)  UB bathing: Supervision   LB 
PT DC to home with family. Discharge instructions reviewed with pt, IV removed, leg bag secured, pt has belongings. Pt verbalized understanding, family at bedside, no questions at this time.  
with the assessment and plan as dictated above.     Current Living situation: home  Expected Disposition: same  Estimated D/C:     Diet ADULT DIET; Regular; 4 carb choices (60 gm/meal); Low Sodium (2 gm); Low Potassium (Less than 3000 mg/day); Low Phosphorus (Less than 1000 mg)   DVT Prophylaxis [] Lovenox, [x]  Heparin, [] SCDs, [] Ambulation,  [] Eliquis, [] Xarelto []Coumadin   Code Status Full Code   Disposition Patient requires continued admission due to urinary retention   Surrogate Decision Maker/ ROWENA Barakat     Personally reviewed Lab Studies and Imaging   Subjective:     Chief Complaint: urinary retention     Patient seen and examined at bedside.  Overall feeling better this morning.  Does have urinary Candelario catheter in place still having some right-sided flank discomfort.    Review of Systems:    Pertinent positives and negatives discussed in HPI    Objective:     Intake/Output Summary (Last 24 hours) at 12/15/2024 0716  Last data filed at 12/15/2024 0337  Gross per 24 hour   Intake --   Output 450 ml   Net -450 ml        Vitals:   Vitals:    12/15/24 0327   BP: 133/61   Pulse: 61   Resp: 18   Temp: 97.7 °F (36.5 °C)   SpO2: 95%       Physical Exam:   General: NAD  Eyes: EOMI  ENT: neck supple  Cardiovascular: Regular rate and rhythm  Respiratory: Clear to auscultation, respirations even and unlabored on RA  Gastrointestinal: Soft, non tender  Genitourinary: no suprapubic tenderness, + R CVA tenderness, Candelario catheter in place  Musculoskeletal: No edema  Skin: warm, dry  Neuro: Alert. R-sided weakness (chronic)  Psych: Mood appropriate.    Medications:   Medications:    cefTRIAXone (ROCEPHIN) IV  1,000 mg IntraVENous Q24H    sodium chloride flush  5-40 mL IntraVENous 2 times per day    heparin (porcine)  5,000 Units SubCUTAneous 3 times per day    insulin lispro  0-8 Units SubCUTAneous 4x Daily AC & HS    amLODIPine  5 mg Oral BID    aspirin  81 mg Oral Daily    busPIRone  10 mg Oral BID

## 2024-12-16 NOTE — CONSULTS
Nephrology Service Consultation      [address] [facility], [address]  [place] [address]  Phone: [phone]  Office Hours: 8:30AM - 4:30PM  Monday - Friday        MEDICAL DECISION MAKING and Recommendations     -Urinary retention: acute  -CKD5; not on dialysis  -Anemia of CKD5  -UTI    Suggest:  -Continue gentle IVF  -Urology's help for the urinary retention  -No urgent RRT needed  -Please avoid nephrotoxins  -Resume darbopoetin or retacrit for anemia workup    Thank you        Patient Active Problem List    Diagnosis Date Noted    Urinary retention 12/14/2024    Anemia 02/07/2024    Complicated UTI (urinary tract infection) 10/10/2023    Anemia due to chronic kidney disease 07/13/2023    Anemia in stage 4 chronic kidney disease ([facility]) 05/09/2023    Chronic kidney disease, stage V ([facility]) 03/03/2022    Dysuria 03/03/2022    Primary hypertension 11/18/2021    Dyslipidemia 11/18/2021    Hyperpotassemia 08/18/2021    CRISTIANA (acute kidney injury) ([facility]) 08/18/2021    Microalbuminuria due to type 2 diabetes mellitus ([facility]) 05/14/2021    Microalbuminuria 05/14/2021    Lacunar cerebrovascular accident (CVA) of subthalamic region ([facility]) 05/14/2021    Chest pain     CAD in native artery 11/20/2012    CAD S/P percutaneous coronary angioplasty 11/20/2012    DM type 1 (diabetes mellitus, type 1) ([facility])     NSTEMI (non-ST elevated myocardial infarction) ([facility]) 11/19/2012    Nausea & vomiting 11/19/2012         Patient:  Gus Garza  MRN: [id number]  Consulting physician:  Gerald Acosta MD  Reason for Consult: office pt of Dr Scales  PCP: Lesly Parekh MD    HISTORY OF PRESENT ILLNESS:   The patient is a 44 y.o. female with CKD5, hx urinary retention, presented with back pain.  CT lumbar spine showed no acute fracture or herniation  She was found to be retaining urine so a otto cath was placed  Nephrology consult for serum creatinine 4.8.  her serum creatinine has been in the 4s over the past 7 months.  She is on room 
cough, chest pain, shortness of breath, denying any abdominal pain, denying any constipation or diarrhea.  Patient usually ambulates with a walker.  Patient received fentanyl, ce[facility]ri[facility]one [facility].    Past Medical History:        Diagnosis Date    Anxiety 2012    Has d/t stress & crowds per pt.    CAD in native artery 2012    NSTEMI    CAD S/P percutaneous coronary angioplasty 2012    PTCA with 2.75 x 22mm Resolute Integrity ILSA in mid RCA- Dr Lieberman    Carpal tunnel syndrome, bilateral     Mild to moderate on right and mild on left.    Chronic low back pain     Cocaine abuse (HCC)     per ERLINDA Lopez Chart    Depression     DM type 1 (diabetes mellitus, type 1) ([facility])     H/O cardiac catheterization     H/O echocardiogram 2012-Normal chamber size. Mild regional wall motion abnormalities with normal LV systolic, but abnormal diastolic function. LVEF 50-55%    H/O tubal ligation     History of cholecystectomy     History of complete ECG 2012    Hypertension     Marijuana use     Per ERLINDA Lopez chart    Neuropathy     Panic attacks 2012    H/O after she had MI .    Retinopathy     Seasonal allergies     Stroke ([facility]) 2022     Past Surgical History:        Procedure Laterality Date     SECTION      CHOLECYSTECTOMY      CORONARY ANGIOPLASTY WITH STENT PLACEMENT  2012    PTCA with 2.75 x 22mm Resollute Integrity ILSA to mid RCA    CT BONE MARROW ASPIRATION  2023    CT BONE MARROW ASPIRATION 2023 [facility] CT SCAN    CT NEEDLE BIOPSY LUNG PERCUTANEOUS  2024    CT NEEDLE BIOPSY LUNG PERCUTANEOUS 2024 [facility] CT SCAN    DIAGNOSTIC CARDIAC CATH LAB PROCEDURE  2012    DILATION AND CURETTAGE OF UTERUS      TUBAL LIGATION       Current Medications:   Current Facility-Administered Medications: cefTRIAXone (ROCEPHIN) 1,000 mg in sterile water 10 mL IV syringe, 1,000 mg, IntraVENous, Q24H  epoetin home-epbx (RETACRIT) injection

## 2024-12-19 ENCOUNTER — HOSPITAL ENCOUNTER (OUTPATIENT)
Dept: INFUSION THERAPY | Age: 44
Discharge: HOME | End: 2024-12-19
Payer: COMMERCIAL

## 2024-12-19 VITALS — SYSTOLIC BLOOD PRESSURE: 125 MMHG | DIASTOLIC BLOOD PRESSURE: 61 MMHG

## 2024-12-19 DIAGNOSIS — D63.1 ANEMIA DUE TO STAGE 4 CHRONIC KIDNEY DISEASE (HCC): Primary | ICD-10-CM

## 2024-12-19 DIAGNOSIS — N18.4 ANEMIA DUE TO STAGE 4 CHRONIC KIDNEY DISEASE (HCC): Primary | ICD-10-CM

## 2024-12-19 LAB
BASOPHILS # BLD: 0.07 K/UL
BASOPHILS NFR BLD: 1 % (ref 0–1)
EOSINOPHIL # BLD: 0.34 K/UL
EOSINOPHILS RELATIVE PERCENT: 4 % (ref 0–3)
ERYTHROCYTE [DISTWIDTH] IN BLOOD BY AUTOMATED COUNT: 12.5 % (ref 11.7–14.9)
HCT VFR BLD AUTO: 32.1 % (ref 37–47)
HGB BLD-MCNC: 10.3 G/DL (ref 12.5–16)
LYMPHOCYTES NFR BLD: 1.01 K/UL
LYMPHOCYTES RELATIVE PERCENT: 13 % (ref 24–44)
MCH RBC QN AUTO: 32.3 PG (ref 27–31)
MCHC RBC AUTO-ENTMCNC: 32.1 G/DL (ref 32–36)
MCV RBC AUTO: 100.6 FL (ref 78–100)
MONOCYTES NFR BLD: 0.57 K/UL
MONOCYTES NFR BLD: 7 % (ref 0–4)
NEUTROPHILS NFR BLD: 75 % (ref 36–66)
NEUTS SEG NFR BLD: 5.82 K/UL
PLATELET # BLD AUTO: 279 K/UL (ref 140–440)
PMV BLD AUTO: 9 FL (ref 7.5–11.1)
RBC # BLD AUTO: 3.19 M/UL (ref 4.2–5.4)
WBC OTHER # BLD: 7.8 K/UL (ref 4–10.5)

## 2024-12-19 PROCEDURE — 85025 COMPLETE CBC W/AUTO DIFF WBC: CPT

## 2024-12-19 PROCEDURE — 36415 COLL VENOUS BLD VENIPUNCTURE: CPT

## 2024-12-19 PROCEDURE — 6360000002 HC RX W HCPCS: Performed by: INTERNAL MEDICINE

## 2024-12-19 PROCEDURE — 96372 THER/PROPH/DIAG INJ SC/IM: CPT

## 2024-12-19 RX ORDER — SODIUM CHLORIDE 9 MG/ML
INJECTION, SOLUTION INTRAVENOUS CONTINUOUS
OUTPATIENT
Start: 2024-12-26

## 2024-12-19 RX ORDER — HYDROCORTISONE SODIUM SUCCINATE 100 MG/2ML
100 INJECTION INTRAMUSCULAR; INTRAVENOUS
OUTPATIENT
Start: 2024-12-26

## 2024-12-19 RX ORDER — DIPHENHYDRAMINE HYDROCHLORIDE 50 MG/ML
50 INJECTION INTRAMUSCULAR; INTRAVENOUS
OUTPATIENT
Start: 2024-12-26

## 2024-12-19 RX ORDER — ALBUTEROL SULFATE 90 UG/1
4 INHALANT RESPIRATORY (INHALATION) PRN
OUTPATIENT
Start: 2024-12-26

## 2024-12-19 RX ORDER — ONDANSETRON 2 MG/ML
8 INJECTION INTRAMUSCULAR; INTRAVENOUS
OUTPATIENT
Start: 2024-12-26

## 2024-12-19 RX ORDER — ACETAMINOPHEN 325 MG/1
650 TABLET ORAL
OUTPATIENT
Start: 2024-12-26

## 2024-12-19 RX ORDER — EPINEPHRINE 1 MG/ML
0.3 INJECTION, SOLUTION, CONCENTRATE INTRAVENOUS PRN
OUTPATIENT
Start: 2024-12-26

## 2024-12-19 RX ORDER — FAMOTIDINE 10 MG/ML
20 INJECTION, SOLUTION INTRAVENOUS
OUTPATIENT
Start: 2024-12-26

## 2024-12-19 RX ADMIN — DARBEPOETIN ALFA 60 MCG: 60 INJECTION, SOLUTION INTRAVENOUS; SUBCUTANEOUS at 11:08

## 2024-12-19 NOTE — PROGRESS NOTES
Spoke with Freda LIAO CBC results Hgb 10.3, pt to receive Aranesp injection with tx parameters to hold if >10. Per Freda ok to give  Aranesp injection. Primary RN notified.

## 2024-12-19 NOTE — PROGRESS NOTES
Patient to infusion suite for aranesp injection. Patient without any issues, was recently at ED for urinary retention, but no issues at this time. Indwelling catheter has helped for now until she sees urologist in Jan.    Hgb from today is 10.3, per YANNI Marmolejo go ahead and give aranespl   Aranesp given left arm, pt tolerated well. Left ambulatory with mother, declined paperwork.

## 2025-01-02 ENCOUNTER — HOSPITAL ENCOUNTER (OUTPATIENT)
Dept: INFUSION THERAPY | Age: 45
Discharge: HOME | End: 2025-01-02
Payer: COMMERCIAL

## 2025-01-02 VITALS — DIASTOLIC BLOOD PRESSURE: 69 MMHG | SYSTOLIC BLOOD PRESSURE: 132 MMHG

## 2025-01-02 DIAGNOSIS — N18.4 ANEMIA DUE TO STAGE 4 CHRONIC KIDNEY DISEASE (HCC): Primary | ICD-10-CM

## 2025-01-02 DIAGNOSIS — D63.1 ANEMIA DUE TO STAGE 4 CHRONIC KIDNEY DISEASE (HCC): Primary | ICD-10-CM

## 2025-01-02 LAB
BASOPHILS # BLD: 0.05 K/UL
BASOPHILS NFR BLD: 1 % (ref 0–1)
EOSINOPHIL # BLD: 0.33 K/UL
EOSINOPHILS RELATIVE PERCENT: 5 % (ref 0–3)
ERYTHROCYTE [DISTWIDTH] IN BLOOD BY AUTOMATED COUNT: 12.7 % (ref 11.7–14.9)
HCT VFR BLD AUTO: 34.1 % (ref 37–47)
HGB BLD-MCNC: 10.9 G/DL (ref 12.5–16)
LYMPHOCYTES NFR BLD: 0.9 K/UL
LYMPHOCYTES RELATIVE PERCENT: 12 % (ref 24–44)
MCH RBC QN AUTO: 32.2 PG (ref 27–31)
MCHC RBC AUTO-ENTMCNC: 32 G/DL (ref 32–36)
MCV RBC AUTO: 100.9 FL (ref 78–100)
MONOCYTES NFR BLD: 0.63 K/UL
MONOCYTES NFR BLD: 9 % (ref 0–4)
NEUTROPHILS NFR BLD: 74 % (ref 36–66)
NEUTS SEG NFR BLD: 5.36 K/UL
PLATELET # BLD AUTO: 256 K/UL (ref 140–440)
PMV BLD AUTO: 9 FL (ref 7.5–11.1)
RBC # BLD AUTO: 3.38 M/UL (ref 4.2–5.4)
WBC OTHER # BLD: 7.3 K/UL (ref 4–10.5)

## 2025-01-02 PROCEDURE — 85025 COMPLETE CBC W/AUTO DIFF WBC: CPT

## 2025-01-02 PROCEDURE — 36415 COLL VENOUS BLD VENIPUNCTURE: CPT

## 2025-01-02 PROCEDURE — 96372 THER/PROPH/DIAG INJ SC/IM: CPT

## 2025-01-02 PROCEDURE — 6360000002 HC RX W HCPCS: Performed by: INTERNAL MEDICINE

## 2025-01-02 RX ORDER — SODIUM CHLORIDE 9 MG/ML
INJECTION, SOLUTION INTRAVENOUS CONTINUOUS
OUTPATIENT
Start: 2025-01-09

## 2025-01-02 RX ORDER — ONDANSETRON 2 MG/ML
8 INJECTION INTRAMUSCULAR; INTRAVENOUS
OUTPATIENT
Start: 2025-01-09

## 2025-01-02 RX ORDER — HYDROCORTISONE SODIUM SUCCINATE 100 MG/2ML
100 INJECTION INTRAMUSCULAR; INTRAVENOUS
OUTPATIENT
Start: 2025-01-09

## 2025-01-02 RX ORDER — ALBUTEROL SULFATE 90 UG/1
4 INHALANT RESPIRATORY (INHALATION) PRN
OUTPATIENT
Start: 2025-01-09

## 2025-01-02 RX ORDER — FAMOTIDINE 10 MG/ML
20 INJECTION, SOLUTION INTRAVENOUS
OUTPATIENT
Start: 2025-01-09

## 2025-01-02 RX ORDER — EPINEPHRINE 1 MG/ML
0.3 INJECTION, SOLUTION, CONCENTRATE INTRAVENOUS PRN
OUTPATIENT
Start: 2025-01-09

## 2025-01-02 RX ORDER — ACETAMINOPHEN 325 MG/1
650 TABLET ORAL
OUTPATIENT
Start: 2025-01-09

## 2025-01-02 RX ORDER — DIPHENHYDRAMINE HYDROCHLORIDE 50 MG/ML
50 INJECTION INTRAMUSCULAR; INTRAVENOUS
OUTPATIENT
Start: 2025-01-09

## 2025-01-02 RX ADMIN — DARBEPOETIN ALFA 60 MCG: 60 INJECTION, SOLUTION INTRAVENOUS; SUBCUTANEOUS at 11:12

## 2025-01-02 NOTE — PROGRESS NOTES
Pt to tx suite for Aranesp injection. CBC results reviewed with Dr Grady Hgb 10.9 from 1/2. Per Dr Grady ok to give Aranesp today and pt to receive Aranesp every 3 weeks instead of every 2 weeks. Pharmacist,  and pt made aware. Injection given SQ to left upper arm. Pt tolerated without incident left ambulatory discharge instructions given

## 2025-01-20 ENCOUNTER — HOSPITAL ENCOUNTER (OUTPATIENT)
Age: 45
Discharge: HOME | End: 2025-01-20
Payer: COMMERCIAL

## 2025-01-20 DIAGNOSIS — R80.9 MICROALBUMINURIA DUE TO TYPE 2 DIABETES MELLITUS (HCC): ICD-10-CM

## 2025-01-20 DIAGNOSIS — N18.5 CHRONIC KIDNEY DISEASE, STAGE V (HCC): ICD-10-CM

## 2025-01-20 DIAGNOSIS — R80.9 MICROALBUMINURIA: ICD-10-CM

## 2025-01-20 DIAGNOSIS — E87.5 HYPERPOTASSEMIA: ICD-10-CM

## 2025-01-20 DIAGNOSIS — E11.29 MICROALBUMINURIA DUE TO TYPE 2 DIABETES MELLITUS (HCC): ICD-10-CM

## 2025-01-20 LAB
ANION GAP SERPL CALCULATED.3IONS-SCNC: 17 MMOL/L (ref 9–17)
BUN SERPL-MCNC: 85 MG/DL (ref 7–20)
CALCIUM SERPL-MCNC: 9.9 MG/DL (ref 8.3–10.6)
CHLORIDE SERPL-SCNC: 95 MMOL/L (ref 99–110)
CO2 SERPL-SCNC: 23 MMOL/L (ref 21–32)
CREAT SERPL-MCNC: 5.2 MG/DL (ref 0.6–1.1)
GFR, ESTIMATED: 9 ML/MIN/1.73M2
GLUCOSE SERPL-MCNC: 145 MG/DL (ref 74–99)
PHOSPHATE SERPL-MCNC: 6.7 MG/DL (ref 2.5–4.9)
POTASSIUM SERPL-SCNC: 5 MMOL/L (ref 3.5–5.1)
PTH-INTACT SERPL-MCNC: 96.3 PG/ML (ref 14–72)
SODIUM SERPL-SCNC: 136 MMOL/L (ref 136–145)

## 2025-01-20 PROCEDURE — 80048 BASIC METABOLIC PNL TOTAL CA: CPT

## 2025-01-20 PROCEDURE — 83970 ASSAY OF PARATHORMONE: CPT

## 2025-01-20 PROCEDURE — 84100 ASSAY OF PHOSPHORUS: CPT

## 2025-01-22 ENCOUNTER — TRANSCRIBE ORDERS (OUTPATIENT)
Dept: ADMINISTRATIVE | Age: 45
End: 2025-01-22

## 2025-01-22 DIAGNOSIS — R31.29 MICROSCOPIC HEMATURIA: Primary | ICD-10-CM

## 2025-01-23 ENCOUNTER — TRANSCRIBE ORDERS (OUTPATIENT)
Dept: ADMINISTRATIVE | Age: 45
End: 2025-01-23

## 2025-01-23 ENCOUNTER — HOSPITAL ENCOUNTER (OUTPATIENT)
Dept: INFUSION THERAPY | Age: 45
Discharge: HOME | End: 2025-01-23
Payer: COMMERCIAL

## 2025-01-23 VITALS — SYSTOLIC BLOOD PRESSURE: 138 MMHG | DIASTOLIC BLOOD PRESSURE: 66 MMHG

## 2025-01-23 DIAGNOSIS — R10.9 FLANK PAIN: ICD-10-CM

## 2025-01-23 DIAGNOSIS — D63.1 ANEMIA DUE TO STAGE 4 CHRONIC KIDNEY DISEASE (HCC): ICD-10-CM

## 2025-01-23 DIAGNOSIS — N18.4 ANEMIA DUE TO STAGE 4 CHRONIC KIDNEY DISEASE (HCC): ICD-10-CM

## 2025-01-23 DIAGNOSIS — R31.29 MICROSCOPIC HEMATURIA: Primary | ICD-10-CM

## 2025-01-23 LAB
BASOPHILS # BLD: 0.06 K/UL
BASOPHILS NFR BLD: 1 % (ref 0–1)
EOSINOPHIL # BLD: 0.26 K/UL
EOSINOPHILS RELATIVE PERCENT: 4 % (ref 0–3)
ERYTHROCYTE [DISTWIDTH] IN BLOOD BY AUTOMATED COUNT: 11.9 % (ref 11.7–14.9)
HCT VFR BLD AUTO: 34.6 % (ref 37–47)
HGB BLD-MCNC: 11.1 G/DL (ref 12.5–16)
LYMPHOCYTES NFR BLD: 1.1 K/UL
LYMPHOCYTES RELATIVE PERCENT: 15 % (ref 24–44)
MCH RBC QN AUTO: 31.7 PG (ref 27–31)
MCHC RBC AUTO-ENTMCNC: 32.1 G/DL (ref 32–36)
MCV RBC AUTO: 98.9 FL (ref 78–100)
MONOCYTES NFR BLD: 0.65 K/UL
MONOCYTES NFR BLD: 9 % (ref 0–4)
NEUTROPHILS NFR BLD: 71 % (ref 36–66)
NEUTS SEG NFR BLD: 5.15 K/UL
PLATELET # BLD AUTO: 247 K/UL (ref 140–440)
PMV BLD AUTO: 9.8 FL (ref 7.5–11.1)
RBC # BLD AUTO: 3.5 M/UL (ref 4.2–5.4)
WBC OTHER # BLD: 7.2 K/UL (ref 4–10.5)

## 2025-01-23 PROCEDURE — 36415 COLL VENOUS BLD VENIPUNCTURE: CPT

## 2025-01-23 PROCEDURE — 85025 COMPLETE CBC W/AUTO DIFF WBC: CPT

## 2025-01-23 NOTE — PROGRESS NOTES
Pt to tx suite for Aranesp injection. CBC results reviewed from 1/23 and per tx parameters which are hold if Hgb >10 to hold. Hgb 11.1 today. Aranesp not given. Pt informed left tx suite ambulatory discharge instructions given

## 2025-01-29 ENCOUNTER — HOSPITAL ENCOUNTER (OUTPATIENT)
Dept: CT IMAGING | Age: 45
Discharge: HOME | End: 2025-01-29
Attending: SPECIALIST
Payer: COMMERCIAL

## 2025-01-29 DIAGNOSIS — R10.9 FLANK PAIN: ICD-10-CM

## 2025-01-29 DIAGNOSIS — R31.29 MICROSCOPIC HEMATURIA: ICD-10-CM

## 2025-01-29 PROCEDURE — 74176 CT ABD & PELVIS W/O CONTRAST: CPT

## 2025-02-13 ENCOUNTER — HOSPITAL ENCOUNTER (OUTPATIENT)
Dept: INFUSION THERAPY | Age: 45
Discharge: HOME | End: 2025-02-13
Payer: COMMERCIAL

## 2025-02-13 VITALS — SYSTOLIC BLOOD PRESSURE: 138 MMHG | DIASTOLIC BLOOD PRESSURE: 67 MMHG

## 2025-02-13 DIAGNOSIS — N18.4 ANEMIA DUE TO STAGE 4 CHRONIC KIDNEY DISEASE (HCC): ICD-10-CM

## 2025-02-13 DIAGNOSIS — D63.1 ANEMIA DUE TO STAGE 4 CHRONIC KIDNEY DISEASE (HCC): ICD-10-CM

## 2025-02-13 LAB
BASOPHILS # BLD: 0.09 K/UL
BASOPHILS NFR BLD: 1 % (ref 0–1)
EOSINOPHIL # BLD: 0.37 K/UL
EOSINOPHILS RELATIVE PERCENT: 5 % (ref 0–3)
ERYTHROCYTE [DISTWIDTH] IN BLOOD BY AUTOMATED COUNT: 11.8 % (ref 11.7–14.9)
HCT VFR BLD AUTO: 36.2 % (ref 37–47)
HGB BLD-MCNC: 11.3 G/DL (ref 12.5–16)
LYMPHOCYTES NFR BLD: 1.23 K/UL
LYMPHOCYTES RELATIVE PERCENT: 15 % (ref 24–44)
MCH RBC QN AUTO: 31.6 PG (ref 27–31)
MCHC RBC AUTO-ENTMCNC: 31.2 G/DL (ref 32–36)
MCV RBC AUTO: 101.1 FL (ref 78–100)
MONOCYTES NFR BLD: 0.58 K/UL
MONOCYTES NFR BLD: 7 % (ref 0–4)
NEUTROPHILS NFR BLD: 72 % (ref 36–66)
NEUTS SEG NFR BLD: 5.72 K/UL
PLATELET # BLD AUTO: 268 K/UL (ref 140–440)
PMV BLD AUTO: 9.9 FL (ref 7.5–11.1)
RBC # BLD AUTO: 3.58 M/UL (ref 4.2–5.4)
WBC OTHER # BLD: 8 K/UL (ref 4–10.5)

## 2025-02-13 PROCEDURE — 85025 COMPLETE CBC W/AUTO DIFF WBC: CPT

## 2025-02-13 PROCEDURE — 36415 COLL VENOUS BLD VENIPUNCTURE: CPT

## 2025-02-13 NOTE — PROGRESS NOTES
Pt to tx suite for Aranesp injection. CBC results reviewed from 2/13 and no injection needed per tx parameters Hgb 11.3. pt informed and left ambulatory discharge instructions given

## 2025-02-14 ENCOUNTER — HOSPITAL ENCOUNTER (OUTPATIENT)
Age: 45
Discharge: HOME | End: 2025-02-14
Payer: COMMERCIAL

## 2025-02-14 DIAGNOSIS — N18.5 CHRONIC KIDNEY DISEASE, STAGE V (HCC): ICD-10-CM

## 2025-02-14 DIAGNOSIS — R80.9 MICROALBUMINURIA: ICD-10-CM

## 2025-02-14 DIAGNOSIS — E87.5 HYPERPOTASSEMIA: ICD-10-CM

## 2025-02-14 DIAGNOSIS — R80.9 MICROALBUMINURIA DUE TO TYPE 2 DIABETES MELLITUS (HCC): ICD-10-CM

## 2025-02-14 DIAGNOSIS — E11.29 MICROALBUMINURIA DUE TO TYPE 2 DIABETES MELLITUS (HCC): ICD-10-CM

## 2025-02-14 LAB
ALBUMIN SERPL-MCNC: 4.2 G/DL (ref 3.4–5)
ALBUMIN/GLOB SERPL: 1.2 {RATIO} (ref 1.1–2.2)
ALP SERPL-CCNC: 104 U/L (ref 40–129)
ALT SERPL-CCNC: 12 U/L (ref 10–40)
ANION GAP SERPL CALCULATED.3IONS-SCNC: 13 MMOL/L (ref 9–17)
ANION GAP SERPL CALCULATED.3IONS-SCNC: 14 MMOL/L (ref 9–17)
AST SERPL-CCNC: 27 U/L (ref 15–37)
BILIRUB SERPL-MCNC: 0.3 MG/DL (ref 0–1)
BUN SERPL-MCNC: 83 MG/DL (ref 7–20)
BUN SERPL-MCNC: 85 MG/DL (ref 7–20)
CALCIUM SERPL-MCNC: 10 MG/DL (ref 8.3–10.6)
CALCIUM SERPL-MCNC: 10.1 MG/DL (ref 8.3–10.6)
CHLORIDE SERPL-SCNC: 97 MMOL/L (ref 99–110)
CHLORIDE SERPL-SCNC: 98 MMOL/L (ref 99–110)
CO2 SERPL-SCNC: 22 MMOL/L (ref 21–32)
CO2 SERPL-SCNC: 22 MMOL/L (ref 21–32)
CREAT SERPL-MCNC: 4.8 MG/DL (ref 0.6–1.1)
CREAT SERPL-MCNC: 4.8 MG/DL (ref 0.6–1.1)
EST. AVERAGE GLUCOSE BLD GHB EST-MCNC: 124 MG/DL
GFR, ESTIMATED: 10 ML/MIN/1.73M2
GFR, ESTIMATED: 10 ML/MIN/1.73M2
GLUCOSE SERPL-MCNC: 83 MG/DL (ref 74–99)
GLUCOSE SERPL-MCNC: 88 MG/DL (ref 74–99)
HBA1C MFR BLD: 6 % (ref 4.2–6.3)
POTASSIUM SERPL-SCNC: 4.6 MMOL/L (ref 3.5–5.1)
POTASSIUM SERPL-SCNC: 4.7 MMOL/L (ref 3.5–5.1)
PROT SERPL-MCNC: 7.8 G/DL (ref 6.4–8.2)
SODIUM SERPL-SCNC: 131 MMOL/L (ref 136–145)
SODIUM SERPL-SCNC: 133 MMOL/L (ref 136–145)

## 2025-02-14 PROCEDURE — 80048 BASIC METABOLIC PNL TOTAL CA: CPT

## 2025-02-14 PROCEDURE — 83036 HEMOGLOBIN GLYCOSYLATED A1C: CPT

## 2025-02-14 PROCEDURE — 80053 COMPREHEN METABOLIC PANEL: CPT

## 2025-02-25 NOTE — PROGRESS NOTES
IR Procedure at [facility]:  Spoke with patient and she will arrive at 0830 at [facility] on 2/28/2025 for her procedure at 1000. Also told patient to take her medications and that she will need someone to drive her that day and went over below instructions.    NPO at Midnight      Follow your directions as prescribed by the doctor for your procedure and medications.  3.   Consult your provider as to when to stop blood thinner  4.   Do not take any pain medication within 6 hours of your procedure  5.   Do not drink any alcoholic beverages or use any street drugs 24 hours before procedure.  6.   Please wear simple, loose fitting clothing to the hospital.  Do not bring valuables (money,             credit cards, checkbooks, etc.)     7.   If you  have a Living Will and Durable Power of  for Healthcare, please bring in a copy.  8.   Please bring picture ID,  insurance card, paperwork from the doctors office            (H & P, Consent,  & card for implantable devices).  9.   Report to the information desk on the ground floor.  10. Take a shower the night before or morning of your procedure, do not apply any lotion, oil or powder.  11. You will need a responsible adult

## 2025-02-28 ENCOUNTER — HOSPITAL ENCOUNTER (OUTPATIENT)
Dept: INTERVENTIONAL RADIOLOGY/VASCULAR | Age: 45
Discharge: HOME | End: 2025-02-28
Payer: COMMERCIAL

## 2025-02-28 VITALS
SYSTOLIC BLOOD PRESSURE: 143 MMHG | HEART RATE: 57 BPM | RESPIRATION RATE: 14 BRPM | DIASTOLIC BLOOD PRESSURE: 67 MMHG | OXYGEN SATURATION: 100 %

## 2025-02-28 DIAGNOSIS — N31.2: ICD-10-CM

## 2025-02-28 LAB
ERYTHROCYTE [DISTWIDTH] IN BLOOD BY AUTOMATED COUNT: 11.5 % (ref 11.7–14.9)
HCT VFR BLD AUTO: 30.3 % (ref 37–47)
HGB BLD-MCNC: 9.9 G/DL (ref 12.5–16)
INR PPP: 0.9
MCH RBC QN AUTO: 31.7 PG (ref 27–31)
MCHC RBC AUTO-ENTMCNC: 32.7 G/DL (ref 32–36)
MCV RBC AUTO: 97.1 FL (ref 78–100)
PARTIAL THROMBOPLASTIN TIME: 29.4 SEC (ref 25.1–37.1)
PLATELET # BLD AUTO: 249 K/UL (ref 140–440)
PMV BLD AUTO: 10 FL (ref 7.5–11.1)
PROTHROMBIN TIME: 12.4 SEC (ref 11.7–14.5)
RBC # BLD AUTO: 3.12 M/UL (ref 4.2–5.4)
WBC OTHER # BLD: 7.2 K/UL (ref 4–10.5)

## 2025-02-28 PROCEDURE — 99153 MOD SED SAME PHYS/QHP EA: CPT

## 2025-02-28 PROCEDURE — 85610 PROTHROMBIN TIME: CPT

## 2025-02-28 PROCEDURE — 85730 THROMBOPLASTIN TIME PARTIAL: CPT

## 2025-02-28 PROCEDURE — 36415 COLL VENOUS BLD VENIPUNCTURE: CPT

## 2025-02-28 PROCEDURE — 6360000002 HC RX W HCPCS

## 2025-02-28 PROCEDURE — 51102 DRAIN BL W/CATH INSERTION: CPT

## 2025-02-28 PROCEDURE — 99152 MOD SED SAME PHYS/QHP 5/>YRS: CPT

## 2025-02-28 PROCEDURE — 6360000002 HC RX W HCPCS: Performed by: RADIOLOGY

## 2025-02-28 PROCEDURE — C1894 INTRO/SHEATH, NON-LASER: HCPCS

## 2025-02-28 PROCEDURE — 76942 ECHO GUIDE FOR BIOPSY: CPT

## 2025-02-28 PROCEDURE — 6360000004 HC RX CONTRAST MEDICATION

## 2025-02-28 PROCEDURE — 85027 COMPLETE CBC AUTOMATED: CPT

## 2025-02-28 RX ORDER — DIPHENHYDRAMINE HYDROCHLORIDE 50 MG/ML
INJECTION INTRAMUSCULAR; INTRAVENOUS PRN
Status: COMPLETED | OUTPATIENT
Start: 2025-02-28 | End: 2025-02-28

## 2025-02-28 RX ORDER — FENTANYL CITRATE 50 UG/ML
INJECTION, SOLUTION INTRAMUSCULAR; INTRAVENOUS PRN
Status: COMPLETED | OUTPATIENT
Start: 2025-02-28 | End: 2025-02-28

## 2025-02-28 RX ORDER — MIDAZOLAM HYDROCHLORIDE 2 MG/2ML
INJECTION, SOLUTION INTRAMUSCULAR; INTRAVENOUS PRN
Status: COMPLETED | OUTPATIENT
Start: 2025-02-28 | End: 2025-02-28

## 2025-02-28 RX ADMIN — FENTANYL CITRATE 50 MCG: 50 INJECTION, SOLUTION INTRAMUSCULAR; INTRAVENOUS at 12:16

## 2025-02-28 RX ADMIN — DIPHENHYDRAMINE HYDROCHLORIDE 25 MG: 50 INJECTION, SOLUTION INTRAMUSCULAR; INTRAVENOUS at 12:23

## 2025-02-28 RX ADMIN — MIDAZOLAM HYDROCHLORIDE 1 MG: 1 INJECTION, SOLUTION INTRAMUSCULAR; INTRAVENOUS at 12:16

## 2025-02-28 RX ADMIN — FENTANYL CITRATE 25 MCG: 50 INJECTION, SOLUTION INTRAMUSCULAR; INTRAVENOUS at 12:31

## 2025-02-28 NOTE — BRIEF OP NOTE
Department of Interventional Radiology Post-Procedure Note      Date: 2/28/2025     Interventional Radiologist: Amrik    Procedure Performed:  suprapubic catheter    H&P Status: H&P was reviewed, the patient was examined and no change has occurred in patient's condition since H&P was completed.    Pre-procedure Diagnosis:  bladder paralysis    Post-procedure Diagnosis:  Same    Sedation:  conscious sedation    Contrast used:  Isovue 370    Estimated blood loss:  Minimal    Preliminary Findings:  Successful    Complications:  none    Full Report to Follow.    Electronically signed by Ginger Rowley MD on 2/28/2025 at 4:01 PM

## 2025-02-28 NOTE — PROGRESS NOTES
TRANSFER - OUT REPORT:    Verbal report given to Xavier RNs on Gus Gazra being transferred back to IR holding for routine post-op       Report consisted of patient's Situation, Background, Assessment and   Recommendations(SBAR).     Information from the following report(s) Nurse Handoff Report was reviewed with the receiving nurse.    Opportunity for questions and clarification was provided.      Patient transported with:   Registered Nurse

## 2025-02-28 NOTE — PRE SEDATION
Sedation Pre-Procedure Note    Patient Name: Gus Garza   YOB: 1980  Room/Bed: Room/bed info not found  Medical Record Number: [id number]  Date: 2025   Time: 9:43 AM       Indication:  bladder paralysis    Consent: I have discussed with the patient and/or the patient representative the indication, alternatives, and the possible risks and/or complications of the planned procedure and the anesthesia methods. The patient and/or patient representative appear to understand and agree to proceed.    Vital Signs:   Vitals:    25 0839   BP: (!) 140/62   Pulse: 59   SpO2: 100%       Past Medical History:   has a past medical history of Anxiety, CAD in native artery, CAD S/P percutaneous coronary angioplasty, Carpal tunnel syndrome, bilateral, Chronic low back pain, Cocaine abuse ([facility]), Depression, DM type 1 (diabetes mellitus, type 1) ([facility]), H/O cardiac catheterization, H/O echocardiogram, H/O tubal ligation, History of cholecystectomy, History of complete ECG, Hypertension, Marijuana use, Neuropathy, Panic attacks, Retinopathy, Seasonal allergies, and Stroke ([facility]).    Past Surgical History:   has a past surgical history that includes  section; Tubal ligation; Cholecystectomy; Diagnostic Cardiac Cath Lab Procedure (2012); Coronary angioplasty with stent (2012); Dilation and curettage of uterus; CT BONE MARROW ASPIRATION (2023); and CT NEEDLE BIOPSY LUNG PERCUTANEOUS (2024).    Medications:   Scheduled Meds:   Continuous Infusions:   PRN Meds:   Home Meds:   Prior to Admission medications    Medication Sig Start Date End Date Taking? Authorizing Provider   torsemide (DEMADEX) 20 MG tablet Take 1 tablet by mouth daily 2/10/25  Yes Shane Scales MD   busPIRone (BUSPAR) 10 MG tablet TAKE 1 TABLET (10 MG TOTAL) BY MOUTH IN THE MORNING AND BEFORE BEDTIME 11/10/24  Yes ProviderCorie MD   amLODIPine (NORVASC) 5 MG tablet Take 1 tablet by mouth in the morning and at 
PROCEDURE MEDS:05756}    Patient is an appropriate candidate for plan of sedation: {yes no free text:868716}    Electronically signed by Ginger Rowley MD on 2/28/2025 at 12:16 PM

## 2025-02-28 NOTE — PROGRESS NOTES
Pt arrived back to IR Holding.  Vitals WNL.  Pt okay to be discharged.  Pt transported via wheelchair to pt's ride with mother at side. Discharge education provided.

## 2025-03-03 ENCOUNTER — APPOINTMENT (OUTPATIENT)
Dept: ULTRASOUND IMAGING | Age: 45
End: 2025-03-03
Payer: COMMERCIAL

## 2025-03-03 ENCOUNTER — HOSPITAL ENCOUNTER (EMERGENCY)
Age: 45
Discharge: HOME | End: 2025-03-03
Payer: COMMERCIAL

## 2025-03-03 VITALS
HEIGHT: 60 IN | HEART RATE: 59 BPM | TEMPERATURE: 97.7 F | SYSTOLIC BLOOD PRESSURE: 135 MMHG | RESPIRATION RATE: 20 BRPM | OXYGEN SATURATION: 97 % | WEIGHT: 140 LBS | BODY MASS INDEX: 27.48 KG/M2 | DIASTOLIC BLOOD PRESSURE: 4 MMHG

## 2025-03-03 DIAGNOSIS — R31.9 HEMATURIA, UNSPECIFIED TYPE: Primary | ICD-10-CM

## 2025-03-03 DIAGNOSIS — R82.71 BACTERIA IN URINE: ICD-10-CM

## 2025-03-03 LAB
ALBUMIN SERPL-MCNC: 4.4 G/DL (ref 3.4–5)
ALBUMIN/GLOB SERPL: 1.4 {RATIO} (ref 1.1–2.2)
ALP SERPL-CCNC: 110 U/L (ref 40–129)
ALT SERPL-CCNC: 16 U/L (ref 10–40)
ANION GAP SERPL CALCULATED.3IONS-SCNC: 13 MMOL/L (ref 9–17)
AST SERPL-CCNC: 30 U/L (ref 15–37)
BACTERIA URNS QL MICRO: ABNORMAL
BASOPHILS # BLD: 0.1 K/UL
BASOPHILS NFR BLD: 1 % (ref 0–1)
BILIRUB SERPL-MCNC: 0.3 MG/DL (ref 0–1)
BILIRUB UR QL STRIP: NEGATIVE
BUN SERPL-MCNC: 75 MG/DL (ref 7–20)
CALCIUM SERPL-MCNC: 10 MG/DL (ref 8.3–10.6)
CHLORIDE SERPL-SCNC: 98 MMOL/L (ref 99–110)
CLARITY UR: CLEAR
CO2 SERPL-SCNC: 25 MMOL/L (ref 21–32)
COLOR UR: YELLOW
CREAT SERPL-MCNC: 4.9 MG/DL (ref 0.6–1.1)
EOSINOPHIL # BLD: 0.31 K/UL
EOSINOPHILS RELATIVE PERCENT: 4 % (ref 0–3)
EPI CELLS #/AREA URNS HPF: 8 /HPF
ERYTHROCYTE [DISTWIDTH] IN BLOOD BY AUTOMATED COUNT: 11.6 % (ref 11.7–14.9)
GFR, ESTIMATED: 10 ML/MIN/1.73M2
GLUCOSE BLD-MCNC: 67 MG/DL (ref 74–99)
GLUCOSE SERPL-MCNC: 76 MG/DL (ref 74–99)
GLUCOSE UR STRIP-MCNC: NEGATIVE MG/DL
HCT VFR BLD AUTO: 33.4 % (ref 37–47)
HGB BLD-MCNC: 10.6 G/DL (ref 12.5–16)
HGB UR QL STRIP.AUTO: ABNORMAL
IMM GRANULOCYTES # BLD AUTO: 0.02 K/UL
IMM GRANULOCYTES NFR BLD: 0 %
INR PPP: 0.8
KETONES UR STRIP-MCNC: NEGATIVE MG/DL
LEUKOCYTE ESTERASE UR QL STRIP: ABNORMAL
LYMPHOCYTES NFR BLD: 0.86 K/UL
LYMPHOCYTES RELATIVE PERCENT: 11 % (ref 24–44)
MCH RBC QN AUTO: 30.8 PG (ref 27–31)
MCHC RBC AUTO-ENTMCNC: 31.7 G/DL (ref 32–36)
MCV RBC AUTO: 97.1 FL (ref 78–100)
MONOCYTES NFR BLD: 0.58 K/UL
MONOCYTES NFR BLD: 7 % (ref 0–4)
MUCOUS THREADS URNS QL MICRO: ABNORMAL
NEUTROPHILS NFR BLD: 76 % (ref 36–66)
NEUTS SEG NFR BLD: 6.04 K/UL
NITRITE UR QL STRIP: NEGATIVE
PH UR STRIP: 6 [PH] (ref 5–8)
PLATELET # BLD AUTO: 262 K/UL (ref 140–440)
PMV BLD AUTO: 10.1 FL (ref 7.5–11.1)
POTASSIUM SERPL-SCNC: 5.1 MMOL/L (ref 3.5–5.1)
PROT SERPL-MCNC: 7.6 G/DL (ref 6.4–8.2)
PROT UR STRIP-MCNC: 100 MG/DL
PROTHROMBIN TIME: 11.7 SEC (ref 11.7–14.5)
RBC # BLD AUTO: 3.44 M/UL (ref 4.2–5.4)
RBC #/AREA URNS HPF: 175 /HPF (ref 0–2)
SODIUM SERPL-SCNC: 136 MMOL/L (ref 136–145)
SP GR UR STRIP: 1.01 (ref 1–1.03)
UROBILINOGEN UR STRIP-ACNC: 0.2 EU/DL (ref 0–1)
WBC #/AREA URNS HPF: 61 /HPF (ref 0–5)
WBC OTHER # BLD: 7.9 K/UL (ref 4–10.5)

## 2025-03-03 PROCEDURE — 81001 URINALYSIS AUTO W/SCOPE: CPT

## 2025-03-03 PROCEDURE — 82962 GLUCOSE BLOOD TEST: CPT

## 2025-03-03 PROCEDURE — 80053 COMPREHEN METABOLIC PANEL: CPT

## 2025-03-03 PROCEDURE — 6370000000 HC RX 637 (ALT 250 FOR IP): Performed by: PHYSICIAN ASSISTANT

## 2025-03-03 PROCEDURE — 99284 EMERGENCY DEPT VISIT MOD MDM: CPT

## 2025-03-03 PROCEDURE — 87086 URINE CULTURE/COLONY COUNT: CPT

## 2025-03-03 PROCEDURE — 85025 COMPLETE CBC W/AUTO DIFF WBC: CPT

## 2025-03-03 PROCEDURE — 76775 US EXAM ABDO BACK WALL LIM: CPT

## 2025-03-03 PROCEDURE — 85610 PROTHROMBIN TIME: CPT

## 2025-03-03 RX ORDER — ACETAMINOPHEN 325 MG/1
650 TABLET ORAL ONCE
Status: COMPLETED | OUTPATIENT
Start: 2025-03-03 | End: 2025-03-03

## 2025-03-03 RX ADMIN — ACETAMINOPHEN 650 MG: 325 TABLET ORAL at 12:22

## 2025-03-03 ASSESSMENT — PAIN SCALES - GENERAL: PAINLEVEL_OUTOF10: 8

## 2025-03-03 ASSESSMENT — PAIN - FUNCTIONAL ASSESSMENT: PAIN_FUNCTIONAL_ASSESSMENT: 0-10

## 2025-03-03 ASSESSMENT — LIFESTYLE VARIABLES
HOW OFTEN DO YOU HAVE A DRINK CONTAINING ALCOHOL: NEVER
HOW OFTEN DO YOU HAVE A DRINK CONTAINING ALCOHOL: NEVER
HOW MANY STANDARD DRINKS CONTAINING ALCOHOL DO YOU HAVE ON A TYPICAL DAY: PATIENT DOES NOT DRINK
HOW MANY STANDARD DRINKS CONTAINING ALCOHOL DO YOU HAVE ON A TYPICAL DAY: PATIENT DOES NOT DRINK

## 2025-03-03 NOTE — ED PROVIDER NOTES
Emergency Department Encounter    Patient: Gus Garza  MRN: [id number]  : 1980  Date of Evaluation: 3/3/2025  ED Provider:  Gilberto Kinney PA-C    Triage Chief Complaint:   Post-op Problem (Suprapubic cath placed on Friday. Blood noted to it this morning. )    [place]:  Gus Garza is a 44 y.o. female that presents presents with family members with a suprapubic catheter that was placed last Friday secondary to neurogenic bladder.  Patient reports that she had 1 day of bloody urine, then clear urine, then last night and this morning had bleeding from the operative site as well as blood in her collection bag.  Patient reports bladder spasms.  Denies fever or chills.  Denies severe abdominal pain.  Has had some flank fullness and pain.  Denies confusion.  Is due to start dialysis soon is end-stage renal.  Has a fistula in the right forearm that she reports will soon need to be replaced per her nephrologist.    ROS - see HPI, below listed is current ROS at time of my eval:  10 systems reviewed and negative except as above.     Past Medical History:   Diagnosis Date    Anxiety 2012    Has d/t stress & crowds per pt.    CAD in native artery 2012    NSTEMI    CAD S/P percutaneous coronary angioplasty 2012    PTCA with 2.75 x 22mm Resolute Integrity ILSA in mid RCA- Dr Lieberman    Carpal tunnel syndrome, bilateral     Mild to moderate on right and mild on left.    Chronic low back pain     Cocaine abuse (HCC)     per ERLINDA Lopez Chart    Depression     DM type 1 (diabetes mellitus, type 1) ([facility])     H/O cardiac catheterization     H/O echocardiogram 2012-Normal chamber size. Mild regional wall motion abnormalities with normal LV systolic, but abnormal diastolic function. LVEF 50-55%    H/O tubal ligation     History of cholecystectomy     History of complete ECG 2012    Hypertension     Marijuana use     Per [facility] chart    Neuropathy     Panic attacks

## 2025-03-03 NOTE — ED TRIAGE NOTES
Patient presents to the ED from home with complaints of a post op problem. Patient had a suprapubic cath placed Friday. States it was fine and clear yesterday and this morning she noticed blood in the bag. Mother states she changed the dressing and it was seeping as well. Patient rates pain 8/10 at this time.

## 2025-03-03 NOTE — ED TRIAGE NOTES
Patient presents to the ED via walk in from home.  Chief complaint: post op issue   Initial vital signs:   Vitals:    03/03/25 0730   BP: (!) 135/4   Pulse: 59   Resp: 20   Temp: 97.7 °F (36.5 °C)   SpO2: 97%      Patient reports blood in suprapubic drain and just wants it to be checked out, was put in Friday, noticed blood this morning in the bag and is concerned.  Patient rates pain as 8 on a scale of 0-10, described as achy sharp. Pain is located at abd.

## 2025-03-03 NOTE — ED NOTES
The following labs were labeled with appropriate pt sticker and tubed to lab:     [x] Blue     [x] Lavender   [] on ice  [x] Green/yellow x2  [] Green/black [] on ice  [] Grey  [] on ice  [] Yellow  [x] Red  [] Pink  [] Type/ Screen  [] ABG  [] VBG    [] COVID-19 swab    [] Rapid  [] PCR  [] Flu swab  [] Peds Viral Panel     [x] Urine Sample  [] Fecal Sample  [] Pelvic Cultures  [] Blood Cultures  [] X 2  [] STREP Cultures  [] Wound Cultures

## 2025-03-03 NOTE — ED NOTES
Peripheral IV placed in the left forearm using a 20 gauge catheter.   Site flushed with normal saline, and no complications noted. Saline locked.   IV Dressing clean, dry, and intact.

## 2025-03-03 NOTE — DISCHARGE INSTRUCTIONS
I spoke with the urology service prior to being discharged and they reassured me that blood in the urine after catheter placement can be normal.  You will be contacted with culture results from urinalysis if positive enough to be concerning for urinary tract infection antibiotics be called into you.  If you develop fever or belly pain return the emergency department.

## 2025-03-04 LAB
MICROORGANISM SPEC CULT: NORMAL
SERVICE CMNT-IMP: NORMAL
SPECIMEN DESCRIPTION: NORMAL

## 2025-03-06 ENCOUNTER — HOSPITAL ENCOUNTER (OUTPATIENT)
Dept: INFUSION THERAPY | Age: 45
Discharge: HOME | End: 2025-03-06
Payer: COMMERCIAL

## 2025-03-06 DIAGNOSIS — N18.4 ANEMIA DUE TO STAGE 4 CHRONIC KIDNEY DISEASE (HCC): Primary | ICD-10-CM

## 2025-03-06 DIAGNOSIS — N18.4 ANEMIA IN STAGE 4 CHRONIC KIDNEY DISEASE (HCC): ICD-10-CM

## 2025-03-06 DIAGNOSIS — N17.9 AKI (ACUTE KIDNEY INJURY): ICD-10-CM

## 2025-03-06 DIAGNOSIS — D63.1 ANEMIA DUE TO STAGE 4 CHRONIC KIDNEY DISEASE (HCC): Primary | ICD-10-CM

## 2025-03-06 DIAGNOSIS — D63.1 ANEMIA IN STAGE 4 CHRONIC KIDNEY DISEASE (HCC): Primary | ICD-10-CM

## 2025-03-06 DIAGNOSIS — D63.1 ANEMIA IN STAGE 4 CHRONIC KIDNEY DISEASE (HCC): ICD-10-CM

## 2025-03-06 DIAGNOSIS — N18.4 ANEMIA IN STAGE 4 CHRONIC KIDNEY DISEASE (HCC): Primary | ICD-10-CM

## 2025-03-06 LAB
ALBUMIN SERPL-MCNC: 4.3 G/DL (ref 3.4–5)
ALBUMIN/GLOB SERPL: 1.1 {RATIO} (ref 1.1–2.2)
ALP SERPL-CCNC: 115 U/L (ref 40–129)
ALT SERPL-CCNC: 11 U/L (ref 10–40)
ANION GAP SERPL CALCULATED.3IONS-SCNC: 16 MMOL/L (ref 9–17)
AST SERPL-CCNC: 26 U/L (ref 15–37)
BASOPHILS # BLD: 0.08 K/UL
BASOPHILS NFR BLD: 1 % (ref 0–1)
BILIRUB SERPL-MCNC: 0.3 MG/DL (ref 0–1)
BUN SERPL-MCNC: 76 MG/DL (ref 7–20)
CALCIUM SERPL-MCNC: 9.7 MG/DL (ref 8.3–10.6)
CHLORIDE SERPL-SCNC: 96 MMOL/L (ref 99–110)
CO2 SERPL-SCNC: 23 MMOL/L (ref 21–32)
CREAT SERPL-MCNC: 5.5 MG/DL (ref 0.6–1.1)
EOSINOPHIL # BLD: 0.31 K/UL
EOSINOPHILS RELATIVE PERCENT: 4 % (ref 0–3)
ERYTHROCYTE [DISTWIDTH] IN BLOOD BY AUTOMATED COUNT: 11.6 % (ref 11.7–14.9)
FERRITIN SERPL-MCNC: 249 NG/ML (ref 15–150)
FOLATE SERPL-MCNC: 5.6 NG/ML (ref 4.8–24.2)
GFR, ESTIMATED: 8 ML/MIN/1.73M2
GLUCOSE SERPL-MCNC: 88 MG/DL (ref 74–99)
HCT VFR BLD AUTO: 30 % (ref 37–47)
HGB BLD-MCNC: 9.6 G/DL (ref 12.5–16)
LYMPHOCYTES NFR BLD: 1.21 K/UL
LYMPHOCYTES RELATIVE PERCENT: 15 % (ref 24–44)
MCH RBC QN AUTO: 31.2 PG (ref 27–31)
MCHC RBC AUTO-ENTMCNC: 32 G/DL (ref 32–36)
MCV RBC AUTO: 97.4 FL (ref 78–100)
MONOCYTES NFR BLD: 0.57 K/UL
MONOCYTES NFR BLD: 7 % (ref 0–4)
NEUTROPHILS NFR BLD: 73 % (ref 36–66)
NEUTS SEG NFR BLD: 5.81 K/UL
PLATELET # BLD AUTO: 283 K/UL (ref 140–440)
PMV BLD AUTO: 9.4 FL (ref 7.5–11.1)
POTASSIUM SERPL-SCNC: 4.4 MMOL/L (ref 3.5–5.1)
PROT SERPL-MCNC: 8 G/DL (ref 6.4–8.2)
RBC # BLD AUTO: 3.08 M/UL (ref 4.2–5.4)
SODIUM SERPL-SCNC: 136 MMOL/L (ref 136–145)
VIT B12 SERPL-MCNC: 1748 PG/ML (ref 211–911)
WBC OTHER # BLD: 8 K/UL (ref 4–10.5)

## 2025-03-06 PROCEDURE — 36415 COLL VENOUS BLD VENIPUNCTURE: CPT

## 2025-03-06 PROCEDURE — 96372 THER/PROPH/DIAG INJ SC/IM: CPT

## 2025-03-06 PROCEDURE — 82607 VITAMIN B-12: CPT

## 2025-03-06 PROCEDURE — 83550 IRON BINDING TEST: CPT

## 2025-03-06 PROCEDURE — 82728 ASSAY OF FERRITIN: CPT

## 2025-03-06 PROCEDURE — 83540 ASSAY OF IRON: CPT

## 2025-03-06 PROCEDURE — 80053 COMPREHEN METABOLIC PANEL: CPT

## 2025-03-06 PROCEDURE — 85025 COMPLETE CBC W/AUTO DIFF WBC: CPT

## 2025-03-06 PROCEDURE — 82746 ASSAY OF FOLIC ACID SERUM: CPT

## 2025-03-06 PROCEDURE — 6360000002 HC RX W HCPCS: Performed by: INTERNAL MEDICINE

## 2025-03-06 RX ORDER — ACETAMINOPHEN 325 MG/1
650 TABLET ORAL
OUTPATIENT
Start: 2025-03-27

## 2025-03-06 RX ORDER — HYDROCORTISONE SODIUM SUCCINATE 100 MG/2ML
100 INJECTION INTRAMUSCULAR; INTRAVENOUS
OUTPATIENT
Start: 2025-03-27

## 2025-03-06 RX ORDER — EPINEPHRINE 1 MG/ML
0.3 INJECTION, SOLUTION, CONCENTRATE INTRAVENOUS PRN
OUTPATIENT
Start: 2025-03-27

## 2025-03-06 RX ORDER — ONDANSETRON 2 MG/ML
8 INJECTION INTRAMUSCULAR; INTRAVENOUS
OUTPATIENT
Start: 2025-03-27

## 2025-03-06 RX ORDER — FAMOTIDINE 10 MG/ML
20 INJECTION, SOLUTION INTRAVENOUS
OUTPATIENT
Start: 2025-03-27

## 2025-03-06 RX ORDER — ALBUTEROL SULFATE 90 UG/1
4 INHALANT RESPIRATORY (INHALATION) PRN
OUTPATIENT
Start: 2025-03-27

## 2025-03-06 RX ORDER — DIPHENHYDRAMINE HYDROCHLORIDE 50 MG/ML
50 INJECTION INTRAMUSCULAR; INTRAVENOUS
OUTPATIENT
Start: 2025-03-27

## 2025-03-06 RX ORDER — SODIUM CHLORIDE 9 MG/ML
INJECTION, SOLUTION INTRAVENOUS CONTINUOUS
OUTPATIENT
Start: 2025-03-27

## 2025-03-06 RX ADMIN — DARBEPOETIN ALFA 60 MCG: 60 INJECTION, SOLUTION INTRAVENOUS; SUBCUTANEOUS at 10:48

## 2025-03-06 NOTE — PROGRESS NOTES
Pt to tx suite for Aranesp injection. Pt states having some fatigue. CBC results reviewed from 3/6 and tx released. Injection given SQ to left upper arm. Pt tolerated without incident left ambulatory discharge instructions given

## 2025-03-07 LAB
IRON SATN MFR SERPL: 23 % (ref 15–50)
IRON SERPL-MCNC: 69 UG/DL (ref 37–145)
TIBC SERPL-MCNC: 299 UG/DL (ref 260–445)
UNSATURATED IRON BINDING CAPACITY: 230 UG/DL (ref 110–370)

## 2025-03-14 ENCOUNTER — HOSPITAL ENCOUNTER (EMERGENCY)
Age: 45
Discharge: HOME | End: 2025-03-14
Attending: STUDENT IN AN ORGANIZED HEALTH CARE EDUCATION/TRAINING PROGRAM
Payer: COMMERCIAL

## 2025-03-14 ENCOUNTER — APPOINTMENT (OUTPATIENT)
Dept: CT IMAGING | Age: 45
End: 2025-03-14
Payer: COMMERCIAL

## 2025-03-14 VITALS
WEIGHT: 140 LBS | TEMPERATURE: 97.6 F | HEART RATE: 59 BPM | BODY MASS INDEX: 27.48 KG/M2 | HEIGHT: 60 IN | SYSTOLIC BLOOD PRESSURE: 133 MMHG | DIASTOLIC BLOOD PRESSURE: 60 MMHG | OXYGEN SATURATION: 98 % | RESPIRATION RATE: 18 BRPM

## 2025-03-14 DIAGNOSIS — Z43.5 ENCOUNTER FOR SUPRAPUBIC CATHETER CARE (HCC): Primary | ICD-10-CM

## 2025-03-14 LAB
BILIRUB UR QL STRIP: NEGATIVE
CHARACTER UR: ABNORMAL
CLARITY UR: CLEAR
COLOR UR: YELLOW
EPI CELLS #/AREA URNS HPF: <1 /HPF
GLUCOSE UR STRIP-MCNC: NEGATIVE MG/DL
HGB UR QL STRIP.AUTO: NEGATIVE
KETONES UR STRIP-MCNC: NEGATIVE MG/DL
LEUKOCYTE ESTERASE UR QL STRIP: ABNORMAL
NITRITE UR QL STRIP: POSITIVE
PH UR STRIP: 6.5 [PH] (ref 5–8)
PROT UR STRIP-MCNC: 100 MG/DL
RBC #/AREA URNS HPF: 0 /HPF (ref 0–2)
SP GR UR STRIP: 1.01 (ref 1–1.03)
UROBILINOGEN UR STRIP-ACNC: 0.2 EU/DL (ref 0–1)
WBC #/AREA URNS HPF: 11 /HPF (ref 0–5)

## 2025-03-14 PROCEDURE — 87186 SC STD MICRODIL/AGAR DIL: CPT

## 2025-03-14 PROCEDURE — 87077 CULTURE AEROBIC IDENTIFY: CPT

## 2025-03-14 PROCEDURE — 74176 CT ABD & PELVIS W/O CONTRAST: CPT

## 2025-03-14 PROCEDURE — 99284 EMERGENCY DEPT VISIT MOD MDM: CPT

## 2025-03-14 PROCEDURE — 87086 URINE CULTURE/COLONY COUNT: CPT

## 2025-03-14 PROCEDURE — 81001 URINALYSIS AUTO W/SCOPE: CPT

## 2025-03-14 RX ORDER — CEPHALEXIN 500 MG/1
500 CAPSULE ORAL 4 TIMES DAILY
Qty: 28 CAPSULE | Refills: 0 | Status: SHIPPED | OUTPATIENT
Start: 2025-03-14 | End: 2025-03-21

## 2025-03-14 ASSESSMENT — PAIN DESCRIPTION - ORIENTATION: ORIENTATION: MID

## 2025-03-14 ASSESSMENT — PAIN DESCRIPTION - DESCRIPTORS: DESCRIPTORS: ACHING;DISCOMFORT

## 2025-03-14 ASSESSMENT — PAIN SCALES - GENERAL: PAINLEVEL_OUTOF10: 8

## 2025-03-14 ASSESSMENT — PAIN DESCRIPTION - LOCATION: LOCATION: ABDOMEN

## 2025-03-14 ASSESSMENT — PAIN - FUNCTIONAL ASSESSMENT: PAIN_FUNCTIONAL_ASSESSMENT: 0-10

## 2025-03-14 NOTE — ED PROVIDER NOTES
all 4 extremities.    MS: No range of motion deficit.  Normal strength.  No gross deformities.    Neuro: Alert and oriented x 3.  No focal motor or sensory deficits.    Skin: Warm, dry, intact.  No rashes or lesions.      Diagnostic Study Results     Labs:  Results for orders placed or performed during the hospital encounter of 03/14/25   Urinalysis with Reflex to Culture    Specimen: Urine   Result Value Ref Range    Color, UA Yellow Yellow    Turbidity UA Clear Clear    Glucose, Ur NEGATIVE NEGATIVE mg/dL    Bilirubin, Urine NEGATIVE NEGATIVE    Ketones, Urine NEGATIVE NEGATIVE mg/dL    Specific Gravity, UA 1.015 1.005 - 1.030    Urine Hgb NEGATIVE NEGATIVE    pH, Urine 6.5 5.0 - 8.0    Protein,  (A) NEGATIVE mg/dL    Urobilinogen, Urine 0.2 0.0 - 1.0 EU/dL    Nitrite, Urine POSITIVE (A) NEGATIVE    Leukocyte Esterase, Urine MODERATE (A) NEGATIVE   Microscopic Urinalysis   Result Value Ref Range    WBC, UA PENDING /HPF    RBC, UA PENDING /HPF    Casts UA PENDING /LPF    Epithelial Cells, UA PENDING /HPF    Renal Epithelial, UA PENDING 0 /HPF    Bacteria, UA PENDING None    Mucus, UA PENDING None    Trichomonas PENDING None    Amorphous, UA PENDING None    Yeast, UA PENDING None    Other Observations UA PENDING NOT REQ.         Radiologic Studies:   If obtained, pertinent radiology studies and findings discussed and MDM.    Medical Decision Making     Patient was triaged by nursing staff and I reviewed vital signs.  Available medical records were reviewed including nursing notes, past medical history, past surgical history, family history and social history.  History obtained by patient and/or family without any significant limitations.    MDM:     On initial exam patient resting comfortably in bed in no acute distress.  Patient is afebrile and remaining vitals within normal limits.  No increased work of breathing.  Lungs clear bilaterally.  Abdomen is soft and nontender with exception of skin immediately  surrounding suprapubic catheter.  No CVA tenderness.  Skin surrounding suprapubic catheter overall looks healthy without significant erythema or active drainage.  There is a small amount of dried yellow drainage on gauze surrounding catheter.  Very small amount of protruding subcutaneous tissue directly adjacent to catheter.  There is some underlying suprapubic tenderness but no palpable fluctuance/abscess.  Remainder of skin exam unremarkable.    Given that catheter was placed just 2 weeks ago and patient is having increasing pain a CT will be obtained.  However, overall exam is reassuring.  Patient nontoxic-appearing.  Patient is having normal urine output.    Urinalysis does show positive nitrites and moderate leukocyte esterase.    CT scan results reviewed without acute process identified.    Given patient's reports of foul-smelling discharge from the catheter site and  urinalysis results despite possibility of colonization plan will be to initiate Keflex course and follow-up closely with urology.    Patient measured return precautions and agrees with plan.      Diagnosis and Disposition     CLINICAL IMPRESSION:  1. Encounter for suprapubic catheter care ([facility])        Disposition: Discharge    Patient condition at time of disposition: Good    Disposition medications (if applicable):  New Prescriptions    CEPHALEXIN (KEFLEX) 500 MG CAPSULE    Take 1 capsule by mouth 4 times daily for 7 days       DISCHARGE NOTE:   Pt has been reexamined. Patient has no new complaints, changes, or physical findings.  Care plan outlined and precautions discussed.  Results were reviewed with the patient. All medications were reviewed with the patient. All of pt's questions and concerns were addressed.  Alarm symptoms and return precautions associated with chief complaint and evaluation were reviewed with the patient in detail.  The patient demonstrated adequate understanding.  Patient was given strict return precautions.  Patient

## 2025-03-14 NOTE — ED TRIAGE NOTES
Arrives ambulatory to ED with walker reports since having suprapubic catheter inserted x 2 weeks.  Pt report pain and drainage from around insertion site.     Drainage is \"yellowish with some blood in it\".   Denies decrease  in urinary output in catheter.   No malodorous urine.   Denies known fever.   Hx of CVA developed retention following.     AAOx4, skin w/d oral mucosa moist pink lips nailbeds pink with brisk crf, resp easy unlabored with symmetrical rise and fall of chest wall.   CVA affected right side of body.

## 2025-03-14 NOTE — DISCHARGE INSTRUCTIONS
Go to your scheduled follow-up appointment next week with urology.  Return to emergency department if you develop new or worsening symptoms.

## 2025-03-16 LAB
MICROORGANISM SPEC CULT: ABNORMAL
MICROORGANISM SPEC CULT: ABNORMAL
SERVICE CMNT-IMP: ABNORMAL
SPECIMEN DESCRIPTION: ABNORMAL

## 2025-03-17 ENCOUNTER — RESULTS FOLLOW-UP (OUTPATIENT)
Dept: EMERGENCY DEPT | Age: 45
End: 2025-03-17

## 2025-03-17 NOTE — PROGRESS NOTES
Pharmacy Note  ED Culture Follow-up    Gus Garza is a 44 y.o. female.     Allergies: Tramadol and Hydrocodone-acetaminophen     Labs:  Lab Results   Component Value Date    BUN 76 (H) 03/06/2025    CREATININE 5.5 (H) 03/06/2025    WBC 8.0 03/06/2025     Estimated Creatinine Clearance: 11 mL/min (A) (based on SCr of 5.5 mg/dL (H)).    Current antimicrobials:   Keflex     ASSESSMENT:  Micro results:   Urine culture: positive for Staph aureus and Pseudomonas Put[place]     PLAN:  Need for intervention: Yes  Discussed with: Dr. MATTHEWS  Chosen treatment:    Called to follow up on patient concerning symptoms.  If patient continued worsening symptoms should return to Ed for evaluation.    Patient response:   Called and spoke with patient. Informed patient of culture result.  Patient endorses significant improvement in symptoms on current antimicrobial treatment.  Patient denies need for re-evaluation and has Urology appointment in a few days.  Counseled patient on Follow up wit PCP, Urology, and if feeling worst return to ED.    Called/sent in prescription to: Not applicable    Please call with any questions. Ext. [phone]    Reza Fisher RPH, PharmD 4:06 PM 3/17/2025

## 2025-03-25 ENCOUNTER — HOSPITAL ENCOUNTER (OUTPATIENT)
Age: 45
Discharge: HOME | End: 2025-03-25
Payer: COMMERCIAL

## 2025-03-25 DIAGNOSIS — E11.29 MICROALBUMINURIA DUE TO TYPE 2 DIABETES MELLITUS (HCC): ICD-10-CM

## 2025-03-25 DIAGNOSIS — E87.5 HYPERPOTASSEMIA: ICD-10-CM

## 2025-03-25 DIAGNOSIS — I10 PRIMARY HYPERTENSION: ICD-10-CM

## 2025-03-25 DIAGNOSIS — N17.9 AKI (ACUTE KIDNEY INJURY): ICD-10-CM

## 2025-03-25 DIAGNOSIS — R80.9 MICROALBUMINURIA DUE TO TYPE 2 DIABETES MELLITUS (HCC): ICD-10-CM

## 2025-03-25 DIAGNOSIS — N18.5 CHRONIC KIDNEY DISEASE, STAGE V (HCC): ICD-10-CM

## 2025-03-25 LAB
PHOSPHATE SERPL-MCNC: 5.5 MG/DL (ref 2.5–4.9)
PTH-INTACT SERPL-MCNC: 116 PG/ML (ref 14–72)

## 2025-03-25 PROCEDURE — 80048 BASIC METABOLIC PNL TOTAL CA: CPT

## 2025-03-25 PROCEDURE — 83970 ASSAY OF PARATHORMONE: CPT

## 2025-03-25 PROCEDURE — 84100 ASSAY OF PHOSPHORUS: CPT

## 2025-03-26 LAB
ANION GAP SERPL CALCULATED.3IONS-SCNC: 14 MMOL/L (ref 9–17)
BUN SERPL-MCNC: 71 MG/DL (ref 7–20)
CALCIUM SERPL-MCNC: 9.5 MG/DL (ref 8.3–10.6)
CHLORIDE SERPL-SCNC: 97 MMOL/L (ref 99–110)
CO2 SERPL-SCNC: 23 MMOL/L (ref 21–32)
CREAT SERPL-MCNC: 4.7 MG/DL (ref 0.6–1.1)
GFR, ESTIMATED: 10 ML/MIN/1.73M2
GLUCOSE SERPL-MCNC: 89 MG/DL (ref 74–99)
POTASSIUM SERPL-SCNC: 4.9 MMOL/L (ref 3.5–5.1)
SODIUM SERPL-SCNC: 134 MMOL/L (ref 136–145)

## 2025-03-27 ENCOUNTER — HOSPITAL ENCOUNTER (OUTPATIENT)
Dept: INFUSION THERAPY | Age: 45
Discharge: HOME | End: 2025-03-27
Payer: COMMERCIAL

## 2025-03-27 VITALS — SYSTOLIC BLOOD PRESSURE: 134 MMHG | DIASTOLIC BLOOD PRESSURE: 67 MMHG

## 2025-03-27 DIAGNOSIS — D63.1 ANEMIA DUE TO STAGE 4 CHRONIC KIDNEY DISEASE: Primary | ICD-10-CM

## 2025-03-27 DIAGNOSIS — N18.4 ANEMIA DUE TO STAGE 4 CHRONIC KIDNEY DISEASE: Primary | ICD-10-CM

## 2025-03-27 LAB
BASOPHILS # BLD: 0.06 K/UL
BASOPHILS NFR BLD: 1 % (ref 0–1)
EOSINOPHIL # BLD: 0.31 K/UL
EOSINOPHILS RELATIVE PERCENT: 5 % (ref 0–3)
ERYTHROCYTE [DISTWIDTH] IN BLOOD BY AUTOMATED COUNT: 12.5 % (ref 11.7–14.9)
HCT VFR BLD AUTO: 30.5 % (ref 37–47)
HGB BLD-MCNC: 9.7 G/DL (ref 12.5–16)
LYMPHOCYTES NFR BLD: 1.07 K/UL
LYMPHOCYTES RELATIVE PERCENT: 16 % (ref 24–44)
MCH RBC QN AUTO: 32.1 PG (ref 27–31)
MCHC RBC AUTO-ENTMCNC: 31.8 G/DL (ref 32–36)
MCV RBC AUTO: 101 FL (ref 78–100)
MONOCYTES NFR BLD: 0.51 K/UL
MONOCYTES NFR BLD: 8 % (ref 0–4)
NEUTROPHILS NFR BLD: 71 % (ref 36–66)
NEUTS SEG NFR BLD: 4.8 K/UL
PLATELET # BLD AUTO: 264 K/UL (ref 140–440)
PMV BLD AUTO: 9.5 FL (ref 7.5–11.1)
RBC # BLD AUTO: 3.02 M/UL (ref 4.2–5.4)
WBC OTHER # BLD: 6.8 K/UL (ref 4–10.5)

## 2025-03-27 PROCEDURE — 96372 THER/PROPH/DIAG INJ SC/IM: CPT

## 2025-03-27 PROCEDURE — 6360000002 HC RX W HCPCS: Performed by: INTERNAL MEDICINE

## 2025-03-27 PROCEDURE — 85025 COMPLETE CBC W/AUTO DIFF WBC: CPT

## 2025-03-27 PROCEDURE — 36415 COLL VENOUS BLD VENIPUNCTURE: CPT

## 2025-03-27 RX ORDER — ACETAMINOPHEN 325 MG/1
650 TABLET ORAL
OUTPATIENT
Start: 2025-04-17

## 2025-03-27 RX ORDER — EPINEPHRINE 1 MG/ML
0.3 INJECTION, SOLUTION, CONCENTRATE INTRAVENOUS PRN
OUTPATIENT
Start: 2025-04-17

## 2025-03-27 RX ORDER — ONDANSETRON 2 MG/ML
8 INJECTION INTRAMUSCULAR; INTRAVENOUS
OUTPATIENT
Start: 2025-04-17

## 2025-03-27 RX ORDER — FAMOTIDINE 10 MG/ML
20 INJECTION, SOLUTION INTRAVENOUS
OUTPATIENT
Start: 2025-04-17

## 2025-03-27 RX ORDER — SODIUM CHLORIDE 9 MG/ML
INJECTION, SOLUTION INTRAVENOUS CONTINUOUS
OUTPATIENT
Start: 2025-04-17

## 2025-03-27 RX ORDER — ALBUTEROL SULFATE 90 UG/1
4 INHALANT RESPIRATORY (INHALATION) PRN
OUTPATIENT
Start: 2025-04-17

## 2025-03-27 RX ORDER — HYDROCORTISONE SODIUM SUCCINATE 100 MG/2ML
100 INJECTION INTRAMUSCULAR; INTRAVENOUS
OUTPATIENT
Start: 2025-04-17

## 2025-03-27 RX ORDER — DIPHENHYDRAMINE HYDROCHLORIDE 50 MG/ML
50 INJECTION, SOLUTION INTRAMUSCULAR; INTRAVENOUS
OUTPATIENT
Start: 2025-04-17

## 2025-03-27 RX ADMIN — DARBEPOETIN ALFA 60 MCG: 60 INJECTION, SOLUTION INTRAVENOUS; SUBCUTANEOUS at 10:58

## 2025-03-27 NOTE — PROGRESS NOTES
Ambulated to infusion area after labs. No concerns voiced. BP within defined parameters. Hgb 9.7, CBC results reviewed with patient.     Aranesp SQ injection administered left arm. Tolerated well.     AVS provided. Ambulatory upon discharge with family member.

## 2025-04-02 ENCOUNTER — OFFICE VISIT (OUTPATIENT)
Dept: ONCOLOGY | Age: 45
End: 2025-04-02
Payer: COMMERCIAL

## 2025-04-02 ENCOUNTER — HOSPITAL ENCOUNTER (OUTPATIENT)
Dept: INFUSION THERAPY | Age: 45
Discharge: HOME | End: 2025-04-02
Payer: COMMERCIAL

## 2025-04-02 VITALS
HEART RATE: 64 BPM | BODY MASS INDEX: 27.76 KG/M2 | HEIGHT: 60 IN | SYSTOLIC BLOOD PRESSURE: 139 MMHG | TEMPERATURE: 98.9 F | WEIGHT: 141.4 LBS | DIASTOLIC BLOOD PRESSURE: 65 MMHG | OXYGEN SATURATION: 97 %

## 2025-04-02 DIAGNOSIS — R91.1 NODULE OF RIGHT LUNG: ICD-10-CM

## 2025-04-02 DIAGNOSIS — N18.4 ANEMIA IN STAGE 4 CHRONIC KIDNEY DISEASE: Primary | ICD-10-CM

## 2025-04-02 DIAGNOSIS — J90 PLEURAL EFFUSION: ICD-10-CM

## 2025-04-02 DIAGNOSIS — R76.8 ANA POSITIVE: ICD-10-CM

## 2025-04-02 DIAGNOSIS — I15.1 SECONDARY HYPERTENSION DUE TO RENAL DISEASE: ICD-10-CM

## 2025-04-02 DIAGNOSIS — D63.1 ANEMIA IN STAGE 4 CHRONIC KIDNEY DISEASE: Primary | ICD-10-CM

## 2025-04-02 PROCEDURE — 3075F SYST BP GE 130 - 139MM HG: CPT | Performed by: INTERNAL MEDICINE

## 2025-04-02 PROCEDURE — 99214 OFFICE O/P EST MOD 30 MIN: CPT | Performed by: INTERNAL MEDICINE

## 2025-04-02 PROCEDURE — 99213 OFFICE O/P EST LOW 20 MIN: CPT

## 2025-04-02 PROCEDURE — 3078F DIAST BP <80 MM HG: CPT | Performed by: INTERNAL MEDICINE

## 2025-04-02 NOTE — PROGRESS NOTES
MA Rooming Questions  Patient: Gus Garza  MRN: [id number]    Date: 4/2/2025        1. Do you have any new issues?   no         2. Do you need any refills on medications?    no    3. Have you had any imaging done since your last visit?   no    4. Have you been hospitalized or seen in the emergency room since your last visit here?   yes - Hosp 12/14-12/16 a= Er 3/3 and 3/14    5. Did the patient have a depression screening completed today? No    No data recorded     PHQ-9 Given to (if applicable):               PHQ-9 Score (if applicable):                     [] Positive     []  Negative              Does question #9 need addressed (if applicable)                     [] Yes    []  No               Dayna Arango MA

## 2025-04-02 NOTE — PROGRESS NOTES
Patient Name:  Gus Garza  Patient :  1980  Patient MRN:  [id number]     Primary Oncologist: Debo Grady MD  Referring Provider: Lesly Parekh MD     Date of Service: 2025      Reason for Consult:  Anemia in stage 4 CKD      Chief Complaint:    Chief Complaint   Patient presents with    Follow-up       Encounter Diagnoses   Name Primary?    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 She reported that she was diagnosed with type 1 diabetes at the age of 16 and has not been taking care of herself controlling blood glucose levels  Reported that she had hemorrhagic CVA in 2022 at which time the CT scan of the head was done with contrast and hence worsen the renal function.  She reported a right-sided upper and lower extremity weakness.  Is undergoing physical therapy.  Denied any bleeding.  Denied any weight loss.  Denied chest pain.  Reported increased shortness of breath on exertion.  Denied any abdominal pain, nausea, vomiting, diarrhea, constipation.    23 CT Head  IMPRESSION:     1.  Resolution of the pontine and intraventricular hemorrhage. No new hemorrhage or developing hydrocephalus.   2.  Stable small area of hypoattenuation in the lateral left cerebellum that may reflect sequelae of a remote infarct.     2023 vitamin B12 540.1  CBC WBC 9.1 hemoglobin 8.1 hematocrit 26.0 MCV 97.7 platelets 310 ANC 6600  CMP BUN 51 creatinine 2.9 GFR 20  Ferritin 71  Iron 55 TIBC 256 sat 21    5/10/2023 CMP WNL  Magnesium 1.7  CBC WBC 9.6 hemoglobin 8.6 hematocrit 27.2 MCV 95.4 platelets 340 ANC 6300  BNP 61.99    2023 vitamin B12 502.6, folate 10.2  CBC WBC 7.5 hemoglobin 7.7 hematocrit 25.2 MCV 97.3 platelets 325  Ferritin 86    Renal function BUN 52 creatinine 3.4 GFR 16 phosphorus

## 2025-04-17 ENCOUNTER — HOSPITAL ENCOUNTER (OUTPATIENT)
Dept: INFUSION THERAPY | Age: 45
Discharge: HOME | End: 2025-04-17
Payer: COMMERCIAL

## 2025-04-17 VITALS — SYSTOLIC BLOOD PRESSURE: 133 MMHG | DIASTOLIC BLOOD PRESSURE: 66 MMHG

## 2025-04-17 DIAGNOSIS — N18.4 ANEMIA DUE TO STAGE 4 CHRONIC KIDNEY DISEASE (HCC): Primary | ICD-10-CM

## 2025-04-17 DIAGNOSIS — D63.1 ANEMIA DUE TO STAGE 4 CHRONIC KIDNEY DISEASE (HCC): Primary | ICD-10-CM

## 2025-04-17 LAB
BASOPHILS # BLD: 0.14 K/UL
BASOPHILS NFR BLD: 2 % (ref 0–1)
EOSINOPHIL # BLD: 0.58 K/UL
EOSINOPHILS RELATIVE PERCENT: 7 % (ref 0–3)
ERYTHROCYTE [DISTWIDTH] IN BLOOD BY AUTOMATED COUNT: 12.2 % (ref 11.7–14.9)
HCT VFR BLD AUTO: 29.5 % (ref 37–47)
HGB BLD-MCNC: 9.4 G/DL (ref 12.5–16)
LYMPHOCYTES NFR BLD: 1.24 K/UL
LYMPHOCYTES RELATIVE PERCENT: 16 % (ref 24–44)
MCH RBC QN AUTO: 32.4 PG (ref 27–31)
MCHC RBC AUTO-ENTMCNC: 31.9 G/DL (ref 32–36)
MCV RBC AUTO: 101.7 FL (ref 78–100)
MONOCYTES NFR BLD: 0.58 K/UL
MONOCYTES NFR BLD: 7 % (ref 0–5)
NEUTROPHILS NFR BLD: 68 % (ref 36–66)
NEUTS SEG NFR BLD: 5.31 K/UL
PLATELET # BLD AUTO: 281 K/UL (ref 140–440)
PMV BLD AUTO: 9.3 FL (ref 7.5–11.1)
RBC # BLD AUTO: 2.9 M/UL (ref 4.2–5.4)
WBC OTHER # BLD: 7.9 K/UL (ref 4–10.5)

## 2025-04-17 PROCEDURE — 36415 COLL VENOUS BLD VENIPUNCTURE: CPT

## 2025-04-17 PROCEDURE — 85025 COMPLETE CBC W/AUTO DIFF WBC: CPT

## 2025-04-17 PROCEDURE — 6360000002 HC RX W HCPCS: Performed by: INTERNAL MEDICINE

## 2025-04-17 PROCEDURE — 96372 THER/PROPH/DIAG INJ SC/IM: CPT

## 2025-04-17 RX ORDER — ACETAMINOPHEN 325 MG/1
650 TABLET ORAL
OUTPATIENT
Start: 2025-05-08

## 2025-04-17 RX ORDER — EPINEPHRINE 1 MG/ML
0.3 INJECTION, SOLUTION, CONCENTRATE INTRAVENOUS PRN
OUTPATIENT
Start: 2025-05-08

## 2025-04-17 RX ORDER — ONDANSETRON 2 MG/ML
8 INJECTION INTRAMUSCULAR; INTRAVENOUS
OUTPATIENT
Start: 2025-05-08

## 2025-04-17 RX ORDER — ALBUTEROL SULFATE 90 UG/1
4 INHALANT RESPIRATORY (INHALATION) PRN
OUTPATIENT
Start: 2025-05-08

## 2025-04-17 RX ORDER — HYDROCORTISONE SODIUM SUCCINATE 100 MG/2ML
100 INJECTION INTRAMUSCULAR; INTRAVENOUS
OUTPATIENT
Start: 2025-05-08

## 2025-04-17 RX ORDER — SODIUM CHLORIDE 9 MG/ML
INJECTION, SOLUTION INTRAVENOUS CONTINUOUS
OUTPATIENT
Start: 2025-05-08

## 2025-04-17 RX ORDER — FAMOTIDINE 10 MG/ML
20 INJECTION, SOLUTION INTRAVENOUS
OUTPATIENT
Start: 2025-05-08

## 2025-04-17 RX ORDER — DIPHENHYDRAMINE HYDROCHLORIDE 50 MG/ML
50 INJECTION, SOLUTION INTRAMUSCULAR; INTRAVENOUS
OUTPATIENT
Start: 2025-05-08

## 2025-04-17 RX ADMIN — DARBEPOETIN ALFA 60 MCG: 60 INJECTION, SOLUTION INTRAVENOUS; SUBCUTANEOUS at 11:11

## 2025-04-17 NOTE — PROGRESS NOTES
Patient to infusion suite for aranesp   injection. Patient with/without any new issues to notify physician of. Labs/vitlas within treatment range (hgb 9.4). Injection given left arm, patient tolerated well. Patient left ambulatory, declined paperwork. RTC 5/8 for injection and f/u Aug 1 with Dr. Grady.

## 2025-04-30 ENCOUNTER — HOSPITAL ENCOUNTER (OUTPATIENT)
Age: 45
Discharge: HOME | End: 2025-04-30
Payer: COMMERCIAL

## 2025-04-30 DIAGNOSIS — R80.9 MICROALBUMINURIA: ICD-10-CM

## 2025-04-30 DIAGNOSIS — N17.9 AKI (ACUTE KIDNEY INJURY): ICD-10-CM

## 2025-04-30 DIAGNOSIS — N18.4 ANEMIA DUE TO STAGE 4 CHRONIC KIDNEY DISEASE (HCC): ICD-10-CM

## 2025-04-30 DIAGNOSIS — D63.1 ANEMIA IN STAGE 4 CHRONIC KIDNEY DISEASE (HCC): ICD-10-CM

## 2025-04-30 DIAGNOSIS — N18.4 ANEMIA IN STAGE 4 CHRONIC KIDNEY DISEASE (HCC): ICD-10-CM

## 2025-04-30 DIAGNOSIS — N18.5 CHRONIC KIDNEY DISEASE, STAGE V (HCC): ICD-10-CM

## 2025-04-30 DIAGNOSIS — E87.5 HYPERPOTASSEMIA: ICD-10-CM

## 2025-04-30 DIAGNOSIS — D63.1 ANEMIA DUE TO STAGE 4 CHRONIC KIDNEY DISEASE (HCC): ICD-10-CM

## 2025-04-30 LAB
ALBUMIN: 4.3 G/DL (ref 3.4–5)
ANION GAP SERPL CALCULATED.3IONS-SCNC: 14 MMOL/L (ref 9–17)
BASOPHILS # BLD: 0.07 K/UL
BASOPHILS NFR BLD: 1 % (ref 0–1)
BUN SERPL-MCNC: 70 MG/DL (ref 7–20)
CALCIUM SERPL-MCNC: 9.8 MG/DL (ref 8.3–10.6)
CHLORIDE SERPL-SCNC: 96 MMOL/L (ref 99–110)
CO2 SERPL-SCNC: 21 MMOL/L (ref 21–32)
CREAT SERPL-MCNC: 4.7 MG/DL (ref 0.6–1.1)
EOSINOPHIL # BLD: 0.34 K/UL
EOSINOPHILS RELATIVE PERCENT: 5 % (ref 0–3)
ERYTHROCYTE [DISTWIDTH] IN BLOOD BY AUTOMATED COUNT: 12.2 % (ref 11.7–14.9)
GFR, ESTIMATED: 10 ML/MIN/1.73M2
GLUCOSE SERPL-MCNC: 99 MG/DL (ref 74–99)
HCT VFR BLD AUTO: 29.4 % (ref 37–47)
HGB BLD-MCNC: 9.6 G/DL (ref 12.5–16)
IMM GRANULOCYTES # BLD AUTO: 0.02 K/UL
IMM GRANULOCYTES NFR BLD: 0 %
LYMPHOCYTES NFR BLD: 1.16 K/UL
LYMPHOCYTES RELATIVE PERCENT: 16 % (ref 24–44)
MCH RBC QN AUTO: 32.9 PG (ref 27–31)
MCHC RBC AUTO-ENTMCNC: 32.7 G/DL (ref 32–36)
MCV RBC AUTO: 100.7 FL (ref 78–100)
MONOCYTES NFR BLD: 0.53 K/UL
MONOCYTES NFR BLD: 7 % (ref 0–5)
NEUTROPHILS NFR BLD: 71 % (ref 36–66)
NEUTS SEG NFR BLD: 5.26 K/UL
PHOSPHATE SERPL-MCNC: 5.6 MG/DL (ref 2.5–4.9)
PLATELET # BLD AUTO: 267 K/UL (ref 140–440)
PMV BLD AUTO: 10.6 FL (ref 7.5–11.1)
POTASSIUM SERPL-SCNC: 4.4 MMOL/L (ref 3.5–5.1)
RBC # BLD AUTO: 2.92 M/UL (ref 4.2–5.4)
SODIUM SERPL-SCNC: 131 MMOL/L (ref 136–145)
WBC OTHER # BLD: 7.4 K/UL (ref 4–10.5)

## 2025-04-30 PROCEDURE — 85025 COMPLETE CBC W/AUTO DIFF WBC: CPT

## 2025-04-30 PROCEDURE — 80069 RENAL FUNCTION PANEL: CPT

## 2025-05-08 ENCOUNTER — HOSPITAL ENCOUNTER (OUTPATIENT)
Dept: INFUSION THERAPY | Age: 45
Discharge: HOME | End: 2025-05-08
Payer: COMMERCIAL

## 2025-05-08 VITALS — DIASTOLIC BLOOD PRESSURE: 56 MMHG | HEART RATE: 64 BPM | SYSTOLIC BLOOD PRESSURE: 142 MMHG

## 2025-05-08 DIAGNOSIS — D63.1 ANEMIA DUE TO STAGE 4 CHRONIC KIDNEY DISEASE (HCC): Primary | ICD-10-CM

## 2025-05-08 DIAGNOSIS — N18.4 ANEMIA DUE TO STAGE 4 CHRONIC KIDNEY DISEASE (HCC): Primary | ICD-10-CM

## 2025-05-08 LAB
BASOPHILS # BLD: 0.08 K/UL
BASOPHILS NFR BLD: 1 % (ref 0–1)
EOSINOPHIL # BLD: 0.29 K/UL
EOSINOPHILS RELATIVE PERCENT: 3 % (ref 0–3)
ERYTHROCYTE [DISTWIDTH] IN BLOOD BY AUTOMATED COUNT: 11.9 % (ref 11.7–14.9)
HCT VFR BLD AUTO: 30 % (ref 37–47)
HGB BLD-MCNC: 9.6 G/DL (ref 12.5–16)
LYMPHOCYTES NFR BLD: 1.08 K/UL
LYMPHOCYTES RELATIVE PERCENT: 12 % (ref 24–44)
MCH RBC QN AUTO: 32.7 PG (ref 27–31)
MCHC RBC AUTO-ENTMCNC: 32 G/DL (ref 32–36)
MCV RBC AUTO: 102 FL (ref 78–100)
MONOCYTES NFR BLD: 0.67 K/UL
MONOCYTES NFR BLD: 8 % (ref 0–5)
NEUTROPHILS NFR BLD: 76 % (ref 36–66)
NEUTS SEG NFR BLD: 6.7 K/UL
PLATELET # BLD AUTO: 267 K/UL (ref 140–440)
PMV BLD AUTO: 9.5 FL (ref 7.5–11.1)
RBC # BLD AUTO: 2.94 M/UL (ref 4.2–5.4)
WBC OTHER # BLD: 8.8 K/UL (ref 4–10.5)

## 2025-05-08 PROCEDURE — 6360000002 HC RX W HCPCS: Performed by: INTERNAL MEDICINE

## 2025-05-08 PROCEDURE — 96372 THER/PROPH/DIAG INJ SC/IM: CPT

## 2025-05-08 PROCEDURE — 85025 COMPLETE CBC W/AUTO DIFF WBC: CPT

## 2025-05-08 PROCEDURE — 36415 COLL VENOUS BLD VENIPUNCTURE: CPT

## 2025-05-08 RX ORDER — SODIUM CHLORIDE 9 MG/ML
INJECTION, SOLUTION INTRAVENOUS CONTINUOUS
OUTPATIENT
Start: 2025-05-28

## 2025-05-08 RX ORDER — HYDROCORTISONE SODIUM SUCCINATE 100 MG/2ML
100 INJECTION INTRAMUSCULAR; INTRAVENOUS
OUTPATIENT
Start: 2025-05-28

## 2025-05-08 RX ORDER — ONDANSETRON 2 MG/ML
8 INJECTION INTRAMUSCULAR; INTRAVENOUS
OUTPATIENT
Start: 2025-05-28

## 2025-05-08 RX ORDER — DIPHENHYDRAMINE HYDROCHLORIDE 50 MG/ML
50 INJECTION, SOLUTION INTRAMUSCULAR; INTRAVENOUS
OUTPATIENT
Start: 2025-05-28

## 2025-05-08 RX ORDER — EPINEPHRINE 1 MG/ML
0.3 INJECTION, SOLUTION, CONCENTRATE INTRAVENOUS PRN
OUTPATIENT
Start: 2025-05-28

## 2025-05-08 RX ORDER — FAMOTIDINE 10 MG/ML
20 INJECTION, SOLUTION INTRAVENOUS
OUTPATIENT
Start: 2025-05-28

## 2025-05-08 RX ORDER — ALBUTEROL SULFATE 90 UG/1
4 INHALANT RESPIRATORY (INHALATION) PRN
OUTPATIENT
Start: 2025-05-28

## 2025-05-08 RX ORDER — ACETAMINOPHEN 325 MG/1
650 TABLET ORAL
OUTPATIENT
Start: 2025-05-28

## 2025-05-08 RX ADMIN — DARBEPOETIN ALFA 60 MCG: 60 INJECTION, SOLUTION INTRAVENOUS; SUBCUTANEOUS at 10:55

## 2025-05-08 NOTE — PROGRESS NOTES
Pt to tx suite for Aranesp injection. CBC results reviewed from 5/8 and tx released. Pt has no concerns or issues to discuss at this time. Injection given SQ to left upper arm. Pt tolerated without incident. Left ambulatory discharge instructions given. Last OV 4/2 next OV 8/1 next injection schedule correctly.

## 2025-05-26 ENCOUNTER — APPOINTMENT (OUTPATIENT)
Dept: CT IMAGING | Age: 45
End: 2025-05-26
Payer: COMMERCIAL

## 2025-05-26 ENCOUNTER — HOSPITAL ENCOUNTER (INPATIENT)
Age: 45
LOS: 4 days | Discharge: HOME | End: 2025-05-30
Attending: STUDENT IN AN ORGANIZED HEALTH CARE EDUCATION/TRAINING PROGRAM | Admitting: STUDENT IN AN ORGANIZED HEALTH CARE EDUCATION/TRAINING PROGRAM
Payer: COMMERCIAL

## 2025-05-26 DIAGNOSIS — N18.4 TYPE 1 DIABETES MELLITUS WITH STAGE 4 CHRONIC KIDNEY DISEASE (HCC): ICD-10-CM

## 2025-05-26 DIAGNOSIS — N18.4 STAGE 4 CHRONIC KIDNEY DISEASE (HCC): ICD-10-CM

## 2025-05-26 DIAGNOSIS — E10.22 TYPE 1 DIABETES MELLITUS WITH STAGE 4 CHRONIC KIDNEY DISEASE (HCC): ICD-10-CM

## 2025-05-26 DIAGNOSIS — N10 ACUTE PYELONEPHRITIS: Primary | ICD-10-CM

## 2025-05-26 PROBLEM — N12 PYELONEPHRITIS: Status: ACTIVE | Noted: 2025-05-26

## 2025-05-26 LAB
ALBUMIN SERPL-MCNC: 4.1 G/DL (ref 3.4–5)
ALBUMIN/GLOB SERPL: 1.2 {RATIO} (ref 1.1–2.2)
ALP SERPL-CCNC: 88 U/L (ref 40–129)
ALT SERPL-CCNC: 18 U/L (ref 10–40)
ANION GAP SERPL CALCULATED.3IONS-SCNC: 12 MMOL/L (ref 9–17)
AST SERPL-CCNC: 41 U/L (ref 15–37)
BASOPHILS # BLD: 0.08 K/UL
BASOPHILS NFR BLD: 1 % (ref 0–1)
BILIRUB SERPL-MCNC: <0.2 MG/DL (ref 0–1)
BILIRUB UR QL STRIP: NEGATIVE
BUN SERPL-MCNC: 78 MG/DL (ref 7–20)
CALCIUM SERPL-MCNC: 9.5 MG/DL (ref 8.3–10.6)
CHLORIDE SERPL-SCNC: 99 MMOL/L (ref 99–110)
CLARITY UR: ABNORMAL
CO2 SERPL-SCNC: 25 MMOL/L (ref 21–32)
COLOR UR: YELLOW
CREAT SERPL-MCNC: 5.3 MG/DL (ref 0.6–1.1)
EOSINOPHIL # BLD: 0.4 K/UL
EOSINOPHILS RELATIVE PERCENT: 6 % (ref 0–3)
EPI CELLS #/AREA URNS HPF: 3 /HPF
ERYTHROCYTE [DISTWIDTH] IN BLOOD BY AUTOMATED COUNT: 11.8 % (ref 11.7–14.9)
GFR, ESTIMATED: 9 ML/MIN/1.73M2
GLUCOSE BLD-MCNC: 98 MG/DL (ref 74–99)
GLUCOSE SERPL-MCNC: 185 MG/DL (ref 74–99)
GLUCOSE UR STRIP-MCNC: 250 MG/DL
HCT VFR BLD AUTO: 30.7 % (ref 37–47)
HGB BLD-MCNC: 9.8 G/DL (ref 12.5–16)
HGB UR QL STRIP.AUTO: ABNORMAL
IMM GRANULOCYTES # BLD AUTO: 0.02 K/UL
IMM GRANULOCYTES NFR BLD: 0 %
KETONES UR STRIP-MCNC: NEGATIVE MG/DL
LACTATE BLDV-SCNC: 0.6 MMOL/L (ref 0.4–2)
LACTATE BLDV-SCNC: 0.7 MMOL/L (ref 0.4–2)
LEUKOCYTE ESTERASE UR QL STRIP: ABNORMAL
LYMPHOCYTES NFR BLD: 1.23 K/UL
LYMPHOCYTES RELATIVE PERCENT: 19 % (ref 24–44)
MCH RBC QN AUTO: 31.7 PG (ref 27–31)
MCHC RBC AUTO-ENTMCNC: 31.9 G/DL (ref 32–36)
MCV RBC AUTO: 99.4 FL (ref 78–100)
MONOCYTES NFR BLD: 0.6 K/UL
MONOCYTES NFR BLD: 9 % (ref 0–5)
NEUTROPHILS NFR BLD: 64 % (ref 36–66)
NEUTS SEG NFR BLD: 4.1 K/UL
NITRITE UR QL STRIP: POSITIVE
PH UR STRIP: 6 [PH] (ref 5–8)
PLATELET # BLD AUTO: 290 K/UL (ref 140–440)
PMV BLD AUTO: 9.9 FL (ref 7.5–11.1)
POTASSIUM SERPL-SCNC: 4.8 MMOL/L (ref 3.5–5.1)
PROT SERPL-MCNC: 7.6 G/DL (ref 6.4–8.2)
PROT UR STRIP-MCNC: 100 MG/DL
RBC # BLD AUTO: 3.09 M/UL (ref 4.2–5.4)
RBC #/AREA URNS HPF: 325 /HPF (ref 0–2)
SODIUM SERPL-SCNC: 136 MMOL/L (ref 136–145)
SP GR UR STRIP: 1.01 (ref 1–1.03)
UROBILINOGEN UR STRIP-ACNC: 0.2 EU/DL (ref 0–1)
WBC #/AREA URNS HPF: 1250 /HPF (ref 0–5)
WBC OTHER # BLD: 6.4 K/UL (ref 4–10.5)
YEAST URNS QL MICRO: ABNORMAL

## 2025-05-26 PROCEDURE — 6360000002 HC RX W HCPCS: Performed by: STUDENT IN AN ORGANIZED HEALTH CARE EDUCATION/TRAINING PROGRAM

## 2025-05-26 PROCEDURE — 1200000000 HC SEMI PRIVATE

## 2025-05-26 PROCEDURE — 6360000002 HC RX W HCPCS: Performed by: NURSE PRACTITIONER

## 2025-05-26 PROCEDURE — 2500000003 HC RX 250 WO HCPCS: Performed by: STUDENT IN AN ORGANIZED HEALTH CARE EDUCATION/TRAINING PROGRAM

## 2025-05-26 PROCEDURE — 87040 BLOOD CULTURE FOR BACTERIA: CPT

## 2025-05-26 PROCEDURE — 87086 URINE CULTURE/COLONY COUNT: CPT

## 2025-05-26 PROCEDURE — 82962 GLUCOSE BLOOD TEST: CPT

## 2025-05-26 PROCEDURE — 36415 COLL VENOUS BLD VENIPUNCTURE: CPT

## 2025-05-26 PROCEDURE — 96365 THER/PROPH/DIAG IV INF INIT: CPT

## 2025-05-26 PROCEDURE — 74176 CT ABD & PELVIS W/O CONTRAST: CPT

## 2025-05-26 PROCEDURE — 87186 SC STD MICRODIL/AGAR DIL: CPT

## 2025-05-26 PROCEDURE — 94761 N-INVAS EAR/PLS OXIMETRY MLT: CPT

## 2025-05-26 PROCEDURE — 85025 COMPLETE CBC W/AUTO DIFF WBC: CPT

## 2025-05-26 PROCEDURE — 87077 CULTURE AEROBIC IDENTIFY: CPT

## 2025-05-26 PROCEDURE — 81001 URINALYSIS AUTO W/SCOPE: CPT

## 2025-05-26 PROCEDURE — 6370000000 HC RX 637 (ALT 250 FOR IP): Performed by: STUDENT IN AN ORGANIZED HEALTH CARE EDUCATION/TRAINING PROGRAM

## 2025-05-26 PROCEDURE — 96375 TX/PRO/DX INJ NEW DRUG ADDON: CPT

## 2025-05-26 PROCEDURE — 99285 EMERGENCY DEPT VISIT HI MDM: CPT

## 2025-05-26 PROCEDURE — 2580000003 HC RX 258: Performed by: NURSE PRACTITIONER

## 2025-05-26 PROCEDURE — 83605 ASSAY OF LACTIC ACID: CPT

## 2025-05-26 PROCEDURE — 80053 COMPREHEN METABOLIC PANEL: CPT

## 2025-05-26 RX ORDER — SEVELAMER CARBONATE 800 MG/1
800 TABLET, FILM COATED ORAL
Status: DISCONTINUED | OUTPATIENT
Start: 2025-05-27 | End: 2025-05-30 | Stop reason: HOSPADM

## 2025-05-26 RX ORDER — ROSUVASTATIN CALCIUM 5 MG/1
10 TABLET, COATED ORAL NIGHTLY
Status: DISCONTINUED | OUTPATIENT
Start: 2025-05-26 | End: 2025-05-30 | Stop reason: HOSPADM

## 2025-05-26 RX ORDER — ACETAMINOPHEN 325 MG/1
650 TABLET ORAL EVERY 6 HOURS PRN
Status: DISCONTINUED | OUTPATIENT
Start: 2025-05-26 | End: 2025-05-30 | Stop reason: HOSPADM

## 2025-05-26 RX ORDER — HEPARIN SODIUM 5000 [USP'U]/ML
5000 INJECTION, SOLUTION INTRAVENOUS; SUBCUTANEOUS EVERY 8 HOURS SCHEDULED
Status: DISCONTINUED | OUTPATIENT
Start: 2025-05-26 | End: 2025-05-30 | Stop reason: HOSPADM

## 2025-05-26 RX ORDER — ONDANSETRON 4 MG/1
4 TABLET, ORALLY DISINTEGRATING ORAL EVERY 8 HOURS PRN
Status: DISCONTINUED | OUTPATIENT
Start: 2025-05-26 | End: 2025-05-27

## 2025-05-26 RX ORDER — HYDROMORPHONE HYDROCHLORIDE 1 MG/ML
0.25 INJECTION, SOLUTION INTRAMUSCULAR; INTRAVENOUS; SUBCUTANEOUS EVERY 4 HOURS PRN
Status: DISCONTINUED | OUTPATIENT
Start: 2025-05-26 | End: 2025-05-30 | Stop reason: HOSPADM

## 2025-05-26 RX ORDER — SODIUM CHLORIDE 9 MG/ML
INJECTION, SOLUTION INTRAVENOUS PRN
Status: DISCONTINUED | OUTPATIENT
Start: 2025-05-26 | End: 2025-05-30 | Stop reason: HOSPADM

## 2025-05-26 RX ORDER — SODIUM CHLORIDE 0.9 % (FLUSH) 0.9 %
5-40 SYRINGE (ML) INJECTION PRN
Status: DISCONTINUED | OUTPATIENT
Start: 2025-05-26 | End: 2025-05-30 | Stop reason: HOSPADM

## 2025-05-26 RX ORDER — ASPIRIN 81 MG/1
81 TABLET, CHEWABLE ORAL DAILY
Status: DISCONTINUED | OUTPATIENT
Start: 2025-05-27 | End: 2025-05-30 | Stop reason: HOSPADM

## 2025-05-26 RX ORDER — AMLODIPINE BESYLATE 5 MG/1
5 TABLET ORAL 2 TIMES DAILY
Status: DISCONTINUED | OUTPATIENT
Start: 2025-05-26 | End: 2025-05-30 | Stop reason: HOSPADM

## 2025-05-26 RX ORDER — DEXTROSE MONOHYDRATE 100 MG/ML
INJECTION, SOLUTION INTRAVENOUS CONTINUOUS PRN
Status: DISCONTINUED | OUTPATIENT
Start: 2025-05-26 | End: 2025-05-30 | Stop reason: HOSPADM

## 2025-05-26 RX ORDER — CITALOPRAM HYDROBROMIDE 40 MG/1
40 TABLET ORAL DAILY
Status: DISCONTINUED | OUTPATIENT
Start: 2025-05-27 | End: 2025-05-30 | Stop reason: HOSPADM

## 2025-05-26 RX ORDER — POLYETHYLENE GLYCOL 3350 17 G/17G
17 POWDER, FOR SOLUTION ORAL DAILY PRN
Status: DISCONTINUED | OUTPATIENT
Start: 2025-05-26 | End: 2025-05-30 | Stop reason: HOSPADM

## 2025-05-26 RX ORDER — ONDANSETRON 2 MG/ML
4 INJECTION INTRAMUSCULAR; INTRAVENOUS ONCE
Status: COMPLETED | OUTPATIENT
Start: 2025-05-26 | End: 2025-05-26

## 2025-05-26 RX ORDER — HYDRALAZINE HYDROCHLORIDE 50 MG/1
100 TABLET, FILM COATED ORAL 3 TIMES DAILY
Status: DISCONTINUED | OUTPATIENT
Start: 2025-05-26 | End: 2025-05-30 | Stop reason: HOSPADM

## 2025-05-26 RX ORDER — INSULIN LISPRO 100 [IU]/ML
0-4 INJECTION, SOLUTION INTRAVENOUS; SUBCUTANEOUS EVERY 4 HOURS
Status: DISCONTINUED | OUTPATIENT
Start: 2025-05-26 | End: 2025-05-27

## 2025-05-26 RX ORDER — BUSPIRONE HYDROCHLORIDE 5 MG/1
10 TABLET ORAL 2 TIMES DAILY
Status: DISCONTINUED | OUTPATIENT
Start: 2025-05-26 | End: 2025-05-30 | Stop reason: HOSPADM

## 2025-05-26 RX ORDER — MORPHINE SULFATE 4 MG/ML
4 INJECTION, SOLUTION INTRAMUSCULAR; INTRAVENOUS
Refills: 0 | Status: COMPLETED | OUTPATIENT
Start: 2025-05-26 | End: 2025-05-26

## 2025-05-26 RX ORDER — SODIUM CHLORIDE 0.9 % (FLUSH) 0.9 %
5-40 SYRINGE (ML) INJECTION EVERY 12 HOURS SCHEDULED
Status: DISCONTINUED | OUTPATIENT
Start: 2025-05-26 | End: 2025-05-30 | Stop reason: HOSPADM

## 2025-05-26 RX ORDER — INSULIN LISPRO 100 [IU]/ML
0-4 INJECTION, SOLUTION INTRAVENOUS; SUBCUTANEOUS
Status: DISCONTINUED | OUTPATIENT
Start: 2025-05-26 | End: 2025-05-30 | Stop reason: HOSPADM

## 2025-05-26 RX ORDER — GLUCAGON 1 MG/ML
1 KIT INJECTION PRN
Status: DISCONTINUED | OUTPATIENT
Start: 2025-05-26 | End: 2025-05-30 | Stop reason: HOSPADM

## 2025-05-26 RX ORDER — LANOLIN ALCOHOL/MO/W.PET/CERES
1000 CREAM (GRAM) TOPICAL DAILY
Status: DISCONTINUED | OUTPATIENT
Start: 2025-05-27 | End: 2025-05-30 | Stop reason: HOSPADM

## 2025-05-26 RX ORDER — FERROUS GLUCONATE 324(37.5)
324 TABLET ORAL EVERY OTHER DAY
Status: DISCONTINUED | OUTPATIENT
Start: 2025-05-27 | End: 2025-05-27

## 2025-05-26 RX ORDER — METOPROLOL TARTRATE 25 MG/1
25 TABLET, FILM COATED ORAL 2 TIMES DAILY
Status: DISCONTINUED | OUTPATIENT
Start: 2025-05-26 | End: 2025-05-30 | Stop reason: HOSPADM

## 2025-05-26 RX ORDER — TORSEMIDE 20 MG/1
20 TABLET ORAL DAILY
Status: DISCONTINUED | OUTPATIENT
Start: 2025-05-27 | End: 2025-05-30 | Stop reason: HOSPADM

## 2025-05-26 RX ORDER — ONDANSETRON 2 MG/ML
4 INJECTION INTRAMUSCULAR; INTRAVENOUS EVERY 6 HOURS PRN
Status: DISCONTINUED | OUTPATIENT
Start: 2025-05-26 | End: 2025-05-27

## 2025-05-26 RX ORDER — ACETAMINOPHEN 650 MG/1
650 SUPPOSITORY RECTAL EVERY 6 HOURS PRN
Status: DISCONTINUED | OUTPATIENT
Start: 2025-05-26 | End: 2025-05-30 | Stop reason: HOSPADM

## 2025-05-26 RX ADMIN — PIPERACILLIN AND TAZOBACTAM 4500 MG: 4; .5 INJECTION, POWDER, FOR SOLUTION INTRAVENOUS at 21:44

## 2025-05-26 RX ADMIN — MORPHINE SULFATE 4 MG: 4 INJECTION, SOLUTION INTRAMUSCULAR; INTRAVENOUS at 19:31

## 2025-05-26 RX ADMIN — BUSPIRONE HYDROCHLORIDE 10 MG: 5 TABLET ORAL at 23:41

## 2025-05-26 RX ADMIN — AMLODIPINE BESYLATE 5 MG: 5 TABLET ORAL at 23:41

## 2025-05-26 RX ADMIN — HYDRALAZINE HYDROCHLORIDE 100 MG: 50 TABLET ORAL at 23:39

## 2025-05-26 RX ADMIN — HYDROMORPHONE HYDROCHLORIDE 0.25 MG: 1 INJECTION, SOLUTION INTRAMUSCULAR; INTRAVENOUS; SUBCUTANEOUS at 22:55

## 2025-05-26 RX ADMIN — METOPROLOL TARTRATE 25 MG: 25 TABLET, FILM COATED ORAL at 23:39

## 2025-05-26 RX ADMIN — ROSUVASTATIN CALCIUM 10 MG: 5 TABLET, FILM COATED ORAL at 23:38

## 2025-05-26 RX ADMIN — SODIUM CHLORIDE, PRESERVATIVE FREE 10 ML: 5 INJECTION INTRAVENOUS at 23:46

## 2025-05-26 RX ADMIN — HEPARIN SODIUM 5000 UNITS: 5000 INJECTION INTRAVENOUS; SUBCUTANEOUS at 23:39

## 2025-05-26 RX ADMIN — ONDANSETRON 4 MG: 2 INJECTION INTRAMUSCULAR; INTRAVENOUS at 19:30

## 2025-05-26 ASSESSMENT — PAIN DESCRIPTION - DESCRIPTORS
DESCRIPTORS: ACHING
DESCRIPTORS: SHARP
DESCRIPTORS: ACHING
DESCRIPTORS: ACHING

## 2025-05-26 ASSESSMENT — PAIN DESCRIPTION - ONSET: ONSET: ON-GOING

## 2025-05-26 ASSESSMENT — PAIN SCALES - GENERAL
PAINLEVEL_OUTOF10: 4
PAINLEVEL_OUTOF10: 8
PAINLEVEL_OUTOF10: 6
PAINLEVEL_OUTOF10: 4
PAINLEVEL_OUTOF10: 9

## 2025-05-26 ASSESSMENT — PAIN DESCRIPTION - ORIENTATION
ORIENTATION: RIGHT

## 2025-05-26 ASSESSMENT — PAIN - FUNCTIONAL ASSESSMENT
PAIN_FUNCTIONAL_ASSESSMENT: ACTIVITIES ARE NOT PREVENTED
PAIN_FUNCTIONAL_ASSESSMENT: 0-10
PAIN_FUNCTIONAL_ASSESSMENT: 0-10

## 2025-05-26 ASSESSMENT — PAIN DESCRIPTION - PAIN TYPE: TYPE: ACUTE PAIN

## 2025-05-26 ASSESSMENT — PAIN DESCRIPTION - LOCATION
LOCATION: FLANK

## 2025-05-26 ASSESSMENT — LIFESTYLE VARIABLES
HOW OFTEN DO YOU HAVE A DRINK CONTAINING ALCOHOL: NEVER
HOW MANY STANDARD DRINKS CONTAINING ALCOHOL DO YOU HAVE ON A TYPICAL DAY: PATIENT DOES NOT DRINK

## 2025-05-26 ASSESSMENT — PAIN DESCRIPTION - FREQUENCY: FREQUENCY: CONTINUOUS

## 2025-05-26 NOTE — ED TRIAGE NOTES
Pt to ED for c/o R flank pain and nausea. Pt has hx of suprapubic catheter.  Has recently finished ABX for UTI

## 2025-05-27 LAB
ALBUMIN SERPL-MCNC: 3.7 G/DL (ref 3.4–5)
ALBUMIN/GLOB SERPL: 1.2 {RATIO} (ref 1.1–2.2)
ALP SERPL-CCNC: 81 U/L (ref 40–129)
ALT SERPL-CCNC: 20 U/L (ref 10–40)
ANION GAP SERPL CALCULATED.3IONS-SCNC: 12 MMOL/L (ref 9–17)
AST SERPL-CCNC: 51 U/L (ref 15–37)
BASOPHILS # BLD: 0.07 K/UL
BASOPHILS NFR BLD: 1 % (ref 0–1)
BILIRUB SERPL-MCNC: <0.2 MG/DL (ref 0–1)
BUN SERPL-MCNC: 74 MG/DL (ref 7–20)
CALCIUM SERPL-MCNC: 8.9 MG/DL (ref 8.3–10.6)
CHLORIDE SERPL-SCNC: 102 MMOL/L (ref 99–110)
CO2 SERPL-SCNC: 23 MMOL/L (ref 21–32)
CREAT SERPL-MCNC: 5.1 MG/DL (ref 0.6–1.1)
EOSINOPHIL # BLD: 0.41 K/UL
EOSINOPHILS RELATIVE PERCENT: 6 % (ref 0–3)
ERYTHROCYTE [DISTWIDTH] IN BLOOD BY AUTOMATED COUNT: 11.8 % (ref 11.7–14.9)
FERRITIN SERPL-MCNC: 141 NG/ML (ref 15–150)
GFR, ESTIMATED: 9 ML/MIN/1.73M2
GLUCOSE BLD-MCNC: 176 MG/DL (ref 74–99)
GLUCOSE BLD-MCNC: 188 MG/DL (ref 74–99)
GLUCOSE BLD-MCNC: 81 MG/DL (ref 74–99)
GLUCOSE BLD-MCNC: 83 MG/DL (ref 74–99)
GLUCOSE BLD-MCNC: 83 MG/DL (ref 74–99)
GLUCOSE SERPL-MCNC: 78 MG/DL (ref 74–99)
HCT VFR BLD AUTO: 28 % (ref 37–47)
HGB BLD-MCNC: 8.6 G/DL (ref 12.5–16)
IMM GRANULOCYTES # BLD AUTO: 0.01 K/UL
IMM GRANULOCYTES NFR BLD: 0 %
INR PPP: 1
IRON SATN MFR SERPL: 27 % (ref 15–50)
IRON SERPL-MCNC: 70 UG/DL (ref 37–145)
LYMPHOCYTES NFR BLD: 1.39 K/UL
LYMPHOCYTES RELATIVE PERCENT: 20 % (ref 24–44)
MCH RBC QN AUTO: 31.3 PG (ref 27–31)
MCHC RBC AUTO-ENTMCNC: 30.7 G/DL (ref 32–36)
MCV RBC AUTO: 101.8 FL (ref 78–100)
MONOCYTES NFR BLD: 0.49 K/UL
MONOCYTES NFR BLD: 7 % (ref 0–5)
NEUTROPHILS NFR BLD: 66 % (ref 36–66)
NEUTS SEG NFR BLD: 4.49 K/UL
PLATELET # BLD AUTO: 259 K/UL (ref 140–440)
PMV BLD AUTO: 10.4 FL (ref 7.5–11.1)
POTASSIUM SERPL-SCNC: 4.6 MMOL/L (ref 3.5–5.1)
PROT SERPL-MCNC: 6.7 G/DL (ref 6.4–8.2)
PROTHROMBIN TIME: 13.6 SEC (ref 11.7–14.5)
RBC # BLD AUTO: 2.75 M/UL (ref 4.2–5.4)
SODIUM SERPL-SCNC: 137 MMOL/L (ref 136–145)
TIBC SERPL-MCNC: 259 UG/DL (ref 260–445)
UNSATURATED IRON BINDING CAPACITY: 189 UG/DL (ref 110–370)
WBC OTHER # BLD: 6.9 K/UL (ref 4–10.5)

## 2025-05-27 PROCEDURE — 2500000003 HC RX 250 WO HCPCS: Performed by: STUDENT IN AN ORGANIZED HEALTH CARE EDUCATION/TRAINING PROGRAM

## 2025-05-27 PROCEDURE — 85610 PROTHROMBIN TIME: CPT

## 2025-05-27 PROCEDURE — 36415 COLL VENOUS BLD VENIPUNCTURE: CPT

## 2025-05-27 PROCEDURE — 6360000002 HC RX W HCPCS: Performed by: NURSE PRACTITIONER

## 2025-05-27 PROCEDURE — 94761 N-INVAS EAR/PLS OXIMETRY MLT: CPT

## 2025-05-27 PROCEDURE — 6370000000 HC RX 637 (ALT 250 FOR IP): Performed by: STUDENT IN AN ORGANIZED HEALTH CARE EDUCATION/TRAINING PROGRAM

## 2025-05-27 PROCEDURE — 83550 IRON BINDING TEST: CPT

## 2025-05-27 PROCEDURE — 2580000003 HC RX 258: Performed by: STUDENT IN AN ORGANIZED HEALTH CARE EDUCATION/TRAINING PROGRAM

## 2025-05-27 PROCEDURE — 80053 COMPREHEN METABOLIC PANEL: CPT

## 2025-05-27 PROCEDURE — 6370000000 HC RX 637 (ALT 250 FOR IP): Performed by: NURSE PRACTITIONER

## 2025-05-27 PROCEDURE — 85025 COMPLETE CBC W/AUTO DIFF WBC: CPT

## 2025-05-27 PROCEDURE — 83540 ASSAY OF IRON: CPT

## 2025-05-27 PROCEDURE — 82728 ASSAY OF FERRITIN: CPT

## 2025-05-27 PROCEDURE — 1200000000 HC SEMI PRIVATE

## 2025-05-27 PROCEDURE — 82962 GLUCOSE BLOOD TEST: CPT

## 2025-05-27 PROCEDURE — 6360000002 HC RX W HCPCS: Performed by: STUDENT IN AN ORGANIZED HEALTH CARE EDUCATION/TRAINING PROGRAM

## 2025-05-27 RX ORDER — METOCLOPRAMIDE HYDROCHLORIDE 5 MG/ML
10 INJECTION INTRAMUSCULAR; INTRAVENOUS EVERY 6 HOURS PRN
Status: DISCONTINUED | OUTPATIENT
Start: 2025-05-27 | End: 2025-05-30 | Stop reason: HOSPADM

## 2025-05-27 RX ORDER — FLUCONAZOLE 100 MG/1
200 TABLET ORAL DAILY
Status: COMPLETED | OUTPATIENT
Start: 2025-05-27 | End: 2025-05-29

## 2025-05-27 RX ADMIN — METOPROLOL TARTRATE 25 MG: 25 TABLET, FILM COATED ORAL at 10:03

## 2025-05-27 RX ADMIN — HYDROMORPHONE HYDROCHLORIDE 0.25 MG: 1 INJECTION, SOLUTION INTRAMUSCULAR; INTRAVENOUS; SUBCUTANEOUS at 15:30

## 2025-05-27 RX ADMIN — HEPARIN SODIUM 5000 UNITS: 5000 INJECTION INTRAVENOUS; SUBCUTANEOUS at 06:46

## 2025-05-27 RX ADMIN — SODIUM CHLORIDE, PRESERVATIVE FREE 10 ML: 5 INJECTION INTRAVENOUS at 10:00

## 2025-05-27 RX ADMIN — HYDRALAZINE HYDROCHLORIDE 100 MG: 50 TABLET ORAL at 18:49

## 2025-05-27 RX ADMIN — AMLODIPINE BESYLATE 5 MG: 5 TABLET ORAL at 20:27

## 2025-05-27 RX ADMIN — CITALOPRAM 40 MG: 40 TABLET, FILM COATED ORAL at 10:02

## 2025-05-27 RX ADMIN — INSULIN LISPRO 1 UNITS: 100 INJECTION, SOLUTION INTRAVENOUS; SUBCUTANEOUS at 20:27

## 2025-05-27 RX ADMIN — VANCOMYCIN HYDROCHLORIDE 1000 MG: 1 INJECTION, POWDER, LYOPHILIZED, FOR SOLUTION INTRAVENOUS at 00:07

## 2025-05-27 RX ADMIN — SODIUM CHLORIDE: 0.9 INJECTION, SOLUTION INTRAVENOUS at 18:33

## 2025-05-27 RX ADMIN — SODIUM CHLORIDE 10 ML: 0.9 INJECTION, SOLUTION INTRAVENOUS at 00:06

## 2025-05-27 RX ADMIN — HYDRALAZINE HYDROCHLORIDE 100 MG: 50 TABLET ORAL at 10:03

## 2025-05-27 RX ADMIN — METOCLOPRAMIDE 10 MG: 5 INJECTION, SOLUTION INTRAMUSCULAR; INTRAVENOUS at 18:48

## 2025-05-27 RX ADMIN — ONDANSETRON 4 MG: 2 INJECTION INTRAMUSCULAR; INTRAVENOUS at 11:46

## 2025-05-27 RX ADMIN — SEVELAMER CARBONATE 800 MG: 800 TABLET, FILM COATED ORAL at 13:00

## 2025-05-27 RX ADMIN — HYDROMORPHONE HYDROCHLORIDE 0.25 MG: 1 INJECTION, SOLUTION INTRAMUSCULAR; INTRAVENOUS; SUBCUTANEOUS at 10:18

## 2025-05-27 RX ADMIN — PIPERACILLIN AND TAZOBACTAM 3375 MG: 3; .375 INJECTION, POWDER, LYOPHILIZED, FOR SOLUTION INTRAVENOUS at 05:41

## 2025-05-27 RX ADMIN — Medication 1000 MCG: at 10:02

## 2025-05-27 RX ADMIN — SEVELAMER CARBONATE 800 MG: 800 TABLET, FILM COATED ORAL at 10:02

## 2025-05-27 RX ADMIN — ROSUVASTATIN CALCIUM 10 MG: 5 TABLET, FILM COATED ORAL at 20:26

## 2025-05-27 RX ADMIN — TORSEMIDE 20 MG: 20 TABLET ORAL at 10:02

## 2025-05-27 RX ADMIN — BUSPIRONE HYDROCHLORIDE 10 MG: 5 TABLET ORAL at 10:02

## 2025-05-27 RX ADMIN — SEVELAMER CARBONATE 800 MG: 800 TABLET, FILM COATED ORAL at 18:48

## 2025-05-27 RX ADMIN — BUSPIRONE HYDROCHLORIDE 10 MG: 5 TABLET ORAL at 20:26

## 2025-05-27 RX ADMIN — HEPARIN SODIUM 5000 UNITS: 5000 INJECTION INTRAVENOUS; SUBCUTANEOUS at 20:25

## 2025-05-27 RX ADMIN — METOPROLOL TARTRATE 25 MG: 25 TABLET, FILM COATED ORAL at 20:27

## 2025-05-27 RX ADMIN — SODIUM CHLORIDE, PRESERVATIVE FREE 10 ML: 5 INJECTION INTRAVENOUS at 20:28

## 2025-05-27 RX ADMIN — FLUCONAZOLE 200 MG: 100 TABLET ORAL at 15:37

## 2025-05-27 RX ADMIN — HEPARIN SODIUM 5000 UNITS: 5000 INJECTION INTRAVENOUS; SUBCUTANEOUS at 15:30

## 2025-05-27 RX ADMIN — HYDROMORPHONE HYDROCHLORIDE 0.25 MG: 1 INJECTION, SOLUTION INTRAMUSCULAR; INTRAVENOUS; SUBCUTANEOUS at 05:48

## 2025-05-27 RX ADMIN — AMLODIPINE BESYLATE 5 MG: 5 TABLET ORAL at 10:02

## 2025-05-27 RX ADMIN — PIPERACILLIN AND TAZOBACTAM 3375 MG: 3; .375 INJECTION, POWDER, LYOPHILIZED, FOR SOLUTION INTRAVENOUS at 18:34

## 2025-05-27 RX ADMIN — ASPIRIN 81 MG: 81 TABLET, CHEWABLE ORAL at 10:03

## 2025-05-27 ASSESSMENT — PAIN SCALES - GENERAL
PAINLEVEL_OUTOF10: 2
PAINLEVEL_OUTOF10: 8
PAINLEVEL_OUTOF10: 2
PAINLEVEL_OUTOF10: 8
PAINLEVEL_OUTOF10: 2
PAINLEVEL_OUTOF10: 7
PAINLEVEL_OUTOF10: 0

## 2025-05-27 ASSESSMENT — PAIN DESCRIPTION - LOCATION
LOCATION: ABDOMEN;BACK;FLANK
LOCATION: ABDOMEN;FLANK;BACK
LOCATION: ABDOMEN
LOCATION: FLANK

## 2025-05-27 ASSESSMENT — PAIN DESCRIPTION - PAIN TYPE
TYPE: ACUTE PAIN
TYPE: ACUTE PAIN

## 2025-05-27 ASSESSMENT — PAIN DESCRIPTION - DESCRIPTORS
DESCRIPTORS: ACHING;PRESSURE
DESCRIPTORS: ACHING
DESCRIPTORS: ACHING
DESCRIPTORS: ACHING;SHARP

## 2025-05-27 ASSESSMENT — PAIN DESCRIPTION - ONSET
ONSET: ON-GOING
ONSET: ON-GOING

## 2025-05-27 ASSESSMENT — PAIN DESCRIPTION - ORIENTATION
ORIENTATION: RIGHT

## 2025-05-27 ASSESSMENT — PAIN DESCRIPTION - FREQUENCY: FREQUENCY: CONTINUOUS

## 2025-05-27 NOTE — PROGRESS NOTES
4 Eyes Skin Assessment     NAME:  Gus Garza  YOB: 1980  MEDICAL RECORD NUMBER:  [id number]    The patient is being assessed for  Admission    I agree that at least one RN has performed a thorough Head to Toe Skin Assessment on the patient. ALL assessment sites listed below have been assessed.      Areas assessed by both nurses:    Head, Face, Ears, Shoulders, Back, Chest, Arms, Elbows, Hands, Sacrum. Buttock, Coccyx, Ischium, Legs. Feet and Heels, and Under Medical Devices         Does the Patient have a Wound? No noted wound(s)       Saleem Prevention initiated by RN: Yes  Wound Care Orders initiated by RN: No    Pressure Injury (Stage 3,4, Unstageable, DTI, NWPT, and Complex wounds) if present, place Wound referral order by RN under : No    New Ostomies, if present place, Ostomy referral order under : No     Nurse 1 eSignature: Electronically signed by Sonya Elias RN on 5/27/25 at 3:18 AM EDT    **SHARE this note so that the co-signing nurse can place an eSignature**    Nurse 2 eSignature: Electronically signed by Harshad Todd RN on 5/27/25 at 4:26 AM EDT

## 2025-05-27 NOTE — PROGRESS NOTES
RENAL DOSE ADJUSTMENT MADE PER P/T PROTOCOL    PREVIOUS ORDER:  Zosyn 3,375 mg q8h [Extended infusion]    Estimated Creatinine Clearance: 11 mL/min (A) (based on SCr of 5.3 mg/dL (H)).  Recent Labs     05/26/25 1930   BUN 78*   CREATININE 5.3*        NEW RENALLY ADJUSTED ORDER:  Zosyn 3,375 mg q12h [Extended infusion]    Lloydashlee Gomes [facility]  5/26/2025 11:19 PM

## 2025-05-27 NOTE — PROGRESS NOTES
V2.0  [facility] Hospitalist Progress Note      Name:  Gus Garza /Age/Sex: 1980  (45 y.o. female)   MRN & CSN:  [id number] & [id number] Encounter Date/Time: 2025 10:02 AM EDT    [facility]  [facility] PCP: Lesly Parekh MD       Hospital Day: 2    Assessment and Plan:   Gus Garza is a 45 y.o. female with pmh of CKD stage 5, CAD, type 1 DM, hypertension, hyperlipidemia, mood disorder who presents with Pyelonephritis    On IV antibiotics. Pending urine culture and nephrology consult.     Plan:  Complicated UTI 2/ Pyelo  Hx of SPC  - Not septic  -Ua showed moderate leukocyte, many yeast  -suprapubic catheter was changed 1-2 weeks ago  - previous urine on 3/25 showed Pseudomonas and staph. CT abdomen shows Bilateral perinephric fat stranding, No hydronephrosis.Continue with Vanc and zosyn   -pending urine cultures.      CKD 5  -has fistula in place with plans for HD soon. Monitor volume status and electrolytes    -creatine 5.1  -nephrology consult, appreciate their recommendations      CAD  -Continue aspirin, statin, beta-blocker     T1DM  - LCSI  - Hypoglycemic protocol      HTN  -Continue home BP meds      HLD  -Continue statin      Mood Disorder   -Continue home meds    Diet ADULT DIET; Regular   DVT Prophylaxis [x] Lovenox, []  Heparin, [] SCDs, [] Ambulation,  [] Eliquis, [] Xarelto  [] Coumadin   Code Status Full Code   Disposition From: home  Expected Disposition: home  Estimated Date of Discharge: 1-2 days  Patient requires continued admission due to medical condition   Surrogate Decision Maker/ POA      Subjective:     Chief Complaint: Flank Pain (Right sided x a couple days) and Nausea       Gus Garza is a 45 y.o. female is evaluated in the room. No complain of nausea or vomiting. No fever. Improved abdominal pain.          Review of Systems:    Review of Systems   All other systems reviewed and are negative.          Objective:     Intake/Output Summary (Last 24 hours) at

## 2025-05-27 NOTE — ED PROVIDER NOTES
inappropriate words, phrases or sentences.  Dictated and Electronically Signed By: Jackie Chen MD [facility] 5/26/2025 16:29            PROCEDURES   Unless otherwise noted below, none       Procedures    CRITICAL CARE   CRITICAL CARE NOTE:  N/A    VITALS:   Vitals:    Vitals:    05/26/25 1707 05/26/25 1709 05/26/25 1931   BP: (!) 152/61     Pulse: 64     Resp: 20  16   Temp: 97.9 °F (36.6 °C)     SpO2: 99%     Weight:  66.2 kg (146 lb)    Height:  1.524 m (5')        EMERGENCY DEPARTMENT COURSE and MDM:   Patient presents as above.  Emergent etiologies considered.  Patient seen and examined.  Work-up initiated secondary to presentation, physical exam findings, vital signs and medical chart review.      Sepsis Consideration:   Exclusion criteria - the patient is NOT to be included for SEP-1 Core Measure due to:  2+ SIRS criteria are not met     History from : Patient    Limitations to history : None    Imaging Interpretation: Not applicable    Telemetry: Not Applicable    ECG Interpretation: N/A    Discussion with Other Profesionals : Admitting Team     Social Determinants : None  which significantly impact access to care.    Records Reviewed : Source reviewed medical record, no recent hospital admissions.  Unfortunately unable to view urology documents from the last week.  Last urine culture was March 2025 and positive for staph and Pseudomonas    Chronic conditions affecting care: Prior stroke, neurogenic bladder, CAD, type 1 diabetes, hypertension, dyslipidemia, CKD    Testing considered by not ordered: see MDM    Patient was given the following medications:  Medications   piperacillin-tazobactam (ZOSYN) 4,500 mg in sodium chloride 0.9 % 100 mL IVPB (addEASE) (has no administration in time range)   morphine sulfate (PF) injection 4 mg (4 mg IntraVENous Given 5/26/25 1931)   ondansetron (ZOFRAN) injection 4 mg (4 mg IntraVENous Given 5/26/25 1930)       CONSULTS: None               In brief,  portions ofthis note were completed with a voice recognition program.  Efforts were made to edit the dictations but occasionally words are mis-transcribed.)    MAMIE RETANA CNP (electronically signed)              Dayna Booth APRN - CNP  05/26/25 0904

## 2025-05-27 NOTE — CARE COORDINATION
05/27/25 0950   Service Assessment   Patient Orientation Alert and Oriented   Cognition Alert   History Provided By Medical Record   Primary Caregiver Self   Support Systems Family Members   Patient's Healthcare Decision Maker is: Legal Next of Kin   PCP Verified by CM Yes   Last Visit to PCP Within last year   Prior Functional Level Independent in ADLs/IADLs   Current Functional Level Independent in ADLs/IADLs   Can patient return to prior living arrangement Yes   Ability to make needs known: Good   Family able to assist with home care needs: Yes   Would you like for me to discuss the discharge plan with any other family members/significant others, and if so, who? No   Financial Resources Other (Comment)   Community Resources None     Chart reviewed. Patient is from home, independent prior to admission. PCP and insurance. No discharge needs identified. CM following for any new needs that may arise.

## 2025-05-27 NOTE — H&P
History and Physical      Name:  Gus Garza /Age/Sex: 1980  (45 y.o. female)   MRN & CSN:  [id number] & [id number] Encounter Date/Time: 2025 10:12 PM EDT   Location:  ED09/ED-09 PCP: Lesly Parekh MD       Hospital Day: 1    Assessment and Plan:     Patient is a 45 y.o. female who presented with Flank Pain (Right sided x a couple days) and Nausea      Complicated UTI 2/2 Pyelo  Hx of SPC  - Not septic, previous urine on 3/25 showed Pseudomonas and staph. CT abdomen shows Bilateral perinephric fat stranding, No hydronephrosis.Continue with Vanc and zosyn pending urine cultures.     CKD 5  -has fistula in place with plans for HD soon. Monitor volume status and electrolytes        CAD  -Continue aspirin, statin, beta-blocker    T1DM  - LCSI  - Hypoglycemic protocol     HTN  -Continue home BP meds     HLD  -Continue statin     Mood Disorder   -Continue home meds    Checklist:  Advanced directive: full  Diet: cardiac   DVT ppx: Heparin   Sugar: BG goal of 140-180 while inpatient    Disposition: admit to inpatient.  Estimated discharge: 3 day(s).  Current living situation: home.  Expected disposition: home.    Spoke with ED provider who recommended admission for the patient and I agree with that plan.  Personally reviewed lab studies and imaging.  EKG interpreted personally and results as stated above.  Imaging that was interpreted personally and results as stated above.    History of Present Illness:     Chief Complaint:    Chief Complaint   Patient presents with    Flank Pain     Right sided x a couple days    Nausea       Patient is a 45 y.o. female with a PMHx as above who presented to the ED with flank pain. Patient presents due to suprapubic pain around her SPC. Was placed on 5 days of cipro and finished it last week with no relief. She rprots her pain has worsened since then with associated chills and pain in suprapubic area, and bilateral flanks, right more than the left. She currently  11/02/2024 11:19 AM    YEAST MANY 05/26/2025 07:10 PM    BACTERIA RARE 03/03/2025 08:11 AM    CLARITYU CLEAR 06/29/2024 07:50 AM    LEUKOCYTESUR MODERATE 05/26/2025 07:10 PM    UROBILINOGEN 0.2 05/26/2025 07:10 PM    BILIRUBINUR NEGATIVE 05/26/2025 07:10 PM    BLOODU NEGATIVE 06/29/2024 07:50 AM    GLUCOSEU 250 05/26/2025 07:10 PM    KETUA NEGATIVE 05/26/2025 07:10 PM    AMORPHOUS OCCASIONAL 06/23/2023 01:00 PM     Urine Cultures:   Lab Results   Component Value Date/Time    LABURIN SEE RESULTS BELOW 03/03/2022 12:12 PM     Blood Cultures: No results found for: \"BC\"  No results found for: \"BLOODCULT2\"  Organism: No results found for: \"ORG\"    Radiology results:  CT ABDOMEN PELVIS WO CONTRAST Additional Contrast? None   Final Result          Pavithra Hall MD  05/26/25 10:12 PM

## 2025-05-27 NOTE — PROGRESS NOTES
PHARMACY VANCOMYCIN MONITORING SERVICE  Pharmacy consulted by Dr. Hall for monitoring and adjustment.    Indication for treatment: Vancomycin indication: Other: UTI  Goal trough: Trough Goal: 10-15 mcg/mL  AUC/MARILIN: <500    Risk Factors for MRSA Identified:   Failed on outpatient antibiotics    Pertinent Laboratory Values:   Temp Readings from Last 3 Encounters:   05/26/25 97.5 °F (36.4 °C) (Oral)   04/02/25 98.9 °F (37.2 °C) (Infrared)   03/14/25 97.6 °F (36.4 °C) (Oral)     Recent Labs     05/26/25  1930 05/27/25  0158   WBC 6.4 6.9     Recent Labs     05/26/25  1930 05/27/25  0158   BUN 78* 74*   CREATININE 5.3* 5.1*     Estimated Creatinine Clearance: 12 mL/min (A) (based on SCr of 5.1 mg/dL (H)).    Intake/Output Summary (Last 24 hours) at 5/27/2025 0401  Last data filed at 5/26/2025 2346  Gross per 24 hour   Intake 10 ml   Output --   Net 10 ml     Last Encounter Weight:  Wt Readings from Last 3 Encounters:   05/26/25 66.2 kg (146 lb)   04/02/25 64.1 kg (141 lb 6.4 oz)   04/01/25 64.4 kg (142 lb)       Pertinent Cultures:   Date    Source    Results  5/26   Blood    In process  5/26   Urine                         In process    Vancomycin level:   TROUGH:  No results for input(s): \"VANCOTROUGH\" in the last 72 hours.  RANDOM:  No results for input(s): \"VANCORANDOM\" in the last 72 hours.    Assessment:  HPI: 45-year-old female presented to the ED with nausea, right-sided flank pain and suprapubic pain around her suprapubic catheter. She recently completed a 5 day course of ciprofloxacin for UTI in an outpatient setting. She reports that her condition has worsened in spite of the antibiotic therapy.  SCr, BUN, and urine output: CKD 5, Scr = 5.1, BUN = 74, Incomplete UOP data  Day(s) of therapy: 1 of 5  Vancomycin concentration: TBD    Plan:  Vancomycin 1000 mg x 1  Intermittent dosing of vancomycin based on levels will be used due to renal function.   Pharmacy will continue to monitor patient and adjust therapy

## 2025-05-27 NOTE — PROGRESS NOTES
ED TO INPATIENT SBAR HANDOFF    Patient Name: Gus Garza   :  1980  45 y.o.   Preferred Name  Gus  Family/Caregiver Present no   Restraints no   C-SSRS: Risk of Suicide: No Risk  Sitter no   Sepsis Risk Score Sepsis V2 Risk Score: 11.8      Situation  Chief Complaint   Patient presents with    Flank Pain     Right sided x a couple days    Nausea     Brief Description of Patient's Condition: nausea and right-sided abdominal and flank pain for the last 4 days.  Patient reports that she thought this was initially related to her suprapubic catheter.  Has been dealing with some urinary infections, was just treated as an outpatient by urology based on culture with Cipro.  Notes she started feeling worse the last few days despite the antibiotics.  Denies any fevers, vomiting, diarrhea.  She has had prior cholecystectomy and  section but still has appendix.  No history of kidney stones.  Also has advanced CKD but not on dialysis.   Mental Status: oriented, alert, coherent, logical, thought processes intact, and able to concentrate and follow conversation  Arrived from: home    Imaging:   CT ABDOMEN PELVIS WO CONTRAST Additional Contrast? None   Final Result        Abnormal labs:   Abnormal Labs Reviewed   COMPREHENSIVE METABOLIC PANEL - Abnormal; Notable for the following components:       Result Value    Glucose 185 (*)     BUN 78 (*)     Creatinine 5.3 (*)     Est, Glom Filt Rate 9 (*)     AST 41 (*)     All other components within normal limits   CBC WITH AUTO DIFFERENTIAL - Abnormal; Notable for the following components:    RBC 3.09 (*)     Hemoglobin 9.8 (*)     Hematocrit 30.7 (*)     MCH 31.7 (*)     MCHC 31.9 (*)     Lymphocytes % 19 (*)     Monocytes % 9 (*)     Eosinophils % 6 (*)     All other components within normal limits   URINALYSIS - Abnormal; Notable for the following components:    Turbidity UA Cloudy (*)     Glucose, Ur 250 (*)     Urine Hgb SMALL (*)     Protein,  (*)

## 2025-05-28 LAB
ALBUMIN SERPL-MCNC: 3.5 G/DL (ref 3.4–5)
ALBUMIN/GLOB SERPL: 1.2 {RATIO} (ref 1.1–2.2)
ALP SERPL-CCNC: 89 U/L (ref 40–129)
ALT SERPL-CCNC: 31 U/L (ref 10–40)
ANION GAP SERPL CALCULATED.3IONS-SCNC: 11 MMOL/L (ref 9–17)
AST SERPL-CCNC: 52 U/L (ref 15–37)
BASOPHILS # BLD: 0.08 K/UL
BASOPHILS NFR BLD: 1 % (ref 0–1)
BILIRUB SERPL-MCNC: <0.2 MG/DL (ref 0–1)
BUN SERPL-MCNC: 70 MG/DL (ref 7–20)
CALCIUM SERPL-MCNC: 8.6 MG/DL (ref 8.3–10.6)
CHLORIDE SERPL-SCNC: 101 MMOL/L (ref 99–110)
CO2 SERPL-SCNC: 21 MMOL/L (ref 21–32)
CREAT SERPL-MCNC: 5.1 MG/DL (ref 0.6–1.1)
DATE LAST DOSE: NORMAL
EOSINOPHIL # BLD: 0.39 K/UL
EOSINOPHILS RELATIVE PERCENT: 6 % (ref 0–3)
ERYTHROCYTE [DISTWIDTH] IN BLOOD BY AUTOMATED COUNT: 11.6 % (ref 11.7–14.9)
GFR, ESTIMATED: 9 ML/MIN/1.73M2
GLUCOSE BLD-MCNC: 106 MG/DL (ref 74–99)
GLUCOSE BLD-MCNC: 114 MG/DL (ref 74–99)
GLUCOSE BLD-MCNC: 199 MG/DL (ref 74–99)
GLUCOSE BLD-MCNC: 203 MG/DL (ref 74–99)
GLUCOSE SERPL-MCNC: 103 MG/DL (ref 74–99)
HCT VFR BLD AUTO: 27.1 % (ref 37–47)
HGB BLD-MCNC: 8.5 G/DL (ref 12.5–16)
IMM GRANULOCYTES # BLD AUTO: 0.02 K/UL
IMM GRANULOCYTES NFR BLD: 0 %
LYMPHOCYTES NFR BLD: 1.21 K/UL
LYMPHOCYTES RELATIVE PERCENT: 18 % (ref 24–44)
MCH RBC QN AUTO: 31.8 PG (ref 27–31)
MCHC RBC AUTO-ENTMCNC: 31.4 G/DL (ref 32–36)
MCV RBC AUTO: 101.5 FL (ref 78–100)
MONOCYTES NFR BLD: 0.59 K/UL
MONOCYTES NFR BLD: 9 % (ref 0–5)
NEUTROPHILS NFR BLD: 65 % (ref 36–66)
NEUTS SEG NFR BLD: 4.29 K/UL
PLATELET # BLD AUTO: 231 K/UL (ref 140–440)
PMV BLD AUTO: 10 FL (ref 7.5–11.1)
POTASSIUM SERPL-SCNC: 4.7 MMOL/L (ref 3.5–5.1)
PROT SERPL-MCNC: 6.5 G/DL (ref 6.4–8.2)
RBC # BLD AUTO: 2.67 M/UL (ref 4.2–5.4)
SODIUM SERPL-SCNC: 133 MMOL/L (ref 136–145)
TME LAST DOSE: NORMAL H
VANCOMYCIN DOSE: NORMAL MG
VANCOMYCIN SERPL-MCNC: 14.2 UG/ML (ref 10–20)
WBC OTHER # BLD: 6.6 K/UL (ref 4–10.5)

## 2025-05-28 PROCEDURE — 2580000003 HC RX 258: Performed by: STUDENT IN AN ORGANIZED HEALTH CARE EDUCATION/TRAINING PROGRAM

## 2025-05-28 PROCEDURE — 6370000000 HC RX 637 (ALT 250 FOR IP): Performed by: STUDENT IN AN ORGANIZED HEALTH CARE EDUCATION/TRAINING PROGRAM

## 2025-05-28 PROCEDURE — 80202 ASSAY OF VANCOMYCIN: CPT

## 2025-05-28 PROCEDURE — 6360000002 HC RX W HCPCS: Performed by: STUDENT IN AN ORGANIZED HEALTH CARE EDUCATION/TRAINING PROGRAM

## 2025-05-28 PROCEDURE — 6370000000 HC RX 637 (ALT 250 FOR IP): Performed by: NURSE PRACTITIONER

## 2025-05-28 PROCEDURE — 85025 COMPLETE CBC W/AUTO DIFF WBC: CPT

## 2025-05-28 PROCEDURE — 80053 COMPREHEN METABOLIC PANEL: CPT

## 2025-05-28 PROCEDURE — 2500000003 HC RX 250 WO HCPCS: Performed by: STUDENT IN AN ORGANIZED HEALTH CARE EDUCATION/TRAINING PROGRAM

## 2025-05-28 PROCEDURE — 1200000000 HC SEMI PRIVATE

## 2025-05-28 PROCEDURE — 36415 COLL VENOUS BLD VENIPUNCTURE: CPT

## 2025-05-28 PROCEDURE — 94761 N-INVAS EAR/PLS OXIMETRY MLT: CPT

## 2025-05-28 PROCEDURE — 82962 GLUCOSE BLOOD TEST: CPT

## 2025-05-28 RX ADMIN — ASPIRIN 81 MG: 81 TABLET, CHEWABLE ORAL at 09:25

## 2025-05-28 RX ADMIN — SEVELAMER CARBONATE 800 MG: 800 TABLET, FILM COATED ORAL at 12:00

## 2025-05-28 RX ADMIN — AMLODIPINE BESYLATE 5 MG: 5 TABLET ORAL at 09:25

## 2025-05-28 RX ADMIN — HYDRALAZINE HYDROCHLORIDE 100 MG: 50 TABLET ORAL at 15:06

## 2025-05-28 RX ADMIN — INSULIN LISPRO 1 UNITS: 100 INJECTION, SOLUTION INTRAVENOUS; SUBCUTANEOUS at 12:00

## 2025-05-28 RX ADMIN — HYDROMORPHONE HYDROCHLORIDE 0.25 MG: 1 INJECTION, SOLUTION INTRAMUSCULAR; INTRAVENOUS; SUBCUTANEOUS at 21:09

## 2025-05-28 RX ADMIN — PIPERACILLIN AND TAZOBACTAM 3375 MG: 3; .375 INJECTION, POWDER, LYOPHILIZED, FOR SOLUTION INTRAVENOUS at 05:35

## 2025-05-28 RX ADMIN — HYDRALAZINE HYDROCHLORIDE 100 MG: 50 TABLET ORAL at 09:25

## 2025-05-28 RX ADMIN — TORSEMIDE 20 MG: 20 TABLET ORAL at 09:25

## 2025-05-28 RX ADMIN — METOPROLOL TARTRATE 25 MG: 25 TABLET, FILM COATED ORAL at 09:25

## 2025-05-28 RX ADMIN — ROSUVASTATIN CALCIUM 10 MG: 5 TABLET, FILM COATED ORAL at 20:48

## 2025-05-28 RX ADMIN — FLUCONAZOLE 200 MG: 100 TABLET ORAL at 09:24

## 2025-05-28 RX ADMIN — HEPARIN SODIUM 5000 UNITS: 5000 INJECTION INTRAVENOUS; SUBCUTANEOUS at 20:47

## 2025-05-28 RX ADMIN — BUSPIRONE HYDROCHLORIDE 10 MG: 5 TABLET ORAL at 09:24

## 2025-05-28 RX ADMIN — HYDRALAZINE HYDROCHLORIDE 100 MG: 50 TABLET ORAL at 02:06

## 2025-05-28 RX ADMIN — HEPARIN SODIUM 5000 UNITS: 5000 INJECTION INTRAVENOUS; SUBCUTANEOUS at 05:33

## 2025-05-28 RX ADMIN — AMLODIPINE BESYLATE 5 MG: 5 TABLET ORAL at 20:47

## 2025-05-28 RX ADMIN — SEVELAMER CARBONATE 800 MG: 800 TABLET, FILM COATED ORAL at 18:14

## 2025-05-28 RX ADMIN — PIPERACILLIN AND TAZOBACTAM 3375 MG: 3; .375 INJECTION, POWDER, LYOPHILIZED, FOR SOLUTION INTRAVENOUS at 18:21

## 2025-05-28 RX ADMIN — BUSPIRONE HYDROCHLORIDE 10 MG: 5 TABLET ORAL at 20:48

## 2025-05-28 RX ADMIN — SEVELAMER CARBONATE 800 MG: 800 TABLET, FILM COATED ORAL at 09:25

## 2025-05-28 RX ADMIN — HEPARIN SODIUM 5000 UNITS: 5000 INJECTION INTRAVENOUS; SUBCUTANEOUS at 15:07

## 2025-05-28 RX ADMIN — INSULIN LISPRO 1 UNITS: 100 INJECTION, SOLUTION INTRAVENOUS; SUBCUTANEOUS at 20:48

## 2025-05-28 RX ADMIN — CITALOPRAM 40 MG: 40 TABLET, FILM COATED ORAL at 09:25

## 2025-05-28 RX ADMIN — SODIUM CHLORIDE: 0.9 INJECTION, SOLUTION INTRAVENOUS at 05:35

## 2025-05-28 RX ADMIN — SODIUM CHLORIDE, PRESERVATIVE FREE 10 ML: 5 INJECTION INTRAVENOUS at 20:53

## 2025-05-28 RX ADMIN — HYDRALAZINE HYDROCHLORIDE 100 MG: 50 TABLET ORAL at 20:48

## 2025-05-28 RX ADMIN — Medication 1000 MCG: at 09:24

## 2025-05-28 RX ADMIN — METOPROLOL TARTRATE 25 MG: 25 TABLET, FILM COATED ORAL at 20:48

## 2025-05-28 ASSESSMENT — PAIN DESCRIPTION - FREQUENCY
FREQUENCY: INTERMITTENT
FREQUENCY: INTERMITTENT

## 2025-05-28 ASSESSMENT — PAIN DESCRIPTION - DESCRIPTORS
DESCRIPTORS: SHARP
DESCRIPTORS: DISCOMFORT;SHARP

## 2025-05-28 ASSESSMENT — PAIN DESCRIPTION - LOCATION
LOCATION: ABDOMEN
LOCATION: ABDOMEN

## 2025-05-28 ASSESSMENT — PAIN DESCRIPTION - ORIENTATION
ORIENTATION: RIGHT
ORIENTATION: RIGHT

## 2025-05-28 ASSESSMENT — PAIN SCALES - GENERAL
PAINLEVEL_OUTOF10: 0
PAINLEVEL_OUTOF10: 7
PAINLEVEL_OUTOF10: 8
PAINLEVEL_OUTOF10: 0

## 2025-05-28 ASSESSMENT — PAIN DESCRIPTION - PAIN TYPE
TYPE: ACUTE PAIN
TYPE: ACUTE PAIN

## 2025-05-28 ASSESSMENT — PAIN DESCRIPTION - ONSET: ONSET: ON-GOING

## 2025-05-28 NOTE — PROGRESS NOTES
I did NOT see the patient. The patient was discussed with the ABELINO. I was available for questions and consultation as needed.       Treating for pyelonephritis.

## 2025-05-28 NOTE — CONSULTS
Clinical Pharmacy Consult Sign Off Note    Gus Garza was receiving therapy with vancomycin for the treatment of UTI.  Orders for vancomycin have been discontinued by provider.  Will sign off on pharmacy to dose consult.  If medication is restarted and pharmacy is to manage dosing, please re-consult at that time.    Thank you,  Halle Magaña [facility], PharmD.  5/28/2025 10:14 AM

## 2025-05-28 NOTE — CONSULTS
Nephrology Service Consultation      [facility] [place] [address]  Phone: [phone]  Office Hours: 8:30AM - 4:30PM  Monday - Friday        MEDICAL DECISION MAKING and Recommendations     -CKD5: creat 5.2  -Hyponatremia  -Chronic hyperkalemia and on veltassa  -Anemia of CKD  -Pseudomonas pyelo in pt with SP cath  -HTN    Suggest:  -Hoping to avoid a picc line in this pt  -Renally dose abx  -Epogen while here, for the anemia  -Continue veltassa for normokalemia maintenance  -No plan for  RRT today  -RUE AVF is maturing, please keep that arm free from anything  --If IV contrast is needed then please discuss the risks and benefits with the patient and proceed.  --If MRI with contrast is needed then confirm the use of GBCA type II, that are safer in patients with renal disease, with the radiology team and then discuss the risks and benefits with the patient and proceed      Thank you    Patient Active Problem List    Diagnosis Date Noted    Pyelonephritis 05/26/2025    Urinary retention 12/14/2024    Anemia 02/07/2024    Complicated UTI (urinary tract infection) 10/10/2023    Anemia due to chronic kidney disease 07/13/2023    Anemia in stage 4 chronic kidney disease ([facility]) 05/09/2023    Chronic kidney disease, stage V ([facility]) 03/03/2022    Dysuria 03/03/2022    Primary hypertension 11/18/2021    Dyslipidemia 11/18/2021    Hyperpotassemia 08/18/2021    CRISTIANA (acute kidney injury) 08/18/2021    Microalbuminuria due to type 2 diabetes mellitus ([facility]) 05/14/2021    Microalbuminuria 05/14/2021    Lacunar cerebrovascular accident (CVA) of subthalamic region ([facility]) 05/14/2021    Chest pain     CAD in native artery 11/20/2012    CAD S/P percutaneous coronary angioplasty 11/20/2012    DM type 1 (diabetes mellitus, type 1) ([facility])     NSTEMI (non-ST elevated myocardial infarction) ([facility]) 11/19/2012    Nausea & vomiting 11/19/2012         Patient:  Gus Garza  MRN: [id number]  Consulting physician:  Gerald Acosta,

## 2025-05-28 NOTE — PROGRESS NOTES
V2.0    [facility] Progress Note      Name:  Gus Garza /Age/Sex: 1980  (45 y.o. female)   MRN & CSN:  [id number] & [id number] Encounter Date/Time: 2025 7:17 AM EDT   Location:  [facility]0/1110-A PCP: Lesly Parekh MD     Attending:Gerald Acosta MD       Hospital Day: 3    Assessment and Recommendations   Gus Garza is a 45 y.o. female with pmh of  CKD stage 5, CAD, Type 1 diabetes, HTN, Hyperlipidemia, mood disorder who presents with Pyelonephritis      Plan:   Complicated UTI 2/2 Pyelo  Hx of SPC  - Not septic  -Ua showed moderate leukocyte, many yeast  -suprapubic catheter was changed 1-2 weeks ago  - previous urine on 3/25 showed Pseudomonas and staph. CT abdomen shows Bilateral perinephric fat stranding, No hydronephrosis.  -Continue with zosyn   -UA positive for pseudomonas      CKD 5  -has fistula in place with plans for HD soon.  -RUE AVF maturing  - Monitor volume status and electrolytes    -creatine 5.1  -nephrology consulted recommending renally dosed antibiotics,     Anemia of CKD  -nephrology recommending Epogen  -per patient due tomorrow for this; order adjusted      CAD  -Continue aspirin, statin, beta-blocker     T1DM  - LCSI  - Hypoglycemic protocol      HTN  -Continue home BP meds      HLD  -Continue statin      Mood Disorder   -Continue home meds        Diet ADULT DIET; Regular; 2000 ml   DVT Prophylaxis [x] Lovenox, []  Heparin, [] SCDs, [] Ambulation,  [] Eliquis, [] Xarelto  [] Coumadin   Code Status Full Code   Disposition From: home  Expected Disposition: home  Estimated Date of Discharge: 1-2 days  Patient requires continued admission due to above medical conditions   Surrogate Decision Maker/ POA  NA     Personally reviewed CBC, CMP and POC glucose    Discussed management of the case with Dr. Duarte  who agreed to the above    Drugs that require monitoring for toxicity include zosyn and the method of monitoring was cbc and physical exam.         Subjective:     Chief  Pt called for F/U . Informed no response yet or access w/ these providers.   Offered appt w/ Dr Charles Pena and pt accepted

## 2025-05-28 NOTE — PLAN OF CARE
Problem: Chronic Conditions and Co-morbidities  Goal: Patient's chronic conditions and co-morbidity symptoms are monitored and maintained or improved  5/28/2025 0147 by Mesha Ellis LPN  Outcome: Progressing  5/27/2025 1300 by Kusum Oneill RN  Outcome: Progressing     Problem: Discharge Planning  Goal: Discharge to home or other facility with appropriate resources  5/28/2025 0147 by Mesha Ellis LPN  Outcome: Progressing  5/27/2025 1300 by Kusum Oneill RN  Outcome: Progressing     Problem: Pain  Goal: Verbalizes/displays adequate comfort level or baseline comfort level  5/28/2025 0147 by Mesha Ellis LPN  Outcome: Progressing  5/27/2025 1300 by Kusum Oneill RN  Outcome: Progressing     Problem: Safety - Adult  Goal: Free from fall injury  5/28/2025 0147 by Mesha Ellis LPN  Outcome: Progressing  5/27/2025 1300 by Kusum Oneill RN  Outcome: Progressing

## 2025-05-29 LAB
ALBUMIN SERPL-MCNC: 3.7 G/DL (ref 3.4–5)
ALBUMIN/GLOB SERPL: 1.2 {RATIO} (ref 1.1–2.2)
ALP SERPL-CCNC: 86 U/L (ref 40–129)
ALT SERPL-CCNC: 23 U/L (ref 10–40)
ANION GAP SERPL CALCULATED.3IONS-SCNC: 12 MMOL/L (ref 9–17)
AST SERPL-CCNC: 43 U/L (ref 15–37)
BASOPHILS # BLD: 0.08 K/UL
BASOPHILS NFR BLD: 1 % (ref 0–1)
BILIRUB SERPL-MCNC: <0.2 MG/DL (ref 0–1)
BUN SERPL-MCNC: 65 MG/DL (ref 7–20)
CALCIUM SERPL-MCNC: 8.7 MG/DL (ref 8.3–10.6)
CHLORIDE SERPL-SCNC: 101 MMOL/L (ref 99–110)
CO2 SERPL-SCNC: 22 MMOL/L (ref 21–32)
CREAT SERPL-MCNC: 5.2 MG/DL (ref 0.6–1.1)
EOSINOPHIL # BLD: 0.38 K/UL
EOSINOPHILS RELATIVE PERCENT: 5 % (ref 0–3)
ERYTHROCYTE [DISTWIDTH] IN BLOOD BY AUTOMATED COUNT: 11.8 % (ref 11.7–14.9)
GFR, ESTIMATED: 9 ML/MIN/1.73M2
GLUCOSE BLD-MCNC: 121 MG/DL (ref 74–99)
GLUCOSE BLD-MCNC: 134 MG/DL (ref 74–99)
GLUCOSE BLD-MCNC: 213 MG/DL (ref 74–99)
GLUCOSE BLD-MCNC: 238 MG/DL (ref 74–99)
GLUCOSE SERPL-MCNC: 124 MG/DL (ref 74–99)
HCT VFR BLD AUTO: 26.3 % (ref 37–47)
HGB BLD-MCNC: 8.4 G/DL (ref 12.5–16)
IMM GRANULOCYTES # BLD AUTO: 0.02 K/UL
IMM GRANULOCYTES NFR BLD: 0 %
LYMPHOCYTES NFR BLD: 1.41 K/UL
LYMPHOCYTES RELATIVE PERCENT: 19 % (ref 24–44)
MCH RBC QN AUTO: 32.2 PG (ref 27–31)
MCHC RBC AUTO-ENTMCNC: 31.9 G/DL (ref 32–36)
MCV RBC AUTO: 100.8 FL (ref 78–100)
MICROORGANISM SPEC CULT: ABNORMAL
MONOCYTES NFR BLD: 0.6 K/UL
MONOCYTES NFR BLD: 8 % (ref 0–5)
NEUTROPHILS NFR BLD: 67 % (ref 36–66)
NEUTS SEG NFR BLD: 5.01 K/UL
PLATELET # BLD AUTO: 237 K/UL (ref 140–440)
PMV BLD AUTO: 10.3 FL (ref 7.5–11.1)
POTASSIUM SERPL-SCNC: 4.6 MMOL/L (ref 3.5–5.1)
PROT SERPL-MCNC: 6.8 G/DL (ref 6.4–8.2)
RBC # BLD AUTO: 2.61 M/UL (ref 4.2–5.4)
SERVICE CMNT-IMP: ABNORMAL
SODIUM SERPL-SCNC: 134 MMOL/L (ref 136–145)
SPECIMEN DESCRIPTION: ABNORMAL
WBC OTHER # BLD: 7.5 K/UL (ref 4–10.5)

## 2025-05-29 PROCEDURE — 6370000000 HC RX 637 (ALT 250 FOR IP): Performed by: NURSE PRACTITIONER

## 2025-05-29 PROCEDURE — 94761 N-INVAS EAR/PLS OXIMETRY MLT: CPT

## 2025-05-29 PROCEDURE — 2580000003 HC RX 258: Performed by: STUDENT IN AN ORGANIZED HEALTH CARE EDUCATION/TRAINING PROGRAM

## 2025-05-29 PROCEDURE — 80053 COMPREHEN METABOLIC PANEL: CPT

## 2025-05-29 PROCEDURE — 6370000000 HC RX 637 (ALT 250 FOR IP): Performed by: STUDENT IN AN ORGANIZED HEALTH CARE EDUCATION/TRAINING PROGRAM

## 2025-05-29 PROCEDURE — 82962 GLUCOSE BLOOD TEST: CPT

## 2025-05-29 PROCEDURE — 85025 COMPLETE CBC W/AUTO DIFF WBC: CPT

## 2025-05-29 PROCEDURE — 6360000002 HC RX W HCPCS: Performed by: STUDENT IN AN ORGANIZED HEALTH CARE EDUCATION/TRAINING PROGRAM

## 2025-05-29 PROCEDURE — 2500000003 HC RX 250 WO HCPCS: Performed by: STUDENT IN AN ORGANIZED HEALTH CARE EDUCATION/TRAINING PROGRAM

## 2025-05-29 PROCEDURE — 1200000000 HC SEMI PRIVATE

## 2025-05-29 PROCEDURE — 36415 COLL VENOUS BLD VENIPUNCTURE: CPT

## 2025-05-29 RX ADMIN — METOPROLOL TARTRATE 25 MG: 25 TABLET, FILM COATED ORAL at 09:12

## 2025-05-29 RX ADMIN — HYDROMORPHONE HYDROCHLORIDE 0.25 MG: 1 INJECTION, SOLUTION INTRAMUSCULAR; INTRAVENOUS; SUBCUTANEOUS at 11:03

## 2025-05-29 RX ADMIN — AMLODIPINE BESYLATE 5 MG: 5 TABLET ORAL at 23:11

## 2025-05-29 RX ADMIN — TORSEMIDE 20 MG: 20 TABLET ORAL at 09:12

## 2025-05-29 RX ADMIN — HEPARIN SODIUM 5000 UNITS: 5000 INJECTION INTRAVENOUS; SUBCUTANEOUS at 14:41

## 2025-05-29 RX ADMIN — BUSPIRONE HYDROCHLORIDE 10 MG: 5 TABLET ORAL at 23:11

## 2025-05-29 RX ADMIN — SODIUM CHLORIDE, PRESERVATIVE FREE 10 ML: 5 INJECTION INTRAVENOUS at 23:13

## 2025-05-29 RX ADMIN — SODIUM CHLORIDE: 0.9 INJECTION, SOLUTION INTRAVENOUS at 05:15

## 2025-05-29 RX ADMIN — SEVELAMER CARBONATE 800 MG: 800 TABLET, FILM COATED ORAL at 11:03

## 2025-05-29 RX ADMIN — FLUCONAZOLE 200 MG: 100 TABLET ORAL at 09:12

## 2025-05-29 RX ADMIN — ASPIRIN 81 MG: 81 TABLET, CHEWABLE ORAL at 09:12

## 2025-05-29 RX ADMIN — PIPERACILLIN AND TAZOBACTAM 3375 MG: 3; .375 INJECTION, POWDER, LYOPHILIZED, FOR SOLUTION INTRAVENOUS at 05:15

## 2025-05-29 RX ADMIN — METOPROLOL TARTRATE 25 MG: 25 TABLET, FILM COATED ORAL at 23:11

## 2025-05-29 RX ADMIN — HYDROMORPHONE HYDROCHLORIDE 0.25 MG: 1 INJECTION, SOLUTION INTRAMUSCULAR; INTRAVENOUS; SUBCUTANEOUS at 23:55

## 2025-05-29 RX ADMIN — AMLODIPINE BESYLATE 5 MG: 5 TABLET ORAL at 09:12

## 2025-05-29 RX ADMIN — HEPARIN SODIUM 5000 UNITS: 5000 INJECTION INTRAVENOUS; SUBCUTANEOUS at 23:12

## 2025-05-29 RX ADMIN — HYDROMORPHONE HYDROCHLORIDE 0.25 MG: 1 INJECTION, SOLUTION INTRAMUSCULAR; INTRAVENOUS; SUBCUTANEOUS at 16:07

## 2025-05-29 RX ADMIN — SEVELAMER CARBONATE 800 MG: 800 TABLET, FILM COATED ORAL at 16:07

## 2025-05-29 RX ADMIN — HEPARIN SODIUM 5000 UNITS: 5000 INJECTION INTRAVENOUS; SUBCUTANEOUS at 05:11

## 2025-05-29 RX ADMIN — INSULIN LISPRO 2 UNITS: 100 INJECTION, SOLUTION INTRAVENOUS; SUBCUTANEOUS at 23:12

## 2025-05-29 RX ADMIN — HYDRALAZINE HYDROCHLORIDE 100 MG: 50 TABLET ORAL at 23:11

## 2025-05-29 RX ADMIN — Medication 1000 MCG: at 09:12

## 2025-05-29 RX ADMIN — CITALOPRAM 40 MG: 40 TABLET, FILM COATED ORAL at 09:12

## 2025-05-29 RX ADMIN — ROSUVASTATIN CALCIUM 10 MG: 5 TABLET, FILM COATED ORAL at 23:11

## 2025-05-29 RX ADMIN — BUSPIRONE HYDROCHLORIDE 10 MG: 5 TABLET ORAL at 09:12

## 2025-05-29 RX ADMIN — SODIUM CHLORIDE: 0.9 INJECTION, SOLUTION INTRAVENOUS at 16:08

## 2025-05-29 RX ADMIN — HYDRALAZINE HYDROCHLORIDE 100 MG: 50 TABLET ORAL at 09:13

## 2025-05-29 RX ADMIN — INSULIN LISPRO 1 UNITS: 100 INJECTION, SOLUTION INTRAVENOUS; SUBCUTANEOUS at 12:32

## 2025-05-29 RX ADMIN — PIPERACILLIN AND TAZOBACTAM 3375 MG: 3; .375 INJECTION, POWDER, LYOPHILIZED, FOR SOLUTION INTRAVENOUS at 16:09

## 2025-05-29 RX ADMIN — SEVELAMER CARBONATE 800 MG: 800 TABLET, FILM COATED ORAL at 09:12

## 2025-05-29 RX ADMIN — HYDRALAZINE HYDROCHLORIDE 100 MG: 50 TABLET ORAL at 14:41

## 2025-05-29 ASSESSMENT — PAIN SCALES - GENERAL
PAINLEVEL_OUTOF10: 6
PAINLEVEL_OUTOF10: 5
PAINLEVEL_OUTOF10: 7
PAINLEVEL_OUTOF10: 4

## 2025-05-29 ASSESSMENT — PAIN DESCRIPTION - ONSET
ONSET: ON-GOING

## 2025-05-29 ASSESSMENT — PAIN DESCRIPTION - PAIN TYPE
TYPE: ACUTE PAIN

## 2025-05-29 ASSESSMENT — PAIN - FUNCTIONAL ASSESSMENT
PAIN_FUNCTIONAL_ASSESSMENT: ACTIVITIES ARE NOT PREVENTED
PAIN_FUNCTIONAL_ASSESSMENT: PREVENTS OR INTERFERES SOME ACTIVE ACTIVITIES AND ADLS

## 2025-05-29 ASSESSMENT — PAIN DESCRIPTION - FREQUENCY
FREQUENCY: INTERMITTENT

## 2025-05-29 ASSESSMENT — PAIN DESCRIPTION - ORIENTATION
ORIENTATION: RIGHT;LOWER
ORIENTATION: RIGHT;LOWER
ORIENTATION: RIGHT
ORIENTATION: RIGHT

## 2025-05-29 ASSESSMENT — PAIN DESCRIPTION - DESCRIPTORS
DESCRIPTORS: ACHING
DESCRIPTORS: SHARP
DESCRIPTORS: SHARP
DESCRIPTORS: ACHING
DESCRIPTORS: SHARP

## 2025-05-29 ASSESSMENT — PAIN DESCRIPTION - LOCATION
LOCATION: ABDOMEN

## 2025-05-29 NOTE — CARE COORDINATION
This RN CM to the bedside to discuss possible IVAB at discharge as discussed in IDR. Patient states she refuses to be discharged with a line in place due to previous experience. She states she will come to infusion clinic daily. For this to be an option, patient can only be on once a day IVAB. PS to Riri June advising on this.

## 2025-05-29 NOTE — PROGRESS NOTES
V2.0    [facility] Progress Note      Name:  Gus Garza /Age/Sex: 1980  (45 y.o. female)   MRN & CSN:  [id number] & [id number] Encounter Date/Time: 2025 12:23 PM EDT   Location:  [facility]-A PCP: Lesly Parekh MD     Attending:Gerald Acosta MD       Hospital Day: 4    Assessment and Recommendations   Gus Garza is a 45 y.o. female with pmh of  CKD stage 5, CAD, Type 1 diabetes, HTN, Hyperlipidemia, mood disorder who presents with Pyelonephritis        Plan:     Complicated UTI 2/2 Pyelo  Hx of SPC  - Not septic  -Ua showed moderate leukocyte, many yeast  -suprapubic catheter was changed 1-2 weeks ago  - previous urine on 3/25 showed Pseudomonas and staph. CT abdomen shows Bilateral perinephric fat stranding, No hydronephrosis.  -Continue with zosyn   -UA positive for pseudomonas   -culture positive for cefepime     CKD 5  -has fistula in place with plans for HD soon.  -RUE AVF maturing  - Monitor volume status and electrolytes    -creatine 5.1  -nephrology consulted recommending renally dosed antibiotics; would like to avoid PICC     Anemia of CKD  -nephrology recommending Epogen  -per patient due today for this; order adjusted      CAD  -Continue aspirin, statin, beta-blocker     T1DM  - LCSI  - Hypoglycemic protocol      HTN  -Continue home BP meds      HLD  -Continue statin      Mood Disorder   -Continue home meds    Diet ADULT DIET; Regular; 2000 ml   DVT Prophylaxis [x] Lovenox, []  Heparin, [] SCDs, [] Ambulation,  [] Eliquis, [] Xarelto  [] Coumadin   Code Status Full Code   Disposition From: home  Expected Disposition: home  Estimated Date of Discharge: 1-3 days  Patient requires continued admission due to IV antibiotics    Surrogate Decision Maker/ POA  NA     Personally reviewed CBC, POC glucose, CBC, and urine culture       Drugs that require monitoring for toxicity include  zosyn and the method of monitoring was labs and physical exam.         Subjective:     Chief Complaint:

## 2025-05-29 NOTE — PROGRESS NOTES
I did NOT see the patient. The patient was discussed with the ABELINO. I was available for questions and consultation as needed.       Can likely go on cefepime daily due to kidney function.

## 2025-05-29 NOTE — PLAN OF CARE
Problem: Chronic Conditions and Co-morbidities  Goal: Patient's chronic conditions and co-morbidity symptoms are monitored and maintained or improved  5/28/2025 2215 by Mesha Ellis LPN  Outcome: Progressing  5/28/2025 1243 by Kusum Oneill, RN  Outcome: Progressing     Problem: Discharge Planning  Goal: Discharge to home or other facility with appropriate resources  5/28/2025 2215 by Mesha Ellis LPN  Outcome: Progressing  5/28/2025 1243 by Kusum Oneill, RN  Outcome: Progressing     Problem: Pain  Goal: Verbalizes/displays adequate comfort level or baseline comfort level  5/28/2025 2215 by Mesha Ellis LPN  Outcome: Progressing  5/28/2025 1243 by Kusum Oneill, RN  Outcome: Progressing     Problem: Safety - Adult  Goal: Free from fall injury  5/28/2025 2215 by Mesha Ellis LPN  Outcome: Progressing  5/28/2025 1243 by Kusum Oneill, RN  Outcome: Progressing

## 2025-05-29 NOTE — PROGRESS NOTES
Physician Progress Note      PATIENT:               BRIANNA BUTTERFIELD  CSN #:                  [id number]  :                       1980  ADMIT DATE:       2025 6:08 PM  DISCH DATE:  RESPONDING  PROVIDER #:        Riri June          QUERY TEXT:    UTI is documented in the medical record  of H&P on 2025 by Pavithra Hall MD .  Please clarify the relationship, if any, with the UTI and Suprepubic   catheter:    The clinical indicators include:  This is a 45 y.o. male who presents with Urinary tract infection.  -Female ,CKD,Presence of suprepubic catheter  -Complicated UTI 2/2 Pyelo ,Ua showed moderate leukocyte, many yeast  -suprapubic catheter was changed 1-2 weeks ago Documented in IM PN on   2025 by Burt Machuca  -Complicated UTI 2/2 Pyelo Hx of SPC  documented in IM PN on  by Riri June, APRN  -Urine culture on  shows PSEUDOMONAS AERUGINOSA >100,000 CFU/ml   Sensitivity to follow Abnormal  -piperacillin-tazobactam (ZOSYN) 4,500 mg, vancomycin (VANCOCIN),Urine   culture, Urine analysis, serial labs, CT abdomen      Thank you  Arielle Townsend S, CDS  Options provided:  -- UTI related to suprapubic catheter  -- UTI not related to suprapubic catheter  -- Other - I will add my own diagnosis  -- Disagree - Not applicable / Not valid  -- Disagree - Clinically unable to determine / Unknown  -- Refer to Clinical Documentation Reviewer    PROVIDER RESPONSE TEXT:patient has Pyonephritis related to suprapubic catheter    UTI is related to suprapubic catheter.    Query created by: Arielle Townsend on 2025 4:43 AM      Electronically signed by:  Riri June 2025 1:52 PM

## 2025-05-29 NOTE — PROGRESS NOTES
Nephrology Progress Note        [address] [place] [address]  Phone: [phone]  Office Hours: 8:30AM - 4:30PM  Monday - Friday 5/29/2025 7:16 AM  Subjective:   Admit Date: 5/26/2025  PCP: Lesly Parekh MD  Interval History:   On room air  In good spirits  1400ml urine   She wants to walk    Diet: ADULT DIET; Regular; 2000 ml      Data:   Scheduled Meds:   epoetin home-epbx  10,000 Units SubCUTAneous Once per day on Monday Wednesday Friday    fluconazole  200 mg Oral Daily    aspirin  81 mg Oral Daily    citalopram  40 mg Oral Daily    rosuvastatin  10 mg Oral Nightly    hydrALAZINE  100 mg Oral TID    metoprolol tartrate  25 mg Oral BID    vitamin B-12  1,000 mcg Oral Daily    patiromer sorbitex calcium  8.4 g Oral Every Other Day    sevelamer  800 mg Oral TID WC    amLODIPine  5 mg Oral BID    busPIRone  10 mg Oral BID    torsemide  20 mg Oral Daily    insulin lispro  0-4 Units SubCUTAneous 4x Daily AC & HS    sodium chloride flush  5-40 mL IntraVENous 2 times per day    heparin (porcine)  5,000 Units SubCUTAneous 3 times per day    piperacillin-tazobactam  3,375 mg IntraVENous Q12H     Continuous Infusions:   dextrose      sodium chloride 5 mL/hr at 05/29/25 0515     PRN Meds:metoclopramide, glucose, dextrose bolus **OR** dextrose bolus, glucagon (rDNA), dextrose, sodium chloride flush, sodium chloride, polyethylene glycol, acetaminophen **OR** acetaminophen, HYDROmorphone  I/O last 3 completed shifts:  In: 600 [P.O.:600]  Out: 2200 [Urine:2200]  No intake/output data recorded.    Intake/Output Summary (Last 24 hours) at 5/29/2025 0716  Last data filed at 5/29/2025 0217  Gross per 24 hour   Intake 600 ml   Output 1400 ml   Net -800 ml       CBC:   Recent Labs     05/27/25  0158 05/28/25  0126 05/29/25  0143   WBC 6.9 6.6 7.5   HGB 8.6* 8.5* 8.4*    231 237       BMP:    Recent Labs     05/27/25  0158 05/28/25  0126 05/29/25  0143    133* 134*   K 4.6 4.7 4.6    101 101

## 2025-05-30 VITALS
SYSTOLIC BLOOD PRESSURE: 133 MMHG | OXYGEN SATURATION: 95 % | WEIGHT: 144.18 LBS | DIASTOLIC BLOOD PRESSURE: 64 MMHG | TEMPERATURE: 97.7 F | RESPIRATION RATE: 17 BRPM | HEART RATE: 60 BPM | BODY MASS INDEX: 28.31 KG/M2 | HEIGHT: 60 IN

## 2025-05-30 LAB
ANION GAP SERPL CALCULATED.3IONS-SCNC: 13 MMOL/L (ref 9–17)
BUN SERPL-MCNC: 60 MG/DL (ref 7–20)
CALCIUM SERPL-MCNC: 9.2 MG/DL (ref 8.3–10.6)
CHLORIDE SERPL-SCNC: 101 MMOL/L (ref 99–110)
CO2 SERPL-SCNC: 21 MMOL/L (ref 21–32)
CREAT SERPL-MCNC: 5.2 MG/DL (ref 0.6–1.1)
GFR, ESTIMATED: 9 ML/MIN/1.73M2
GLUCOSE BLD-MCNC: 110 MG/DL (ref 74–99)
GLUCOSE BLD-MCNC: 132 MG/DL (ref 74–99)
GLUCOSE BLD-MCNC: 204 MG/DL (ref 74–99)
GLUCOSE SERPL-MCNC: 104 MG/DL (ref 74–99)
POTASSIUM SERPL-SCNC: 5 MMOL/L (ref 3.5–5.1)
SODIUM SERPL-SCNC: 135 MMOL/L (ref 136–145)

## 2025-05-30 PROCEDURE — 2580000003 HC RX 258: Performed by: STUDENT IN AN ORGANIZED HEALTH CARE EDUCATION/TRAINING PROGRAM

## 2025-05-30 PROCEDURE — 82962 GLUCOSE BLOOD TEST: CPT

## 2025-05-30 PROCEDURE — 6360000002 HC RX W HCPCS: Performed by: STUDENT IN AN ORGANIZED HEALTH CARE EDUCATION/TRAINING PROGRAM

## 2025-05-30 PROCEDURE — 94761 N-INVAS EAR/PLS OXIMETRY MLT: CPT

## 2025-05-30 PROCEDURE — 2500000003 HC RX 250 WO HCPCS: Performed by: STUDENT IN AN ORGANIZED HEALTH CARE EDUCATION/TRAINING PROGRAM

## 2025-05-30 PROCEDURE — 2580000003 HC RX 258: Performed by: LICENSED PRACTICAL NURSE

## 2025-05-30 PROCEDURE — 6360000002 HC RX W HCPCS: Performed by: LICENSED PRACTICAL NURSE

## 2025-05-30 PROCEDURE — 6370000000 HC RX 637 (ALT 250 FOR IP): Performed by: STUDENT IN AN ORGANIZED HEALTH CARE EDUCATION/TRAINING PROGRAM

## 2025-05-30 PROCEDURE — 36415 COLL VENOUS BLD VENIPUNCTURE: CPT

## 2025-05-30 PROCEDURE — 80048 BASIC METABOLIC PNL TOTAL CA: CPT

## 2025-05-30 PROCEDURE — 6370000000 HC RX 637 (ALT 250 FOR IP): Performed by: INTERNAL MEDICINE

## 2025-05-30 RX ORDER — LANOLIN ALCOHOL/MO/W.PET/CERES
1000 CREAM (GRAM) TOPICAL
Qty: 12 TABLET | Refills: 0 | Status: SHIPPED | OUTPATIENT
Start: 2025-05-30

## 2025-05-30 RX ORDER — PATIROMER 8.4 G/1
8.4 POWDER, FOR SUSPENSION ORAL EVERY OTHER DAY
Qty: 15 PACKET | Refills: 0
Start: 2025-05-30

## 2025-05-30 RX ORDER — CEFEPIME HYDROCHLORIDE 1 G/1
500 INJECTION, POWDER, FOR SOLUTION INTRAMUSCULAR; INTRAVENOUS DAILY
Qty: 5 EACH | Refills: 0 | Status: SHIPPED | OUTPATIENT
Start: 2025-05-30 | End: 2025-06-09

## 2025-05-30 RX ORDER — POLYETHYLENE GLYCOL 3350 17 G/17G
17 POWDER, FOR SOLUTION ORAL EVERY 8 HOURS
Status: DISCONTINUED | OUTPATIENT
Start: 2025-05-30 | End: 2025-05-30 | Stop reason: HOSPADM

## 2025-05-30 RX ORDER — SODIUM CHLORIDE 0.9 % (FLUSH) 0.9 %
5-40 SYRINGE (ML) INJECTION PRN
Status: CANCELLED | OUTPATIENT
Start: 2025-05-30

## 2025-05-30 RX ADMIN — INSULIN LISPRO 1 UNITS: 100 INJECTION, SOLUTION INTRAVENOUS; SUBCUTANEOUS at 11:31

## 2025-05-30 RX ADMIN — PIPERACILLIN AND TAZOBACTAM 3375 MG: 3; .375 INJECTION, POWDER, LYOPHILIZED, FOR SOLUTION INTRAVENOUS at 05:53

## 2025-05-30 RX ADMIN — HYDROMORPHONE HYDROCHLORIDE 0.25 MG: 1 INJECTION, SOLUTION INTRAMUSCULAR; INTRAVENOUS; SUBCUTANEOUS at 04:05

## 2025-05-30 RX ADMIN — HYDRALAZINE HYDROCHLORIDE 100 MG: 50 TABLET ORAL at 11:23

## 2025-05-30 RX ADMIN — CEFEPIME 500 MG: 1 INJECTION, POWDER, FOR SOLUTION INTRAMUSCULAR; INTRAVENOUS at 16:08

## 2025-05-30 RX ADMIN — METOPROLOL TARTRATE 25 MG: 25 TABLET, FILM COATED ORAL at 11:23

## 2025-05-30 RX ADMIN — ASPIRIN 81 MG: 81 TABLET, CHEWABLE ORAL at 11:23

## 2025-05-30 RX ADMIN — BUSPIRONE HYDROCHLORIDE 10 MG: 5 TABLET ORAL at 11:24

## 2025-05-30 RX ADMIN — HEPARIN SODIUM 5000 UNITS: 5000 INJECTION INTRAVENOUS; SUBCUTANEOUS at 05:49

## 2025-05-30 RX ADMIN — SEVELAMER CARBONATE 800 MG: 800 TABLET, FILM COATED ORAL at 11:23

## 2025-05-30 RX ADMIN — EPOETIN ALFA-EPBX 10000 UNITS: 10000 INJECTION, SOLUTION INTRAVENOUS; SUBCUTANEOUS at 16:08

## 2025-05-30 RX ADMIN — POLYETHYLENE GLYCOL (3350) 17 G: 17 POWDER, FOR SOLUTION ORAL at 06:50

## 2025-05-30 RX ADMIN — SODIUM CHLORIDE, PRESERVATIVE FREE 10 ML: 5 INJECTION INTRAVENOUS at 11:24

## 2025-05-30 RX ADMIN — AMLODIPINE BESYLATE 5 MG: 5 TABLET ORAL at 11:24

## 2025-05-30 RX ADMIN — Medication 1000 MCG: at 11:24

## 2025-05-30 RX ADMIN — CITALOPRAM 40 MG: 40 TABLET, FILM COATED ORAL at 11:23

## 2025-05-30 RX ADMIN — TORSEMIDE 20 MG: 20 TABLET ORAL at 11:23

## 2025-05-30 ASSESSMENT — PAIN - FUNCTIONAL ASSESSMENT: PAIN_FUNCTIONAL_ASSESSMENT: PREVENTS OR INTERFERES SOME ACTIVE ACTIVITIES AND ADLS

## 2025-05-30 ASSESSMENT — PAIN SCALES - GENERAL: PAINLEVEL_OUTOF10: 7

## 2025-05-30 ASSESSMENT — PAIN DESCRIPTION - DESCRIPTORS: DESCRIPTORS: ACHING

## 2025-05-30 ASSESSMENT — PAIN DESCRIPTION - LOCATION: LOCATION: ABDOMEN

## 2025-05-30 ASSESSMENT — PAIN DESCRIPTION - ORIENTATION: ORIENTATION: RIGHT

## 2025-05-30 NOTE — PLAN OF CARE
Problem: Chronic Conditions and Co-morbidities  Goal: Patient's chronic conditions and co-morbidity symptoms are monitored and maintained or improved  5/30/2025 0203 by Dayna Briones LPN  Outcome: Progressing  5/29/2025 1231 by Nicole Groves RN  Outcome: Progressing     Problem: Discharge Planning  Goal: Discharge to home or other facility with appropriate resources  Outcome: Progressing     Problem: Pain  Goal: Verbalizes/displays adequate comfort level or baseline comfort level  Outcome: Progressing     Problem: Safety - Adult  Goal: Free from fall injury  Outcome: Progressing

## 2025-05-30 NOTE — DISCHARGE SUMMARY
V2.0  Discharge Summary    Name:  Gus Garza /Age/Sex: 1980 (45 y.o. female)   Admit Date: 2025  Discharge Date: 25    MRN & CSN:  [id number] & [id number] Encounter Date and Time 25 12:49 PM EDT    Attending:  Gerald Acosta MD Discharging Provider: MAMIE Black - CNP       Hospital Course:     Brief HPI: Gus Garza is a 45 y.o. female who presented with pyleonephritis.    Brief Problem Based Course:   Complicated UTI 2/2 Pyelo  Hx of SPC  - Not septic  -Ua showed moderate leukocyte, many yeast  -suprapubic catheter was changed 1-2 weeks ago  - previous urine on 3/25 showed Pseudomonas and staph. CT abdomen shows Bilateral perinephric fat stranding, No hydronephrosis.  -was treated with IV zoysn in patient  -UA positive for pseudomonas   -culture positive for cefepime; script sent to outpatient infusion center for renally dose 500mg 1 x daily; patient needs to receive first dose today and will receive the rest of the duration of treatment as outpatient  - states patient is approved at outpatient clinic; patient aware she has to come in through ED over weekend for daily infusion for now 9 more days     CKD 5  -has fistula in place with plans for HD soon.  -RUE AVF maturing  - Monitor volume status and electrolytes    -creatine 5.1  -nephrology consulted recommending renally dosed antibiotics; would like to avoid PICC     Anemia of CKD  -nephrology recommending Epogen      CAD  -Continue aspirin, statin, beta-blocker     T1DM  - LCSI  - Hypoglycemic protocol      HTN  -Continue home BP meds      HLD  -Continue statin      Mood Disorder   -Continue home meds         The patient expressed appropriate understanding of, and agreement with the discharge recommendations, medications, and plan.     Consults this admission:  PHARMACY TO DOSE VANCOMYCIN  IP CONSULT TO NEPHROLOGY    Discharge Diagnosis:   Pyelonephritis    Discharge Instruction:   Follow up appointments:  pharmacy    Bring a paper prescription for each of these medications  cefepime 1 g injection        Objective Findings at Discharge:   /68   Pulse 61   Temp 97.8 °F (36.6 °C) (Oral)   Resp 17   Ht 1.524 m (5')   Wt 65.4 kg (144 lb 2.9 oz)   LMP 04/18/2015   SpO2 95%   BMI 28.16 kg/m²       Physical Exam:     General: NAD  Eyes: EOMI  ENT: neck supple  Cardiovascular: Regular rate.  Respiratory: Clear to auscultation  Gastrointestinal: Soft, non tender  Genitourinary: no suprapubic tenderness  Musculoskeletal: No edema  Skin: warm, dry  Neuro: Alert.  Psych: Mood appropriate.         Labs and Imaging   CT ABDOMEN PELVIS WO CONTRAST Additional Contrast? None  Result Date: 5/26/2025  PROCEDURE: CT ABDOMEN PELVIS WO CONTRAST DATE OF EXAM:  5/26/2025 15:57 DEMOGRAPHICS: 45 years old Female INDICATION: RLQ pain COMPARISON: CT abdomen and pelvis without contrast 3/14/2025. TECHNIQUE: Contiguous axial slices of the abdomen and pelvis were submitted without IV administration of contrast. No oral contrast was utilized. Additional  coronal reformatted images were submitted.  DOSE OPTIMIZATION: CT radiation dose optimization techniques (automated exposure  control, and use of iterative reconstruction techniques, or adjustment of the mA and/or kV according to patient size) were used to limit patient radiation dose. FINDINGS: CT ABDOMEN: Inferior chest:  The lung bases are clear. The heart is mildly enlarged.  There is no pericardial or pleural effusion. Gallbladder fossa:  The gallbladder is surgically absent. Biliary tree: There is no evidence for intra-or extrahepatic biliary ductal dilatation. Liver: The liver demonstrates normal appearance. No focal abnormalities are seen. Spleen: Normal size and morphology is seen. No masses are identified. Pancreas: Normal morphology without masses or inflammatory changes. Adrenals: Normal size without masses. Kidneys: The bilateral kidneys appear atrophic. There is bilateral

## 2025-05-30 NOTE — PROGRESS NOTES
Nephrology Progress Note        [address] [place] [address]  Phone: [phone]  Office Hours: 8:30AM - 4:30PM  Monday - Friday 5/30/2025 7:28 AM  Subjective:   Admit Date: 5/26/2025  PCP: Lesly Parekh MD  Interval History:   On room air  Reports no BM  Nonoliguric     Diet: ADULT DIET; Regular; 2000 ml      Data:   Scheduled Meds:   polyethylene glycol  17 g Oral q8h    epoetin home-epbx  10,000 Units SubCUTAneous Once per day on Monday Wednesday Friday    aspirin  81 mg Oral Daily    citalopram  40 mg Oral Daily    rosuvastatin  10 mg Oral Nightly    hydrALAZINE  100 mg Oral TID    metoprolol tartrate  25 mg Oral BID    vitamin B-12  1,000 mcg Oral Daily    patiromer sorbitex calcium  8.4 g Oral Every Other Day    sevelamer  800 mg Oral TID WC    amLODIPine  5 mg Oral BID    busPIRone  10 mg Oral BID    torsemide  20 mg Oral Daily    insulin lispro  0-4 Units SubCUTAneous 4x Daily AC & HS    sodium chloride flush  5-40 mL IntraVENous 2 times per day    heparin (porcine)  5,000 Units SubCUTAneous 3 times per day    piperacillin-tazobactam  3,375 mg IntraVENous Q12H     Continuous Infusions:   dextrose      sodium chloride 25 mL/hr at 05/29/25 1608     PRN Meds:metoclopramide, glucose, dextrose bolus **OR** dextrose bolus, glucagon (rDNA), dextrose, sodium chloride flush, sodium chloride, polyethylene glycol, acetaminophen **OR** acetaminophen, HYDROmorphone  I/O last 3 completed shifts:  In: 710 [P.O.:710]  Out: 2800 [Urine:2800]  No intake/output data recorded.    Intake/Output Summary (Last 24 hours) at 5/30/2025 0728  Last data filed at 5/30/2025 0302  Gross per 24 hour   Intake 470 ml   Output 1400 ml   Net -930 ml       CBC:   Recent Labs     05/28/25  0126 05/29/25  0143   WBC 6.6 7.5   HGB 8.5* 8.4*    237       BMP:    Recent Labs     05/28/25  0126 05/29/25  0143   * 134*   K 4.7 4.6    101   CO2 21 22   BUN 70* 65*   CREATININE 5.1* 5.2*   GLUCOSE 103* 124*

## 2025-05-30 NOTE — CARE COORDINATION
Paper cefepime script and infusion clinic referral faxed along with clinical documentation to [facility] [phone].     1151 - Order received by [facility]. Pre-CERT for IVAB needed. They will start this today.    1248 - Infusion clinic called and patient is approved to go to infusion clinic. Patient will need IVAB does today and she will need to report to the ER both Saturday and Sunday at 11am. On Monday she will come to the infusion clinic at 1pm, stopping at registration first. This RN has given both verbal and written instructions to patient on this. Patient verbalized understanding. Riri HATCH also advised on this.

## 2025-05-30 NOTE — PROGRESS NOTES
I did NOT see the patient. The patient was discussed with the ABELINO. I was available for questions and consultation as needed.       Set up for infusion clinic for cefepime for pyelonephritis.

## 2025-05-30 NOTE — PROGRESS NOTES
CLINICAL PHARMACY SERVICES  Renal dose adjustment made per [facility] protocol     Estimated Creatinine Clearance: 11 mL/min (A) (based on SCr of 5.2 mg/dL (H)).  Recent Labs     05/28/25  0126 05/29/25  0143 05/30/25  0807   BUN 70* 65* 60*   CREATININE 5.1* 5.2* 5.2*     Body mass index is 28.16 kg/m².    CEFEPIME FOR INDICATION:  UTI                Cefepime - Extended Infusion (over 240 minutes)  Loading dose:  2000mg ivpb over 30 minutes (all indications)  []  CRCL  >=60 []  CRCL  30-59 []  CRCL  11-29 []  CRCL  <=10, HD []  PD []  CRRT   2000mg  q12h 2000mg  q24h 1000mg  q24h  500mg  q24h 1000mg  q24h 2000mg  q12h   Maintenance dose:  Start at next regularly scheduled dosing   interval from load based on indication/renal function.   Hemodialysis:  Give dose after dialysis on dialysis days.    Load    Conrado Parekh RPH  5/30/2025 1:40 PM

## 2025-05-31 ENCOUNTER — HOSPITAL ENCOUNTER (OUTPATIENT)
Age: 45
Setting detail: OBSERVATION
Discharge: HOME | End: 2025-05-31
Attending: HOSPITALIST | Admitting: HOSPITALIST
Payer: COMMERCIAL

## 2025-05-31 VITALS
OXYGEN SATURATION: 98 % | SYSTOLIC BLOOD PRESSURE: 135 MMHG | RESPIRATION RATE: 16 BRPM | HEART RATE: 57 BPM | DIASTOLIC BLOOD PRESSURE: 60 MMHG | TEMPERATURE: 97.5 F

## 2025-05-31 PROCEDURE — 2580000003 HC RX 258: Performed by: HOSPITALIST

## 2025-05-31 PROCEDURE — G0378 HOSPITAL OBSERVATION PER HR: HCPCS

## 2025-05-31 PROCEDURE — 6360000002 HC RX W HCPCS: Performed by: HOSPITALIST

## 2025-05-31 PROCEDURE — G0379 DIRECT REFER HOSPITAL OBSERV: HCPCS

## 2025-05-31 PROCEDURE — 96365 THER/PROPH/DIAG IV INF INIT: CPT

## 2025-05-31 RX ADMIN — CEFEPIME 500 MG: 1 INJECTION, POWDER, FOR SOLUTION INTRAMUSCULAR; INTRAVENOUS at 12:19

## 2025-05-31 NOTE — PROGRESS NOTES
Pt discharged to exit via walker with mother. PIV in place per previous note. Printed AVS refused.

## 2025-05-31 NOTE — PROGRESS NOTES
ED contacted by this nurse for assistance placing PIV using US guidance.    20g PIV placed in LUE Bicep by ED nurse Ondina RN using US.     House supervisor Mj notified by this nurse of patient being a hard stick with patient requesting IV to remain in place. ROSALIA Barker is okay with PIV remaining in place until tomorrow 6/1/25 after OP infusion is completed.

## 2025-06-01 ENCOUNTER — RESULTS FOLLOW-UP (OUTPATIENT)
Dept: EMERGENCY DEPT | Age: 45
End: 2025-06-01

## 2025-06-01 ENCOUNTER — HOSPITAL ENCOUNTER (OUTPATIENT)
Age: 45
Discharge: HOME | End: 2025-06-01
Attending: HOSPITALIST | Admitting: HOSPITALIST
Payer: COMMERCIAL

## 2025-06-01 VITALS
OXYGEN SATURATION: 98 % | DIASTOLIC BLOOD PRESSURE: 60 MMHG | TEMPERATURE: 97.7 F | SYSTOLIC BLOOD PRESSURE: 139 MMHG | HEART RATE: 57 BPM | RESPIRATION RATE: 15 BRPM

## 2025-06-01 LAB
MICROORGANISM SPEC CULT: NORMAL
MICROORGANISM SPEC CULT: NORMAL
SERVICE CMNT-IMP: NORMAL
SERVICE CMNT-IMP: NORMAL
SPECIMEN DESCRIPTION: NORMAL
SPECIMEN DESCRIPTION: NORMAL

## 2025-06-01 PROCEDURE — 6360000002 HC RX W HCPCS: Performed by: HOSPITALIST

## 2025-06-01 PROCEDURE — 96365 THER/PROPH/DIAG IV INF INIT: CPT

## 2025-06-01 PROCEDURE — G0379 DIRECT REFER HOSPITAL OBSERV: HCPCS

## 2025-06-01 PROCEDURE — 94761 N-INVAS EAR/PLS OXIMETRY MLT: CPT

## 2025-06-01 PROCEDURE — G0378 HOSPITAL OBSERVATION PER HR: HCPCS

## 2025-06-01 PROCEDURE — 2580000003 HC RX 258: Performed by: HOSPITALIST

## 2025-06-01 RX ADMIN — CEFEPIME 500 MG: 1 INJECTION, POWDER, FOR SOLUTION INTRAMUSCULAR; INTRAVENOUS at 11:43

## 2025-06-01 NOTE — PROGRESS NOTES
Patient tolerated antibiotic infusion well. PIV removed per protocol with no complications. Pt discharged to exit via walker with son. Printed AVS refused.

## 2025-06-02 ENCOUNTER — HOSPITAL ENCOUNTER (OUTPATIENT)
Dept: INFUSION THERAPY | Age: 45
Setting detail: INFUSION SERIES
Discharge: HOME | End: 2025-06-02
Payer: COMMERCIAL

## 2025-06-02 VITALS
HEART RATE: 55 BPM | OXYGEN SATURATION: 98 % | SYSTOLIC BLOOD PRESSURE: 122 MMHG | DIASTOLIC BLOOD PRESSURE: 62 MMHG | RESPIRATION RATE: 16 BRPM | TEMPERATURE: 97.9 F

## 2025-06-02 DIAGNOSIS — N12 PYELONEPHRITIS: Primary | ICD-10-CM

## 2025-06-02 PROCEDURE — 6360000002 HC RX W HCPCS: Performed by: LICENSED PRACTICAL NURSE

## 2025-06-02 PROCEDURE — 96365 THER/PROPH/DIAG IV INF INIT: CPT

## 2025-06-02 PROCEDURE — 2500000003 HC RX 250 WO HCPCS: Performed by: LICENSED PRACTICAL NURSE

## 2025-06-02 PROCEDURE — 2580000003 HC RX 258: Performed by: LICENSED PRACTICAL NURSE

## 2025-06-02 RX ORDER — SODIUM CHLORIDE 0.9 % (FLUSH) 0.9 %
5-40 SYRINGE (ML) INJECTION PRN
Status: DISCONTINUED | OUTPATIENT
Start: 2025-06-02 | End: 2025-06-03 | Stop reason: HOSPADM

## 2025-06-02 RX ORDER — SODIUM CHLORIDE 0.9 % (FLUSH) 0.9 %
5-40 SYRINGE (ML) INJECTION PRN
Status: CANCELLED | OUTPATIENT
Start: 2025-06-03

## 2025-06-02 RX ADMIN — SODIUM CHLORIDE, PRESERVATIVE FREE 10 ML: 5 INJECTION INTRAVENOUS at 13:08

## 2025-06-02 RX ADMIN — CEFEPIME 1000 MG: 1 INJECTION, POWDER, FOR SOLUTION INTRAMUSCULAR; INTRAVENOUS at 13:09

## 2025-06-02 RX ADMIN — SODIUM CHLORIDE, PRESERVATIVE FREE 10 ML: 5 INJECTION INTRAVENOUS at 13:48

## 2025-06-02 NOTE — PROGRESS NOTES
Physician Progress Note      PATIENT:               BRIANNA BUTTERFIELD  CSN #:                  [id number]  :                       1980  ADMIT DATE:       2025 6:08 PM  DISCH DATE:        2025 5:08 PM  RESPONDING  PROVIDER #:        Earnest PADRON DO          QUERY TEXT:    Hyponatremia  is documented in the medical record  of nephrology consult note   on 2025 by Earnest Padron,Sodium-133,134,135 . Please provide   additional clinical indicators supportive of your documentation. Or please   document if the diagnosis of Hyponatremia has been ruled out after study.    The clinical indicators include:  This is a 45 y.o. male who presents with  UTI.  CKD,DM  -Hyponatremia  is documented in the medical record  of nephrology consult note   on 2025 by Earnest Padron  -Hyponatremia  is documented in the medical record  of nephrologyPN  note on   2025 by Earnest Padron  2025-SODIUM -133,Glucose-103  2025-SODIUM-134,Glucose-124  2025-SODIUM-135,Glucose-104  -insulin lispro (HUMALOG,ADMELOG) injection vial 0-4 Units,0.9 % sodium   chloride infusion, Serial labs    Thank you  MADELEINE Walker,CDS  Options provided:  -- Hyponatremia present as evidenced by, Please document evidence.  -- Hyponatremia was ruled out  -- Other - I will add my own diagnosis  -- Disagree - Not applicable / Not valid  -- Disagree - Clinically unable to determine / Unknown  -- Refer to Clinical Documentation Reviewer    PROVIDER RESPONSE TEXT:    Hyponatremia is present as evidenced by sodium level below 135    Query created by: Arielle Townsend on 2025 2:35 AM      Electronically signed by:  Earnest PADRON DO 2025 6:37 PM

## 2025-06-02 NOTE — PROGRESS NOTES
Pt arrived on unit. Oriented to room, call light, and chair/bed controls with understanding voiced. Pt is aware of and agreeable to POC.

## 2025-06-03 ENCOUNTER — HOSPITAL ENCOUNTER (OUTPATIENT)
Dept: INFUSION THERAPY | Age: 45
Setting detail: INFUSION SERIES
Discharge: HOME | End: 2025-06-03
Payer: COMMERCIAL

## 2025-06-03 VITALS
TEMPERATURE: 97.4 F | SYSTOLIC BLOOD PRESSURE: 137 MMHG | RESPIRATION RATE: 16 BRPM | OXYGEN SATURATION: 100 % | DIASTOLIC BLOOD PRESSURE: 67 MMHG

## 2025-06-03 DIAGNOSIS — N12 PYELONEPHRITIS: Primary | ICD-10-CM

## 2025-06-03 PROCEDURE — 2580000003 HC RX 258: Performed by: LICENSED PRACTICAL NURSE

## 2025-06-03 PROCEDURE — 2500000003 HC RX 250 WO HCPCS: Performed by: LICENSED PRACTICAL NURSE

## 2025-06-03 PROCEDURE — 96365 THER/PROPH/DIAG IV INF INIT: CPT

## 2025-06-03 PROCEDURE — 6360000002 HC RX W HCPCS: Performed by: LICENSED PRACTICAL NURSE

## 2025-06-03 RX ORDER — SODIUM CHLORIDE 0.9 % (FLUSH) 0.9 %
10 SYRINGE (ML) INJECTION PRN
Status: CANCELLED | OUTPATIENT
Start: 2025-06-04

## 2025-06-03 RX ORDER — SODIUM CHLORIDE 0.9 % (FLUSH) 0.9 %
10 SYRINGE (ML) INJECTION PRN
Status: DISCONTINUED | OUTPATIENT
Start: 2025-06-03 | End: 2025-06-04 | Stop reason: HOSPADM

## 2025-06-03 RX ORDER — SODIUM CHLORIDE 0.9 % (FLUSH) 0.9 %
5-40 SYRINGE (ML) INJECTION PRN
Status: CANCELLED | OUTPATIENT
Start: 2025-06-04

## 2025-06-03 RX ADMIN — SODIUM CHLORIDE, PRESERVATIVE FREE 10 ML: 5 INJECTION INTRAVENOUS at 09:24

## 2025-06-03 RX ADMIN — SODIUM CHLORIDE, PRESERVATIVE FREE 10 ML: 5 INJECTION INTRAVENOUS at 09:58

## 2025-06-03 RX ADMIN — CEFEPIME 500 MG: 1 INJECTION, POWDER, FOR SOLUTION INTRAMUSCULAR; INTRAVENOUS at 09:27

## 2025-06-03 NOTE — PROGRESS NOTES
Prior to discharge, the After Visit Summary Discharge Instructions were reviewed with the patient.  She was offered a printed version of the AVS, but declined the offer. Recurrent appointment, pt tolerated infusion well. Pt discharged home. Pt to exit via wheelchair.    Orders Placed This Encounter   Medications    sodium chloride flush 0.9 % injection 10 mL    cefepime (MAXIPIME) 500 mg in sodium chloride 0.9 % 50 mL IVPB     Antimicrobial Indications:   Urinary Tract Infection     UTI duration of therapy:   10 days

## 2025-06-04 ENCOUNTER — HOSPITAL ENCOUNTER (OUTPATIENT)
Dept: INFUSION THERAPY | Age: 45
Setting detail: INFUSION SERIES
Discharge: HOME | End: 2025-06-04
Payer: COMMERCIAL

## 2025-06-04 VITALS
TEMPERATURE: 97.4 F | SYSTOLIC BLOOD PRESSURE: 128 MMHG | RESPIRATION RATE: 16 BRPM | HEART RATE: 55 BPM | OXYGEN SATURATION: 98 % | DIASTOLIC BLOOD PRESSURE: 51 MMHG

## 2025-06-04 DIAGNOSIS — N12 PYELONEPHRITIS: Primary | ICD-10-CM

## 2025-06-04 PROCEDURE — 6360000002 HC RX W HCPCS: Performed by: LICENSED PRACTICAL NURSE

## 2025-06-04 PROCEDURE — 2580000003 HC RX 258: Performed by: LICENSED PRACTICAL NURSE

## 2025-06-04 PROCEDURE — 2500000003 HC RX 250 WO HCPCS: Performed by: LICENSED PRACTICAL NURSE

## 2025-06-04 PROCEDURE — 96365 THER/PROPH/DIAG IV INF INIT: CPT

## 2025-06-04 RX ORDER — SODIUM CHLORIDE 0.9 % (FLUSH) 0.9 %
10 SYRINGE (ML) INJECTION PRN
Status: CANCELLED | OUTPATIENT
Start: 2025-06-05

## 2025-06-04 RX ORDER — SODIUM CHLORIDE 0.9 % (FLUSH) 0.9 %
10 SYRINGE (ML) INJECTION PRN
Status: DISCONTINUED | OUTPATIENT
Start: 2025-06-04 | End: 2025-06-05 | Stop reason: HOSPADM

## 2025-06-04 RX ADMIN — CEFEPIME 500 MG: 1 INJECTION, POWDER, FOR SOLUTION INTRAMUSCULAR; INTRAVENOUS at 09:32

## 2025-06-04 RX ADMIN — SODIUM CHLORIDE, PRESERVATIVE FREE 10 ML: 5 INJECTION INTRAVENOUS at 09:32

## 2025-06-04 RX ADMIN — SODIUM CHLORIDE, PRESERVATIVE FREE 10 ML: 5 INJECTION INTRAVENOUS at 10:06

## 2025-06-04 NOTE — DISCHARGE INSTRUCTIONS
Antibiotic Therapy  Make sure you come as directed by your doctor.  Do not miss a dose.  Notify doctor for rash, hives, itching, or any unusual problems.    Other Instructions  Continue home meds, diet and activity  Call your Doctor for any specific questions or problems  If you have any problems and are unable to contact your doctor, go to the Emergency Room.      Thank you for choosing [facility]. It is a pleasure to serve you.        Infusion Unit  [phone]  8AM-5PM

## 2025-06-05 ENCOUNTER — HOSPITAL ENCOUNTER (OUTPATIENT)
Dept: INFUSION THERAPY | Age: 45
Setting detail: INFUSION SERIES
Discharge: HOME | End: 2025-06-05
Payer: COMMERCIAL

## 2025-06-05 VITALS
DIASTOLIC BLOOD PRESSURE: 55 MMHG | TEMPERATURE: 97.8 F | OXYGEN SATURATION: 98 % | HEART RATE: 56 BPM | RESPIRATION RATE: 16 BRPM | SYSTOLIC BLOOD PRESSURE: 118 MMHG

## 2025-06-05 DIAGNOSIS — N12 PYELONEPHRITIS: Primary | ICD-10-CM

## 2025-06-05 PROCEDURE — 2580000003 HC RX 258: Performed by: LICENSED PRACTICAL NURSE

## 2025-06-05 PROCEDURE — 96365 THER/PROPH/DIAG IV INF INIT: CPT

## 2025-06-05 PROCEDURE — 2500000003 HC RX 250 WO HCPCS: Performed by: LICENSED PRACTICAL NURSE

## 2025-06-05 PROCEDURE — 6360000002 HC RX W HCPCS: Performed by: LICENSED PRACTICAL NURSE

## 2025-06-05 RX ORDER — SODIUM CHLORIDE 0.9 % (FLUSH) 0.9 %
10 SYRINGE (ML) INJECTION PRN
Status: DISCONTINUED | OUTPATIENT
Start: 2025-06-05 | End: 2025-06-06 | Stop reason: HOSPADM

## 2025-06-05 RX ORDER — SODIUM CHLORIDE 0.9 % (FLUSH) 0.9 %
10 SYRINGE (ML) INJECTION PRN
Status: CANCELLED | OUTPATIENT
Start: 2025-06-06

## 2025-06-05 RX ADMIN — SODIUM CHLORIDE, PRESERVATIVE FREE 10 ML: 5 INJECTION INTRAVENOUS at 10:15

## 2025-06-05 RX ADMIN — CEFEPIME 500 MG: 1 INJECTION, POWDER, FOR SOLUTION INTRAMUSCULAR; INTRAVENOUS at 09:37

## 2025-06-05 RX ADMIN — SODIUM CHLORIDE, PRESERVATIVE FREE 10 ML: 5 INJECTION INTRAVENOUS at 09:35

## 2025-06-05 NOTE — DISCHARGE INSTRUCTIONS
Antibiotic Therapy  Make sure you come as directed by your doctor.  Do not miss a dose.  Notify doctor for rash, hives, itching, or any unusual problems.    Other Instructions  Continue home meds, diet and activity  Call your Doctor for any specific questions or problems  If you have any problems and are unable to contact your doctor, go to the Emergency Room.      Thank you for choosing [facility]. It is a pleasure to serve you.        Infusion Unit  [phone]  8AM-5PM

## 2025-06-06 ENCOUNTER — HOSPITAL ENCOUNTER (OUTPATIENT)
Dept: INFUSION THERAPY | Age: 45
Setting detail: INFUSION SERIES
Discharge: HOME | End: 2025-06-06
Payer: MEDICARE

## 2025-06-06 VITALS
HEART RATE: 55 BPM | RESPIRATION RATE: 16 BRPM | TEMPERATURE: 98.2 F | SYSTOLIC BLOOD PRESSURE: 115 MMHG | DIASTOLIC BLOOD PRESSURE: 49 MMHG | OXYGEN SATURATION: 99 %

## 2025-06-06 DIAGNOSIS — N12 PYELONEPHRITIS: Primary | ICD-10-CM

## 2025-06-06 PROCEDURE — 96365 THER/PROPH/DIAG IV INF INIT: CPT

## 2025-06-06 PROCEDURE — 2580000003 HC RX 258: Performed by: LICENSED PRACTICAL NURSE

## 2025-06-06 PROCEDURE — 6360000002 HC RX W HCPCS: Performed by: LICENSED PRACTICAL NURSE

## 2025-06-06 PROCEDURE — 2500000003 HC RX 250 WO HCPCS: Performed by: LICENSED PRACTICAL NURSE

## 2025-06-06 PROCEDURE — 96374 THER/PROPH/DIAG INJ IV PUSH: CPT

## 2025-06-06 RX ORDER — SODIUM CHLORIDE 0.9 % (FLUSH) 0.9 %
10 SYRINGE (ML) INJECTION PRN
Status: CANCELLED | OUTPATIENT
Start: 2025-06-07

## 2025-06-06 RX ORDER — SODIUM CHLORIDE 0.9 % (FLUSH) 0.9 %
10 SYRINGE (ML) INJECTION PRN
Status: DISCONTINUED | OUTPATIENT
Start: 2025-06-06 | End: 2025-06-06

## 2025-06-06 RX ADMIN — SODIUM CHLORIDE, PRESERVATIVE FREE 10 ML: 5 INJECTION INTRAVENOUS at 10:27

## 2025-06-06 RX ADMIN — SODIUM CHLORIDE, PRESERVATIVE FREE 10 ML: 5 INJECTION INTRAVENOUS at 11:20

## 2025-06-06 RX ADMIN — CEFEPIME 500 MG: 1 INJECTION, POWDER, FOR SOLUTION INTRAMUSCULAR; INTRAVENOUS at 10:29

## 2025-06-06 NOTE — PROGRESS NOTES
Prior to discharge, the After Visit Summary Discharge Instructions were reviewed with the patient.  She was offered a printed version of the AVS, but declined the offer. Recurrent appointment, pt tolerated infusion well. Pt discharged home. Pt to exit via wheelchair.    Orders Placed This Encounter   Medications    cefepime (MAXIPIME) 500 mg in sodium chloride 0.9 % 50 mL IVPB     Antimicrobial Indications:   Urinary Tract Infection     UTI duration of therapy:   10 days    sodium chloride flush 0.9 % injection 10 mL

## 2025-06-07 ENCOUNTER — HOSPITAL ENCOUNTER (OUTPATIENT)
Age: 45
Discharge: HOME | End: 2025-06-07
Attending: HOSPITALIST | Admitting: HOSPITALIST
Payer: COMMERCIAL

## 2025-06-07 PROCEDURE — 2580000003 HC RX 258: Performed by: INTERNAL MEDICINE

## 2025-06-07 PROCEDURE — G0378 HOSPITAL OBSERVATION PER HR: HCPCS

## 2025-06-07 PROCEDURE — G0379 DIRECT REFER HOSPITAL OBSERV: HCPCS

## 2025-06-07 PROCEDURE — 76937 US GUIDE VASCULAR ACCESS: CPT

## 2025-06-07 PROCEDURE — 6360000002 HC RX W HCPCS: Performed by: INTERNAL MEDICINE

## 2025-06-07 PROCEDURE — 96374 THER/PROPH/DIAG INJ IV PUSH: CPT

## 2025-06-07 RX ADMIN — CEFEPIME 500 MG: 1 INJECTION, POWDER, FOR SOLUTION INTRAMUSCULAR; INTRAVENOUS at 12:46

## 2025-06-07 NOTE — CONSULTS
IV Consult complete.  Nexiva 22g 1.75\" PIV Inserted in Left Forearm  x 1 attempt using sterile ultrasound guided technique.  Brisk blood return, flushes easy.  OK to leave IV in for last dose tomorrow.  Patient is limited access d/t limb restriction for right arm dialysis fistula.

## 2025-06-08 ENCOUNTER — HOSPITAL ENCOUNTER (OUTPATIENT)
Age: 45
Discharge: HOME | End: 2025-06-08
Attending: HOSPITALIST | Admitting: HOSPITALIST
Payer: MEDICARE

## 2025-06-08 ENCOUNTER — HOSPITAL ENCOUNTER (OUTPATIENT)
Age: 45
Setting detail: OBSERVATION
End: 2025-06-08
Payer: MEDICARE

## 2025-06-08 VITALS
SYSTOLIC BLOOD PRESSURE: 139 MMHG | OXYGEN SATURATION: 100 % | DIASTOLIC BLOOD PRESSURE: 63 MMHG | RESPIRATION RATE: 18 BRPM | HEART RATE: 67 BPM | TEMPERATURE: 97.5 F

## 2025-06-08 PROCEDURE — 6360000002 HC RX W HCPCS: Performed by: INTERNAL MEDICINE

## 2025-06-08 PROCEDURE — G0379 DIRECT REFER HOSPITAL OBSERV: HCPCS

## 2025-06-08 PROCEDURE — 96365 THER/PROPH/DIAG IV INF INIT: CPT

## 2025-06-08 PROCEDURE — 2580000003 HC RX 258: Performed by: INTERNAL MEDICINE

## 2025-06-08 PROCEDURE — G0378 HOSPITAL OBSERVATION PER HR: HCPCS

## 2025-06-08 RX ADMIN — CEFEPIME 500 MG: 1 INJECTION, POWDER, FOR SOLUTION INTRAMUSCULAR; INTRAVENOUS at 09:19

## 2025-06-08 NOTE — PROGRESS NOTES
Patient tolerated antibiotic infusion well. PIV removed per protocol with no complications. Pt educated and discharged to exit via walker with Aunt. Printed AVS refused.

## 2025-06-08 NOTE — PROGRESS NOTES
Patient to unit via walker. Pharmacy called for ordered antibiotic. Left PIV in place on admission.

## 2025-06-19 ENCOUNTER — HOSPITAL ENCOUNTER (OUTPATIENT)
Dept: INFUSION THERAPY | Age: 45
Discharge: HOME | End: 2025-06-19
Payer: MEDICARE

## 2025-06-19 VITALS — DIASTOLIC BLOOD PRESSURE: 60 MMHG | SYSTOLIC BLOOD PRESSURE: 130 MMHG

## 2025-06-19 DIAGNOSIS — D63.1 ANEMIA DUE TO STAGE 4 CHRONIC KIDNEY DISEASE (HCC): Primary | ICD-10-CM

## 2025-06-19 DIAGNOSIS — N18.4 ANEMIA DUE TO STAGE 4 CHRONIC KIDNEY DISEASE (HCC): Primary | ICD-10-CM

## 2025-06-19 LAB
BASOPHILS # BLD: 0.07 K/UL
BASOPHILS NFR BLD: 1 % (ref 0–1)
EOSINOPHIL # BLD: 0.2 K/UL
EOSINOPHILS RELATIVE PERCENT: 2 % (ref 0–3)
ERYTHROCYTE [DISTWIDTH] IN BLOOD BY AUTOMATED COUNT: 11.9 % (ref 11.7–14.9)
HCT VFR BLD AUTO: 29.3 % (ref 37–47)
HGB BLD-MCNC: 9.4 G/DL (ref 12.5–16)
LYMPHOCYTES NFR BLD: 1.13 K/UL
LYMPHOCYTES RELATIVE PERCENT: 13 % (ref 24–44)
MCH RBC QN AUTO: 31.5 PG (ref 27–31)
MCHC RBC AUTO-ENTMCNC: 32.1 G/DL (ref 32–36)
MCV RBC AUTO: 98.3 FL (ref 78–100)
MONOCYTES NFR BLD: 0.58 K/UL
MONOCYTES NFR BLD: 7 % (ref 0–5)
NEUTROPHILS NFR BLD: 77 % (ref 36–66)
NEUTS SEG NFR BLD: 6.57 K/UL
PLATELET # BLD AUTO: 208 K/UL (ref 140–440)
PMV BLD AUTO: 10.4 FL (ref 7.5–11.1)
RBC # BLD AUTO: 2.98 M/UL (ref 4.2–5.4)
WBC OTHER # BLD: 8.6 K/UL (ref 4–10.5)

## 2025-06-19 PROCEDURE — 6360000002 HC RX W HCPCS: Performed by: INTERNAL MEDICINE

## 2025-06-19 PROCEDURE — 36415 COLL VENOUS BLD VENIPUNCTURE: CPT

## 2025-06-19 PROCEDURE — 85025 COMPLETE CBC W/AUTO DIFF WBC: CPT

## 2025-06-19 PROCEDURE — 96372 THER/PROPH/DIAG INJ SC/IM: CPT

## 2025-06-19 RX ORDER — DIPHENHYDRAMINE HYDROCHLORIDE 50 MG/ML
50 INJECTION, SOLUTION INTRAMUSCULAR; INTRAVENOUS
OUTPATIENT
Start: 2025-07-06

## 2025-06-19 RX ORDER — ACETAMINOPHEN 325 MG/1
650 TABLET ORAL
OUTPATIENT
Start: 2025-07-06

## 2025-06-19 RX ORDER — SODIUM CHLORIDE 9 MG/ML
INJECTION, SOLUTION INTRAVENOUS CONTINUOUS
OUTPATIENT
Start: 2025-07-06

## 2025-06-19 RX ORDER — ONDANSETRON 2 MG/ML
8 INJECTION INTRAMUSCULAR; INTRAVENOUS
OUTPATIENT
Start: 2025-07-06

## 2025-06-19 RX ORDER — EPINEPHRINE 1 MG/ML
0.3 INJECTION, SOLUTION, CONCENTRATE INTRAVENOUS PRN
OUTPATIENT
Start: 2025-07-06

## 2025-06-19 RX ORDER — FAMOTIDINE 10 MG/ML
20 INJECTION, SOLUTION INTRAVENOUS
OUTPATIENT
Start: 2025-07-06

## 2025-06-19 RX ORDER — ALBUTEROL SULFATE 90 UG/1
4 INHALANT RESPIRATORY (INHALATION) PRN
OUTPATIENT
Start: 2025-07-06

## 2025-06-19 RX ORDER — HYDROCORTISONE SODIUM SUCCINATE 100 MG/2ML
100 INJECTION INTRAMUSCULAR; INTRAVENOUS
OUTPATIENT
Start: 2025-07-06

## 2025-06-19 RX ADMIN — DARBEPOETIN ALFA 60 MCG: 60 INJECTION, SOLUTION INTRAVENOUS; SUBCUTANEOUS at 11:24

## 2025-06-19 NOTE — PROGRESS NOTES
Pt to tx suite for Aranesp injection. CBC results reviewed from 6/19 and tx released. Pt has no concerns or issues to discuss at this time. Injection given SQ to left upper arm. Pt tolerated without incident. Left ambulatory discharge instructions given. Next OV 8/1 next tx scheduled correctly.

## 2025-07-02 NOTE — PROGRESS NOTES
Patient Name: Gus Garza  : 1980  MRN# [id number]    REASON FOR VISIT: 6 month follow  Patient Active Problem List    Diagnosis Date Noted    Pyelonephritis 2025    Urinary retention 2024    Anemia 2024    Complicated UTI (urinary tract infection) 10/10/2023    Anemia due to chronic kidney disease 2023    Anemia in stage 4 chronic kidney disease ([facility]) 2023    Chronic kidney disease, stage V ([facility]) 2022    Dysuria 2022    Primary hypertension 2021    Dyslipidemia 2021    Hyperpotassemia 2021    CRISTIANA (acute kidney injury) 2021    Microalbuminuria due to type 2 diabetes mellitus ([facility]) 2021    Microalbuminuria 2021    Lacunar cerebrovascular accident (CVA) of subthalamic region ([facility]) 2021    Chest pain     CAD in native artery 2012    CAD S/P percutaneous coronary angioplasty 2012    DM type 1 (diabetes mellitus, type 1) ([facility])     NSTEMI (non-ST elevated myocardial infarction) ([facility]) 2012    Nausea & vomiting 2012     LABS:  Component 25   Cholesterol, Total 101  127 87  104  101  --   Triglycerides 78  112 76  101  66  --   HDL Cholesterol 66  70 -- -- -- 61   VLDL Cholesterol Turner 16  20 -- -- -- --   LDL CHOL CALC ([facility]) 19  37               Component 23 1619 20 1551 12 0615   TSH, High Sensitivity 1.500 2.090 CM 0.861      Hemoglobin A1C   Date Value Ref Range Status   2025 5.6 4.0 - 6.0 % Final

## 2025-07-08 ENCOUNTER — OFFICE VISIT (OUTPATIENT)
Dept: CARDIOLOGY CLINIC | Age: 45
End: 2025-07-08
Payer: MEDICARE

## 2025-07-08 VITALS
DIASTOLIC BLOOD PRESSURE: 70 MMHG | BODY MASS INDEX: 27.88 KG/M2 | HEIGHT: 60 IN | HEART RATE: 60 BPM | SYSTOLIC BLOOD PRESSURE: 132 MMHG | WEIGHT: 142 LBS

## 2025-07-08 DIAGNOSIS — E10.59 TYPE 1 DIABETES MELLITUS WITH OTHER CIRCULATORY COMPLICATION (HCC): ICD-10-CM

## 2025-07-08 DIAGNOSIS — I10 PRIMARY HYPERTENSION: ICD-10-CM

## 2025-07-08 DIAGNOSIS — I21.4 NSTEMI (NON-ST ELEVATED MYOCARDIAL INFARCTION) (HCC): ICD-10-CM

## 2025-07-08 DIAGNOSIS — Z98.61 CAD S/P PERCUTANEOUS CORONARY ANGIOPLASTY: Primary | ICD-10-CM

## 2025-07-08 DIAGNOSIS — E78.5 DYSLIPIDEMIA: ICD-10-CM

## 2025-07-08 DIAGNOSIS — I25.10 CAD S/P PERCUTANEOUS CORONARY ANGIOPLASTY: Primary | ICD-10-CM

## 2025-07-08 PROCEDURE — 99214 OFFICE O/P EST MOD 30 MIN: CPT | Performed by: INTERNAL MEDICINE

## 2025-07-08 PROCEDURE — 3044F HG A1C LEVEL LT 7.0%: CPT | Performed by: INTERNAL MEDICINE

## 2025-07-08 PROCEDURE — 3078F DIAST BP <80 MM HG: CPT | Performed by: INTERNAL MEDICINE

## 2025-07-08 PROCEDURE — 3075F SYST BP GE 130 - 139MM HG: CPT | Performed by: INTERNAL MEDICINE

## 2025-07-08 NOTE — PATIENT INSTRUCTIONS
CORONARY ARTERY DISEASE:Yes, mild to moderate.  clinically stable. Patient is on optimal medical regimen ( see medication list above )  Patient is currently  not significantly symptomatic from CAD.   -changes in  treatment:   no, on  ASA & Metoprolol     11/18/2021   Left Main: Mild distal tapering.   LAD: Moderate segmental Luminal irregularities + 50 to 60 % very   proximal (hazy) & mid vessel stenosis.   LEFT CIRCUMFLEX: Moderate segmental Luminal irregularities + Sub   branch of OM1 which is very small 1.5 mm vessel has ostial &   proximal 95 % stenosis.   RCA: Moderate segmental Luminal irregularities + 40 to 50 %   proximal & 50 to 60 & mid vessel in-stent re-stenosis.      IFR:   Proximal RCA: 95   Mid RCA: 95   Proximal LAD: 92     EKG today: NSR 58/min, Old IMI.     12/19/2023  This is a normal perfusion Cardiolite study.  Patient's baseline EKG reveals sinus bradycardia at 58 bpm otherwise EKG is normal.  No ischemic EKG changes noted with Lexiscan infusion.     Perfusion imaging reveals normal uptake of Cardiolite in all myocardial territories.  No ischemia or myocardial infarction findings noted.  Normal LV function and wall motion.  Left ventricular ejection fraction is 58%.     12/06/2024  This is a normal perfusion Lexiscan Cardiolite study.  Patient's baseline EKG reveals normal sinus rhythm at 62 bpm with poor R wave progression.  SPECT images obtained standard short axis, vertical and horizontal long axis views reveal  patchy abnormalities of perfusion both in the rest and stress imaging and not consistent with reversible ischemia. Possibility of diaphragmatic artifact causing the abnormalities cannot be excluded.     HYPERTENSION:Yes  well controlled on current medical regimen, see list above.  - changes in  treatment:   no, takes Amlodipine & Hygroton     DYSLIPIDEMIA: yes,   Patient's profile is at / near Goal.yes,   HDL is high  Tolerating current medical regimen wellyes, on Crestor  LIPID

## 2025-07-08 NOTE — PROGRESS NOTES
CLINICAL STAFF DOCUMENTATION    Dr. John Garza  1980  [id number]    Have you had any Chest Pain recently? - No  Have you had any Shortness of Breath - No  Have you had any dizziness - No  Have you had any palpitations recently? - No  Do you have any edema - swelling in No    When did you have your last labs drawn 6/25  What doctor ordered charanjit  Do we have the labs in their chart Yes  Do you need any prescriptions refilled? - No    Do you have a surgery or procedure scheduled in the near future - No  Do use tobacco products? - No  Do you drink alcohol? - No  Do you use any illicit drugs? - No  Caffeine? - yes  How much caffeine? .occ      Check medication list thoroughly!!! AND RECONCILE OUTSIDE MEDICATIONS  If dose has changed change the entire order not just the MG  BE SURE TO ASK PATIENT IF THEY NEED MEDICATION REFILLS  Verify Pharmacy and update if incorrect    Add to every patient's \"wrap up\" the following dot phrase AFTERVISITCARDIOHEARTHOUSE and ensure we explain this to our patients

## 2025-07-08 NOTE — PROGRESS NOTES
OFFICE PROGRESS NOTES      Gus is a 45 y.o. female who has    CHIEF COMPLAINT AS FOLLOWS:  CHEST PAIN: Patient denies any C/O chest pains at this time.      SOB: No C/O SOB at this time.                LEG EDEMA: No leg edema   PALPITATIONS: Denies any C/O Palpitations   DIZZINESS: No C/O Dizziness   SYNCOPE: None   OTHER/ ADDITIONAL COMPLAINTS:                                     HPI: Patient is here for F/U on her CAD, HTN & Dyslipidemia problems.   CAD: Patient has known CAD. Had angioplasty in the past.  HTN: Patient has known essential HTN. Has been treated with guideline recommended medical / physical/ diet therapy as stated below.  Dyslipidemia: Patient has known mixed dyslipidemia. Has been treated with guideline recommended medical / physical/ diet therapy as stated below.                Current Outpatient Medications   Medication Sig Dispense Refill    amLODIPine (NORVASC) 5 MG tablet Take 1 tablet by mouth in the morning and at bedtime 60 tablet 5    patiromer sorbitex calcium (VELTASSA) 8.4 g PACK packet Take 1 packet by mouth every other day 15 packet 0    vitamin B-12 (CYANOCOBALAMIN) 1000 MCG tablet Take 1 tablet by mouth three times a week 3x a week 12 tablet 0    torsemide (DEMADEX) 20 MG tablet Take 1 tablet by mouth daily 90 tablet 1    busPIRone (BUSPAR) 10 MG tablet TAKE 1 TABLET (10 MG TOTAL) BY MOUTH IN THE MORNING AND BEFORE BEDTIME      darbepoetin home-polysorbate (ARANESP) 60 MCG/0.3ML SOSY injection Inject 0.3 mLs into the skin every 30 days      sevelamer (RENVELA) 800 MG tablet Take 1 tablet by mouth 3 times daily (with meals) 90 tablet 3    TRESIBA FLEXTOUCH 100 UNIT/ML SOPN 6 Units      ondansetron (ZOFRAN) 4 MG tablet Take 1 tablet by mouth every 8 hours as needed      metoprolol tartrate (LOPRESSOR) 25 MG tablet Take 1 tablet by mouth 2 times daily      Melatonin 1 MG CAPS Take by mouth at bedtime      hydrALAZINE (APRESOLINE) 100 MG tablet Take 1 tablet by mouth 3 times daily

## 2025-07-10 ENCOUNTER — HOSPITAL ENCOUNTER (OUTPATIENT)
Dept: INFUSION THERAPY | Age: 45
Discharge: HOME | End: 2025-07-10
Payer: MEDICARE

## 2025-07-10 VITALS — SYSTOLIC BLOOD PRESSURE: 130 MMHG | DIASTOLIC BLOOD PRESSURE: 61 MMHG

## 2025-07-10 DIAGNOSIS — D63.1 ANEMIA DUE TO STAGE 4 CHRONIC KIDNEY DISEASE (HCC): Primary | ICD-10-CM

## 2025-07-10 DIAGNOSIS — N18.4 ANEMIA DUE TO STAGE 4 CHRONIC KIDNEY DISEASE (HCC): Primary | ICD-10-CM

## 2025-07-10 LAB
BASOPHILS # BLD: 0.11 K/UL
BASOPHILS NFR BLD: 1 % (ref 0–1)
EOSINOPHIL # BLD: 0.33 K/UL
EOSINOPHILS RELATIVE PERCENT: 4 % (ref 0–3)
ERYTHROCYTE [DISTWIDTH] IN BLOOD BY AUTOMATED COUNT: 12.8 % (ref 11.7–14.9)
HCT VFR BLD AUTO: 28.5 % (ref 37–47)
HGB BLD-MCNC: 8.9 G/DL (ref 12.5–16)
LYMPHOCYTES NFR BLD: 1.14 K/UL
LYMPHOCYTES RELATIVE PERCENT: 14 % (ref 24–44)
MCH RBC QN AUTO: 30.6 PG (ref 27–31)
MCHC RBC AUTO-ENTMCNC: 31.2 G/DL (ref 32–36)
MCV RBC AUTO: 97.9 FL (ref 78–100)
MONOCYTES NFR BLD: 0.49 K/UL
MONOCYTES NFR BLD: 6 % (ref 0–5)
NEUTROPHILS NFR BLD: 75 % (ref 36–66)
NEUTS SEG NFR BLD: 6.09 K/UL
PLATELET # BLD AUTO: 293 K/UL (ref 140–440)
PMV BLD AUTO: 9.8 FL (ref 7.5–11.1)
RBC # BLD AUTO: 2.91 M/UL (ref 4.2–5.4)
WBC OTHER # BLD: 8.2 K/UL (ref 4–10.5)

## 2025-07-10 PROCEDURE — 6360000002 HC RX W HCPCS: Performed by: INTERNAL MEDICINE

## 2025-07-10 PROCEDURE — 96372 THER/PROPH/DIAG INJ SC/IM: CPT

## 2025-07-10 PROCEDURE — 36415 COLL VENOUS BLD VENIPUNCTURE: CPT

## 2025-07-10 PROCEDURE — 85025 COMPLETE CBC W/AUTO DIFF WBC: CPT

## 2025-07-10 RX ORDER — EPINEPHRINE 1 MG/ML
0.3 INJECTION, SOLUTION, CONCENTRATE INTRAVENOUS PRN
OUTPATIENT
Start: 2025-07-31

## 2025-07-10 RX ORDER — SODIUM CHLORIDE 9 MG/ML
INJECTION, SOLUTION INTRAVENOUS CONTINUOUS
OUTPATIENT
Start: 2025-07-31

## 2025-07-10 RX ORDER — HYDROCORTISONE SODIUM SUCCINATE 100 MG/2ML
100 INJECTION INTRAMUSCULAR; INTRAVENOUS
OUTPATIENT
Start: 2025-07-31

## 2025-07-10 RX ORDER — FAMOTIDINE 10 MG/ML
20 INJECTION, SOLUTION INTRAVENOUS
OUTPATIENT
Start: 2025-07-31

## 2025-07-10 RX ORDER — DIPHENHYDRAMINE HYDROCHLORIDE 50 MG/ML
50 INJECTION, SOLUTION INTRAMUSCULAR; INTRAVENOUS
OUTPATIENT
Start: 2025-07-31

## 2025-07-10 RX ORDER — ONDANSETRON 2 MG/ML
8 INJECTION INTRAMUSCULAR; INTRAVENOUS
OUTPATIENT
Start: 2025-07-31

## 2025-07-10 RX ORDER — ALBUTEROL SULFATE 90 UG/1
4 INHALANT RESPIRATORY (INHALATION) PRN
OUTPATIENT
Start: 2025-07-31

## 2025-07-10 RX ORDER — ACETAMINOPHEN 325 MG/1
650 TABLET ORAL
OUTPATIENT
Start: 2025-07-31

## 2025-07-10 RX ADMIN — DARBEPOETIN ALFA 60 MCG: 60 INJECTION, SOLUTION INTRAVENOUS; SUBCUTANEOUS at 11:24

## 2025-07-10 NOTE — PROGRESS NOTES
Pt to tx suite for Aranesp injection. CBC results reviewed from 7/10 and tx released. Pt states fatigue more than usual, no other symptoms noted. Injection given SQ to left upper arm. Pt tolerated without incident. Left ambulatory discharge instructions given. Next OV 8/1 next injection scheduled correctly.

## 2025-07-25 ENCOUNTER — HOSPITAL ENCOUNTER (OUTPATIENT)
Age: 45
Discharge: HOME | End: 2025-07-25
Payer: MEDICARE

## 2025-07-25 DIAGNOSIS — R80.9 MICROALBUMINURIA: ICD-10-CM

## 2025-07-25 DIAGNOSIS — E87.5 HYPERPOTASSEMIA: ICD-10-CM

## 2025-07-25 DIAGNOSIS — D63.1 ANEMIA IN STAGE 4 CHRONIC KIDNEY DISEASE (HCC): ICD-10-CM

## 2025-07-25 DIAGNOSIS — N18.5 CHRONIC KIDNEY DISEASE, STAGE V (HCC): ICD-10-CM

## 2025-07-25 DIAGNOSIS — N18.4 ANEMIA IN STAGE 4 CHRONIC KIDNEY DISEASE (HCC): ICD-10-CM

## 2025-07-25 DIAGNOSIS — N17.9 AKI (ACUTE KIDNEY INJURY): ICD-10-CM

## 2025-07-25 LAB
ALBUMIN: 4.2 G/DL (ref 3.4–5)
ANION GAP SERPL CALCULATED.3IONS-SCNC: 16 MMOL/L (ref 9–17)
BUN SERPL-MCNC: 83 MG/DL (ref 7–20)
CALCIUM SERPL-MCNC: 9.5 MG/DL (ref 8.3–10.6)
CHLORIDE SERPL-SCNC: 97 MMOL/L (ref 99–110)
CO2 SERPL-SCNC: 22 MMOL/L (ref 21–32)
CREAT SERPL-MCNC: 4.8 MG/DL (ref 0.6–1.1)
ERYTHROCYTE [DISTWIDTH] IN BLOOD BY AUTOMATED COUNT: 13.1 % (ref 11.7–14.9)
GFR, ESTIMATED: 10 ML/MIN/1.73M2
GLUCOSE SERPL-MCNC: 89 MG/DL (ref 74–99)
HCT VFR BLD AUTO: 28.2 % (ref 37–47)
HGB BLD-MCNC: 8.7 G/DL (ref 12.5–16)
MCH RBC QN AUTO: 30.2 PG (ref 27–31)
MCHC RBC AUTO-ENTMCNC: 30.9 G/DL (ref 32–36)
MCV RBC AUTO: 97.9 FL (ref 78–100)
PHOSPHATE SERPL-MCNC: 6.1 MG/DL (ref 2.5–4.9)
PLATELET # BLD AUTO: 243 K/UL (ref 140–440)
PMV BLD AUTO: 11.1 FL (ref 7.5–11.1)
POTASSIUM SERPL-SCNC: 4.7 MMOL/L (ref 3.5–5.1)
RBC # BLD AUTO: 2.88 M/UL (ref 4.2–5.4)
SODIUM SERPL-SCNC: 134 MMOL/L (ref 136–145)
WBC OTHER # BLD: 7.1 K/UL (ref 4–10.5)

## 2025-07-25 PROCEDURE — 85027 COMPLETE CBC AUTOMATED: CPT

## 2025-07-25 PROCEDURE — 80069 RENAL FUNCTION PANEL: CPT

## 2025-07-30 ENCOUNTER — HOSPITAL ENCOUNTER (INPATIENT)
Age: 45
LOS: 6 days | Discharge: HOME | DRG: 640 | End: 2025-08-05
Attending: STUDENT IN AN ORGANIZED HEALTH CARE EDUCATION/TRAINING PROGRAM | Admitting: STUDENT IN AN ORGANIZED HEALTH CARE EDUCATION/TRAINING PROGRAM
Payer: MEDICARE

## 2025-07-30 ENCOUNTER — APPOINTMENT (OUTPATIENT)
Dept: GENERAL RADIOLOGY | Age: 45
DRG: 640 | End: 2025-07-30
Payer: MEDICARE

## 2025-07-30 DIAGNOSIS — N18.6 ESRD (END STAGE RENAL DISEASE) (HCC): ICD-10-CM

## 2025-07-30 DIAGNOSIS — E87.70 HYPERVOLEMIA, UNSPECIFIED HYPERVOLEMIA TYPE: Primary | ICD-10-CM

## 2025-07-30 LAB
ALBUMIN SERPL-MCNC: 3.7 G/DL (ref 3.4–5)
ALBUMIN/GLOB SERPL: 1.5 {RATIO} (ref 1.1–2.2)
ALP SERPL-CCNC: 113 U/L (ref 40–129)
ALT SERPL-CCNC: 16 U/L (ref 10–40)
ANION GAP SERPL CALCULATED.3IONS-SCNC: 15 MMOL/L (ref 9–17)
AST SERPL-CCNC: 37 U/L (ref 15–37)
BACTERIA URNS QL MICRO: ABNORMAL
BASOPHILS # BLD: 0.04 K/UL
BASOPHILS NFR BLD: 1 % (ref 0–1)
BILIRUB DIRECT SERPL-MCNC: <0.2 MG/DL (ref 0–0.3)
BILIRUB INDIRECT SERPL-MCNC: ABNORMAL MG/DL (ref 0–0.7)
BILIRUB SERPL-MCNC: 0.4 MG/DL (ref 0–1)
BILIRUB UR QL STRIP: NEGATIVE
BNP SERPL-MCNC: ABNORMAL PG/ML (ref 0–125)
BUN SERPL-MCNC: 94 MG/DL (ref 7–20)
CALCIUM SERPL-MCNC: 8.7 MG/DL (ref 8.3–10.6)
CHLORIDE SERPL-SCNC: 97 MMOL/L (ref 99–110)
CLARITY UR: ABNORMAL
CO2 SERPL-SCNC: 21 MMOL/L (ref 21–32)
COLOR UR: YELLOW
CREAT SERPL-MCNC: 5.2 MG/DL (ref 0.6–1.1)
CRP SERPL HS-MCNC: 8.7 MG/L (ref 0–5)
EKG ATRIAL RATE: 57 BPM
EKG DIAGNOSIS: NORMAL
EKG P AXIS: 46 DEGREES
EKG P-R INTERVAL: 176 MS
EKG Q-T INTERVAL: 536 MS
EKG QRS DURATION: 90 MS
EKG QTC CALCULATION (BAZETT): 521 MS
EKG R AXIS: -34 DEGREES
EKG T AXIS: 74 DEGREES
EKG VENTRICULAR RATE: 57 BPM
EOSINOPHIL # BLD: 0.25 K/UL
EOSINOPHILS RELATIVE PERCENT: 3 % (ref 0–3)
EPI CELLS #/AREA URNS HPF: 1 /HPF
ERYTHROCYTE [DISTWIDTH] IN BLOOD BY AUTOMATED COUNT: 12.9 % (ref 11.7–14.9)
FERRITIN SERPL-MCNC: 194 NG/ML (ref 15–150)
GFR, ESTIMATED: 9 ML/MIN/1.73M2
GLUCOSE BLD-MCNC: 149 MG/DL (ref 74–99)
GLUCOSE SERPL-MCNC: 189 MG/DL (ref 74–99)
GLUCOSE UR STRIP-MCNC: NEGATIVE MG/DL
HBV CORE AB SER QL: NONREACTIVE
HBV SURFACE AB SERPL IA-ACNC: 3.5 MIU/ML
HBV SURFACE AG SERPL QL IA: NONREACTIVE
HCT VFR BLD AUTO: 22.9 % (ref 37–47)
HGB BLD-MCNC: 7.3 G/DL (ref 12.5–16)
HGB UR QL STRIP.AUTO: NEGATIVE
IMM GRANULOCYTES # BLD AUTO: 0.07 K/UL
IMM GRANULOCYTES NFR BLD: 1 %
IRON SATN MFR SERPL: 6 % (ref 15–50)
IRON SERPL-MCNC: 18 UG/DL (ref 37–145)
KETONES UR STRIP-MCNC: NEGATIVE MG/DL
LEUKOCYTE ESTERASE UR QL STRIP: ABNORMAL
LIPASE SERPL-CCNC: 42 U/L (ref 13–60)
LYMPHOCYTES NFR BLD: 0.55 K/UL
LYMPHOCYTES RELATIVE PERCENT: 7 % (ref 24–44)
MCH RBC QN AUTO: 30.3 PG (ref 27–31)
MCHC RBC AUTO-ENTMCNC: 31.9 G/DL (ref 32–36)
MCV RBC AUTO: 95 FL (ref 78–100)
MONOCYTES NFR BLD: 0.56 K/UL
MONOCYTES NFR BLD: 7 % (ref 0–5)
MUCOUS THREADS URNS QL MICRO: ABNORMAL
NEUTROPHILS NFR BLD: 81 % (ref 36–66)
NEUTS SEG NFR BLD: 6.21 K/UL
NITRITE UR QL STRIP: NEGATIVE
PH UR STRIP: 6 [PH] (ref 5–8)
PLATELET # BLD AUTO: 202 K/UL (ref 140–440)
PMV BLD AUTO: 10.8 FL (ref 7.5–11.1)
POTASSIUM SERPL-SCNC: 4.3 MMOL/L (ref 3.5–5.1)
PROCALCITONIN SERPL-MCNC: 0.2 NG/ML
PROT SERPL-MCNC: 6.2 G/DL (ref 6.4–8.2)
PROT UR STRIP-MCNC: 30 MG/DL
RBC # BLD AUTO: 2.41 M/UL (ref 4.2–5.4)
RBC #/AREA URNS HPF: 26 /HPF (ref 0–2)
SODIUM SERPL-SCNC: 133 MMOL/L (ref 136–145)
SP GR UR STRIP: 1.01 (ref 1–1.03)
TIBC SERPL-MCNC: 278 UG/DL (ref 260–445)
TROPONIN I SERPL HS-MCNC: 36 NG/L (ref 0–14)
TROPONIN I SERPL HS-MCNC: 37 NG/L (ref 0–14)
TSH SERPL DL<=0.05 MIU/L-ACNC: 1.32 UIU/ML (ref 0.27–4.2)
UNSATURATED IRON BINDING CAPACITY: 260 UG/DL (ref 110–370)
UROBILINOGEN UR STRIP-ACNC: 0.2 EU/DL (ref 0–1)
WBC #/AREA URNS HPF: 231 /HPF (ref 0–5)
WBC OTHER # BLD: 7.7 K/UL (ref 4–10.5)
YEAST URNS QL MICRO: ABNORMAL

## 2025-07-30 PROCEDURE — 83550 IRON BINDING TEST: CPT

## 2025-07-30 PROCEDURE — 6360000002 HC RX W HCPCS: Performed by: INTERNAL MEDICINE

## 2025-07-30 PROCEDURE — 86317 IMMUNOASSAY INFECTIOUS AGENT: CPT

## 2025-07-30 PROCEDURE — 96374 THER/PROPH/DIAG INJ IV PUSH: CPT

## 2025-07-30 PROCEDURE — 83540 ASSAY OF IRON: CPT

## 2025-07-30 PROCEDURE — 2580000003 HC RX 258: Performed by: STUDENT IN AN ORGANIZED HEALTH CARE EDUCATION/TRAINING PROGRAM

## 2025-07-30 PROCEDURE — 2060000000 HC ICU INTERMEDIATE R&B

## 2025-07-30 PROCEDURE — 87340 HEPATITIS B SURFACE AG IA: CPT

## 2025-07-30 PROCEDURE — 6360000002 HC RX W HCPCS: Performed by: STUDENT IN AN ORGANIZED HEALTH CARE EDUCATION/TRAINING PROGRAM

## 2025-07-30 PROCEDURE — 87077 CULTURE AEROBIC IDENTIFY: CPT

## 2025-07-30 PROCEDURE — 87086 URINE CULTURE/COLONY COUNT: CPT

## 2025-07-30 PROCEDURE — 82728 ASSAY OF FERRITIN: CPT

## 2025-07-30 PROCEDURE — 83690 ASSAY OF LIPASE: CPT

## 2025-07-30 PROCEDURE — 99285 EMERGENCY DEPT VISIT HI MDM: CPT

## 2025-07-30 PROCEDURE — 6370000000 HC RX 637 (ALT 250 FOR IP): Performed by: STUDENT IN AN ORGANIZED HEALTH CARE EDUCATION/TRAINING PROGRAM

## 2025-07-30 PROCEDURE — 71046 X-RAY EXAM CHEST 2 VIEWS: CPT

## 2025-07-30 PROCEDURE — 86140 C-REACTIVE PROTEIN: CPT

## 2025-07-30 PROCEDURE — 82248 BILIRUBIN DIRECT: CPT

## 2025-07-30 PROCEDURE — 87186 SC STD MICRODIL/AGAR DIL: CPT

## 2025-07-30 PROCEDURE — 85025 COMPLETE CBC W/AUTO DIFF WBC: CPT

## 2025-07-30 PROCEDURE — 93010 ELECTROCARDIOGRAM REPORT: CPT | Performed by: INTERNAL MEDICINE

## 2025-07-30 PROCEDURE — 80053 COMPREHEN METABOLIC PANEL: CPT

## 2025-07-30 PROCEDURE — 84443 ASSAY THYROID STIM HORMONE: CPT

## 2025-07-30 PROCEDURE — 81001 URINALYSIS AUTO W/SCOPE: CPT

## 2025-07-30 PROCEDURE — 94761 N-INVAS EAR/PLS OXIMETRY MLT: CPT

## 2025-07-30 PROCEDURE — 2500000003 HC RX 250 WO HCPCS: Performed by: STUDENT IN AN ORGANIZED HEALTH CARE EDUCATION/TRAINING PROGRAM

## 2025-07-30 PROCEDURE — 84145 PROCALCITONIN (PCT): CPT

## 2025-07-30 PROCEDURE — 86704 HEP B CORE ANTIBODY TOTAL: CPT

## 2025-07-30 PROCEDURE — 90935 HEMODIALYSIS ONE EVALUATION: CPT

## 2025-07-30 PROCEDURE — 82962 GLUCOSE BLOOD TEST: CPT

## 2025-07-30 PROCEDURE — 84484 ASSAY OF TROPONIN QUANT: CPT

## 2025-07-30 PROCEDURE — 2580000003 HC RX 258: Performed by: INTERNAL MEDICINE

## 2025-07-30 PROCEDURE — 83880 ASSAY OF NATRIURETIC PEPTIDE: CPT

## 2025-07-30 PROCEDURE — 93005 ELECTROCARDIOGRAM TRACING: CPT | Performed by: STUDENT IN AN ORGANIZED HEALTH CARE EDUCATION/TRAINING PROGRAM

## 2025-07-30 RX ORDER — AMLODIPINE BESYLATE 5 MG/1
5 TABLET ORAL 2 TIMES DAILY
Status: DISCONTINUED | OUTPATIENT
Start: 2025-07-30 | End: 2025-08-05 | Stop reason: HOSPADM

## 2025-07-30 RX ORDER — ACETAMINOPHEN 325 MG/1
650 TABLET ORAL EVERY 6 HOURS PRN
Status: DISCONTINUED | OUTPATIENT
Start: 2025-07-30 | End: 2025-08-05 | Stop reason: HOSPADM

## 2025-07-30 RX ORDER — FUROSEMIDE 10 MG/ML
40 INJECTION INTRAMUSCULAR; INTRAVENOUS ONCE
Status: COMPLETED | OUTPATIENT
Start: 2025-07-30 | End: 2025-07-30

## 2025-07-30 RX ORDER — HYDRALAZINE HYDROCHLORIDE 50 MG/1
100 TABLET, FILM COATED ORAL 3 TIMES DAILY
Status: DISCONTINUED | OUTPATIENT
Start: 2025-07-30 | End: 2025-08-05 | Stop reason: HOSPADM

## 2025-07-30 RX ORDER — HEPARIN SODIUM 5000 [USP'U]/ML
5000 INJECTION, SOLUTION INTRAVENOUS; SUBCUTANEOUS EVERY 8 HOURS SCHEDULED
Status: DISCONTINUED | OUTPATIENT
Start: 2025-07-30 | End: 2025-08-05 | Stop reason: HOSPADM

## 2025-07-30 RX ORDER — ONDANSETRON 4 MG/1
4 TABLET, ORALLY DISINTEGRATING ORAL EVERY 8 HOURS PRN
Status: DISCONTINUED | OUTPATIENT
Start: 2025-07-30 | End: 2025-07-30

## 2025-07-30 RX ORDER — ACETAMINOPHEN 650 MG/1
650 SUPPOSITORY RECTAL EVERY 6 HOURS PRN
Status: DISCONTINUED | OUTPATIENT
Start: 2025-07-30 | End: 2025-08-05 | Stop reason: HOSPADM

## 2025-07-30 RX ORDER — GLUCAGON 1 MG/ML
1 KIT INJECTION PRN
Status: DISCONTINUED | OUTPATIENT
Start: 2025-07-30 | End: 2025-08-05 | Stop reason: HOSPADM

## 2025-07-30 RX ORDER — VITAMIN B COMPLEX
1000 TABLET ORAL DAILY
Status: DISCONTINUED | OUTPATIENT
Start: 2025-07-31 | End: 2025-08-05 | Stop reason: HOSPADM

## 2025-07-30 RX ORDER — MORPHINE SULFATE 2 MG/ML
2 INJECTION, SOLUTION INTRAMUSCULAR; INTRAVENOUS EVERY 4 HOURS PRN
Status: DISCONTINUED | OUTPATIENT
Start: 2025-07-30 | End: 2025-07-30

## 2025-07-30 RX ORDER — SODIUM CHLORIDE 0.9 % (FLUSH) 0.9 %
5-40 SYRINGE (ML) INJECTION EVERY 12 HOURS SCHEDULED
Status: DISCONTINUED | OUTPATIENT
Start: 2025-07-30 | End: 2025-08-05 | Stop reason: HOSPADM

## 2025-07-30 RX ORDER — INSULIN LISPRO 100 [IU]/ML
0-8 INJECTION, SOLUTION INTRAVENOUS; SUBCUTANEOUS
Status: DISCONTINUED | OUTPATIENT
Start: 2025-07-30 | End: 2025-08-05 | Stop reason: HOSPADM

## 2025-07-30 RX ORDER — SODIUM CHLORIDE 9 MG/ML
INJECTION, SOLUTION INTRAVENOUS PRN
Status: DISCONTINUED | OUTPATIENT
Start: 2025-07-30 | End: 2025-08-05 | Stop reason: HOSPADM

## 2025-07-30 RX ORDER — POLYETHYLENE GLYCOL 3350 17 G/17G
17 POWDER, FOR SOLUTION ORAL DAILY PRN
Status: DISCONTINUED | OUTPATIENT
Start: 2025-07-30 | End: 2025-08-05 | Stop reason: HOSPADM

## 2025-07-30 RX ORDER — OXYCODONE AND ACETAMINOPHEN 5; 325 MG/1; MG/1
1 TABLET ORAL
Refills: 0 | Status: COMPLETED | OUTPATIENT
Start: 2025-07-30 | End: 2025-07-30

## 2025-07-30 RX ORDER — METOPROLOL TARTRATE 25 MG/1
25 TABLET, FILM COATED ORAL 2 TIMES DAILY
Status: DISCONTINUED | OUTPATIENT
Start: 2025-07-30 | End: 2025-08-05 | Stop reason: HOSPADM

## 2025-07-30 RX ORDER — ONDANSETRON 2 MG/ML
4 INJECTION INTRAMUSCULAR; INTRAVENOUS ONCE
Status: COMPLETED | OUTPATIENT
Start: 2025-07-30 | End: 2025-07-30

## 2025-07-30 RX ORDER — CITALOPRAM HYDROBROMIDE 20 MG/1
40 TABLET ORAL DAILY
Status: DISCONTINUED | OUTPATIENT
Start: 2025-07-31 | End: 2025-08-05 | Stop reason: HOSPADM

## 2025-07-30 RX ORDER — PROCHLORPERAZINE EDISYLATE 5 MG/ML
2.5 INJECTION INTRAMUSCULAR; INTRAVENOUS EVERY 6 HOURS PRN
Status: DISCONTINUED | OUTPATIENT
Start: 2025-07-30 | End: 2025-08-05 | Stop reason: HOSPADM

## 2025-07-30 RX ORDER — ROSUVASTATIN CALCIUM 5 MG/1
10 TABLET, COATED ORAL NIGHTLY
Status: DISCONTINUED | OUTPATIENT
Start: 2025-07-30 | End: 2025-08-05 | Stop reason: HOSPADM

## 2025-07-30 RX ORDER — ASPIRIN 81 MG/1
81 TABLET, CHEWABLE ORAL DAILY
Status: DISCONTINUED | OUTPATIENT
Start: 2025-07-31 | End: 2025-08-05 | Stop reason: HOSPADM

## 2025-07-30 RX ORDER — DEXTROSE MONOHYDRATE 100 MG/ML
INJECTION, SOLUTION INTRAVENOUS CONTINUOUS PRN
Status: DISCONTINUED | OUTPATIENT
Start: 2025-07-30 | End: 2025-08-05 | Stop reason: HOSPADM

## 2025-07-30 RX ORDER — LANOLIN ALCOHOL/MO/W.PET/CERES
1000 CREAM (GRAM) TOPICAL
Status: DISCONTINUED | OUTPATIENT
Start: 2025-08-01 | End: 2025-08-05 | Stop reason: HOSPADM

## 2025-07-30 RX ORDER — PROCHLORPERAZINE MALEATE 5 MG/1
5 TABLET ORAL EVERY 6 HOURS PRN
Status: DISCONTINUED | OUTPATIENT
Start: 2025-07-30 | End: 2025-08-05 | Stop reason: HOSPADM

## 2025-07-30 RX ORDER — SODIUM CHLORIDE 0.9 % (FLUSH) 0.9 %
5-40 SYRINGE (ML) INJECTION PRN
Status: DISCONTINUED | OUTPATIENT
Start: 2025-07-30 | End: 2025-08-05 | Stop reason: HOSPADM

## 2025-07-30 RX ORDER — BUSPIRONE HYDROCHLORIDE 5 MG/1
10 TABLET ORAL 2 TIMES DAILY
Status: DISCONTINUED | OUTPATIENT
Start: 2025-07-30 | End: 2025-08-05 | Stop reason: HOSPADM

## 2025-07-30 RX ORDER — ONDANSETRON 2 MG/ML
4 INJECTION INTRAMUSCULAR; INTRAVENOUS EVERY 6 HOURS PRN
Status: DISCONTINUED | OUTPATIENT
Start: 2025-07-30 | End: 2025-07-30

## 2025-07-30 RX ORDER — SEVELAMER CARBONATE 800 MG/1
800 TABLET, FILM COATED ORAL
Status: DISCONTINUED | OUTPATIENT
Start: 2025-07-30 | End: 2025-08-05 | Stop reason: HOSPADM

## 2025-07-30 RX ADMIN — SODIUM CHLORIDE, PRESERVATIVE FREE 10 ML: 5 INJECTION INTRAVENOUS at 20:32

## 2025-07-30 RX ADMIN — BUSPIRONE HYDROCHLORIDE 10 MG: 5 TABLET ORAL at 20:31

## 2025-07-30 RX ADMIN — SEVELAMER CARBONATE 800 MG: 800 TABLET, FILM COATED ORAL at 17:47

## 2025-07-30 RX ADMIN — FUROSEMIDE 40 MG: 10 INJECTION, SOLUTION INTRAMUSCULAR; INTRAVENOUS at 12:27

## 2025-07-30 RX ADMIN — METOPROLOL TARTRATE 25 MG: 25 TABLET, FILM COATED ORAL at 20:31

## 2025-07-30 RX ADMIN — SODIUM CHLORIDE, PRESERVATIVE FREE 10 ML: 5 INJECTION INTRAVENOUS at 17:47

## 2025-07-30 RX ADMIN — IRON SUCROSE 200 MG: 20 INJECTION, SOLUTION INTRAVENOUS at 17:47

## 2025-07-30 RX ADMIN — PROCHLORPERAZINE MALEATE 5 MG: 5 TABLET ORAL at 21:26

## 2025-07-30 RX ADMIN — SODIUM CHLORIDE, PRESERVATIVE FREE 10 ML: 5 INJECTION INTRAVENOUS at 14:50

## 2025-07-30 RX ADMIN — CEFEPIME 1000 MG: 1 INJECTION, POWDER, FOR SOLUTION INTRAMUSCULAR; INTRAVENOUS at 12:30

## 2025-07-30 RX ADMIN — HEPARIN SODIUM 5000 UNITS: 5000 INJECTION INTRAVENOUS; SUBCUTANEOUS at 17:47

## 2025-07-30 RX ADMIN — ROSUVASTATIN CALCIUM 10 MG: 5 TABLET, FILM COATED ORAL at 20:30

## 2025-07-30 RX ADMIN — FUROSEMIDE 5 MG/HR: 10 INJECTION, SOLUTION INTRAMUSCULAR; INTRAVENOUS at 17:52

## 2025-07-30 RX ADMIN — HYDROMORPHONE HYDROCHLORIDE 0.25 MG: 1 INJECTION, SOLUTION INTRAMUSCULAR; INTRAVENOUS; SUBCUTANEOUS at 20:29

## 2025-07-30 RX ADMIN — FUROSEMIDE 5 MG/HR: 10 INJECTION, SOLUTION INTRAMUSCULAR; INTRAVENOUS at 13:28

## 2025-07-30 RX ADMIN — ONDANSETRON 4 MG: 2 INJECTION INTRAMUSCULAR; INTRAVENOUS at 14:49

## 2025-07-30 RX ADMIN — OXYCODONE AND ACETAMINOPHEN 1 TABLET: 5; 325 TABLET ORAL at 12:20

## 2025-07-30 RX ADMIN — HYDROMORPHONE HYDROCHLORIDE 0.25 MG: 1 INJECTION, SOLUTION INTRAMUSCULAR; INTRAVENOUS; SUBCUTANEOUS at 14:50

## 2025-07-30 ASSESSMENT — PAIN SCALES - GENERAL
PAINLEVEL_OUTOF10: 0
PAINLEVEL_OUTOF10: 8

## 2025-07-30 ASSESSMENT — PAIN - FUNCTIONAL ASSESSMENT
PAIN_FUNCTIONAL_ASSESSMENT: ACTIVITIES ARE NOT PREVENTED
PAIN_FUNCTIONAL_ASSESSMENT: NONE - DENIES PAIN
PAIN_FUNCTIONAL_ASSESSMENT: NONE - DENIES PAIN
PAIN_FUNCTIONAL_ASSESSMENT: ACTIVITIES ARE NOT PREVENTED

## 2025-07-30 ASSESSMENT — PAIN DESCRIPTION - ORIENTATION
ORIENTATION: LOWER
ORIENTATION: MID
ORIENTATION: LOWER

## 2025-07-30 ASSESSMENT — PAIN DESCRIPTION - DESCRIPTORS
DESCRIPTORS: ACHING
DESCRIPTORS: STABBING
DESCRIPTORS: ACHING;CRAMPING

## 2025-07-30 ASSESSMENT — PAIN DESCRIPTION - LOCATION
LOCATION: ABDOMEN
LOCATION: ABDOMEN;BACK
LOCATION: BACK

## 2025-07-31 LAB
ALBUMIN SERPL-MCNC: 4.1 G/DL (ref 3.4–5)
ALBUMIN/GLOB SERPL: 1.2 {RATIO} (ref 1.1–2.2)
ALP SERPL-CCNC: 112 U/L (ref 40–129)
ALT SERPL-CCNC: 16 U/L (ref 10–40)
ANION GAP SERPL CALCULATED.3IONS-SCNC: 12 MMOL/L (ref 9–17)
AST SERPL-CCNC: 37 U/L (ref 15–37)
BASOPHILS # BLD: 0.06 K/UL
BASOPHILS NFR BLD: 1 % (ref 0–1)
BILIRUB DIRECT SERPL-MCNC: 0.2 MG/DL (ref 0–0.3)
BILIRUB INDIRECT SERPL-MCNC: 0.2 MG/DL (ref 0–0.7)
BILIRUB SERPL-MCNC: 0.5 MG/DL (ref 0–1)
BUN SERPL-MCNC: 50 MG/DL (ref 7–20)
CALCIUM SERPL-MCNC: 9.1 MG/DL (ref 8.3–10.6)
CHLORIDE SERPL-SCNC: 99 MMOL/L (ref 99–110)
CO2 SERPL-SCNC: 27 MMOL/L (ref 21–32)
CREAT SERPL-MCNC: 3.4 MG/DL (ref 0.6–1.1)
EOSINOPHIL # BLD: 0.29 K/UL
EOSINOPHILS RELATIVE PERCENT: 3 % (ref 0–3)
ERYTHROCYTE [DISTWIDTH] IN BLOOD BY AUTOMATED COUNT: 12.9 % (ref 11.7–14.9)
EST. AVERAGE GLUCOSE BLD GHB EST-MCNC: 120 MG/DL
FOLATE SERPL-MCNC: 9.9 NG/ML (ref 4.8–24.2)
GFR, ESTIMATED: 15 ML/MIN/1.73M2
GLUCOSE BLD-MCNC: 100 MG/DL (ref 74–99)
GLUCOSE BLD-MCNC: 121 MG/DL (ref 74–99)
GLUCOSE BLD-MCNC: 125 MG/DL (ref 74–99)
GLUCOSE BLD-MCNC: 234 MG/DL (ref 74–99)
GLUCOSE BLD-MCNC: 45 MG/DL (ref 74–99)
GLUCOSE BLD-MCNC: 81 MG/DL (ref 74–99)
GLUCOSE SERPL-MCNC: 96 MG/DL (ref 74–99)
HBA1C MFR BLD: 5.8 % (ref 4.2–6.3)
HCT VFR BLD AUTO: 24.8 % (ref 37–47)
HGB BLD-MCNC: 8 G/DL (ref 12.5–16)
IMM GRANULOCYTES # BLD AUTO: 0.04 K/UL
IMM GRANULOCYTES NFR BLD: 1 %
LYMPHOCYTES NFR BLD: 0.39 K/UL
LYMPHOCYTES RELATIVE PERCENT: 4 % (ref 24–44)
MAGNESIUM SERPL-MCNC: 2.3 MG/DL (ref 1.8–2.4)
MCH RBC QN AUTO: 30.3 PG (ref 27–31)
MCHC RBC AUTO-ENTMCNC: 32.3 G/DL (ref 32–36)
MCV RBC AUTO: 93.9 FL (ref 78–100)
MONOCYTES NFR BLD: 0.53 K/UL
MONOCYTES NFR BLD: 6 % (ref 0–5)
NEUTROPHILS NFR BLD: 85 % (ref 36–66)
NEUTS SEG NFR BLD: 7.46 K/UL
PLATELET # BLD AUTO: 247 K/UL (ref 140–440)
PMV BLD AUTO: 10.1 FL (ref 7.5–11.1)
POTASSIUM SERPL-SCNC: 4.5 MMOL/L (ref 3.5–5.1)
PROT SERPL-MCNC: 7.4 G/DL (ref 6.4–8.2)
RBC # BLD AUTO: 2.64 M/UL (ref 4.2–5.4)
SODIUM SERPL-SCNC: 138 MMOL/L (ref 136–145)
VIT B12 SERPL-MCNC: >2000 PG/ML (ref 211–911)
WBC OTHER # BLD: 8.8 K/UL (ref 4–10.5)

## 2025-07-31 PROCEDURE — 36415 COLL VENOUS BLD VENIPUNCTURE: CPT

## 2025-07-31 PROCEDURE — 94761 N-INVAS EAR/PLS OXIMETRY MLT: CPT

## 2025-07-31 PROCEDURE — 83036 HEMOGLOBIN GLYCOSYLATED A1C: CPT

## 2025-07-31 PROCEDURE — 0T2BX0Z CHANGE DRAINAGE DEVICE IN BLADDER, EXTERNAL APPROACH: ICD-10-PCS | Performed by: STUDENT IN AN ORGANIZED HEALTH CARE EDUCATION/TRAINING PROGRAM

## 2025-07-31 PROCEDURE — 2500000003 HC RX 250 WO HCPCS: Performed by: STUDENT IN AN ORGANIZED HEALTH CARE EDUCATION/TRAINING PROGRAM

## 2025-07-31 PROCEDURE — 83735 ASSAY OF MAGNESIUM: CPT

## 2025-07-31 PROCEDURE — 6370000000 HC RX 637 (ALT 250 FOR IP): Performed by: STUDENT IN AN ORGANIZED HEALTH CARE EDUCATION/TRAINING PROGRAM

## 2025-07-31 PROCEDURE — 85025 COMPLETE CBC W/AUTO DIFF WBC: CPT

## 2025-07-31 PROCEDURE — 82607 VITAMIN B-12: CPT

## 2025-07-31 PROCEDURE — 82962 GLUCOSE BLOOD TEST: CPT

## 2025-07-31 PROCEDURE — 82248 BILIRUBIN DIRECT: CPT

## 2025-07-31 PROCEDURE — 2580000003 HC RX 258: Performed by: STUDENT IN AN ORGANIZED HEALTH CARE EDUCATION/TRAINING PROGRAM

## 2025-07-31 PROCEDURE — 2060000000 HC ICU INTERMEDIATE R&B

## 2025-07-31 PROCEDURE — 6360000002 HC RX W HCPCS: Performed by: STUDENT IN AN ORGANIZED HEALTH CARE EDUCATION/TRAINING PROGRAM

## 2025-07-31 PROCEDURE — 2580000003 HC RX 258: Performed by: INTERNAL MEDICINE

## 2025-07-31 PROCEDURE — 80053 COMPREHEN METABOLIC PANEL: CPT

## 2025-07-31 PROCEDURE — 6360000002 HC RX W HCPCS: Performed by: INTERNAL MEDICINE

## 2025-07-31 PROCEDURE — 82746 ASSAY OF FOLIC ACID SERUM: CPT

## 2025-07-31 RX ORDER — LIDOCAINE 4 G/G
1 PATCH TOPICAL DAILY
Status: DISCONTINUED | OUTPATIENT
Start: 2025-07-31 | End: 2025-08-05 | Stop reason: HOSPADM

## 2025-07-31 RX ADMIN — PROCHLORPERAZINE EDISYLATE 2.5 MG: 5 INJECTION INTRAMUSCULAR; INTRAVENOUS at 09:05

## 2025-07-31 RX ADMIN — ACETAMINOPHEN 650 MG: 325 TABLET ORAL at 17:47

## 2025-07-31 RX ADMIN — HYDROMORPHONE HYDROCHLORIDE 0.25 MG: 1 INJECTION, SOLUTION INTRAMUSCULAR; INTRAVENOUS; SUBCUTANEOUS at 13:36

## 2025-07-31 RX ADMIN — HEPARIN SODIUM 5000 UNITS: 5000 INJECTION INTRAVENOUS; SUBCUTANEOUS at 17:43

## 2025-07-31 RX ADMIN — PROCHLORPERAZINE EDISYLATE 2.5 MG: 5 INJECTION INTRAMUSCULAR; INTRAVENOUS at 04:16

## 2025-07-31 RX ADMIN — FUROSEMIDE 5 MG/HR: 10 INJECTION, SOLUTION INTRAMUSCULAR; INTRAVENOUS at 02:24

## 2025-07-31 RX ADMIN — HYDRALAZINE HYDROCHLORIDE 100 MG: 50 TABLET ORAL at 13:36

## 2025-07-31 RX ADMIN — DEXTROSE 250 ML: 10 SOLUTION INTRAVENOUS at 23:49

## 2025-07-31 RX ADMIN — SODIUM CHLORIDE: 9 INJECTION, SOLUTION INTRAVENOUS at 09:22

## 2025-07-31 RX ADMIN — CITALOPRAM HYDROBROMIDE 40 MG: 20 TABLET ORAL at 09:06

## 2025-07-31 RX ADMIN — SODIUM CHLORIDE, PRESERVATIVE FREE 10 ML: 5 INJECTION INTRAVENOUS at 04:18

## 2025-07-31 RX ADMIN — SEVELAMER CARBONATE 800 MG: 800 TABLET, FILM COATED ORAL at 17:43

## 2025-07-31 RX ADMIN — METOPROLOL TARTRATE 25 MG: 25 TABLET, FILM COATED ORAL at 20:57

## 2025-07-31 RX ADMIN — HYDRALAZINE HYDROCHLORIDE 100 MG: 50 TABLET ORAL at 20:56

## 2025-07-31 RX ADMIN — ROSUVASTATIN CALCIUM 10 MG: 5 TABLET, FILM COATED ORAL at 20:57

## 2025-07-31 RX ADMIN — BUSPIRONE HYDROCHLORIDE 10 MG: 5 TABLET ORAL at 20:57

## 2025-07-31 RX ADMIN — Medication 1000 UNITS: at 09:06

## 2025-07-31 RX ADMIN — IRON SUCROSE 200 MG: 20 INJECTION, SOLUTION INTRAVENOUS at 17:43

## 2025-07-31 RX ADMIN — ASPIRIN 81 MG: 81 TABLET, CHEWABLE ORAL at 09:07

## 2025-07-31 RX ADMIN — SODIUM CHLORIDE, PRESERVATIVE FREE 10 ML: 5 INJECTION INTRAVENOUS at 20:57

## 2025-07-31 RX ADMIN — METOPROLOL TARTRATE 25 MG: 25 TABLET, FILM COATED ORAL at 09:07

## 2025-07-31 RX ADMIN — HEPARIN SODIUM 5000 UNITS: 5000 INJECTION INTRAVENOUS; SUBCUTANEOUS at 02:20

## 2025-07-31 RX ADMIN — HEPARIN SODIUM 5000 UNITS: 5000 INJECTION INTRAVENOUS; SUBCUTANEOUS at 09:08

## 2025-07-31 RX ADMIN — AMLODIPINE BESYLATE 5 MG: 5 TABLET ORAL at 20:57

## 2025-07-31 RX ADMIN — HYDROMORPHONE HYDROCHLORIDE 0.25 MG: 1 INJECTION, SOLUTION INTRAMUSCULAR; INTRAVENOUS; SUBCUTANEOUS at 04:17

## 2025-07-31 RX ADMIN — SODIUM CHLORIDE, PRESERVATIVE FREE 10 ML: 5 INJECTION INTRAVENOUS at 09:07

## 2025-07-31 RX ADMIN — CEFEPIME 1000 MG: 1 INJECTION, POWDER, FOR SOLUTION INTRAMUSCULAR; INTRAVENOUS at 09:23

## 2025-07-31 RX ADMIN — INSULIN LISPRO 2 UNITS: 100 INJECTION, SOLUTION INTRAVENOUS; SUBCUTANEOUS at 20:56

## 2025-07-31 RX ADMIN — BUSPIRONE HYDROCHLORIDE 10 MG: 5 TABLET ORAL at 09:06

## 2025-07-31 RX ADMIN — SEVELAMER CARBONATE 800 MG: 800 TABLET, FILM COATED ORAL at 11:53

## 2025-07-31 RX ADMIN — AMLODIPINE BESYLATE 5 MG: 5 TABLET ORAL at 09:07

## 2025-07-31 RX ADMIN — PROCHLORPERAZINE EDISYLATE 2.5 MG: 5 INJECTION INTRAMUSCULAR; INTRAVENOUS at 17:48

## 2025-07-31 RX ADMIN — HYDRALAZINE HYDROCHLORIDE 100 MG: 50 TABLET ORAL at 09:06

## 2025-07-31 RX ADMIN — SEVELAMER CARBONATE 800 MG: 800 TABLET, FILM COATED ORAL at 09:06

## 2025-07-31 ASSESSMENT — PAIN SCALES - GENERAL
PAINLEVEL_OUTOF10: 0
PAINLEVEL_OUTOF10: 7
PAINLEVEL_OUTOF10: 5
PAINLEVEL_OUTOF10: 5
PAINLEVEL_OUTOF10: 0
PAINLEVEL_OUTOF10: 5
PAINLEVEL_OUTOF10: 4
PAINLEVEL_OUTOF10: 5
PAINLEVEL_OUTOF10: 8
PAINLEVEL_OUTOF10: 5

## 2025-07-31 ASSESSMENT — PAIN DESCRIPTION - ORIENTATION
ORIENTATION: MID
ORIENTATION: RIGHT
ORIENTATION: RIGHT

## 2025-07-31 ASSESSMENT — PAIN DESCRIPTION - DESCRIPTORS
DESCRIPTORS: ACHING
DESCRIPTORS: ACHING
DESCRIPTORS: STABBING

## 2025-07-31 ASSESSMENT — PAIN DESCRIPTION - LOCATION
LOCATION: FLANK
LOCATION: FLANK
LOCATION: BACK

## 2025-08-01 PROBLEM — N39.0 PSEUDOMONAS URINARY TRACT INFECTION: Status: ACTIVE | Noted: 2025-08-01

## 2025-08-01 PROBLEM — B96.5 PSEUDOMONAS URINARY TRACT INFECTION: Status: ACTIVE | Noted: 2025-08-01

## 2025-08-01 LAB
ANION GAP SERPL CALCULATED.3IONS-SCNC: 13 MMOL/L (ref 9–17)
BNP SERPL-MCNC: ABNORMAL PG/ML (ref 0–125)
BUN SERPL-MCNC: 49 MG/DL (ref 7–20)
CALCIUM SERPL-MCNC: 9.6 MG/DL (ref 8.3–10.6)
CHLORIDE SERPL-SCNC: 97 MMOL/L (ref 99–110)
CO2 SERPL-SCNC: 25 MMOL/L (ref 21–32)
CREAT SERPL-MCNC: 3.9 MG/DL (ref 0.6–1.1)
GFR, ESTIMATED: 12 ML/MIN/1.73M2
GLUCOSE BLD-MCNC: 115 MG/DL (ref 74–99)
GLUCOSE BLD-MCNC: 131 MG/DL (ref 74–99)
GLUCOSE BLD-MCNC: 173 MG/DL (ref 74–99)
GLUCOSE BLD-MCNC: 207 MG/DL (ref 74–99)
GLUCOSE BLD-MCNC: 220 MG/DL (ref 74–99)
GLUCOSE BLD-MCNC: 252 MG/DL (ref 74–99)
GLUCOSE SERPL-MCNC: 104 MG/DL (ref 74–99)
MAGNESIUM SERPL-MCNC: 2.2 MG/DL (ref 1.8–2.4)
MICROORGANISM SPEC CULT: ABNORMAL
POTASSIUM SERPL-SCNC: 4.4 MMOL/L (ref 3.5–5.1)
SERVICE CMNT-IMP: ABNORMAL
SODIUM SERPL-SCNC: 135 MMOL/L (ref 136–145)
SPECIMEN DESCRIPTION: ABNORMAL

## 2025-08-01 PROCEDURE — 94761 N-INVAS EAR/PLS OXIMETRY MLT: CPT

## 2025-08-01 PROCEDURE — 83880 ASSAY OF NATRIURETIC PEPTIDE: CPT

## 2025-08-01 PROCEDURE — 6370000000 HC RX 637 (ALT 250 FOR IP): Performed by: STUDENT IN AN ORGANIZED HEALTH CARE EDUCATION/TRAINING PROGRAM

## 2025-08-01 PROCEDURE — APPSS60 APP SPLIT SHARED TIME 46-60 MINUTES: Performed by: NURSE PRACTITIONER

## 2025-08-01 PROCEDURE — 2580000003 HC RX 258: Performed by: STUDENT IN AN ORGANIZED HEALTH CARE EDUCATION/TRAINING PROGRAM

## 2025-08-01 PROCEDURE — 2060000000 HC ICU INTERMEDIATE R&B

## 2025-08-01 PROCEDURE — 99223 1ST HOSP IP/OBS HIGH 75: CPT | Performed by: INTERNAL MEDICINE

## 2025-08-01 PROCEDURE — 82962 GLUCOSE BLOOD TEST: CPT

## 2025-08-01 PROCEDURE — 80048 BASIC METABOLIC PNL TOTAL CA: CPT

## 2025-08-01 PROCEDURE — 6360000002 HC RX W HCPCS: Performed by: INTERNAL MEDICINE

## 2025-08-01 PROCEDURE — 90935 HEMODIALYSIS ONE EVALUATION: CPT

## 2025-08-01 PROCEDURE — 36415 COLL VENOUS BLD VENIPUNCTURE: CPT

## 2025-08-01 PROCEDURE — 83735 ASSAY OF MAGNESIUM: CPT

## 2025-08-01 PROCEDURE — 6360000002 HC RX W HCPCS: Performed by: STUDENT IN AN ORGANIZED HEALTH CARE EDUCATION/TRAINING PROGRAM

## 2025-08-01 PROCEDURE — 2500000003 HC RX 250 WO HCPCS: Performed by: STUDENT IN AN ORGANIZED HEALTH CARE EDUCATION/TRAINING PROGRAM

## 2025-08-01 RX ORDER — SODIUM CHLORIDE 0.9 % (FLUSH) 0.9 %
5-40 SYRINGE (ML) INJECTION PRN
Status: CANCELLED | OUTPATIENT
Start: 2025-08-01

## 2025-08-01 RX ORDER — LIDOCAINE HYDROCHLORIDE 10 MG/ML
50 INJECTION, SOLUTION EPIDURAL; INFILTRATION; INTRACAUDAL; PERINEURAL ONCE
Status: CANCELLED | OUTPATIENT
Start: 2025-08-01 | End: 2025-08-01

## 2025-08-01 RX ORDER — SODIUM CHLORIDE 0.9 % (FLUSH) 0.9 %
5-40 SYRINGE (ML) INJECTION EVERY 12 HOURS SCHEDULED
Status: CANCELLED | OUTPATIENT
Start: 2025-08-01

## 2025-08-01 RX ORDER — SODIUM CHLORIDE 9 MG/ML
INJECTION, SOLUTION INTRAVENOUS PRN
Status: CANCELLED | OUTPATIENT
Start: 2025-08-01

## 2025-08-01 RX ADMIN — ASPIRIN 81 MG: 81 TABLET, CHEWABLE ORAL at 09:09

## 2025-08-01 RX ADMIN — HYDRALAZINE HYDROCHLORIDE 100 MG: 50 TABLET ORAL at 13:50

## 2025-08-01 RX ADMIN — IRON SUCROSE 200 MG: 20 INJECTION, SOLUTION INTRAVENOUS at 18:59

## 2025-08-01 RX ADMIN — ROSUVASTATIN CALCIUM 10 MG: 5 TABLET, FILM COATED ORAL at 22:08

## 2025-08-01 RX ADMIN — HEPARIN SODIUM 5000 UNITS: 5000 INJECTION INTRAVENOUS; SUBCUTANEOUS at 01:34

## 2025-08-01 RX ADMIN — METOPROLOL TARTRATE 25 MG: 25 TABLET, FILM COATED ORAL at 22:08

## 2025-08-01 RX ADMIN — CITALOPRAM HYDROBROMIDE 40 MG: 20 TABLET ORAL at 09:09

## 2025-08-01 RX ADMIN — BUSPIRONE HYDROCHLORIDE 10 MG: 5 TABLET ORAL at 22:08

## 2025-08-01 RX ADMIN — HYDRALAZINE HYDROCHLORIDE 100 MG: 50 TABLET ORAL at 22:08

## 2025-08-01 RX ADMIN — AMLODIPINE BESYLATE 5 MG: 5 TABLET ORAL at 22:08

## 2025-08-01 RX ADMIN — HYDRALAZINE HYDROCHLORIDE 100 MG: 50 TABLET ORAL at 09:09

## 2025-08-01 RX ADMIN — SEVELAMER CARBONATE 800 MG: 800 TABLET, FILM COATED ORAL at 13:50

## 2025-08-01 RX ADMIN — HEPARIN SODIUM 5000 UNITS: 5000 INJECTION INTRAVENOUS; SUBCUTANEOUS at 18:59

## 2025-08-01 RX ADMIN — AMLODIPINE BESYLATE 5 MG: 5 TABLET ORAL at 09:09

## 2025-08-01 RX ADMIN — SODIUM CHLORIDE, PRESERVATIVE FREE 10 ML: 5 INJECTION INTRAVENOUS at 09:00

## 2025-08-01 RX ADMIN — BUSPIRONE HYDROCHLORIDE 10 MG: 5 TABLET ORAL at 09:08

## 2025-08-01 RX ADMIN — SEVELAMER CARBONATE 800 MG: 800 TABLET, FILM COATED ORAL at 18:59

## 2025-08-01 RX ADMIN — HEPARIN SODIUM 5000 UNITS: 5000 INJECTION INTRAVENOUS; SUBCUTANEOUS at 09:09

## 2025-08-01 RX ADMIN — Medication 1000 UNITS: at 09:09

## 2025-08-01 RX ADMIN — SEVELAMER CARBONATE 800 MG: 800 TABLET, FILM COATED ORAL at 09:09

## 2025-08-01 RX ADMIN — METOPROLOL TARTRATE 25 MG: 25 TABLET, FILM COATED ORAL at 09:08

## 2025-08-01 RX ADMIN — CEFEPIME 1000 MG: 1 INJECTION, POWDER, FOR SOLUTION INTRAMUSCULAR; INTRAVENOUS at 09:14

## 2025-08-01 RX ADMIN — Medication 1000 MCG: at 09:09

## 2025-08-02 LAB
ANION GAP SERPL CALCULATED.3IONS-SCNC: 14 MMOL/L (ref 9–17)
BASOPHILS # BLD: 0.1 K/UL
BASOPHILS NFR BLD: 1 % (ref 0–1)
BUN SERPL-MCNC: 57 MG/DL (ref 7–20)
CALCIUM SERPL-MCNC: 9.2 MG/DL (ref 8.3–10.6)
CHLORIDE SERPL-SCNC: 100 MMOL/L (ref 99–110)
CO2 SERPL-SCNC: 23 MMOL/L (ref 21–32)
CREAT SERPL-MCNC: 4.4 MG/DL (ref 0.6–1.1)
CRP SERPL HS-MCNC: 4.6 MG/L (ref 0–5)
EOSINOPHIL # BLD: 0.25 K/UL
EOSINOPHILS RELATIVE PERCENT: 3 % (ref 0–3)
ERYTHROCYTE [DISTWIDTH] IN BLOOD BY AUTOMATED COUNT: 12.9 % (ref 11.7–14.9)
GFR, ESTIMATED: 11 ML/MIN/1.73M2
GLUCOSE BLD-MCNC: 126 MG/DL (ref 74–99)
GLUCOSE BLD-MCNC: 199 MG/DL (ref 74–99)
GLUCOSE BLD-MCNC: 286 MG/DL (ref 74–99)
GLUCOSE BLD-MCNC: 90 MG/DL (ref 74–99)
GLUCOSE SERPL-MCNC: 115 MG/DL (ref 74–99)
HCT VFR BLD AUTO: 25.9 % (ref 37–47)
HGB BLD-MCNC: 8.1 G/DL (ref 12.5–16)
IMM GRANULOCYTES # BLD AUTO: 0.04 K/UL
IMM GRANULOCYTES NFR BLD: 1 %
LYMPHOCYTES NFR BLD: 0.84 K/UL
LYMPHOCYTES RELATIVE PERCENT: 10 % (ref 24–44)
MAGNESIUM SERPL-MCNC: 2.3 MG/DL (ref 1.8–2.4)
MCH RBC QN AUTO: 30.5 PG (ref 27–31)
MCHC RBC AUTO-ENTMCNC: 31.3 G/DL (ref 32–36)
MCV RBC AUTO: 97.4 FL (ref 78–100)
MONOCYTES NFR BLD: 0.57 K/UL
MONOCYTES NFR BLD: 7 % (ref 0–5)
NEUTROPHILS NFR BLD: 79 % (ref 36–66)
NEUTS SEG NFR BLD: 6.88 K/UL
PLATELET # BLD AUTO: 297 K/UL (ref 140–440)
PMV BLD AUTO: 10 FL (ref 7.5–11.1)
POTASSIUM SERPL-SCNC: 5.1 MMOL/L (ref 3.5–5.1)
PROCALCITONIN SERPL-MCNC: 0.16 NG/ML
RBC # BLD AUTO: 2.66 M/UL (ref 4.2–5.4)
SODIUM SERPL-SCNC: 136 MMOL/L (ref 136–145)
WBC OTHER # BLD: 8.7 K/UL (ref 4–10.5)

## 2025-08-02 PROCEDURE — 84145 PROCALCITONIN (PCT): CPT

## 2025-08-02 PROCEDURE — 2060000000 HC ICU INTERMEDIATE R&B

## 2025-08-02 PROCEDURE — 82962 GLUCOSE BLOOD TEST: CPT

## 2025-08-02 PROCEDURE — 6360000002 HC RX W HCPCS: Performed by: STUDENT IN AN ORGANIZED HEALTH CARE EDUCATION/TRAINING PROGRAM

## 2025-08-02 PROCEDURE — 6360000002 HC RX W HCPCS: Performed by: NURSE PRACTITIONER

## 2025-08-02 PROCEDURE — C1751 CATH, INF, PER/CENT/MIDLINE: HCPCS

## 2025-08-02 PROCEDURE — 94761 N-INVAS EAR/PLS OXIMETRY MLT: CPT

## 2025-08-02 PROCEDURE — 86140 C-REACTIVE PROTEIN: CPT

## 2025-08-02 PROCEDURE — 85025 COMPLETE CBC W/AUTO DIFF WBC: CPT

## 2025-08-02 PROCEDURE — 6370000000 HC RX 637 (ALT 250 FOR IP): Performed by: STUDENT IN AN ORGANIZED HEALTH CARE EDUCATION/TRAINING PROGRAM

## 2025-08-02 PROCEDURE — 36415 COLL VENOUS BLD VENIPUNCTURE: CPT

## 2025-08-02 PROCEDURE — 2580000003 HC RX 258: Performed by: STUDENT IN AN ORGANIZED HEALTH CARE EDUCATION/TRAINING PROGRAM

## 2025-08-02 PROCEDURE — 2580000003 HC RX 258: Performed by: NURSE PRACTITIONER

## 2025-08-02 PROCEDURE — 80048 BASIC METABOLIC PNL TOTAL CA: CPT

## 2025-08-02 PROCEDURE — 76937 US GUIDE VASCULAR ACCESS: CPT

## 2025-08-02 PROCEDURE — 83735 ASSAY OF MAGNESIUM: CPT

## 2025-08-02 PROCEDURE — 2500000003 HC RX 250 WO HCPCS: Performed by: STUDENT IN AN ORGANIZED HEALTH CARE EDUCATION/TRAINING PROGRAM

## 2025-08-02 PROCEDURE — 36410 VNPNXR 3YR/> PHY/QHP DX/THER: CPT

## 2025-08-02 PROCEDURE — 6370000000 HC RX 637 (ALT 250 FOR IP): Performed by: INTERNAL MEDICINE

## 2025-08-02 RX ORDER — TORSEMIDE 20 MG/1
40 TABLET ORAL DAILY
Status: DISCONTINUED | OUTPATIENT
Start: 2025-08-02 | End: 2025-08-05 | Stop reason: HOSPADM

## 2025-08-02 RX ADMIN — ROSUVASTATIN CALCIUM 10 MG: 5 TABLET, FILM COATED ORAL at 21:50

## 2025-08-02 RX ADMIN — HEPARIN SODIUM 5000 UNITS: 5000 INJECTION INTRAVENOUS; SUBCUTANEOUS at 11:21

## 2025-08-02 RX ADMIN — METOPROLOL TARTRATE 25 MG: 25 TABLET, FILM COATED ORAL at 21:50

## 2025-08-02 RX ADMIN — SODIUM CHLORIDE, PRESERVATIVE FREE 10 ML: 5 INJECTION INTRAVENOUS at 21:51

## 2025-08-02 RX ADMIN — INSULIN LISPRO 2 UNITS: 100 INJECTION, SOLUTION INTRAVENOUS; SUBCUTANEOUS at 14:14

## 2025-08-02 RX ADMIN — HYDRALAZINE HYDROCHLORIDE 100 MG: 50 TABLET ORAL at 21:50

## 2025-08-02 RX ADMIN — PIPERACILLIN AND TAZOBACTAM 3375 MG: 3; .375 INJECTION, POWDER, FOR SOLUTION INTRAVENOUS at 00:27

## 2025-08-02 RX ADMIN — HEPARIN SODIUM 5000 UNITS: 5000 INJECTION INTRAVENOUS; SUBCUTANEOUS at 02:49

## 2025-08-02 RX ADMIN — SEVELAMER CARBONATE 800 MG: 800 TABLET, FILM COATED ORAL at 18:15

## 2025-08-02 RX ADMIN — AMLODIPINE BESYLATE 5 MG: 5 TABLET ORAL at 21:50

## 2025-08-02 RX ADMIN — ASPIRIN 81 MG: 81 TABLET, CHEWABLE ORAL at 11:20

## 2025-08-02 RX ADMIN — SODIUM CHLORIDE, PRESERVATIVE FREE 10 ML: 5 INJECTION INTRAVENOUS at 11:22

## 2025-08-02 RX ADMIN — HYDRALAZINE HYDROCHLORIDE 100 MG: 50 TABLET ORAL at 16:08

## 2025-08-02 RX ADMIN — AMLODIPINE BESYLATE 5 MG: 5 TABLET ORAL at 11:20

## 2025-08-02 RX ADMIN — HYDRALAZINE HYDROCHLORIDE 100 MG: 50 TABLET ORAL at 11:20

## 2025-08-02 RX ADMIN — PIPERACILLIN AND TAZOBACTAM 3375 MG: 3; .375 INJECTION, POWDER, FOR SOLUTION INTRAVENOUS at 21:56

## 2025-08-02 RX ADMIN — PIPERACILLIN AND TAZOBACTAM 3375 MG: 3; .375 INJECTION, POWDER, FOR SOLUTION INTRAVENOUS at 11:32

## 2025-08-02 RX ADMIN — SEVELAMER CARBONATE 800 MG: 800 TABLET, FILM COATED ORAL at 11:20

## 2025-08-02 RX ADMIN — SODIUM CHLORIDE: 9 INJECTION, SOLUTION INTRAVENOUS at 11:31

## 2025-08-02 RX ADMIN — CITALOPRAM HYDROBROMIDE 40 MG: 20 TABLET ORAL at 11:21

## 2025-08-02 RX ADMIN — Medication 1000 UNITS: at 11:19

## 2025-08-02 RX ADMIN — BUSPIRONE HYDROCHLORIDE 10 MG: 5 TABLET ORAL at 21:50

## 2025-08-02 RX ADMIN — HEPARIN SODIUM 5000 UNITS: 5000 INJECTION INTRAVENOUS; SUBCUTANEOUS at 18:15

## 2025-08-02 RX ADMIN — BUSPIRONE HYDROCHLORIDE 10 MG: 5 TABLET ORAL at 11:21

## 2025-08-02 RX ADMIN — METOPROLOL TARTRATE 25 MG: 25 TABLET, FILM COATED ORAL at 11:20

## 2025-08-02 RX ADMIN — SODIUM CHLORIDE, PRESERVATIVE FREE 10 ML: 5 INJECTION INTRAVENOUS at 00:29

## 2025-08-02 RX ADMIN — TORSEMIDE 40 MG: 20 TABLET ORAL at 11:40

## 2025-08-02 ASSESSMENT — PAIN SCALES - GENERAL: PAINLEVEL_OUTOF10: 0

## 2025-08-03 PROBLEM — N18.6 ESRD (END STAGE RENAL DISEASE) (HCC): Status: ACTIVE | Noted: 2025-08-03

## 2025-08-03 PROBLEM — N39.0 URINARY TRACT INFECTION DUE TO PSEUDOMONAS AERUGINOSA: Status: ACTIVE | Noted: 2025-08-03

## 2025-08-03 PROBLEM — T83.510A URINARY TRACT INFECTION ASSOCIATED WITH CYSTOSTOMY CATHETER: Status: ACTIVE | Noted: 2023-10-10

## 2025-08-03 PROBLEM — B96.5 URINARY TRACT INFECTION DUE TO PSEUDOMONAS AERUGINOSA: Status: ACTIVE | Noted: 2025-08-03

## 2025-08-03 LAB
ANION GAP SERPL CALCULATED.3IONS-SCNC: 13 MMOL/L (ref 9–17)
BASOPHILS # BLD: 0.09 K/UL
BASOPHILS NFR BLD: 1 % (ref 0–1)
BNP SERPL-MCNC: 7496 PG/ML (ref 0–125)
BUN SERPL-MCNC: 59 MG/DL (ref 7–20)
CALCIUM SERPL-MCNC: 9.1 MG/DL (ref 8.3–10.6)
CHLORIDE SERPL-SCNC: 100 MMOL/L (ref 99–110)
CO2 SERPL-SCNC: 21 MMOL/L (ref 21–32)
CREAT SERPL-MCNC: 5.1 MG/DL (ref 0.6–1.1)
CRP SERPL HS-MCNC: 3 MG/L (ref 0–5)
EOSINOPHIL # BLD: 0.33 K/UL
EOSINOPHILS RELATIVE PERCENT: 4 % (ref 0–3)
ERYTHROCYTE [DISTWIDTH] IN BLOOD BY AUTOMATED COUNT: 13.1 % (ref 11.7–14.9)
GFR, ESTIMATED: 9 ML/MIN/1.73M2
GLUCOSE BLD-MCNC: 115 MG/DL (ref 74–99)
GLUCOSE BLD-MCNC: 176 MG/DL (ref 74–99)
GLUCOSE BLD-MCNC: 207 MG/DL (ref 74–99)
GLUCOSE BLD-MCNC: 211 MG/DL (ref 74–99)
GLUCOSE SERPL-MCNC: 105 MG/DL (ref 74–99)
HCT VFR BLD AUTO: 25.8 % (ref 37–47)
HGB BLD-MCNC: 7.8 G/DL (ref 12.5–16)
IMM GRANULOCYTES # BLD AUTO: 0.02 K/UL
IMM GRANULOCYTES NFR BLD: 0 %
LYMPHOCYTES NFR BLD: 0.8 K/UL
LYMPHOCYTES RELATIVE PERCENT: 10 % (ref 24–44)
MAGNESIUM SERPL-MCNC: 2.4 MG/DL (ref 1.8–2.4)
MCH RBC QN AUTO: 30.5 PG (ref 27–31)
MCHC RBC AUTO-ENTMCNC: 30.2 G/DL (ref 32–36)
MCV RBC AUTO: 100.8 FL (ref 78–100)
MONOCYTES NFR BLD: 0.5 K/UL
MONOCYTES NFR BLD: 6 % (ref 0–5)
NEUTROPHILS NFR BLD: 78 % (ref 36–66)
NEUTS SEG NFR BLD: 6.02 K/UL
PLATELET # BLD AUTO: 276 K/UL (ref 140–440)
PMV BLD AUTO: 10 FL (ref 7.5–11.1)
POTASSIUM SERPL-SCNC: 5 MMOL/L (ref 3.5–5.1)
RBC # BLD AUTO: 2.56 M/UL (ref 4.2–5.4)
SODIUM SERPL-SCNC: 134 MMOL/L (ref 136–145)
WBC OTHER # BLD: 7.8 K/UL (ref 4–10.5)

## 2025-08-03 PROCEDURE — 80048 BASIC METABOLIC PNL TOTAL CA: CPT

## 2025-08-03 PROCEDURE — 36415 COLL VENOUS BLD VENIPUNCTURE: CPT

## 2025-08-03 PROCEDURE — 86140 C-REACTIVE PROTEIN: CPT

## 2025-08-03 PROCEDURE — 6370000000 HC RX 637 (ALT 250 FOR IP): Performed by: STUDENT IN AN ORGANIZED HEALTH CARE EDUCATION/TRAINING PROGRAM

## 2025-08-03 PROCEDURE — 2580000003 HC RX 258: Performed by: NURSE PRACTITIONER

## 2025-08-03 PROCEDURE — 82962 GLUCOSE BLOOD TEST: CPT

## 2025-08-03 PROCEDURE — 6360000002 HC RX W HCPCS: Performed by: NURSE PRACTITIONER

## 2025-08-03 PROCEDURE — 2500000003 HC RX 250 WO HCPCS: Performed by: STUDENT IN AN ORGANIZED HEALTH CARE EDUCATION/TRAINING PROGRAM

## 2025-08-03 PROCEDURE — 6360000002 HC RX W HCPCS: Performed by: STUDENT IN AN ORGANIZED HEALTH CARE EDUCATION/TRAINING PROGRAM

## 2025-08-03 PROCEDURE — 6370000000 HC RX 637 (ALT 250 FOR IP): Performed by: INTERNAL MEDICINE

## 2025-08-03 PROCEDURE — 94761 N-INVAS EAR/PLS OXIMETRY MLT: CPT

## 2025-08-03 PROCEDURE — 2580000003 HC RX 258: Performed by: STUDENT IN AN ORGANIZED HEALTH CARE EDUCATION/TRAINING PROGRAM

## 2025-08-03 PROCEDURE — 83735 ASSAY OF MAGNESIUM: CPT

## 2025-08-03 PROCEDURE — 1200000000 HC SEMI PRIVATE

## 2025-08-03 PROCEDURE — 85025 COMPLETE CBC W/AUTO DIFF WBC: CPT

## 2025-08-03 PROCEDURE — 83880 ASSAY OF NATRIURETIC PEPTIDE: CPT

## 2025-08-03 RX ADMIN — SODIUM CHLORIDE, PRESERVATIVE FREE 10 ML: 5 INJECTION INTRAVENOUS at 21:15

## 2025-08-03 RX ADMIN — BUSPIRONE HYDROCHLORIDE 10 MG: 5 TABLET ORAL at 21:15

## 2025-08-03 RX ADMIN — SEVELAMER CARBONATE 800 MG: 800 TABLET, FILM COATED ORAL at 08:25

## 2025-08-03 RX ADMIN — SODIUM CHLORIDE: 9 INJECTION, SOLUTION INTRAVENOUS at 08:40

## 2025-08-03 RX ADMIN — HYDRALAZINE HYDROCHLORIDE 100 MG: 50 TABLET ORAL at 08:24

## 2025-08-03 RX ADMIN — HYDRALAZINE HYDROCHLORIDE 100 MG: 50 TABLET ORAL at 14:17

## 2025-08-03 RX ADMIN — SEVELAMER CARBONATE 800 MG: 800 TABLET, FILM COATED ORAL at 14:17

## 2025-08-03 RX ADMIN — ASPIRIN 81 MG: 81 TABLET, CHEWABLE ORAL at 08:25

## 2025-08-03 RX ADMIN — METOPROLOL TARTRATE 25 MG: 25 TABLET, FILM COATED ORAL at 21:14

## 2025-08-03 RX ADMIN — BUSPIRONE HYDROCHLORIDE 10 MG: 5 TABLET ORAL at 08:25

## 2025-08-03 RX ADMIN — METOPROLOL TARTRATE 25 MG: 25 TABLET, FILM COATED ORAL at 08:25

## 2025-08-03 RX ADMIN — HEPARIN SODIUM 5000 UNITS: 5000 INJECTION INTRAVENOUS; SUBCUTANEOUS at 08:24

## 2025-08-03 RX ADMIN — TORSEMIDE 40 MG: 20 TABLET ORAL at 08:33

## 2025-08-03 RX ADMIN — ROSUVASTATIN CALCIUM 10 MG: 5 TABLET, FILM COATED ORAL at 21:15

## 2025-08-03 RX ADMIN — AMLODIPINE BESYLATE 5 MG: 5 TABLET ORAL at 08:25

## 2025-08-03 RX ADMIN — HEPARIN SODIUM 5000 UNITS: 5000 INJECTION INTRAVENOUS; SUBCUTANEOUS at 17:51

## 2025-08-03 RX ADMIN — CITALOPRAM HYDROBROMIDE 40 MG: 20 TABLET ORAL at 08:25

## 2025-08-03 RX ADMIN — PIPERACILLIN AND TAZOBACTAM 3375 MG: 3; .375 INJECTION, POWDER, FOR SOLUTION INTRAVENOUS at 08:42

## 2025-08-03 RX ADMIN — SEVELAMER CARBONATE 800 MG: 800 TABLET, FILM COATED ORAL at 17:51

## 2025-08-03 RX ADMIN — Medication 1000 UNITS: at 08:25

## 2025-08-03 RX ADMIN — HYDRALAZINE HYDROCHLORIDE 100 MG: 50 TABLET ORAL at 21:15

## 2025-08-03 RX ADMIN — PIPERACILLIN AND TAZOBACTAM 3375 MG: 3; .375 INJECTION, POWDER, FOR SOLUTION INTRAVENOUS at 21:23

## 2025-08-03 RX ADMIN — SODIUM CHLORIDE, PRESERVATIVE FREE 10 ML: 5 INJECTION INTRAVENOUS at 08:24

## 2025-08-03 RX ADMIN — AMLODIPINE BESYLATE 5 MG: 5 TABLET ORAL at 21:15

## 2025-08-04 ENCOUNTER — APPOINTMENT (OUTPATIENT)
Dept: INTERVENTIONAL RADIOLOGY/VASCULAR | Age: 45
DRG: 640 | End: 2025-08-04
Attending: INTERNAL MEDICINE
Payer: MEDICARE

## 2025-08-04 LAB
ANION GAP SERPL CALCULATED.3IONS-SCNC: 12 MMOL/L (ref 9–17)
BASOPHILS # BLD: 0.09 K/UL
BASOPHILS NFR BLD: 1 % (ref 0–1)
BUN SERPL-MCNC: 58 MG/DL (ref 7–20)
CALCIUM SERPL-MCNC: 9.2 MG/DL (ref 8.3–10.6)
CHLORIDE SERPL-SCNC: 101 MMOL/L (ref 99–110)
CO2 SERPL-SCNC: 23 MMOL/L (ref 21–32)
CREAT SERPL-MCNC: 5.3 MG/DL (ref 0.6–1.1)
CRP SERPL HS-MCNC: <3 MG/L (ref 0–5)
EOSINOPHIL # BLD: 0.45 K/UL
EOSINOPHILS RELATIVE PERCENT: 6 % (ref 0–3)
ERYTHROCYTE [DISTWIDTH] IN BLOOD BY AUTOMATED COUNT: 13.2 % (ref 11.7–14.9)
GFR, ESTIMATED: 9 ML/MIN/1.73M2
GLUCOSE BLD-MCNC: 143 MG/DL (ref 74–99)
GLUCOSE BLD-MCNC: 155 MG/DL (ref 74–99)
GLUCOSE BLD-MCNC: 156 MG/DL (ref 74–99)
GLUCOSE BLD-MCNC: 177 MG/DL (ref 74–99)
GLUCOSE SERPL-MCNC: 128 MG/DL (ref 74–99)
HCT VFR BLD AUTO: 26.5 % (ref 37–47)
HGB BLD-MCNC: 8.1 G/DL (ref 12.5–16)
IMM GRANULOCYTES # BLD AUTO: 0.03 K/UL
IMM GRANULOCYTES NFR BLD: 0 %
LYMPHOCYTES NFR BLD: 1.06 K/UL
LYMPHOCYTES RELATIVE PERCENT: 13 % (ref 24–44)
MAGNESIUM SERPL-MCNC: 2.5 MG/DL (ref 1.8–2.4)
MCH RBC QN AUTO: 30.5 PG (ref 27–31)
MCHC RBC AUTO-ENTMCNC: 30.6 G/DL (ref 32–36)
MCV RBC AUTO: 99.6 FL (ref 78–100)
MONOCYTES NFR BLD: 0.64 K/UL
MONOCYTES NFR BLD: 8 % (ref 0–5)
NEUTROPHILS NFR BLD: 72 % (ref 36–66)
NEUTS SEG NFR BLD: 5.87 K/UL
PLATELET # BLD AUTO: 299 K/UL (ref 140–440)
PMV BLD AUTO: 10 FL (ref 7.5–11.1)
POTASSIUM SERPL-SCNC: 4.7 MMOL/L (ref 3.5–5.1)
RBC # BLD AUTO: 2.66 M/UL (ref 4.2–5.4)
SODIUM SERPL-SCNC: 136 MMOL/L (ref 136–145)
WBC OTHER # BLD: 8.1 K/UL (ref 4–10.5)

## 2025-08-04 PROCEDURE — 6370000000 HC RX 637 (ALT 250 FOR IP): Performed by: STUDENT IN AN ORGANIZED HEALTH CARE EDUCATION/TRAINING PROGRAM

## 2025-08-04 PROCEDURE — 76937 US GUIDE VASCULAR ACCESS: CPT

## 2025-08-04 PROCEDURE — 5A1D70Z PERFORMANCE OF URINARY FILTRATION, INTERMITTENT, LESS THAN 6 HOURS PER DAY: ICD-10-PCS | Performed by: STUDENT IN AN ORGANIZED HEALTH CARE EDUCATION/TRAINING PROGRAM

## 2025-08-04 PROCEDURE — 6360000002 HC RX W HCPCS

## 2025-08-04 PROCEDURE — 1200000000 HC SEMI PRIVATE

## 2025-08-04 PROCEDURE — 85025 COMPLETE CBC W/AUTO DIFF WBC: CPT

## 2025-08-04 PROCEDURE — 94761 N-INVAS EAR/PLS OXIMETRY MLT: CPT

## 2025-08-04 PROCEDURE — 6360000002 HC RX W HCPCS: Performed by: RADIOLOGY

## 2025-08-04 PROCEDURE — 6360000002 HC RX W HCPCS: Performed by: STUDENT IN AN ORGANIZED HEALTH CARE EDUCATION/TRAINING PROGRAM

## 2025-08-04 PROCEDURE — 6360000002 HC RX W HCPCS: Performed by: NURSE PRACTITIONER

## 2025-08-04 PROCEDURE — 02HV33Z INSERTION OF INFUSION DEVICE INTO SUPERIOR VENA CAVA, PERCUTANEOUS APPROACH: ICD-10-PCS | Performed by: STUDENT IN AN ORGANIZED HEALTH CARE EDUCATION/TRAINING PROGRAM

## 2025-08-04 PROCEDURE — 82962 GLUCOSE BLOOD TEST: CPT

## 2025-08-04 PROCEDURE — 2580000003 HC RX 258: Performed by: STUDENT IN AN ORGANIZED HEALTH CARE EDUCATION/TRAINING PROGRAM

## 2025-08-04 PROCEDURE — 6370000000 HC RX 637 (ALT 250 FOR IP): Performed by: INTERNAL MEDICINE

## 2025-08-04 PROCEDURE — 2580000003 HC RX 258: Performed by: NURSE PRACTITIONER

## 2025-08-04 PROCEDURE — 6370000000 HC RX 637 (ALT 250 FOR IP): Performed by: NURSE PRACTITIONER

## 2025-08-04 PROCEDURE — C1769 GUIDE WIRE: HCPCS

## 2025-08-04 PROCEDURE — 36558 INSERT TUNNELED CV CATH: CPT

## 2025-08-04 PROCEDURE — 86140 C-REACTIVE PROTEIN: CPT

## 2025-08-04 PROCEDURE — 77001 FLUOROGUIDE FOR VEIN DEVICE: CPT

## 2025-08-04 PROCEDURE — 36415 COLL VENOUS BLD VENIPUNCTURE: CPT

## 2025-08-04 PROCEDURE — 80048 BASIC METABOLIC PNL TOTAL CA: CPT

## 2025-08-04 PROCEDURE — 99232 SBSQ HOSP IP/OBS MODERATE 35: CPT | Performed by: NURSE PRACTITIONER

## 2025-08-04 PROCEDURE — 83735 ASSAY OF MAGNESIUM: CPT

## 2025-08-04 PROCEDURE — 2500000003 HC RX 250 WO HCPCS: Performed by: STUDENT IN AN ORGANIZED HEALTH CARE EDUCATION/TRAINING PROGRAM

## 2025-08-04 PROCEDURE — 0JH63XZ INSERTION OF TUNNELED VASCULAR ACCESS DEVICE INTO CHEST SUBCUTANEOUS TISSUE AND FASCIA, PERCUTANEOUS APPROACH: ICD-10-PCS | Performed by: STUDENT IN AN ORGANIZED HEALTH CARE EDUCATION/TRAINING PROGRAM

## 2025-08-04 RX ORDER — FENTANYL CITRATE 50 UG/ML
INJECTION, SOLUTION INTRAMUSCULAR; INTRAVENOUS PRN
Status: COMPLETED | OUTPATIENT
Start: 2025-08-04 | End: 2025-08-04

## 2025-08-04 RX ORDER — DIPHENHYDRAMINE HYDROCHLORIDE 50 MG/ML
INJECTION, SOLUTION INTRAMUSCULAR; INTRAVENOUS PRN
Status: COMPLETED | OUTPATIENT
Start: 2025-08-04 | End: 2025-08-04

## 2025-08-04 RX ORDER — OXYCODONE HYDROCHLORIDE 5 MG/1
2.5 TABLET ORAL EVERY 4 HOURS PRN
Refills: 0 | Status: DISCONTINUED | OUTPATIENT
Start: 2025-08-04 | End: 2025-08-05 | Stop reason: HOSPADM

## 2025-08-04 RX ORDER — OXYCODONE HYDROCHLORIDE 5 MG/1
5 TABLET ORAL EVERY 4 HOURS PRN
Refills: 0 | Status: DISCONTINUED | OUTPATIENT
Start: 2025-08-04 | End: 2025-08-05 | Stop reason: HOSPADM

## 2025-08-04 RX ADMIN — SEVELAMER CARBONATE 800 MG: 800 TABLET, FILM COATED ORAL at 10:11

## 2025-08-04 RX ADMIN — CITALOPRAM HYDROBROMIDE 40 MG: 20 TABLET ORAL at 10:12

## 2025-08-04 RX ADMIN — BUSPIRONE HYDROCHLORIDE 10 MG: 5 TABLET ORAL at 10:11

## 2025-08-04 RX ADMIN — PIPERACILLIN AND TAZOBACTAM 3375 MG: 3; .375 INJECTION, POWDER, FOR SOLUTION INTRAVENOUS at 10:19

## 2025-08-04 RX ADMIN — HYDRALAZINE HYDROCHLORIDE 100 MG: 50 TABLET ORAL at 10:11

## 2025-08-04 RX ADMIN — SODIUM CHLORIDE, PRESERVATIVE FREE 10 ML: 5 INJECTION INTRAVENOUS at 22:30

## 2025-08-04 RX ADMIN — Medication 1000 UNITS: at 10:11

## 2025-08-04 RX ADMIN — SODIUM CHLORIDE, PRESERVATIVE FREE 10 ML: 5 INJECTION INTRAVENOUS at 10:16

## 2025-08-04 RX ADMIN — AMLODIPINE BESYLATE 5 MG: 5 TABLET ORAL at 22:06

## 2025-08-04 RX ADMIN — PIPERACILLIN AND TAZOBACTAM 3375 MG: 3; .375 INJECTION, POWDER, FOR SOLUTION INTRAVENOUS at 22:09

## 2025-08-04 RX ADMIN — OXYCODONE HYDROCHLORIDE 5 MG: 5 TABLET ORAL at 21:37

## 2025-08-04 RX ADMIN — ASPIRIN 81 MG: 81 TABLET, CHEWABLE ORAL at 10:11

## 2025-08-04 RX ADMIN — SEVELAMER CARBONATE 800 MG: 800 TABLET, FILM COATED ORAL at 17:23

## 2025-08-04 RX ADMIN — HEPARIN SODIUM 5000 UNITS: 5000 INJECTION INTRAVENOUS; SUBCUTANEOUS at 05:02

## 2025-08-04 RX ADMIN — DIPHENHYDRAMINE HYDROCHLORIDE 25 MG: 50 INJECTION, SOLUTION INTRAMUSCULAR; INTRAVENOUS at 16:09

## 2025-08-04 RX ADMIN — BUSPIRONE HYDROCHLORIDE 10 MG: 5 TABLET ORAL at 22:05

## 2025-08-04 RX ADMIN — HYDRALAZINE HYDROCHLORIDE 100 MG: 50 TABLET ORAL at 22:06

## 2025-08-04 RX ADMIN — HYDRALAZINE HYDROCHLORIDE 100 MG: 50 TABLET ORAL at 13:30

## 2025-08-04 RX ADMIN — METOPROLOL TARTRATE 25 MG: 25 TABLET, FILM COATED ORAL at 22:06

## 2025-08-04 RX ADMIN — AMLODIPINE BESYLATE 5 MG: 5 TABLET ORAL at 10:11

## 2025-08-04 RX ADMIN — METOPROLOL TARTRATE 25 MG: 25 TABLET, FILM COATED ORAL at 10:11

## 2025-08-04 RX ADMIN — ROSUVASTATIN CALCIUM 10 MG: 5 TABLET, FILM COATED ORAL at 22:05

## 2025-08-04 RX ADMIN — SODIUM CHLORIDE: 9 INJECTION, SOLUTION INTRAVENOUS at 10:18

## 2025-08-04 RX ADMIN — Medication 1000 MCG: at 10:11

## 2025-08-04 RX ADMIN — FENTANYL CITRATE 50 MCG: 50 INJECTION, SOLUTION INTRAMUSCULAR; INTRAVENOUS at 16:08

## 2025-08-04 RX ADMIN — SEVELAMER CARBONATE 800 MG: 800 TABLET, FILM COATED ORAL at 13:30

## 2025-08-04 RX ADMIN — TORSEMIDE 40 MG: 20 TABLET ORAL at 10:11

## 2025-08-04 ASSESSMENT — PAIN DESCRIPTION - DESCRIPTORS
DESCRIPTORS: THROBBING
DESCRIPTORS: THROBBING

## 2025-08-04 ASSESSMENT — PAIN - FUNCTIONAL ASSESSMENT: PAIN_FUNCTIONAL_ASSESSMENT: ACTIVITIES ARE NOT PREVENTED

## 2025-08-04 ASSESSMENT — PAIN DESCRIPTION - LOCATION
LOCATION: NECK
LOCATION: NECK

## 2025-08-04 ASSESSMENT — PAIN DESCRIPTION - ORIENTATION
ORIENTATION: LEFT
ORIENTATION: LEFT

## 2025-08-04 ASSESSMENT — PAIN SCALES - GENERAL: PAINLEVEL_OUTOF10: 2

## 2025-08-05 VITALS
WEIGHT: 137.79 LBS | TEMPERATURE: 97.9 F | HEIGHT: 60 IN | SYSTOLIC BLOOD PRESSURE: 117 MMHG | HEART RATE: 63 BPM | BODY MASS INDEX: 27.05 KG/M2 | OXYGEN SATURATION: 99 % | DIASTOLIC BLOOD PRESSURE: 46 MMHG | RESPIRATION RATE: 18 BRPM

## 2025-08-05 LAB
ALBUMIN SERPL-MCNC: 3.8 G/DL (ref 3.4–5)
ALBUMIN/GLOB SERPL: 1.2 {RATIO} (ref 1.1–2.2)
ALP SERPL-CCNC: 101 U/L (ref 40–129)
ALT SERPL-CCNC: 15 U/L (ref 10–40)
ANION GAP SERPL CALCULATED.3IONS-SCNC: 11 MMOL/L (ref 9–17)
AST SERPL-CCNC: 34 U/L (ref 15–37)
BASOPHILS # BLD: 0.07 K/UL
BASOPHILS NFR BLD: 1 % (ref 0–1)
BILIRUB SERPL-MCNC: 0.3 MG/DL (ref 0–1)
BNP SERPL-MCNC: 5473 PG/ML (ref 0–125)
BUN SERPL-MCNC: 60 MG/DL (ref 7–20)
CALCIUM SERPL-MCNC: 9.1 MG/DL (ref 8.3–10.6)
CHLORIDE SERPL-SCNC: 102 MMOL/L (ref 99–110)
CO2 SERPL-SCNC: 22 MMOL/L (ref 21–32)
CREAT SERPL-MCNC: 5.3 MG/DL (ref 0.6–1.1)
EOSINOPHIL # BLD: 0.56 K/UL
EOSINOPHILS RELATIVE PERCENT: 5 % (ref 0–3)
ERYTHROCYTE [DISTWIDTH] IN BLOOD BY AUTOMATED COUNT: 13.1 % (ref 11.7–14.9)
GFR, ESTIMATED: 9 ML/MIN/1.73M2
GLUCOSE BLD-MCNC: 123 MG/DL (ref 74–99)
GLUCOSE SERPL-MCNC: 156 MG/DL (ref 74–99)
HCT VFR BLD AUTO: 25 % (ref 37–47)
HGB BLD-MCNC: 7.7 G/DL (ref 12.5–16)
IMM GRANULOCYTES # BLD AUTO: 0.04 K/UL
IMM GRANULOCYTES NFR BLD: 0 %
LYMPHOCYTES NFR BLD: 1.22 K/UL
LYMPHOCYTES RELATIVE PERCENT: 11 % (ref 24–44)
MAGNESIUM SERPL-MCNC: 2.5 MG/DL (ref 1.8–2.4)
MCH RBC QN AUTO: 30.3 PG (ref 27–31)
MCHC RBC AUTO-ENTMCNC: 30.8 G/DL (ref 32–36)
MCV RBC AUTO: 98.4 FL (ref 78–100)
MONOCYTES NFR BLD: 0.81 K/UL
MONOCYTES NFR BLD: 7 % (ref 0–5)
NEUTROPHILS NFR BLD: 76 % (ref 36–66)
NEUTS SEG NFR BLD: 8.49 K/UL
PHOSPHATE SERPL-MCNC: 3.7 MG/DL (ref 2.5–4.9)
PLATELET # BLD AUTO: 298 K/UL (ref 140–440)
PMV BLD AUTO: 9.6 FL (ref 7.5–11.1)
POTASSIUM SERPL-SCNC: 5.3 MMOL/L (ref 3.5–5.1)
PROT SERPL-MCNC: 6.9 G/DL (ref 6.4–8.2)
RBC # BLD AUTO: 2.54 M/UL (ref 4.2–5.4)
SODIUM SERPL-SCNC: 135 MMOL/L (ref 136–145)
WBC OTHER # BLD: 11.2 K/UL (ref 4–10.5)

## 2025-08-05 PROCEDURE — 6370000000 HC RX 637 (ALT 250 FOR IP): Performed by: STUDENT IN AN ORGANIZED HEALTH CARE EDUCATION/TRAINING PROGRAM

## 2025-08-05 PROCEDURE — 82962 GLUCOSE BLOOD TEST: CPT

## 2025-08-05 PROCEDURE — 83880 ASSAY OF NATRIURETIC PEPTIDE: CPT

## 2025-08-05 PROCEDURE — 94761 N-INVAS EAR/PLS OXIMETRY MLT: CPT

## 2025-08-05 PROCEDURE — 6370000000 HC RX 637 (ALT 250 FOR IP): Performed by: NURSE PRACTITIONER

## 2025-08-05 PROCEDURE — 83735 ASSAY OF MAGNESIUM: CPT

## 2025-08-05 PROCEDURE — 85025 COMPLETE CBC W/AUTO DIFF WBC: CPT

## 2025-08-05 PROCEDURE — 90935 HEMODIALYSIS ONE EVALUATION: CPT

## 2025-08-05 PROCEDURE — 6360000002 HC RX W HCPCS: Performed by: INTERNAL MEDICINE

## 2025-08-05 PROCEDURE — 80053 COMPREHEN METABOLIC PANEL: CPT

## 2025-08-05 PROCEDURE — 2500000003 HC RX 250 WO HCPCS: Performed by: STUDENT IN AN ORGANIZED HEALTH CARE EDUCATION/TRAINING PROGRAM

## 2025-08-05 PROCEDURE — 6370000000 HC RX 637 (ALT 250 FOR IP): Performed by: INTERNAL MEDICINE

## 2025-08-05 PROCEDURE — 84100 ASSAY OF PHOSPHORUS: CPT

## 2025-08-05 RX ORDER — TORSEMIDE 20 MG/1
TABLET ORAL
Qty: 321.429 TABLET | Refills: 1 | Status: SHIPPED | OUTPATIENT
Start: 2025-08-05

## 2025-08-05 RX ADMIN — Medication 1000 UNITS: at 11:16

## 2025-08-05 RX ADMIN — OXYCODONE HYDROCHLORIDE 5 MG: 5 TABLET ORAL at 01:41

## 2025-08-05 RX ADMIN — SODIUM CHLORIDE, PRESERVATIVE FREE 10 ML: 5 INJECTION INTRAVENOUS at 11:22

## 2025-08-05 RX ADMIN — TORSEMIDE 40 MG: 20 TABLET ORAL at 11:15

## 2025-08-05 RX ADMIN — ASPIRIN 81 MG: 81 TABLET, CHEWABLE ORAL at 11:15

## 2025-08-05 RX ADMIN — METOPROLOL TARTRATE 25 MG: 25 TABLET, FILM COATED ORAL at 11:22

## 2025-08-05 RX ADMIN — BUSPIRONE HYDROCHLORIDE 10 MG: 5 TABLET ORAL at 11:15

## 2025-08-05 RX ADMIN — SEVELAMER CARBONATE 800 MG: 800 TABLET, FILM COATED ORAL at 11:14

## 2025-08-05 RX ADMIN — PROCHLORPERAZINE MALEATE 5 MG: 5 TABLET ORAL at 10:28

## 2025-08-05 RX ADMIN — CITALOPRAM HYDROBROMIDE 40 MG: 20 TABLET ORAL at 11:14

## 2025-08-05 RX ADMIN — EPOETIN ALFA-EPBX 10000 UNITS: 10000 INJECTION, SOLUTION INTRAVENOUS; SUBCUTANEOUS at 09:30

## 2025-08-05 ASSESSMENT — PAIN SCALES - GENERAL
PAINLEVEL_OUTOF10: 7
PAINLEVEL_OUTOF10: 3

## 2025-08-05 ASSESSMENT — PAIN DESCRIPTION - ORIENTATION: ORIENTATION: LEFT

## 2025-08-05 ASSESSMENT — PAIN DESCRIPTION - DESCRIPTORS: DESCRIPTORS: THROBBING

## 2025-08-05 ASSESSMENT — PAIN DESCRIPTION - LOCATION: LOCATION: NECK

## 2025-08-20 ENCOUNTER — TELEPHONE (OUTPATIENT)
Dept: ONCOLOGY | Age: 45
End: 2025-08-20